# Patient Record
Sex: MALE | Race: WHITE | NOT HISPANIC OR LATINO | Employment: OTHER | URBAN - METROPOLITAN AREA
[De-identification: names, ages, dates, MRNs, and addresses within clinical notes are randomized per-mention and may not be internally consistent; named-entity substitution may affect disease eponyms.]

---

## 2017-01-09 ENCOUNTER — HOSPITAL ENCOUNTER (OUTPATIENT)
Dept: PREADMISSION TESTING | Facility: HOSPITAL | Age: 82
Discharge: HOME | End: 2017-01-09
Attending: ANESTHESIOLOGY
Payer: MEDICARE

## 2017-01-09 ENCOUNTER — ANESTHESIA EVENT (OUTPATIENT)
Dept: SURGERY | Facility: HOSPITAL | Age: 82
End: 2017-01-09
Payer: MEDICARE

## 2017-01-09 VITALS
DIASTOLIC BLOOD PRESSURE: 58 MMHG | OXYGEN SATURATION: 92 % | SYSTOLIC BLOOD PRESSURE: 128 MMHG | HEART RATE: 60 BPM | TEMPERATURE: 98 F | HEIGHT: 70 IN | WEIGHT: 149.25 LBS | RESPIRATION RATE: 18 BRPM | BODY MASS INDEX: 21.37 KG/M2

## 2017-01-09 NOTE — IP AVS SNAPSHOT
[facility]  [address]  [facility] [address]  Phone: [phone]           Patient Discharge Instructions    Our goal is to set you up for success. This packet includes information on your condition, medications, and your home care. It will help you to care for yourself so you don't get sicker.     Please ask your nurse if you have any questions.        There are many details to remember when preparing for your surgery. Here is what you will need to do, please ask your nurse if there are more specific instructions and if you have any questions:    1. 24 hours before procedure Do not smoke or drink alcoholic beverages 24 hours prior to your procedure    2. Eating before procedure Do not eat or drink anything 8 hours before your procedure - this includes gum, mints, and candy.     3. Day of procedure Please remove all jewelry for the procedure. If you wear contact lenses, dentures, hearing aids or glasses, bring a container to put them in during your surgery and give to a family member for safekeeping.  If your doctor has scheduled you for an overnight stay, bring a small overnight bag with any personal items that you need.    4. After procedure Make arrangements in advance for transportation home by a responsible adult. It is not safe to drive a vehicle during the 24 hours following surgery.     PLEASE NOTE: You may be contacted the day before your surgery to confirm your surgery date and arrival time. The Surgery schedule has many variables which may affect the time of your surgery case. Family members should be available if your surgery time changes.                [facility]  Unless otherwise directed by your provider, please contact [facility], our nurse care line that is available for 24/7 assistance.     [phone] (toll-free)    Registered nurses in the [facility] provide clinical advisement, health education, appointment booking, and other  advisory services.                    ** Verify the list of medication(s) below is accurate and up to date. Carry this with you in case of emergency. If your medications have changed, please notify your healthcare provider.             Medication List      TAKE these medications        Additional Info                      amoxicillin-clavulanate 500-125mg 500-125 mg Tab   Commonly known as:  AUGMENTIN   Refills:  0   Dose:  1 tablet   Indications:  Pneumonia    Instructions:  Take 1 tablet (500 mg total) by mouth 2 (two) times daily. FOR 7 MORE DAYS     Begin Date    AM    Noon    PM    Bedtime       aspirin 81 MG EC tablet   Commonly known as:  ECOTRIN   Refills:  0   Dose:  81 mg    Instructions:  Take 81 mg by mouth once daily.     Begin Date    AM    Noon    PM    Bedtime       budesonide-formoterol 160-4.5 mcg 160-4.5 mcg/actuation Hfaa   Commonly known as:  SYMBICORT   Refills:  0   Dose:  2 puff    Instructions:  Inhale 2 puffs into the lungs every 12 (twelve) hours.     Begin Date    AM    Noon    PM    Bedtime       donepezil 10 MG tablet   Commonly known as:  ARICEPT   Refills:  0   Dose:  10 mg    Instructions:  Take 10 mg by mouth every evening.     Begin Date    AM    Noon    PM    Bedtime       escitalopram oxalate 10 MG tablet   Commonly known as:  LEXAPRO   Refills:  0   Dose:  10 mg    Instructions:  Take 10 mg by mouth once daily.     Begin Date    AM    Noon    PM    Bedtime       esomeprazole 40 MG capsule   Commonly known as:  NEXIUM   Refills:  0   Dose:  40 mg    Instructions:  Take 40 mg by mouth before breakfast.     Begin Date    AM    Noon    PM    Bedtime       fish oil-omega-3 fatty acids 300-1,000 mg capsule   Refills:  0   Dose:  2 g    Instructions:  Take 2 g by mouth once daily.     Begin Date    AM    Noon    PM    Bedtime       gabapentin 100 MG capsule   Commonly known as:  NEURONTIN   Refills:  0   Dose:  100 mg    Instructions:  Take 100 mg by mouth 3 (three) times daily.      Begin Date    AM    Noon    PM    Bedtime       hydrocodone-acetaminophen 5-325mg 5-325 mg per tablet   Commonly known as:  NORCO   Refills:  0   Dose:  1 tablet    Instructions:  Take 1 tablet by mouth every 6 (six) hours as needed for Pain.     Begin Date    AM    Noon    PM    Bedtime       latanoprost 0.005 % ophthalmic solution   Refills:  0   Dose:  1 drop    Instructions:  1 drop every evening.     Begin Date    AM    Noon    PM    Bedtime       lisinopril 5 MG tablet   Commonly known as:  PRINIVIL,ZESTRIL   Refills:  0   Dose:  5 mg    Instructions:  Take 5 mg by mouth once daily.     Begin Date    AM    Noon    PM    Bedtime       lorazepam 0.5 MG tablet   Commonly known as:  ATIVAN   Refills:  0   Dose:  0.5 mg    Instructions:  Take 0.5 mg by mouth 2 (two) times daily as needed for Anxiety.     Begin Date    AM    Noon    PM    Bedtime       memantine 14 mg Cspx   Refills:  0    Instructions:  Take by mouth.     Begin Date    AM    Noon    PM    Bedtime       metoprolol tartrate 25 MG tablet   Commonly known as:  LOPRESSOR   Refills:  0   Dose:  25 mg    Instructions:  Take 1 tablet (25 mg total) by mouth 2 (two) times daily.     Begin Date    AM    Noon    PM    Bedtime       MILK OF MAGNESIA 400 mg/5 mL Susp   Refills:  0   Dose:  30 mL   Generic drug:  magnesium hydroxide 400 mg/5 ml    Instructions:  Take 30 mLs by mouth daily as needed.     Begin Date    AM    Noon    PM    Bedtime       mirtazapine 15 MG disintegrating tablet   Commonly known as:  REMERON SOL-TAB   Refills:  0   Dose:  15 mg    Instructions:  Take 15 mg by mouth every evening.     Begin Date    AM    Noon    PM    Bedtime       ondansetron 4 MG tablet   Commonly known as:  ZOFRAN   Refills:  0      Begin Date    AM    Noon    PM    Bedtime       ONE DAILY MULTIVITAMIN per tablet   Refills:  0   Dose:  1 tablet   Generic drug:  multivitamin    Instructions:  Take 1 tablet by mouth once daily.     Begin Date    AM    Noon    PM     Bedtime       pravastatin 40 MG tablet   Commonly known as:  PRAVACHOL   Refills:  0   Dose:  40 mg    Instructions:  Take 40 mg by mouth once daily.     Begin Date    AM    Noon    PM    Bedtime       VITAMIN B-1 100 MG tablet   Refills:  0   Dose:  100 mg   Generic drug:  thiamine    Instructions:  Take 100 mg by mouth once daily.     Begin Date    AM    Noon    PM    Bedtime       VITAMIN B-12 1000 MCG tablet   Refills:  0   Dose:  100 mcg   Generic drug:  cyanocobalamin    Instructions:  Take 100 mcg by mouth once daily.     Begin Date    AM    Noon    PM    Bedtime                  Please bring to all follow up appointments:    1. A copy of your discharge instructions.  2. All medicines you are currently taking in their original bottles.  3. Identification and insurance card.    Please arrive 15 minutes ahead of scheduled appointment time.    Please call 24 hours in advance if you must reschedule your appointment and/or time.        Your Scheduled Appointments     Jan 09, 2017  2:00 PM CST   Non-Fasting Lab with LAB, APPOINTMENT [place] [facility]    [address] [id number] [phone]            Jan 16, 2017  7:30 AM CST   Lymph Node Imaging with [facility] NM4 MG2 400LB LIMIT   [facility]    [address] [id number] [phone]              Your Future Surgeries/Procedures     Jan 16, 2017   Surgery with Abraham Villafuerte MD   [facility]    [address] [id number] [phone]                  Discharge Instructions       Your surgery has been scheduled for:__________________________________________    You should report to:  ____[facility], located on the [place] side of the first floor of the           [facility] ([phone])  ____The [facility] Center, located on  the [facility] side of the            Second floor of the [facility] ([phone])  ____3rd Floor SSCU located on the [facility] side of the [facility] [phone]  Please Note   - Tell your doctor if you take Aspirin, products containing Aspirin, herbal medications  or blood thinners, such as Coumadin, Ticlid, or Plavix.  (Consult your provider regarding holding or stopping before surgery).  - Arrange for someone to drive you home following surgery.  You will not be allowed to leave the surgical facility alone or drive yourself home following sedation and anesthesia.  Before Surgery  - Stop taking all herbal medications 14days prior to surgery  - No Motrin/Advil (Ibuprofen) 7 days before surgery  - No Aleve (Naproxen) 7 days before surgery  - Stop Taking Asprin, products containing Asprin _____days before surgery  - Stop taking blood thinners_______days before surgery  - Refrain from drinking alcoholic beverages for 24hours before and after surgery  - Stop or limit smoking _________days before surgery  Night before Surgery  - DO NOT EAT OR DRINK ANYTHING AFTER MIDNIGHT, INCLUDING GUM, HARD CANDY, MINTS, OR CHEWING TOBACCO.  - Take a shower or bath (shower is recommended).  Bathe with Hibiclens soap or an antibacterial soap from the neck down.  If not supplied by your surgeon, hibiclens soap will need to be purchased over the counter in pharmacy.  Rinse soap off thoroughly.  - Shampoo your hair with your regular shampoo  The Day of Surgery  - Take another bath or shower with hibiclens or any antibacterial soap, to reduce the chance of infection.  - Take heart and blood pressure medications with a small sip of water, as advised by the perioperative team.  - Do not take fluid pills  - You may brush your teeth and rinse your mouth, but do not swall any additional water.   - Do not apply perfumes, powder, body lotions or deodorant on the day of surgery.  - Nail polish  should be removed.  - Do not wear makeup or moisturizer  - Wear comfortable clothes, such as a button front shirt and loose fitting pants.  - Leave all jewelry, including body piercings, and valuables at home.    - Bring any devices you will neeed after surgery such as crutches or canes.  - If you have sleep apnea, please bring your CPAP machine  In the event that your physical condition changes including the onset of a cold or respiratory illness, or if you have to delay or cancel your surgery, please notify your surgeon.  Anesthesia: Monitored Anesthesia Care (MAC)  Youre due to have surgery. During surgery, youll be given medication called anesthesia. This will keep you comfortable and pain-free. Your surgeon will use monitored anesthesia care (MAC). This sheet tells you more about this type of anesthesia.    What Is Monitored Anesthesia Care?  MAC keeps you very drowsy during surgery. You may be awake, but you will likely not remember much. And you wont feel pain. With MAC, medications are given through an IV line into a vein in your arm or hand. A local anesthetic may also be injected into the skin and muscle around the surgical site to numb it. The anesthesia provider monitors you during the procedure. He or she checks your heart rate and rhythm, blood pressure, and blood oxygen level.  Anesthesia Tools and Medications That May be Near You During Your Operation  You will likely have:  · A pulse oximeter on the end of your finger. This measures your blood oxygen level.  · Electrocardiography leads (electrodes) on your chest. These record your heart rate and rhythm.  · Medications given through an IV. These relax you and prevent pain. You may be awake or sleep lightly. If you have local anesthetic, it is injected directly into your skin.  · A facemask to give you oxygen, if needed.  Risks and Possible Complications  MAC has some risks. These include:  · Breathing problems  · Nausea and vomiting  · Allergic  "reaction to the anesthetic    Anesthesia Safety  · Follow all instructions you are given for how long not to eat or drink before your procedure.  · Be sure your doctor knows what medications you take. This includes over-the-counter medications, herbs, and supplements.  · Have an adult family member or friend drive you home after the procedure.  · For the first 24 hours after your surgery:  ¨ Do not drive or use heavy equipment.  ¨ Do not make important decisions or sign documents.  ¨ Avoid alcohol.  ¨ Have someone stay with you, if possible. They can watch for problems and help keep you safe.  © 3940-2496 [facility], [address] [place] [address]. All rights reserved. This information is not intended as a substitute for professional medical care. Always follow your healthcare professional's instructions.          Admission Information     Date & Time Provider Department CSN    1/9/2017  1:00 PM Uday Herrmann MD [facility] [id number]      Care Providers     Provider Role Specialty Primary office phone    Uday Herrmann MD Attending Provider Anesthesiology [phone]      Your Vitals Were     BP Pulse Temp Resp Height Weight    128/58 60 98.2 °F (36.8 °C) 18 5' 9.5" (1.765 m) 67.7 kg (149 lb 4 oz)    SpO2 BMI             92% 21.72 kg/m2         Recent Lab Values     No lab values to display.      Allergies as of 1/9/2017     No Known Allergies      Advance Directives     An advance directive is a document which, in the event you are no longer able to make decisions for yourself, tells your healthcare team what kind of treatment you do or do not want to receive, or who you would like to make those decisions for you.  If you do not currently have an advance directive, [facility] encourages you to create one.  For more information call:  [phone]), [phone] ([phone]),  or log on to [email or web address].        Language Assistance Services     " ATTENTION: Language assistance services are available, free of charge. Please call [phone].      ATENCIÓN: Si aliya noguera, tiene a mcdowell disposición servicios gratuitos de asistencia lingüística. Llame al [phone].     CHÚ Ý: N?u b?n nói Ti?ng Vi?t, có các d?ch v? h? tr? ngôn ng? mi?n phí dành cho b?n. G?i s? [phone].        Pneumonmia Discharge Instructions                [facility] Sign-Up     Activating your [facility] account is as easy as 1-2-3!     1) Visit [email or web address], select Sign Up Now, enter this activation code and your date of birth, then select Next.  [email or web address]  Expires: 2/13/2017 11:05 AM      2) Create a username and password to use when you visit [facility] in the future and select a security question in case you lose your password and select Next.    3) Enter your e-mail address and click Sign Up!    Additional Information  If you have questions, please e-mail [email or web address] or call [phone] to talk to our [facility] staff. Remember, [facility] is NOT to be used for urgent needs. For medical emergencies, dial 911.          [facility] complies with applicable Federal civil rights laws and does not discriminate on the basis of race, color, national origin, age, disability, or sex.

## 2017-01-09 NOTE — ANESTHESIA PREPROCEDURE EVALUATION
01/09/2017  Fidel Allan is a 89 y.o., male.    [facility] Anesthesia Evaluation      I have reviewed the Medications.     Review of Systems  Anesthesia Hx:  No problems with previous Anesthesia  History of prior surgery of interest to airway management or planning: Previous anesthesia: General insertion of pacemaker at [facility] with general anesthesia.  Procedure performed at an [facility].  Denies Personal Hx of Anesthesia complications.   Social:  Former Smoker, No Alcohol Use Smoked x 25 yrs 1ppd; quit 25 yrs ago   Hematology/Oncology:        Current/Recent Cancer. (melanoma left posterior neck)   EENT/Dental:   glaucoma   Cardiovascular:   Exercise tolerance: poor Hypertension CAD asymptomatic CABG/stent   Denies Angina. hyperlipidemia ECG has been reviewed. Pt reports CABG over 20yrs ago.   [employer] pacemaker inserted 11/23/16  Aortic stenosis;  LHC at [facility] (in Dr Fofana's note states pt had stent placed & then the next day he implanted the pacemaker on 11/23/16); pt denies stent & is only taking low dose aspirin Functional Capacity 2 METS, walks on own around nursing home, sometimes uses w/c; denies CP, SOB    Pulmonary:   Pneumonia (12/27/16 had sepsis 2/2 pneumonia; completed antibiotics ) COPD Denies Shortness of breath.  Denies Sleep Apnea.    Renal/:  Renal/ Normal     Hepatic/GI:   GERD, well controlled    Musculoskeletal:   Denies Arthritis.     Neurological:  Neurology Normal    Endocrine:   Denies Diabetes.    Psych:   Psychiatric History depression dementia         Physical Exam   Airway/Jaw/Neck:  Airway Findings: Mouth Opening: Normal Tongue: Normal  General Airway Assessment: Adult  Jaw/Neck Findings:     Neck ROM: Normal ROM      Dental:  Dental Findings: Edentulous   Chest/Lungs:  Chest/Lungs Findings: Clear to auscultation, Normal Respiratory Rate      Heart/Vascular:  Heart Findings: Rate: Normal  Rhythm: Regular Rhythm  Heart Murmur  Systolic  Systolic Heart Murmur Description: L Upper Sternal Border  Systolic Heart Murmur Grade: Grade II           Pt was seen in POC 1/9/17 accompanied by staff from nursing home/Angella Portillo RN      Anesthesia Plan  Type of Anesthesia, risks & benefits discussed:  Anesthesia Type:  general  Patient's Preference:   Intra-op Monitoring Plan: arterial line  Intra-op Monitoring Plan Comments:   Post Op Pain Control Plan:   Post Op Pain Control Plan Comments:   Induction:   IV  Beta Blocker:  Patient is not currently on a Beta-Blocker (No further documentation required).       Informed Consent: Patient understands risks and agrees with Anesthesia plan.  Questions answered. Anesthesia consent signed with patient.  ASA Score: 4     Day of Surgery Review of History & Physical:    H&P update referred to the surgeon.         Ready For Surgery From Anesthesia Perspective.

## 2017-01-09 NOTE — DISCHARGE INSTRUCTIONS
Your surgery has been scheduled for:__________________________________________    You should report to:  ____[facility], located on the [place] side of the first floor of the           [facility] ([phone])  ____[facility] Center, located on the [facility] side of the            Second floor of the [facility] ([phone])  ____3rd Floor SSCU located on the [facility] side of the [facility] [phone]  Please Note   - Tell your doctor if you take Aspirin, products containing Aspirin, herbal medications  or blood thinners, such as Coumadin, Ticlid, or Plavix.  (Consult your provider regarding holding or stopping before surgery).  - Arrange for someone to drive you home following surgery.  You will not be allowed to leave the surgical facility alone or drive yourself home following sedation and anesthesia.  Before Surgery  - Stop taking all herbal medications 14days prior to surgery  - No Motrin/Advil (Ibuprofen) 7 days before surgery  - No Aleve (Naproxen) 7 days before surgery  - Stop Taking Asprin, products containing Asprin _____days before surgery  - Stop taking blood thinners_______days before surgery  - Refrain from drinking alcoholic beverages for 24hours before and after surgery  - Stop or limit smoking _________days before surgery  Night before Surgery  - DO NOT EAT OR DRINK ANYTHING AFTER MIDNIGHT, INCLUDING GUM, HARD CANDY, MINTS, OR CHEWING TOBACCO.  - Take a shower or bath (shower is recommended).  Bathe with Hibiclens soap or an antibacterial soap from the neck down.  If not supplied by your surgeon, hibiclens soap will need to be purchased over the counter in pharmacy.  Rinse soap off thoroughly.  - Shampoo your hair with your regular shampoo  The Day of Surgery  - Take another bath or shower with hibiclens or any antibacterial soap, to reduce the chance of infection.  - Take heart and blood  pressure medications with a small sip of water, as advised by the perioperative team.  - Do not take fluid pills  - You may brush your teeth and rinse your mouth, but do not swall any additional water.   - Do not apply perfumes, powder, body lotions or deodorant on the day of surgery.  - Nail polish should be removed.  - Do not wear makeup or moisturizer  - Wear comfortable clothes, such as a button front shirt and loose fitting pants.  - Leave all jewelry, including body piercings, and valuables at home.    - Bring any devices you will neeed after surgery such as crutches or canes.  - If you have sleep apnea, please bring your CPAP machine  In the event that your physical condition changes including the onset of a cold or respiratory illness, or if you have to delay or cancel your surgery, please notify your surgeon.  Anesthesia: Monitored Anesthesia Care (MAC)  Youre due to have surgery. During surgery, youll be given medication called anesthesia. This will keep you comfortable and pain-free. Your surgeon will use monitored anesthesia care (MAC). This sheet tells you more about this type of anesthesia.    What Is Monitored Anesthesia Care?  MAC keeps you very drowsy during surgery. You may be awake, but you will likely not remember much. And you wont feel pain. With MAC, medications are given through an IV line into a vein in your arm or hand. A local anesthetic may also be injected into the skin and muscle around the surgical site to numb it. The anesthesia provider monitors you during the procedure. He or she checks your heart rate and rhythm, blood pressure, and blood oxygen level.  Anesthesia Tools and Medications That May be Near You During Your Operation  You will likely have:  · A pulse oximeter on the end of your finger. This measures your blood oxygen level.  · Electrocardiography leads (electrodes) on your chest. These record your heart rate and rhythm.  · Medications given through an IV. These relax  you and prevent pain. You may be awake or sleep lightly. If you have local anesthetic, it is injected directly into your skin.  · A facemask to give you oxygen, if needed.  Risks and Possible Complications  MAC has some risks. These include:  · Breathing problems  · Nausea and vomiting  · Allergic reaction to the anesthetic    Anesthesia Safety  · Follow all instructions you are given for how long not to eat or drink before your procedure.  · Be sure your doctor knows what medications you take. This includes over-the-counter medications, herbs, and supplements.  · Have an adult family member or friend drive you home after the procedure.  · For the first 24 hours after your surgery:  ¨ Do not drive or use heavy equipment.  ¨ Do not make important decisions or sign documents.  ¨ Avoid alcohol.  ¨ Have someone stay with you, if possible. They can watch for problems and help keep you safe.  © 9948-3397 [employer], [address] [place] [address], [place] [address]. All rights reserved. This information is not intended as a substitute for professional medical care. Always follow your healthcare professional's instructions.

## 2017-01-13 ENCOUNTER — TELEPHONE (OUTPATIENT)
Dept: SURGERY | Facility: CLINIC | Age: 82
End: 2017-01-13

## 2017-01-13 NOTE — TELEPHONE ENCOUNTER
[facility] notified for him to arrive at the 2nd Floor Radiology Department for 7:30am for Nuclear Medicine Lymph Node Mapping and then to go to the 2nd Floor Surgery Center for surgery with Dr. Villafuerte on Monday 1/16/17.  Representative at [facility] Verbalized understanding.

## 2017-01-16 ENCOUNTER — ANESTHESIA (OUTPATIENT)
Dept: SURGERY | Facility: HOSPITAL | Age: 82
End: 2017-01-16
Payer: MEDICARE

## 2017-01-16 ENCOUNTER — HOSPITAL ENCOUNTER (OUTPATIENT)
Dept: RADIOLOGY | Facility: HOSPITAL | Age: 82
Discharge: HOME | End: 2017-01-16
Attending: SURGERY
Payer: MEDICARE

## 2017-01-16 DIAGNOSIS — C43.9 MALIGNANT MELANOMA, UNSPECIFIED SITE: ICD-10-CM

## 2017-01-16 PROCEDURE — D9220A PRA ANESTHESIA: Mod: CRNA,,, | Performed by: REGISTERED NURSE

## 2017-01-16 PROCEDURE — 25000003 PHARM REV CODE 250: Performed by: SURGERY

## 2017-01-16 PROCEDURE — 25000003 PHARM REV CODE 250: Performed by: REGISTERED NURSE

## 2017-01-16 PROCEDURE — 63600175 PHARM REV CODE 636 W HCPCS: Performed by: REGISTERED NURSE

## 2017-01-16 PROCEDURE — 78195 LYMPH SYSTEM IMAGING: CPT | Mod: 26,GC,, | Performed by: RADIOLOGY

## 2017-01-16 PROCEDURE — D9220A PRA ANESTHESIA: Mod: ANES,,, | Performed by: ANESTHESIOLOGY

## 2017-01-16 PROCEDURE — A9541 TC99M SULFUR COLLOID: HCPCS

## 2017-01-16 RX ORDER — CEFAZOLIN SODIUM 1 G/3ML
INJECTION, POWDER, FOR SOLUTION INTRAMUSCULAR; INTRAVENOUS
Status: DISCONTINUED | OUTPATIENT
Start: 2017-01-16 | End: 2017-01-16

## 2017-01-16 RX ORDER — LIDOCAINE HCL/PF 100 MG/5ML
SYRINGE (ML) INTRAVENOUS
Status: DISCONTINUED | OUTPATIENT
Start: 2017-01-16 | End: 2017-01-16

## 2017-01-16 RX ORDER — ONDANSETRON 2 MG/ML
INJECTION INTRAMUSCULAR; INTRAVENOUS
Status: DISCONTINUED | OUTPATIENT
Start: 2017-01-16 | End: 2017-01-16

## 2017-01-16 RX ORDER — FENTANYL CITRATE 50 UG/ML
INJECTION, SOLUTION INTRAMUSCULAR; INTRAVENOUS
Status: DISCONTINUED | OUTPATIENT
Start: 2017-01-16 | End: 2017-01-16

## 2017-01-16 RX ORDER — PROPOFOL 10 MG/ML
VIAL (ML) INTRAVENOUS
Status: DISCONTINUED | OUTPATIENT
Start: 2017-01-16 | End: 2017-01-16

## 2017-01-16 RX ORDER — PHENYLEPHRINE HYDROCHLORIDE 10 MG/ML
INJECTION INTRAVENOUS
Status: DISCONTINUED | OUTPATIENT
Start: 2017-01-16 | End: 2017-01-16

## 2017-01-16 RX ADMIN — PHENYLEPHRINE HYDROCHLORIDE 200 MCG: 10 INJECTION INTRAVENOUS at 02:01

## 2017-01-16 RX ADMIN — PHENYLEPHRINE HYDROCHLORIDE 200 MCG: 10 INJECTION INTRAVENOUS at 01:01

## 2017-01-16 RX ADMIN — SODIUM CHLORIDE: 0.9 INJECTION, SOLUTION INTRAVENOUS at 01:01

## 2017-01-16 RX ADMIN — FENTANYL CITRATE 25 MCG: 50 INJECTION, SOLUTION INTRAMUSCULAR; INTRAVENOUS at 02:01

## 2017-01-16 RX ADMIN — PROPOFOL 120 MG: 10 INJECTION, EMULSION INTRAVENOUS at 01:01

## 2017-01-16 RX ADMIN — LIDOCAINE HYDROCHLORIDE 40 MG: 20 INJECTION, SOLUTION INTRAVENOUS at 01:01

## 2017-01-16 RX ADMIN — FENTANYL CITRATE 50 MCG: 50 INJECTION, SOLUTION INTRAMUSCULAR; INTRAVENOUS at 01:01

## 2017-01-16 RX ADMIN — PHENYLEPHRINE HYDROCHLORIDE 0.3 MCG/KG/MIN: 10 INJECTION INTRAVENOUS at 02:01

## 2017-01-16 RX ADMIN — PROPOFOL 30 MG: 10 INJECTION, EMULSION INTRAVENOUS at 02:01

## 2017-01-16 RX ADMIN — CEFAZOLIN 2 G: 1 INJECTION, POWDER, FOR SOLUTION INTRAVENOUS at 02:01

## 2017-01-16 RX ADMIN — ONDANSETRON 4 MG: 2 INJECTION INTRAMUSCULAR; INTRAVENOUS at 02:01

## 2017-01-16 RX ADMIN — PROPOFOL 50 MG: 10 INJECTION, EMULSION INTRAVENOUS at 02:01

## 2017-01-16 RX ADMIN — PHENYLEPHRINE HYDROCHLORIDE 300 MCG: 10 INJECTION INTRAVENOUS at 01:01

## 2017-01-16 NOTE — ANESTHESIA POSTPROCEDURE EVALUATION
"Anesthesia Post Evaluation    Patient: Fidel Allan    Procedure(s) Performed: Procedure(s) (LRB):  EXCISION-WIDE LOCAL 2 cm (Left)  BIOPSY-SENTINEL NODE (N/A)    Final Anesthesia Type: general  Patient location during evaluation: PACU  Patient participation: Yes- Able to Participate  Level of consciousness: awake and alert  Post-procedure vital signs: reviewed and stable  Pain management: adequate  Airway patency: patent  PONV status at discharge: No PONV  Anesthetic complications: no      Cardiovascular status: hemodynamically stable  Respiratory status: unassisted, spontaneous ventilation and room air  Hydration status: euvolemic  Follow-up not needed.        Visit Vitals    BP (!) 155/68    Pulse 60    Temp 36.7 °C (98.1 °F) (Oral)    Resp (!) 21    Ht 5' 9" (1.753 m)    Wt 68 kg (150 lb)    SpO2 (!) 93%    BMI 22.15 kg/m2       Pain/Fior Score: Pain Assessment Performed: Yes (1/16/2017  5:00 PM)  Presence of Pain: denies (1/16/2017  5:00 PM)  Pain Rating Prior to Med Admin: 3 (1/16/2017  4:27 PM)  Fior Score: 10 (1/16/2017  5:00 PM)      "

## 2017-01-16 NOTE — ANESTHESIA RELEASE NOTE
"Anesthesia Release from PACU Note    Patient: Fidel Allan    Procedure(s) Performed: Procedure(s) (LRB):  EXCISION-WIDE LOCAL 2 cm (Left)  BIOPSY-SENTINEL NODE (N/A)    Anesthesia type: general    Post pain: Adequate analgesia    Post assessment: no apparent anesthetic complications, tolerated procedure well and no evidence of recall    Last Vitals:   Visit Vitals    BP (!) 155/68    Pulse 60    Temp 36.7 °C (98.1 °F) (Oral)    Resp (!) 21    Ht 5' 9" (1.753 m)    Wt 68 kg (150 lb)    SpO2 (!) 93%    BMI 22.15 kg/m2       Post vital signs: stable    Level of consciousness: awake, alert  and oriented    Nausea/Vomiting: no nausea/no vomiting    Complications: none    Airway Patency: patent    Respiratory: unassisted    Cardiovascular: stable and blood pressure at baseline    Hydration: euvolemic  "

## 2017-01-16 NOTE — TRANSFER OF CARE
"Anesthesia Transfer of Care Note    Patient: Fidel Allan    Procedure(s) Performed: Procedure(s) (LRB):  EXCISION-WIDE LOCAL 2 cm (Left)  BIOPSY-SENTINEL NODE (N/A)    Patient location: PACU    Anesthesia Type: general    Transport from OR: Transported from OR on 6-10 L/min O2 by face mask with adequate spontaneous ventilation    Post pain: adequate analgesia    Post assessment: no apparent anesthetic complications and tolerated procedure well    Post vital signs: stable    Level of consciousness: awake, alert and oriented    Nausea/Vomiting: no nausea/vomiting    Complications: none          Last vitals:   Visit Vitals    BP (!) 167/78 (BP Location: Left arm, Patient Position: Lying, BP Method: Automatic)    Pulse (!) 58    Temp 36.4 °C (97.6 °F) (Oral)    Resp 18    Ht 5' 9" (1.753 m)    Wt 68 kg (150 lb)    SpO2 99%    BMI 22.15 kg/m2     "

## 2017-01-17 ENCOUNTER — TELEPHONE (OUTPATIENT)
Dept: SURGERY | Facility: CLINIC | Age: 82
End: 2017-01-17

## 2017-01-17 NOTE — TELEPHONE ENCOUNTER
----- Message from Lynn Rose sent at 1/17/2017 10:51 AM CST -----  Contact: Astrid/ [phone]/ assisted living  Astrid called and wanted to schedule a 1 week wound re check. Please call and advise Astrid @ [phone]. Thanks :)

## 2017-01-17 NOTE — TELEPHONE ENCOUNTER
Spoke to safia at nursing [place], she stated that the JEANETH drain is leaking air, per DR Villafuerte, suggested that she place a xeroform on wound  and around   JEANETH insertion site to help with the leaking, she will call if in continues to give her trouble

## 2017-01-17 NOTE — TELEPHONE ENCOUNTER
----- Message from Burton Neal sent at 1/17/2017 11:51 AM CST -----  Contact: Monique anne [facility]  [place],    Caller needs to speak back with you regarding pt. Please call her at [phone]

## 2017-01-26 ENCOUNTER — OFFICE VISIT (OUTPATIENT)
Dept: SURGERY | Facility: CLINIC | Age: 82
End: 2017-01-26
Payer: MEDICARE

## 2017-01-26 VITALS — HEIGHT: 69 IN | TEMPERATURE: 98 F | WEIGHT: 149 LBS | BODY MASS INDEX: 22.07 KG/M2

## 2017-01-26 DIAGNOSIS — C43.4 MALIGNANT MELANOMA OF NECK: Primary | ICD-10-CM

## 2017-01-26 PROCEDURE — 99212 OFFICE O/P EST SF 10 MIN: CPT | Mod: PBBFAC,PO | Performed by: SURGERY

## 2017-01-26 PROCEDURE — 99024 POSTOP FOLLOW-UP VISIT: CPT | Mod: ,,, | Performed by: SURGERY

## 2017-01-26 PROCEDURE — 99999 PR PBB SHADOW E&M-EST. PATIENT-LVL II: CPT | Mod: PBBFAC,,, | Performed by: SURGERY

## 2017-01-26 RX ORDER — NITROGLYCERIN 0.3 MG/1
0.3 TABLET SUBLINGUAL EVERY 5 MIN PRN
COMMUNITY
Start: 2017-01-05

## 2017-01-26 RX ORDER — NYSTATIN 100000 [USP'U]/ML
SUSPENSION ORAL
COMMUNITY
Start: 2017-01-02 | End: 2017-01-29 | Stop reason: CLARIF

## 2017-01-26 RX ORDER — MEMANTINE HYDROCHLORIDE 14 MG/1
14 CAPSULE, EXTENDED RELEASE ORAL DAILY
COMMUNITY
Start: 2017-01-17

## 2017-01-26 RX ORDER — MIRTAZAPINE 15 MG/1
30 TABLET, FILM COATED ORAL NIGHTLY
COMMUNITY
Start: 2017-01-09

## 2017-01-26 RX ORDER — TRAMADOL HYDROCHLORIDE 50 MG/1
TABLET ORAL
COMMUNITY
Start: 2017-01-09 | End: 2017-01-29 | Stop reason: CLARIF

## 2017-01-26 NOTE — LETTER
[facility]  [address]  [facility] [address]  Phone: [phone] January 26, 2017      Jeffrey Watson MD  [address] [place] [address]  [address]  [facility]  [place] [address]    Patient: Fidel Allan   MR Number: [id number]   YOB: 1927   Date of Visit: 1/26/2017     Dear Dr. Watson:    I saw your patient Fidel Allan in my surgery clinic. Below are the relevant portions of my assessment and plan of care.    Mr. Allan is a 89-year-old male who is status post melanoma excision with SLNB, negative margins and negative node.    Patient will f/u with Dr. Watson, his Dermatologist, Q 3 - 6 months for whole body skin screening and surveillance.  No role for any further adjuvant therapy for his melanoma with negative margins of resection and negative SLNB at age 89 for stage II melanoma with thick T4 primary lesion.  F/U with me prn at this point.    Incision site well healed without signs of infection.  Drain and sutures removed. A copy of the pathology report discussed with patient and caretaker and a copy provided to them as well.    If you have questions, please do not hesitate to call me. I look forward to following Fidel along with you.    Sincerely,        Abraham Villafuerte MD  Medical Director, [facility] [place]  Section of General and Oncologic Surgery  [facility]    RLC/hcr     [facility]

## 2017-01-26 NOTE — PROGRESS NOTES
"Fidel Allan is a 89 y.o. male patient.   No diagnosis found.  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  Scheduled Meds:  Continuous Infusions:  PRN Meds:    Review of patient's allergies indicates:  No Known Allergies  There are no hospital problems to display for this patient.    Temperature 98.1 °F (36.7 °C), temperature source Oral, height 5' 9" (1.753 m), weight 67.6 kg (149 lb).    Subjective   Patient doing well since surgery.  No incisional breaking or drainage from incision.  Care provider did not bring drain logs, however the drain has never filled completely on any given day and was never drained more than once per day.  Patient denies pain.    Objective   Vitals above    NAD, friendly and cooperative  NSR  Lungs clear  Abd benign  Ext 2+ DPs x4, no c/c/e  Incision: clean and dry with running nylon suture in place, drain in place with minimal serous output in bulb and tubing    FINAL PATHOLOGIC DIAGNOSIS  1. Skin, left neck, excision:  - RESIDUAL ATYPICAL INTRAEPIDERMAL MELANOCYTIC PROLIFERATION. NO RESIDUAL INVASIVE  MELANOMA IS IDENTIFIED.  - MARGINS ARE NEGATIVE.  - PREVIOUS BIOPSY SITE CHANGES.  - INCIDENTAL MELANOCYTIC NEVUS, INTRADERMAL TYPE, PRESENT WITHIN BLOCKS S AND T.  MARGINS ARE NEGATIVE.  MICROSCOPIC DESCRIPTION: Sections show a proliferation of enlarged, variably atypical junctional melanocytes  exhibiting focal lentiginous and near-confluent growth along the dermoepidermal " junction. The tumor is located in  close proximity to an area with changes consistent with a previous biopsy site. All margins are negative. Within  blocks S and T, there are nests and cords of melanocytes within the dermis showing architectural symmetry,  maturation, and no cytological atypia.  2. Lymph node, left neck level II sentinel lymph node, excision:  - ONE LYMPH NODE NEGATIVE FOR METASTATIC MELANOMA (0/1).  MICROSCOPIC DESCRIPTION: Sections show lymphoid tissue. Atypical melanocytes are not noted. S-100, Mart-1  and HMB-45 immunohistochemical stains are performed and fail to identify atypical melanocytes. Appropriately  reactive controls are reviewed. Multiple levels are examined.     Assessment & Plan     The patient Fidel Allan is a 89 y.o. male s/p melanoma excision with SLNB, negative margins and negative node    Sutures and drain removed in clinic without issues  Follow-up as needed    Jonathan Schoen, MD  1/26/2017   I have personally taken the history and examined this patient and agree with the resident's note as stated above.  Pt will f/u with Dr. Watson his dermatologist Q 3-6 months for whole body skin screening and surveillance.  No role for any further adjuvant therapy for his melanoma with negative margins of resection and negative SLNB at age 89 for stage II melanoma with thick T4 primary lesion.  F/U with me prn at this point.  Incision site well healed with out signs of infection.  Drain and sutures removed.  A copy of the pathology report discussed with patient and caretaker and a copy provided to them as well.

## 2017-01-29 ENCOUNTER — HOSPITAL ENCOUNTER (INPATIENT)
Facility: HOSPITAL | Age: 82
LOS: 5 days | Discharge: HOME | DRG: 445 | End: 2017-02-03
Attending: EMERGENCY MEDICINE | Admitting: HOSPITALIST
Payer: MEDICARE

## 2017-01-29 DIAGNOSIS — R78.81 STREPTOCOCCAL BACTEREMIA: ICD-10-CM

## 2017-01-29 DIAGNOSIS — A49.1 INFECTION DUE TO STREPTOCOCCUS GALLOLYTICUS: ICD-10-CM

## 2017-01-29 DIAGNOSIS — K80.31 CHOLANGITIS DUE TO BILE DUCT CALCULUS WITH OBSTRUCTION: Primary | ICD-10-CM

## 2017-01-29 DIAGNOSIS — B95.5 STREPTOCOCCAL BACTEREMIA: ICD-10-CM

## 2017-01-29 DIAGNOSIS — I10 ESSENTIAL HYPERTENSION: Chronic | ICD-10-CM

## 2017-01-29 DIAGNOSIS — K83.09 CHOLANGITIS: ICD-10-CM

## 2017-01-29 DIAGNOSIS — I38 ENDOCARDITIS: ICD-10-CM

## 2017-01-29 DIAGNOSIS — N18.30 STAGE 3 CHRONIC KIDNEY DISEASE: Chronic | ICD-10-CM

## 2017-01-29 PROBLEM — D69.6 THROMBOCYTOPENIA: Status: ACTIVE | Noted: 2017-01-29

## 2017-01-29 PROBLEM — E80.6 HYPERBILIRUBINEMIA: Status: ACTIVE | Noted: 2017-01-29

## 2017-01-29 PROBLEM — E87.1 HYPONATREMIA: Status: ACTIVE | Noted: 2017-01-29

## 2017-01-29 PROBLEM — R74.01 TRANSAMINITIS: Status: ACTIVE | Noted: 2017-01-29

## 2017-01-29 PROBLEM — K80.50 CHOLEDOCHOLITHIASIS: Status: ACTIVE | Noted: 2017-01-29

## 2017-01-29 LAB
ALBUMIN SERPL BCP-MCNC: 3.2 G/DL
ALP SERPL-CCNC: 227 U/L
ALT SERPL W/O P-5'-P-CCNC: 184 U/L
ANION GAP SERPL CALC-SCNC: 9 MMOL/L
AST SERPL-CCNC: 327 U/L
BASOPHILS # BLD AUTO: 0.01 K/UL
BASOPHILS NFR BLD: 0.1 %
BILIRUB SERPL-MCNC: 2.1 MG/DL
BILIRUB UR QL STRIP: ABNORMAL
BNP SERPL-MCNC: 169 PG/ML
BUN SERPL-MCNC: 20 MG/DL
CALCIUM SERPL-MCNC: 8.4 MG/DL
CHLORIDE SERPL-SCNC: 99 MMOL/L
CK SERPL-CCNC: 71 U/L
CLARITY UR: CLEAR
CO2 SERPL-SCNC: 27 MMOL/L
COLOR UR: ABNORMAL
CREAT SERPL-MCNC: 1.1 MG/DL
DIFFERENTIAL METHOD: ABNORMAL
EOSINOPHIL # BLD AUTO: 0 K/UL
EOSINOPHIL NFR BLD: 0.1 %
ERYTHROCYTE [DISTWIDTH] IN BLOOD BY AUTOMATED COUNT: 14.1 %
EST. GFR  (AFRICAN AMERICAN): >60 ML/MIN/1.73 M^2
EST. GFR  (NON AFRICAN AMERICAN): 59 ML/MIN/1.73 M^2
FLUAV AG SPEC QL IA: NEGATIVE
FLUBV AG SPEC QL IA: NEGATIVE
GLUCOSE SERPL-MCNC: 104 MG/DL
GLUCOSE UR QL STRIP: NEGATIVE
HCT VFR BLD AUTO: 31.2 %
HGB BLD-MCNC: 9.7 G/DL
HGB UR QL STRIP: ABNORMAL
INR PPP: 1.1
KETONES UR QL STRIP: NEGATIVE
LACTATE SERPL-SCNC: 1.5 MMOL/L
LEUKOCYTE ESTERASE UR QL STRIP: NEGATIVE
LYMPHOCYTES # BLD AUTO: 0.9 K/UL
LYMPHOCYTES NFR BLD: 11.1 %
MAGNESIUM SERPL-MCNC: 1.8 MG/DL
MCH RBC QN AUTO: 27.7 PG
MCHC RBC AUTO-ENTMCNC: 31.1 %
MCV RBC AUTO: 89 FL
MONOCYTES # BLD AUTO: 0.7 K/UL
MONOCYTES NFR BLD: 8.7 %
NEUTROPHILS # BLD AUTO: 6.4 K/UL
NEUTROPHILS NFR BLD: 80 %
NITRITE UR QL STRIP: NEGATIVE
PH UR STRIP: 6 [PH] (ref 5–8)
PLATELET # BLD AUTO: 112 K/UL
PMV BLD AUTO: 10.3 FL
POTASSIUM SERPL-SCNC: 4.1 MMOL/L
PROT SERPL-MCNC: 6.1 G/DL
PROT UR QL STRIP: ABNORMAL
PROTHROMBIN TIME: 11.4 SEC
RBC # BLD AUTO: 3.5 M/UL
SODIUM SERPL-SCNC: 135 MMOL/L
SP GR UR STRIP: 1.02 (ref 1–1.03)
SPECIMEN SOURCE: NORMAL
TROPONIN I SERPL DL<=0.01 NG/ML-MCNC: 0.02 NG/ML
URN SPEC COLLECT METH UR: ABNORMAL
UROBILINOGEN UR STRIP-ACNC: 1 EU/DL
WBC # BLD AUTO: 8.03 K/UL

## 2017-01-29 PROCEDURE — 25000003 PHARM REV CODE 250: Performed by: EMERGENCY MEDICINE

## 2017-01-29 PROCEDURE — 80053 COMPREHEN METABOLIC PANEL: CPT

## 2017-01-29 PROCEDURE — 25500020 PHARM REV CODE 255: Performed by: EMERGENCY MEDICINE

## 2017-01-29 PROCEDURE — 96361 HYDRATE IV INFUSION ADD-ON: CPT

## 2017-01-29 PROCEDURE — 85610 PROTHROMBIN TIME: CPT

## 2017-01-29 PROCEDURE — 99285 EMERGENCY DEPT VISIT HI MDM: CPT | Mod: 25

## 2017-01-29 PROCEDURE — 93005 ELECTROCARDIOGRAM TRACING: CPT

## 2017-01-29 PROCEDURE — 96375 TX/PRO/DX INJ NEW DRUG ADDON: CPT

## 2017-01-29 PROCEDURE — 87186 SC STD MICRODIL/AGAR DIL: CPT

## 2017-01-29 PROCEDURE — 96365 THER/PROPH/DIAG IV INF INIT: CPT

## 2017-01-29 PROCEDURE — 83605 ASSAY OF LACTIC ACID: CPT

## 2017-01-29 PROCEDURE — 84484 ASSAY OF TROPONIN QUANT: CPT

## 2017-01-29 PROCEDURE — 83880 ASSAY OF NATRIURETIC PEPTIDE: CPT

## 2017-01-29 PROCEDURE — 81003 URINALYSIS AUTO W/O SCOPE: CPT

## 2017-01-29 PROCEDURE — 85025 COMPLETE CBC W/AUTO DIFF WBC: CPT

## 2017-01-29 PROCEDURE — 83735 ASSAY OF MAGNESIUM: CPT

## 2017-01-29 PROCEDURE — 87040 BLOOD CULTURE FOR BACTERIA: CPT | Mod: 59

## 2017-01-29 PROCEDURE — 87400 INFLUENZA A/B EACH AG IA: CPT

## 2017-01-29 PROCEDURE — 12000002 HC ACUTE/MED SURGE SEMI-PRIVATE ROOM

## 2017-01-29 PROCEDURE — 11000001 HC ACUTE MED/SURG PRIVATE ROOM

## 2017-01-29 PROCEDURE — 63600175 PHARM REV CODE 636 W HCPCS: Performed by: EMERGENCY MEDICINE

## 2017-01-29 PROCEDURE — 82550 ASSAY OF CK (CPK): CPT

## 2017-01-29 RX ORDER — DONEPEZIL HYDROCHLORIDE 5 MG/1
10 TABLET, FILM COATED ORAL NIGHTLY
Status: CANCELLED | OUTPATIENT
Start: 2017-01-29

## 2017-01-29 RX ORDER — MORPHINE SULFATE 2 MG/ML
2 INJECTION, SOLUTION INTRAMUSCULAR; INTRAVENOUS
Status: COMPLETED | OUTPATIENT
Start: 2017-01-29 | End: 2017-01-29

## 2017-01-29 RX ORDER — IBUPROFEN 200 MG
24 TABLET ORAL
Status: CANCELLED | OUTPATIENT
Start: 2017-01-29

## 2017-01-29 RX ORDER — ACETAMINOPHEN 325 MG/1
650 TABLET ORAL
Status: COMPLETED | OUTPATIENT
Start: 2017-01-29 | End: 2017-01-29

## 2017-01-29 RX ORDER — ESOMEPRAZOLE MAGNESIUM 40 MG/1
40 CAPSULE, DELAYED RELEASE ORAL DAILY
Status: ON HOLD | COMMUNITY
End: 2017-06-11

## 2017-01-29 RX ORDER — PRAVASTATIN SODIUM 40 MG/1
40 TABLET ORAL NIGHTLY
Status: CANCELLED | OUTPATIENT
Start: 2017-01-29

## 2017-01-29 RX ORDER — MIRTAZAPINE 15 MG/1
15 TABLET, ORALLY DISINTEGRATING ORAL NIGHTLY
Status: CANCELLED | OUTPATIENT
Start: 2017-01-29

## 2017-01-29 RX ORDER — INSULIN ASPART 100 [IU]/ML
1-10 INJECTION, SOLUTION INTRAVENOUS; SUBCUTANEOUS
Status: CANCELLED | OUTPATIENT
Start: 2017-01-29

## 2017-01-29 RX ORDER — GLUCAGON 1 MG
1 KIT INJECTION
Status: CANCELLED | OUTPATIENT
Start: 2017-01-29

## 2017-01-29 RX ORDER — ONDANSETRON 2 MG/ML
4 INJECTION INTRAMUSCULAR; INTRAVENOUS EVERY 6 HOURS PRN
Status: CANCELLED | OUTPATIENT
Start: 2017-01-29

## 2017-01-29 RX ORDER — FAMOTIDINE 20 MG/1
20 TABLET, FILM COATED ORAL 2 TIMES DAILY
Status: CANCELLED | OUTPATIENT
Start: 2017-01-29

## 2017-01-29 RX ORDER — IBUPROFEN 200 MG
16 TABLET ORAL
Status: CANCELLED | OUTPATIENT
Start: 2017-01-29

## 2017-01-29 RX ORDER — ESCITALOPRAM OXALATE 10 MG/1
10 TABLET ORAL EVERY MORNING
Status: CANCELLED | OUTPATIENT
Start: 2017-01-30

## 2017-01-29 RX ORDER — SODIUM CHLORIDE 9 MG/ML
1000 INJECTION, SOLUTION INTRAVENOUS
Status: COMPLETED | OUTPATIENT
Start: 2017-01-29 | End: 2017-01-29

## 2017-01-29 RX ORDER — GABAPENTIN 100 MG/1
100 CAPSULE ORAL 3 TIMES DAILY
Status: CANCELLED | OUTPATIENT
Start: 2017-01-29

## 2017-01-29 RX ORDER — ACETAMINOPHEN 325 MG/1
650 TABLET ORAL EVERY 6 HOURS PRN
COMMUNITY
End: 2018-06-20 | Stop reason: HOSPADM

## 2017-01-29 RX ORDER — ENOXAPARIN SODIUM 100 MG/ML
40 INJECTION SUBCUTANEOUS EVERY 24 HOURS
Status: CANCELLED | OUTPATIENT
Start: 2017-01-30

## 2017-01-29 RX ORDER — FLUTICASONE FUROATE AND VILANTEROL 100; 25 UG/1; UG/1
1 POWDER RESPIRATORY (INHALATION) DAILY
Status: CANCELLED | OUTPATIENT
Start: 2017-01-30

## 2017-01-29 RX ADMIN — PIPERACILLIN SODIUM AND TAZOBACTAM SODIUM 4.5 G: 4; .5 INJECTION, POWDER, FOR SOLUTION INTRAVENOUS at 08:01

## 2017-01-29 RX ADMIN — MORPHINE SULFATE 2 MG: 2 INJECTION, SOLUTION INTRAMUSCULAR; INTRAVENOUS at 10:01

## 2017-01-29 RX ADMIN — ACETAMINOPHEN 650 MG: 325 TABLET ORAL at 05:01

## 2017-01-29 RX ADMIN — SODIUM CHLORIDE 1000 ML: 0.9 INJECTION, SOLUTION INTRAVENOUS at 09:01

## 2017-01-29 RX ADMIN — SODIUM CHLORIDE 1000 ML: 0.9 INJECTION, SOLUTION INTRAVENOUS at 06:01

## 2017-01-29 RX ADMIN — IOHEXOL 75 ML: 350 INJECTION, SOLUTION INTRAVENOUS at 07:01

## 2017-01-29 NOTE — ED PROVIDER NOTES
Encounter Date: 1/29/2017       History     Chief Complaint   Patient presents with    Fever     fever and chills since this afternoon     Review of patient's allergies indicates:  No Known Allergies  HPI Comments: Patient is an 89-year-old male brought in by EMS from his nursing home where he was noted with fever today.  Temperature at the nursing home was reportedly 101.9.  Patient has no physical complaints.  He denies headache, chest pain or abdominal pain.  No shortness of breath or cough.  No vomiting or diarrhea.    The history is provided by the patient, the nursing home and the EMS personnel.     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  Past Surgical History   Procedure Laterality Date    Coronary artery bypass graft      Colectomy      Cardiac pacemaker placement  2016    Coronary angioplasty with stent placement       History reviewed. No pertinent family history.  Social History   Substance Use Topics    Smoking status: Never Smoker    Smokeless tobacco: None    Alcohol use No     Review of Systems   Constitutional: Positive for fever.   Respiratory: Negative for cough.    Cardiovascular: Negative for chest pain.   Gastrointestinal: Negative for abdominal pain, diarrhea and vomiting.   All other systems reviewed and are negative.      Physical Exam   Initial Vitals   BP Pulse Resp Temp SpO2   01/29/17 1635 01/29/17 1635 01/29/17 1635 01/29/17 1635 01/29/17 1635   140/47 74 18 102.4 °F (39.1  °C) 93 %     Physical Exam    Nursing note and vitals reviewed.  Constitutional: No distress.   HENT:   Head: Normocephalic and atraumatic.   Eyes: EOM are normal.   Neck: Normal range of motion. Neck supple.   Cardiovascular:   GIA.   Pulmonary/Chest:   Diminished breath sounds bilaterally.  (Poor inspiratory effort.)   Abdominal: Soft. There is no tenderness.   Musculoskeletal: Normal range of motion.   Neurological: He is alert.   Skin: Skin is warm and dry.   Psychiatric: His behavior is normal.         ED Course   Procedures  Labs Reviewed   CBC W/ AUTO DIFFERENTIAL - Abnormal; Notable for the following:        Result Value    RBC 3.50 (*)     Hemoglobin 9.7 (*)     Hematocrit 31.2 (*)     MCHC 31.1 (*)     Platelets 112 (*)     Lymph # 0.9 (*)     Gran% 80.0 (*)     Lymph% 11.1 (*)     All other components within normal limits   COMPREHENSIVE METABOLIC PANEL - Abnormal; Notable for the following:     Sodium 135 (*)     Calcium 8.4 (*)     Albumin 3.2 (*)     Total Bilirubin 2.1 (*)     Alkaline Phosphatase 227 (*)      (*)      (*)     eGFR if non  59 (*)     All other components within normal limits   URINALYSIS - Abnormal; Notable for the following:     Color, UA Orange (*)     Protein, UA Trace (*)     Bilirubin (UA) 1+ (*)     Occult Blood UA Trace (*)     All other components within normal limits   B-TYPE NATRIURETIC PEPTIDE - Abnormal; Notable for the following:      (*)     All other components within normal limits   CULTURE, BLOOD   CULTURE, BLOOD   CK   TROPONIN I   INFLUENZA A AND B ANTIGEN   PROTIME-INR   MAGNESIUM   LACTIC ACID, PLASMA     Imaging Results         X-Ray Chest 1 View (Final result) Result time:  01/29/17 17:30:10    Final result by Brooks Espinal MD (01/29/17 17:30:10)    Impression:        No acute cardiothoracic disease evident.      Electronically signed by: Dr. Brooks Espinal MD  Date:     01/29/17  Time:    17:30     Narrative:     PORTABLE AP CHEST:      Comparison: 12/28/16    Findings:     Sternal wires.  Bipolar pacer.  Surgical clips.  The lungs are clear. The cardiac silhouette is normal in size. The pulmonary vasculature is unremarkable. There is no pleural effusion or pneumothorax. The hilar and mediastinal contours are unremarkable. There are no acute bony abnormalities.                                   Attending Attestation:             Attending ED Notes:   Patient signed out to me in the follow-up on CAT scan results.  Patient is an 89-year-old nursing home resident was brought to emergency department for fevers.  Labwork showed elevated T bili, transaminases, and alkaline phosphatase.  CT abdomen and pelvis showed possible common bile duct obstruction.  Patient given antibiotics. Pt will need to be admitted to internal medicine for abx and probable ercp GI consulted.Dr. Boyle who agrees.           ED Course     Clinical Impression:   The encounter diagnosis was Cholangitis due to bile duct calculus with obstruction.          John Dhaliwal MD  01/29/17 [id number]

## 2017-01-29 NOTE — IP AVS SNAPSHOT
\[facility]\""  [address]  [place] [address]  Phone: [phone]           Patient Discharge Instructions     Our goal is to set you up for success. This packet includes information on your condition, medications, and your home care. It will help you to care for yourself so you don't get sicker and need to go back to the hospital.     Please ask your nurse if you have any questions.        There are many details to remember when preparing to leave the hospital. Here is what you will need to do:    1. Take your medicine. If you are prescribed medications, review your Medication List in the following pages. You may have new medications to  at the pharmacy and others that you'll need to stop taking. Review the instructions for how and when to take your medications. Talk with your doctor or nurses if you are unsure of what to do.     2. Go to your follow-up appointments. Specific follow-up information is listed in the following pages. Your may be contacted by a transition nurse or clinical provider about future appointments. Be sure we have all of the phone numbers to reach you, if needed. Please contact your provider's office if you are unable to make an appointment.     3. Watch for warning signs. Your doctor or nurse will give you detailed warning signs to watch for and when to call for assistance. These instructions may also include educational information about your condition. If you experience any of warning signs to your health, call your doctor.               ** Verify the list of medication(s) below is accurate and up to date. Carry this with you in case of emergency. If your medications have changed, please notify your healthcare provider.             Medication List      START taking these medications        Additional Info                      dextrose 5 % SolP 50 mL with cefTRIAXone 2 gram SolR 2 g   Refills:  0   Dose:  2 g   Indications:  Bacteremia, strep gallolyticus    Last  time this was given:  2,000 mg on 2/3/2017  2:26 PM   Instructions:  Inject 2 g into the vein once daily.     Begin Date    AM    Noon    PM    Bedtime         CONTINUE taking these medications        Additional Info                      acetaminophen 325 MG tablet   Commonly known as:  TYLENOL   Refills:  0   Dose:  650 mg    Last time this was given:  650 mg on 1/29/2017  5:01 PM   Instructions:  Take 650 mg by mouth every 6 (six) hours as needed for Pain.     Begin Date    AM    Noon    PM    Bedtime       aspirin 81 MG EC tablet   Commonly known as:  ECOTRIN   Refills:  0   Dose:  81 mg    Last time this was given:  81 mg on 2/3/2017  8:02 AM   Instructions:  Take 81 mg by mouth once daily.     Begin Date    AM    Noon    PM    Bedtime       budesonide-formoterol 160-4.5 mcg 160-4.5 mcg/actuation Hfaa   Commonly known as:  SYMBICORT   Refills:  0   Dose:  2 puff    Instructions:  Inhale 2 puffs into the lungs 2 (two) times daily.     Begin Date    AM    Noon    PM    Bedtime       donepezil 10 MG tablet   Commonly known as:  ARICEPT   Refills:  0   Dose:  10 mg    Last time this was given:  10 mg on 2/2/2017  8:29 PM   Instructions:  Take 10 mg by mouth every evening.     Begin Date    AM    Noon    PM    Bedtime       escitalopram oxalate 10 MG tablet   Commonly known as:  LEXAPRO   Refills:  0   Dose:  10 mg    Last time this was given:  10 mg on 2/3/2017  6:04 AM   Instructions:  Take 10 mg by mouth every morning.     Begin Date    AM    Noon    PM    Bedtime       esomeprazole 40 MG capsule   Commonly known as:  NEXIUM   Refills:  0   Dose:  40 mg    Instructions:  Take 40 mg by mouth once daily.     Begin Date    AM    Noon    PM    Bedtime       fish oil-omega-3 fatty acids 300-1,000 mg capsule   Refills:  0   Dose:  1 g    Instructions:  Take 1 g by mouth every morning.     Begin Date    AM    Noon    PM    Bedtime       gabapentin 100 MG capsule   Commonly known as:  NEURONTIN   Refills:  0   Dose:  100  mg    Last time this was given:  100 mg on 2/3/2017  2:25 PM   Instructions:  Take 100 mg by mouth 3 (three) times daily.     Begin Date    AM    Noon    PM    Bedtime       hydrocodone-acetaminophen 5-325mg 5-325 mg per tablet   Commonly known as:  NORCO   Refills:  0   Dose:  1 tablet    Instructions:  Take 1 tablet by mouth every 6 (six) hours as needed for Pain.     Begin Date    AM    Noon    PM    Bedtime       latanoprost 0.005 % ophthalmic solution   Refills:  0   Dose:  1 drop    Last time this was given:  1 drop on 2/2/2017  8:41 PM   Instructions:  Place 1 drop into both eyes every evening.     Begin Date    AM    Noon    PM    Bedtime       lisinopril 5 MG tablet   Commonly known as:  PRINIVIL,ZESTRIL   Refills:  0   Dose:  5 mg    Last time this was given:  5 mg on 2/3/2017  6:03 AM   Instructions:  Take 5 mg by mouth every morning. Hold for SBP < 120     Begin Date    AM    Noon    PM    Bedtime       lorazepam 0.5 MG tablet   Commonly known as:  ATIVAN   Refills:  0   Dose:  0.5 mg    Instructions:  Take 0.5 mg by mouth 2 (two) times daily as needed for Anxiety.     Begin Date    AM    Noon    PM    Bedtime       MILK OF MAGNESIA 400 mg/5 mL Susp   Refills:  0   Dose:  30 mL   Generic drug:  magnesium hydroxide 400 mg/5 ml    Instructions:  Take 30 mLs by mouth every evening.     Begin Date    AM    Noon    PM    Bedtime       mirtazapine 15 MG tablet   Commonly known as:  REMERON   Refills:  0   Dose:  15 mg    Last time this was given:  15 mg on 2/2/2017  8:29 PM   Instructions:  Take 15 mg by mouth every evening.     Begin Date    AM    Noon    PM    Bedtime       NAMENDA XR 14 mg Cspx   Refills:  0   Dose:  14 mg   Generic drug:  memantine    Instructions:  Take 14 mg by mouth once daily.     Begin Date    AM    Noon    PM    Bedtime       NITROSTAT 0.3 MG SL tablet   Refills:  0   Dose:  0.3 mg   Generic drug:  nitroGLYCERIN    Instructions:  Place 0.3 mg under the tongue every 5 (five) minutes  as needed for Chest pain.     Begin Date    AM    Noon    PM    Bedtime       ONE DAILY MULTIVITAMIN per tablet   Refills:  0   Dose:  1 tablet   Generic drug:  multivitamin    Instructions:  Take 1 tablet by mouth once daily.     Begin Date    AM    Noon    PM    Bedtime       pravastatin 40 MG tablet   Commonly known as:  PRAVACHOL   Refills:  0   Dose:  40 mg    Instructions:  Take 40 mg by mouth every evening.     Begin Date    AM    Noon    PM    Bedtime       VITAMIN B-1 100 MG tablet   Refills:  0   Dose:  100 mg   Generic drug:  thiamine    Last time this was given:  100 mg on 2/3/2017  6:03 AM   Instructions:  Take 100 mg by mouth every morning.     Begin Date    AM    Noon    PM    Bedtime       VITAMIN B-12 1000 MCG tablet   Refills:  0   Dose:  1000 mcg   Generic drug:  cyanocobalamin    Last time this was given:  1,000 mcg on 2/3/2017  8:02 AM   Instructions:  Take 1,000 mcg by mouth once daily.     Begin Date    AM    Noon    PM    Bedtime            Where to Get Your Medications      Information about where to get these medications is not yet available     ! Ask your nurse or doctor about these medications     dextrose 5 % SolP 50 mL with cefTRIAXone 2 gram SolR 2 g                  Please bring to all follow up appointments:    1. A copy of your discharge instructions.  2. All medicines you are currently taking in their original bottles.  3. Identification and insurance card.    Please arrive 15 minutes ahead of scheduled appointment time.    Please call 24 hours in advance if you must reschedule your appointment and/or time.        Follow-up Information     Follow up with Juan Elmore MD.    Specialty:  Internal Medicine    Why:  As needed    Contact information:    [facility] [address]  [place] [address]  [phone]          Follow up with [facility] On 2/3/2017.    Specialties:  Nursing Home Agency, SNF Agency    Why:  Nursing Home    Contact information:    [address]  [facility] [address]  [phone]        Referrals     Future Orders    Ambulatory referral to Outpatient Case Management     Questions:    Does the patient have a chronic or uncontrolled disease process?:  Yes    Does the patient have a new diagnosis of a catastrophic or life altering illness/treatment?:  No    Does the patient have any psycho-social issues that may affect their ability to adhere to treatment plan?:  Yes    Does patient have any behaviors or circumstances that may impede ability to adhere to treatment plan?:  Yes    Is patient at risk for admission/readmission?:  Yes        Discharge Instructions     Future Orders    Activity as tolerated     Aspiration precautions     Diet Adult Regular     Questions:    Total calories:      Fat restriction, if any:      Protein restriction, if any:      Na restriction, if any:      Fluid restriction:      Additional restrictions:  Cardiac (Low Na/Chol)    Dental Soft    Full code     Skin assessment every shift      Up in chair/ wheel chair     Vital signs per facility protocol         Primary Diagnosis     Your primary diagnosis was:  Cholangitis Due To Bile Duct Calculus With Obstruction      Admission Information     Date & Time Provider Department CSN    1/29/2017  4:22 PM Cristhian Reyes MD [facility] [id number]      Care Providers     Provider Role Specialty Primary office phone    Cristhian Reyes MD Attending Provider Hospitalist [phone]    Kerry Boyle MD Consulting Physician  Gastroenterology [phone]    Elzbieta Truong MD Consulting Physician  Gastroenterology  [phone]    Guillaume Landrum MD Surgeon  Gastroenterology [phone]    Yenny Elias MD Consulting Physician  Infectious Diseases [phone]    Yovany Guzman MD Surgeon  Cardiology [phone]      Important Medicare Message          Most Recent Value    Important Message from Medicare Regarding Discharge Appeal Rights  Other  "(comments), Signed/date by patient/caregiver [Patient not in room.] yes 01/31/2017 1134      Your Vitals Were     BP Pulse Temp Resp Height Weight    169/74 (BP Location: Right arm, Patient Position: Lying, BP Method: Automatic) 60 99.4 °F (37.4 °C) (Oral) 16 5' 9" (1.753 m) 68.3 kg (150 lb 9.6 oz)    SpO2 BMI             97% 22.24 kg/m2         Recent Lab Values     No lab values to display.      Pending Labs     Order Current Status    Blood culture Preliminary result    Blood culture Preliminary result      Allergies as of 2/3/2017     No Known Allergies      [facility]     [facility] Nurse Care Line - 24/7 Assistance  Unless otherwise directed by your provider, please contact [facility], our nurse care line that is available for 24/7 assistance.     Registered nurses in the [facility] provide clinical advisement, health education, appointment booking, and other advisory services.  Call for this free service at [phone].        Advance Directives     An advance directive is a document which, in the event you are no longer able to make decisions for yourself, tells your healthcare team what kind of treatment you do or do not want to receive, or who you would like to make those decisions for you.  If you do not currently have an advance directive, [facility] encourages you to create one.  For more information call:  [phone]), [phone] ([phone]),  or log on to [email or web address].        Language Assistance Services     ATTENTION: Language assistance services are available, free of charge. Please call [phone].      ATENCIÓN: Si habla español, tiene a mcdowell disposición servicios gratuitos de asistencia lingüística. Llame al [phone].     CHÚ Ý: N?u b?n nói Ti?ng Vi?t, có các d?ch v? h? tr? ngôn ng? mi?n phí dành cho b?n. G?i s? [phone].        Pneumonmia Discharge Instructions                Chronic Kindey Disease Education           "   [facility] Sign-Up     Activating your [facility] account is as easy as 1-2-3!     1) Visit [email or web address], select Sign Up Now, enter this activation code and your date of birth, then select Next.  [email or web address]  Expires: 2/13/2017 11:05 AM      2) Create a username and password to use when you visit [facility] in the future and select a security question in case you lose your password and select Next.    3) Enter your e-mail address and click Sign Up!    Additional Information  If you have questions, please e-mail [email or web address] or call [phone] to talk to our [facility] staff. Remember, [facility] is NOT to be used for urgent needs. For medical emergencies, dial 911.          [facility] complies with applicable Federal civil rights laws and does not discriminate on the basis of race, color, national origin, age, disability, or sex.

## 2017-01-30 ENCOUNTER — ANESTHESIA (OUTPATIENT)
Dept: ENDOSCOPY | Facility: HOSPITAL | Age: 82
DRG: 445 | End: 2017-01-30
Payer: MEDICARE

## 2017-01-30 ENCOUNTER — TELEPHONE (OUTPATIENT)
Dept: GASTROENTEROLOGY | Facility: CLINIC | Age: 82
End: 2017-01-30

## 2017-01-30 ENCOUNTER — ANESTHESIA EVENT (OUTPATIENT)
Dept: ENDOSCOPY | Facility: HOSPITAL | Age: 82
DRG: 445 | End: 2017-01-30
Payer: MEDICARE

## 2017-01-30 PROBLEM — C43.9 MELANOMA: Status: RESOLVED | Noted: 2017-01-16 | Resolved: 2017-01-30

## 2017-01-30 PROBLEM — I25.810 CORONARY ARTERY DISEASE INVOLVING CORONARY BYPASS GRAFT OF NATIVE HEART WITHOUT ANGINA PECTORIS: Chronic | Status: ACTIVE | Noted: 2017-01-30

## 2017-01-30 PROBLEM — I10 ESSENTIAL HYPERTENSION: Chronic | Status: ACTIVE | Noted: 2017-01-30

## 2017-01-30 PROBLEM — Z95.0 S/P PLACEMENT OF CARDIAC PACEMAKER: Status: ACTIVE | Noted: 2017-01-30

## 2017-01-30 LAB
ALBUMIN SERPL BCP-MCNC: 2.7 G/DL
ALP SERPL-CCNC: 213 U/L
ALT SERPL W/O P-5'-P-CCNC: 154 U/L
ANION GAP SERPL CALC-SCNC: 6 MMOL/L
AST SERPL-CCNC: 192 U/L
BASOPHILS # BLD AUTO: 0.03 K/UL
BASOPHILS NFR BLD: 0.2 %
BILIRUB DIRECT SERPL-MCNC: 1.1 MG/DL
BILIRUB SERPL-MCNC: 1.6 MG/DL
BUN SERPL-MCNC: 16 MG/DL
CALCIUM SERPL-MCNC: 8.2 MG/DL
CHLORIDE SERPL-SCNC: 105 MMOL/L
CO2 SERPL-SCNC: 24 MMOL/L
CREAT SERPL-MCNC: 1 MG/DL
DIFFERENTIAL METHOD: ABNORMAL
EOSINOPHIL # BLD AUTO: 0 K/UL
EOSINOPHIL NFR BLD: 0.1 %
ERYTHROCYTE [DISTWIDTH] IN BLOOD BY AUTOMATED COUNT: 14.5 %
EST. GFR  (AFRICAN AMERICAN): >60 ML/MIN/1.73 M^2
EST. GFR  (NON AFRICAN AMERICAN): >60 ML/MIN/1.73 M^2
GLUCOSE SERPL-MCNC: 100 MG/DL
HCT VFR BLD AUTO: 29.5 %
HGB BLD-MCNC: 9.1 G/DL
LYMPHOCYTES # BLD AUTO: 1.5 K/UL
LYMPHOCYTES NFR BLD: 12.5 %
MCH RBC QN AUTO: 27.7 PG
MCHC RBC AUTO-ENTMCNC: 30.8 %
MCV RBC AUTO: 90 FL
MONOCYTES # BLD AUTO: 1.3 K/UL
MONOCYTES NFR BLD: 10.7 %
NEUTROPHILS # BLD AUTO: 9.3 K/UL
NEUTROPHILS NFR BLD: 76.2 %
PLATELET # BLD AUTO: 101 K/UL
PMV BLD AUTO: 10.5 FL
POTASSIUM SERPL-SCNC: 4.2 MMOL/L
PROT SERPL-MCNC: 5.7 G/DL
RBC # BLD AUTO: 3.28 M/UL
SODIUM SERPL-SCNC: 135 MMOL/L
WBC # BLD AUTO: 12.23 K/UL

## 2017-01-30 PROCEDURE — 94640 AIRWAY INHALATION TREATMENT: CPT

## 2017-01-30 PROCEDURE — 27200949 HC CANNULATOME: Performed by: INTERNAL MEDICINE

## 2017-01-30 PROCEDURE — 37000008 HC ANESTHESIA 1ST 15 MINUTES: Performed by: INTERNAL MEDICINE

## 2017-01-30 PROCEDURE — 43264 ERCP REMOVE DUCT CALCULI: CPT | Mod: ,,, | Performed by: INTERNAL MEDICINE

## 2017-01-30 PROCEDURE — 37000009 HC ANESTHESIA EA ADD 15 MINS: Performed by: INTERNAL MEDICINE

## 2017-01-30 PROCEDURE — 25000003 PHARM REV CODE 250: Performed by: NURSE ANESTHETIST, CERTIFIED REGISTERED

## 2017-01-30 PROCEDURE — 25000003 PHARM REV CODE 250: Performed by: NURSE PRACTITIONER

## 2017-01-30 PROCEDURE — 27000221 HC OXYGEN, UP TO 24 HOURS

## 2017-01-30 PROCEDURE — 43264 ERCP REMOVE DUCT CALCULI: CPT | Performed by: INTERNAL MEDICINE

## 2017-01-30 PROCEDURE — 63600175 PHARM REV CODE 636 W HCPCS: Performed by: NURSE ANESTHETIST, CERTIFIED REGISTERED

## 2017-01-30 PROCEDURE — C1726 CATH, BAL DIL, NON-VASCULAR: HCPCS | Performed by: INTERNAL MEDICINE

## 2017-01-30 PROCEDURE — 36415 COLL VENOUS BLD VENIPUNCTURE: CPT

## 2017-01-30 PROCEDURE — 85025 COMPLETE CBC W/AUTO DIFF WBC: CPT

## 2017-01-30 PROCEDURE — 74328 X-RAY BILE DUCT ENDOSCOPY: CPT | Mod: 26,,, | Performed by: INTERNAL MEDICINE

## 2017-01-30 PROCEDURE — 80048 BASIC METABOLIC PNL TOTAL CA: CPT

## 2017-01-30 PROCEDURE — 94761 N-INVAS EAR/PLS OXIMETRY MLT: CPT

## 2017-01-30 PROCEDURE — 63600175 PHARM REV CODE 636 W HCPCS: Performed by: NURSE PRACTITIONER

## 2017-01-30 PROCEDURE — 0F798ZZ DILATION OF COMMON BILE DUCT, VIA NATURAL OR ARTIFICIAL OPENING ENDOSCOPIC: ICD-10-PCS | Performed by: INTERNAL MEDICINE

## 2017-01-30 PROCEDURE — 80076 HEPATIC FUNCTION PANEL: CPT

## 2017-01-30 PROCEDURE — C1769 GUIDE WIRE: HCPCS | Performed by: INTERNAL MEDICINE

## 2017-01-30 PROCEDURE — 99223 1ST HOSP IP/OBS HIGH 75: CPT | Mod: ,,, | Performed by: INTERNAL MEDICINE

## 2017-01-30 PROCEDURE — 11000001 HC ACUTE MED/SURG PRIVATE ROOM

## 2017-01-30 RX ORDER — LATANOPROST 50 UG/ML
1 SOLUTION/ DROPS OPHTHALMIC NIGHTLY
Status: DISCONTINUED | OUTPATIENT
Start: 2017-01-30 | End: 2017-02-03 | Stop reason: HOSPADM

## 2017-01-30 RX ORDER — FLUTICASONE FUROATE AND VILANTEROL 100; 25 UG/1; UG/1
1 POWDER RESPIRATORY (INHALATION) DAILY
Status: DISCONTINUED | OUTPATIENT
Start: 2017-01-30 | End: 2017-02-03 | Stop reason: HOSPADM

## 2017-01-30 RX ORDER — SODIUM CHLORIDE 0.9 % (FLUSH) 0.9 %
3 SYRINGE (ML) INJECTION
Status: CANCELLED | OUTPATIENT
Start: 2017-01-30

## 2017-01-30 RX ORDER — MORPHINE SULFATE 2 MG/ML
2 INJECTION, SOLUTION INTRAMUSCULAR; INTRAVENOUS EVERY 6 HOURS PRN
Status: DISCONTINUED | OUTPATIENT
Start: 2017-01-30 | End: 2017-02-03 | Stop reason: HOSPADM

## 2017-01-30 RX ORDER — HYDROMORPHONE HYDROCHLORIDE 2 MG/ML
0.2 INJECTION, SOLUTION INTRAMUSCULAR; INTRAVENOUS; SUBCUTANEOUS EVERY 5 MIN PRN
Status: CANCELLED | OUTPATIENT
Start: 2017-01-30

## 2017-01-30 RX ORDER — SODIUM CHLORIDE 9 MG/ML
INJECTION, SOLUTION INTRAVENOUS CONTINUOUS
Status: DISCONTINUED | OUTPATIENT
Start: 2017-01-30 | End: 2017-02-02

## 2017-01-30 RX ORDER — ONDANSETRON 2 MG/ML
8 INJECTION INTRAMUSCULAR; INTRAVENOUS EVERY 8 HOURS PRN
Status: DISCONTINUED | OUTPATIENT
Start: 2017-01-30 | End: 2017-02-03 | Stop reason: HOSPADM

## 2017-01-30 RX ORDER — ROCURONIUM BROMIDE 10 MG/ML
INJECTION, SOLUTION INTRAVENOUS
Status: DISCONTINUED | OUTPATIENT
Start: 2017-01-30 | End: 2017-01-30

## 2017-01-30 RX ORDER — PANTOPRAZOLE SODIUM 40 MG/1
40 TABLET, DELAYED RELEASE ORAL DAILY
Status: DISCONTINUED | OUTPATIENT
Start: 2017-01-30 | End: 2017-02-03 | Stop reason: HOSPADM

## 2017-01-30 RX ORDER — ACETAMINOPHEN 500 MG
500 TABLET ORAL EVERY 6 HOURS PRN
Status: DISCONTINUED | OUTPATIENT
Start: 2017-01-30 | End: 2017-02-03 | Stop reason: HOSPADM

## 2017-01-30 RX ORDER — GABAPENTIN 100 MG/1
100 CAPSULE ORAL 3 TIMES DAILY
Status: DISCONTINUED | OUTPATIENT
Start: 2017-01-30 | End: 2017-02-03 | Stop reason: HOSPADM

## 2017-01-30 RX ORDER — LORAZEPAM 0.5 MG/1
0.5 TABLET ORAL 2 TIMES DAILY PRN
Status: DISCONTINUED | OUTPATIENT
Start: 2017-01-30 | End: 2017-02-03 | Stop reason: HOSPADM

## 2017-01-30 RX ORDER — MEMANTINE HYDROCHLORIDE 5 MG/1
10 TABLET ORAL 2 TIMES DAILY
Status: DISCONTINUED | OUTPATIENT
Start: 2017-01-30 | End: 2017-02-03 | Stop reason: HOSPADM

## 2017-01-30 RX ORDER — SUCCINYLCHOLINE CHLORIDE 20 MG/ML
INJECTION INTRAMUSCULAR; INTRAVENOUS
Status: DISCONTINUED | OUTPATIENT
Start: 2017-01-30 | End: 2017-01-30

## 2017-01-30 RX ORDER — DONEPEZIL HYDROCHLORIDE 5 MG/1
10 TABLET, FILM COATED ORAL NIGHTLY
Status: DISCONTINUED | OUTPATIENT
Start: 2017-01-30 | End: 2017-02-03 | Stop reason: HOSPADM

## 2017-01-30 RX ORDER — ONDANSETRON 2 MG/ML
INJECTION INTRAMUSCULAR; INTRAVENOUS
Status: DISCONTINUED | OUTPATIENT
Start: 2017-01-30 | End: 2017-01-30

## 2017-01-30 RX ORDER — ESCITALOPRAM OXALATE 10 MG/1
10 TABLET ORAL EVERY MORNING
Status: DISCONTINUED | OUTPATIENT
Start: 2017-01-30 | End: 2017-02-03 | Stop reason: HOSPADM

## 2017-01-30 RX ORDER — RAMELTEON 8 MG/1
8 TABLET ORAL NIGHTLY PRN
Status: DISCONTINUED | OUTPATIENT
Start: 2017-01-30 | End: 2017-02-03 | Stop reason: HOSPADM

## 2017-01-30 RX ORDER — ASPIRIN 81 MG/1
81 TABLET ORAL DAILY
Status: DISCONTINUED | OUTPATIENT
Start: 2017-01-30 | End: 2017-02-03 | Stop reason: HOSPADM

## 2017-01-30 RX ORDER — MIRTAZAPINE 15 MG/1
15 TABLET, FILM COATED ORAL NIGHTLY
Status: DISCONTINUED | OUTPATIENT
Start: 2017-01-30 | End: 2017-02-03 | Stop reason: HOSPADM

## 2017-01-30 RX ORDER — LISINOPRIL 5 MG/1
5 TABLET ORAL EVERY MORNING
Status: DISCONTINUED | OUTPATIENT
Start: 2017-01-30 | End: 2017-02-03 | Stop reason: HOSPADM

## 2017-01-30 RX ORDER — THIAMINE HCL 100 MG
100 TABLET ORAL EVERY MORNING
Status: DISCONTINUED | OUTPATIENT
Start: 2017-01-30 | End: 2017-02-03 | Stop reason: HOSPADM

## 2017-01-30 RX ORDER — ETOMIDATE 2 MG/ML
INJECTION INTRAVENOUS
Status: DISCONTINUED | OUTPATIENT
Start: 2017-01-30 | End: 2017-01-30

## 2017-01-30 RX ORDER — LIDOCAINE HCL/PF 100 MG/5ML
SYRINGE (ML) INTRAVENOUS
Status: DISCONTINUED | OUTPATIENT
Start: 2017-01-30 | End: 2017-01-30

## 2017-01-30 RX ORDER — FENTANYL CITRATE 50 UG/ML
INJECTION, SOLUTION INTRAMUSCULAR; INTRAVENOUS
Status: DISCONTINUED | OUTPATIENT
Start: 2017-01-30 | End: 2017-01-30

## 2017-01-30 RX ORDER — ENOXAPARIN SODIUM 100 MG/ML
40 INJECTION SUBCUTANEOUS EVERY 24 HOURS
Status: DISCONTINUED | OUTPATIENT
Start: 2017-01-30 | End: 2017-02-03 | Stop reason: HOSPADM

## 2017-01-30 RX ORDER — SODIUM CHLORIDE, SODIUM LACTATE, POTASSIUM CHLORIDE, CALCIUM CHLORIDE 600; 310; 30; 20 MG/100ML; MG/100ML; MG/100ML; MG/100ML
125 INJECTION, SOLUTION INTRAVENOUS CONTINUOUS
Status: CANCELLED | OUTPATIENT
Start: 2017-01-30 | End: 2017-01-30

## 2017-01-30 RX ORDER — LANOLIN ALCOHOL/MO/W.PET/CERES
1000 CREAM (GRAM) TOPICAL DAILY
Status: DISCONTINUED | OUTPATIENT
Start: 2017-01-30 | End: 2017-02-03 | Stop reason: HOSPADM

## 2017-01-30 RX ORDER — ONDANSETRON 2 MG/ML
4 INJECTION INTRAMUSCULAR; INTRAVENOUS DAILY PRN
Status: CANCELLED | OUTPATIENT
Start: 2017-01-30

## 2017-01-30 RX ADMIN — LISINOPRIL 5 MG: 5 TABLET ORAL at 06:01

## 2017-01-30 RX ADMIN — PANTOPRAZOLE SODIUM 40 MG: 40 TABLET, DELAYED RELEASE ORAL at 08:01

## 2017-01-30 RX ADMIN — SODIUM CHLORIDE 500 ML: 0.9 INJECTION, SOLUTION INTRAVENOUS at 10:01

## 2017-01-30 RX ADMIN — Medication 100 MG: at 06:01

## 2017-01-30 RX ADMIN — PIPERACILLIN SODIUM AND TAZOBACTAM SODIUM 4.5 G: 4; .5 INJECTION, POWDER, FOR SOLUTION INTRAVENOUS at 03:01

## 2017-01-30 RX ADMIN — ASPIRIN 81 MG: 81 TABLET, COATED ORAL at 08:01

## 2017-01-30 RX ADMIN — LIDOCAINE HYDROCHLORIDE 50 MG: 20 INJECTION, SOLUTION INTRAVENOUS at 12:01

## 2017-01-30 RX ADMIN — Medication 1 TABLET: at 08:01

## 2017-01-30 RX ADMIN — GABAPENTIN 100 MG: 400 CAPSULE ORAL at 03:01

## 2017-01-30 RX ADMIN — ETOMIDATE 12 MG: 2 INJECTION, SOLUTION INTRAVENOUS at 12:01

## 2017-01-30 RX ADMIN — ESCITALOPRAM 10 MG: 10 TABLET, FILM COATED ORAL at 06:01

## 2017-01-30 RX ADMIN — SODIUM CHLORIDE: 0.9 INJECTION, SOLUTION INTRAVENOUS at 11:01

## 2017-01-30 RX ADMIN — MIRTAZAPINE 15 MG: 15 TABLET, FILM COATED ORAL at 08:01

## 2017-01-30 RX ADMIN — SODIUM CHLORIDE: 0.9 INJECTION, SOLUTION INTRAVENOUS at 12:01

## 2017-01-30 RX ADMIN — MEMANTINE HYDROCHLORIDE 10 MG: 5 TABLET ORAL at 08:01

## 2017-01-30 RX ADMIN — Medication 1 CAPSULE: at 08:01

## 2017-01-30 RX ADMIN — ENOXAPARIN SODIUM 40 MG: 100 INJECTION SUBCUTANEOUS at 03:01

## 2017-01-30 RX ADMIN — DONEPEZIL HYDROCHLORIDE 10 MG: 5 TABLET ORAL at 08:01

## 2017-01-30 RX ADMIN — FENTANYL CITRATE 50 MCG: 50 INJECTION, SOLUTION INTRAMUSCULAR; INTRAVENOUS at 12:01

## 2017-01-30 RX ADMIN — PIPERACILLIN SODIUM AND TAZOBACTAM SODIUM 4.5 G: 4; .5 INJECTION, POWDER, FOR SOLUTION INTRAVENOUS at 05:01

## 2017-01-30 RX ADMIN — FLUTICASONE FUROATE AND VILANTEROL TRIFENATATE 1 PUFF: 100; 25 POWDER RESPIRATORY (INHALATION) at 07:01

## 2017-01-30 RX ADMIN — SODIUM CHLORIDE: 0.9 INJECTION, SOLUTION INTRAVENOUS at 03:01

## 2017-01-30 RX ADMIN — PIPERACILLIN SODIUM AND TAZOBACTAM SODIUM 4.5 G: 4; .5 INJECTION, POWDER, FOR SOLUTION INTRAVENOUS at 08:01

## 2017-01-30 RX ADMIN — SUCCINYLCHOLINE CHLORIDE 100 MG: 20 INJECTION, SOLUTION INTRAMUSCULAR; INTRAVENOUS at 12:01

## 2017-01-30 RX ADMIN — CYANOCOBALAMIN TAB 1000 MCG 1000 MCG: 1000 TAB at 08:01

## 2017-01-30 RX ADMIN — GABAPENTIN 100 MG: 400 CAPSULE ORAL at 10:01

## 2017-01-30 RX ADMIN — ONDANSETRON 4 MG: 2 INJECTION, SOLUTION INTRAMUSCULAR; INTRAVENOUS at 12:01

## 2017-01-30 RX ADMIN — GABAPENTIN 100 MG: 400 CAPSULE ORAL at 05:01

## 2017-01-30 RX ADMIN — ROCURONIUM BROMIDE 5 MG: 10 INJECTION, SOLUTION INTRAVENOUS at 12:01

## 2017-01-30 RX ADMIN — LATANOPROST 1 DROP: 50 SOLUTION OPHTHALMIC at 08:01

## 2017-01-30 NOTE — CONSULTS
Gastroenterology  CC: Fever    HPI 89 y.o. male with the below listed as medical history presented to the hospital with fever.  Patients workup revealed a CT scan which I reviewed noting choledocholithiasis with some biliary ductal dilation.  Patient does endorse some nausea which is intermittent and came postprandial period is mild to moderate in intensity can last for hours without particular dietary relation.  No other exacerbating or relieving factors or other associated symptoms.  Denies any dark urine or jaundice.  He is alert and oriented ×3 and answers questions.  Been afebrile this morning.  He does endorse some bloating and dyspepsia.  No current chest pain or shortness of breath.  He denies dysuria, cough, sputum production.    The following portions of the patient's history were reviewed and updated as appropriate: allergies, current medications, past family history, past medical history, past social history, past surgical history and problem list.    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     Blindness of right eye     Bone disorder     Bradycardia     CAD (coronary artery disease)     Constipation     COPD (chronic obstructive pulmonary disease)     Dyspnea     Falls frequently     GERD (gastroesophageal reflux disease)     Glaucoma     Hearing loss     HLD (hyperlipidemia)     Hypertension     Insomnia     Major depressive disorder     Melanoma     Vascular dementia          Past Surgical History   Procedure Laterality Date    Coronary artery bypass graft      Colectomy      Cardiac pacemaker placement  2016    Coronary angioplasty with stent placement         Social History  Social History   Substance Use Topics    Smoking status: Never Smoker     "Smokeless tobacco: None    Alcohol use No         History reviewed. No pertinent family history.    Review of Systems  General ROS: negative for chills or weight loss; +fever  Psychological ROS: negative for hallucination, depression or suicidal ideation  Ophthalmic ROS: negative for blurry vision, photophobia or eye pain  ENT ROS: negative for epistaxis, sore throat or rhinorrhea  Respiratory ROS: no cough, shortness of breath, or wheezing  Cardiovascular ROS: no chest pain or dyspnea on exertion  Gastrointestinal ROS: no abdominal pain, change in bowel habits, or black/ bloody stools  Genito-Urinary ROS: no dysuria, trouble voiding, or hematuria  Musculoskeletal ROS: negative for gait disturbance or muscular weakness  Neurological ROS: no syncope or seizures; no ataxia  Dermatological ROS: negative for pruritis, rash and jaundice    Physical Examination  Visit Vitals    BP (!) 114/56    Pulse 60    Temp 99.4 °F (37.4 °C) (Oral)    Resp 18    Ht 5' 9" (1.753 m)    Wt 67.6 kg (149 lb)    SpO2 98%    BMI 22 kg/m2     General appearance: alert, cooperative, no distress  HENT: Normocephalic, atraumatic, neck symmetrical, no nasal discharge   Eyes: conjunctivae/corneas clear, PERRL, EOM's intact  Lungs: clear to auscultation bilaterally, no dullness to percussion bilaterally  Heart: regular rate and rhythm without rub; no displacement of the PMI   Abdomen: soft, non-tender; bowel sounds normoactive; no organomegaly  Extremities: extremities symmetric; no clubbing, cyanosis, or edema  Integument: Skin color, texture, turgor normal; no rashes; hair distrubution normal  Neurologic: Alert and oriented X 3, normal strength, normal coordination and gait  Psychiatric: no pressured speech; normal affect; no evidence of impaired cognition     Labs: Tb >1    Imaging: CT images/report reviewed, choledocholithiasis    Assessment/Plan: 88yo male with fever with:    1. Elevated LFTs/Choledocholithiasis   - plan for " ERCP     - numerous risk factors, copd, cad   - monitor liver function   - continue abx    - risks/benefits of procedure discussed in detail with the pt, he understands and is amenable to proceeding    - he is awake, conversive; alert oriented x 3   - I also discussed the procedure with his wife, she confirms he consents for himself   - monitor cultures

## 2017-01-30 NOTE — TRANSFER OF CARE
"Anesthesia Transfer of Care Note    Patient: Fidel Allan    Procedure(s) Performed: Procedure(s) (LRB):  ERCP (N/A)    Patient location: PACU    Anesthesia Type: general    Transport from OR: Transported from OR on 6-10 L/min O2 by face mask with adequate spontaneous ventilation    Post pain: adequate analgesia    Post assessment: no apparent anesthetic complications and tolerated procedure well    Post vital signs: stable    Level of consciousness: awake, alert and oriented    Nausea/Vomiting: no nausea/vomiting    Complications: none          Last vitals:   Visit Vitals    BP (!) 114/56    Pulse 60    Temp 37.4 °C (99.4 °F) (Oral)    Resp 18    Ht 5' 9" (1.753 m)    Wt 67.6 kg (149 lb)    SpO2 98%    BMI 22 kg/m2     "

## 2017-01-30 NOTE — PLAN OF CARE
Problem: Patient Care Overview  Goal: Plan of Care Review  Outcome: Ongoing (interventions implemented as appropriate)  Pt transferred to unit with belongings AA&O x4.  No verbalized complaints of pain. Respirations even and unlabored. On 2 liters NC with oxygen saturation of 95%. Pt blood pressure upon admission 100/49. NP dileep notified. IV fluids initiated. Pt notified not to eat or drink anything except meds. Safety maintained. Bed in lowest position, side rails up x 3, call light and personal items within reach, room close to nurses station, and bed alarm on. Pt notified to call for assistance if needed. Pt verbalizes understanding. Will continue to monitor.

## 2017-01-30 NOTE — ASSESSMENT & PLAN NOTE
"Transaminitis  Hyperbilirubinemia  , , , tbili 2.1.  Abdominal CT showed "Debris/sludge/possible choledocholithiasis identified level of the mid/distal common bile duct with mild proximal biliary ductal dilatation and intrahepatic biliary ductal dilatation.  MRCP and/or ultrasound recommended for further evaluation in this regard."  Given zosyn 4.5 gm and morphine 2 mg IV in ED.  Will obtain abdominal U/S. Continue zosyn 4.5 gm q8h. NPO p MN.  IVFs.  Daily BMP and LFTs.  Holding statin due to elevated LFTs.  GI consulted for evaluation; appreciate assistance.    "

## 2017-01-30 NOTE — ANESTHESIA RELEASE NOTE
"Anesthesia Release from PACU Note    Patient: Fidel Allan    Procedure(s) Performed: Procedure(s) (LRB):  ERCP (N/A)    Anesthesia type: general    Post pain: Adequate analgesia    Post assessment: no apparent anesthetic complications    Last Vitals:   Visit Vitals    BP (!) 119/57    Pulse 60    Temp 36.9 °C (98.5 °F) (Oral)    Resp 12    Ht 5' 9" (1.753 m)    Wt 67.6 kg (149 lb)    SpO2 95%    BMI 22 kg/m2       Post vital signs: stable    Level of consciousness: awake and oriented    Nausea/Vomiting: no nausea/no vomiting    Complications: none    Airway Patency: patent    Respiratory: unassisted, spontaneous ventilation, nasal cannula    Cardiovascular: stable    Hydration: euvolemic  "

## 2017-01-30 NOTE — ANESTHESIA PREPROCEDURE EVALUATION
01/30/2017  Fidel Allan is a 89 y.o., male w cholangitis from bile duct stone for ERCP.    2017-01-16 Wide_Excision GA  [fent 50, prop 120 => sbp no drop]        [iso, fent 25, phenyleph injection & infusioni; generally hypertensive]       [LMA-4 atraumatic]  2016-11-03 Pacemaker MAC mid 1, fent 25X3, prop 20-> 40...20 mcg/kg/min VSS NAAC    ANES-RELATED MED/SURG  HTN  CAD   - CABG  COPD  Sinus node dysfunction (bradycardia)   - pacemaker 2016  Moderate Aortic Stenosis  GERD  Vascular dementia  Thrombocytopenia  Malignant melanoma  Stage 3 chronic kidney disease  Blind R eye      Past Surgical History   Procedure Laterality Date    Coronary artery bypass graft      Colectomy      Cardiac pacemaker placement  2016    Coronary angioplasty with stent placement       ALLERGIES 2017-01-30  No Known Allergies    ANES-RELATED MAR 2017-01-30  asa  fluticasone-vilanterol memantine pantoprazole pip-tazo  enoxaparin    donepezil  lisinopril  [facility] Anesthesia Evaluation      I have reviewed the Medications.     Review of Systems  Anesthesia Hx:  No problems with previous Anesthesia  History of prior surgery of interest to airway management or planning: Previous anesthesia: General insertion of pacemaker at [facility] with general anesthesia.  Procedure performed at an [facility].  Denies Personal Hx of Anesthesia complications.   Social:  Former Smoker, No Alcohol Use Smoked x 25 yrs 1ppd; quit 25 yrs ago   Hematology/Oncology:         -- Anemia: Current/Recent Cancer. (melanoma left posterior neck)   EENT/Dental:   Glaucoma  Edentulous  Blind R eye   Cardiovascular:   Exercise tolerance: poor Hypertension CAD asymptomatic CABG/stent   Denies Angina. hyperlipidemia ECG has been reviewed. Pt reports CABG over 20yrs ago.   [employer] pacemaker inserted 11/23/16  Aortic stenosis;  LHC at [facility]  [facility] (in Dr Fofana's note states pt had stent placed & then the next day he implanted the pacemaker on 11/23/16); pt denies stent & is only taking low dose aspirin Functional Capacity 2 METS, walks on own around nursing home, sometimes uses w/c; denies CP, SOB    Pulmonary:   Pneumonia (12/27/16 had sepsis 2/2 pneumonia; completed antibiotics ) COPD Denies Shortness of breath.  Denies Sleep Apnea.    Renal/:  Renal/ Normal     Hepatic/GI:   GERD, well controlled 2017-01-30 Acute calculous cholecystitis (bile duct stone)   Musculoskeletal:   Denies Arthritis.     Neurological:  Neurology Normal    Endocrine:   Denies Diabetes.    Psych:   Psychiatric History depression 2017-01-30 Dementia, but seems able to consent       Wt Readings from Last 3 Encounters:   01/29/17 67.6 kg (149 lb)   01/26/17 67.6 kg (149 lb)   01/16/17 68 kg (150 lb)     Temp Readings from Last 3 Encounters:   01/30/17 37.4 °C (99.4 °F) (Oral)   01/26/17 36.7 °C (98.1 °F) (Oral)   01/16/17 36.8 °C (98.3 °F) (Oral)     BP Readings from Last 3 Encounters:   01/30/17 (!) 114/56   01/16/17 (!) 140/58   01/09/17 (!) 128/58     Pulse Readings from Last 3 Encounters:   01/30/17 60   01/16/17 60   01/09/17 60       Physical Exam   Airway/Jaw/Neck:  AIRWAY FINDINGS: Normal       Dental:  Dental Findings: Edentulous   Chest/Lungs:  Chest/Lungs Clear    Heart/Vascular:  Heart Findings: (pacemaker R infraclavicular fossa) Rate: Normal  Rhythm: Regular Rhythm  Heart Murmur  Systolic  Systolic Heart Murmur Description: L Upper Sternal Border  Systolic Heart Murmur Grade: Grade II           Lab Results   Component Value Date    WBC 12.23 01/30/2017    HGB 9.1 (L) 01/30/2017    HCT 29.5 (L) 01/30/2017    MCV 90 01/30/2017     (L) 01/30/2017       Chemistry        Component Value Date/Time     (L) 01/30/2017 0553    K 4.2 01/30/2017 0553     01/30/2017 0553    CO2 24 01/30/2017 0553    BUN 16 01/30/2017 0553    CREATININE 1.0 01/30/2017  0553     01/30/2017 0553        Component Value Date/Time    CALCIUM 8.2 (L) 01/30/2017 0553    ALKPHOS 213 (H) 01/30/2017 0553     (H) 01/30/2017 0553     (H) 01/30/2017 0553    BILITOT 1.6 (H) 01/30/2017 0553        Lab Results   Component Value Date    ALBUMIN 2.7 (L) 01/30/2017     CXR 2017-01-29  No acute cardiothoracic disease evident.    EKG 20106-12-23  ST, LAD, wide QRS, poss septal MI      Anesthesia Plan  Type of Anesthesia, risks & benefits discussed:  Anesthesia Type:  general  Patient's Preference:   Intra-op Monitoring Plan: arterial line  Intra-op Monitoring Plan Comments:   Post Op Pain Control Plan:   Post Op Pain Control Plan Comments:   Induction:   IV  Beta Blocker:  Patient is not currently on a Beta-Blocker (No further documentation required).       Informed Consent: Patient understands risks and agrees with Anesthesia plan.  Questions answered. Anesthesia consent signed with patient.  ASA Score: 4     Day of Surgery Review of History & Physical:    H&P update referred to the surgeon.     Anesthesia Plan Notes: 2017-01-30   - consent from pt & daughter in law (latter has already communicated w his wife who also agrees)   - Pt is NPO   - has pacemaker R infraclavicular fossa        Ready For Surgery From Anesthesia Perspective.      nnn

## 2017-01-30 NOTE — ANESTHESIA POSTPROCEDURE EVALUATION
"Anesthesia Post Evaluation    Patient: Fidel Allan    Procedure(s) Performed: Procedure(s) (LRB):  ERCP (N/A)    Final Anesthesia Type: general  Patient location during evaluation: PACU  Patient participation: Yes- Able to Participate  Level of consciousness: awake and alert  Post-procedure vital signs: reviewed and stable  Pain management: adequate  Airway patency: patent  PONV status at discharge: No PONV  Anesthetic complications: no      Cardiovascular status: hemodynamically stable  Respiratory status: unassisted  Hydration status: euvolemic  Follow-up not needed.        Visit Vitals    BP (!) 119/57    Pulse 60    Temp 36.9 °C (98.5 °F) (Oral)    Resp 12    Ht 5' 9" (1.753 m)    Wt 67.6 kg (149 lb)    SpO2 95%    BMI 22 kg/m2       Pain/Fior Score: Pain Assessment Performed: Yes (1/30/2017  1:46 PM)  Presence of Pain: denies (1/30/2017  1:46 PM)  Pain Rating Prior to Med Admin: 9 (1/29/2017 10:32 PM)  Fior Score: 9 (1/30/2017  1:46 PM)  Modified Fior Score: 19 (1/30/2017  1:46 PM)      "

## 2017-01-30 NOTE — PLAN OF CARE
Problem: Patient Care Overview  Goal: Plan of Care Review  Outcome: Ongoing (interventions implemented as appropriate)  No issues in recovery,vss, report called, patient signed out per dr evangelista, no issues

## 2017-01-30 NOTE — SUBJECTIVE & OBJECTIVE
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     Blindness of right eye     Bone disorder     Bradycardia     CAD (coronary artery disease)     Constipation     COPD (chronic obstructive pulmonary disease)     Dyspnea     Falls frequently     GERD (gastroesophageal reflux disease)     Glaucoma     Hearing loss     HLD (hyperlipidemia)     Hypertension     Insomnia     Major depressive disorder     Melanoma     Vascular dementia        Past Surgical History   Procedure Laterality Date    Coronary artery bypass graft      Colectomy      Cardiac pacemaker placement  2016    Coronary angioplasty with stent placement         Review of patient's allergies indicates:  No Known Allergies    No current facility-administered medications on file prior to encounter.      Current Outpatient Prescriptions on File Prior to Encounter   Medication Sig    aspirin (ECOTRIN) 81 MG EC tablet Take 81 mg by mouth once daily.    budesonide-formoterol 160-4.5 mcg (SYMBICORT) 160-4.5 mcg/actuation HFAA Inhale 2 puffs into the lungs every 12 (twelve) hours.    cyanocobalamin (VITAMIN B-12) 1000 MCG tablet Take 1,000 mcg by mouth once daily.     donepezil (ARICEPT) 10 MG tablet Take 10 mg by mouth every evening.    escitalopram oxalate (LEXAPRO) 10 MG tablet Take 10 mg by mouth every morning.     fish oil-omega-3 fatty acids 300-1,000 mg capsule Take 1 g by mouth every morning.     gabapentin (NEURONTIN) 100 MG capsule Take 100 mg by mouth 3 (three) times daily.    hydrocodone-acetaminophen 5-325mg (NORCO) 5-325 mg per tablet Take 1 tablet by mouth every 6 (six) hours as needed for Pain.    latanoprost 0.005 % ophthalmic solution Place 1 drop into both eyes every evening.     lisinopril (PRINIVIL,ZESTRIL) 5 MG tablet Take 5 mg by mouth every morning. Hold for SBP < 120    lorazepam (ATIVAN) 0.5 MG tablet Take 0.5 mg by mouth 2 (two) times daily as needed  for Anxiety.    magnesium hydroxide 400 mg/5 ml (MILK OF MAGNESIA) 400 mg/5 mL Susp Take 30 mLs by mouth every evening.     mirtazapine (REMERON) 15 MG tablet Take 15 mg by mouth every evening.     multivitamin (ONE DAILY MULTIVITAMIN) per tablet Take 1 tablet by mouth once daily.    NAMENDA XR 14 mg CSpX Take 14 mg by mouth once daily.     NITROSTAT 0.3 mg SL tablet Place 0.3 mg under the tongue every 5 (five) minutes as needed for Chest pain.     pravastatin (PRAVACHOL) 40 MG tablet Take 40 mg by mouth every evening.     thiamine (VITAMIN B-1) 100 MG tablet Take 100 mg by mouth every morning.      Family History     None        Social History Main Topics    Smoking status: Never Smoker    Smokeless tobacco: Not on file    Alcohol use No    Drug use: No    Sexual activity: Not on file     Review of Systems   Constitutional: Positive for fever. Negative for chills.   HENT: Negative for congestion and sore throat.    Eyes: Negative for photophobia and visual disturbance.   Respiratory: Negative for cough and shortness of breath.    Cardiovascular: Negative for chest pain and leg swelling.   Gastrointestinal: Positive for abdominal pain. Negative for constipation, diarrhea, nausea and vomiting.   Endocrine: Negative for cold intolerance and heat intolerance.   Genitourinary: Negative for dysuria, frequency and urgency.   Musculoskeletal: Negative for arthralgias and myalgias.   Skin: Negative for color change and pallor.   Neurological: Positive for weakness. Negative for dizziness, numbness and headaches.   Hematological: Does not bruise/bleed easily.   Psychiatric/Behavioral: Negative for agitation. The patient is not nervous/anxious.      Objective:     Vital Signs (Most Recent):  Temp: 99 °F (37.2 °C) (01/29/17 2236)  Pulse: 73 (01/29/17 2340)  Resp: (!) 23 (01/29/17 2340)  BP: (!) 96/40 (01/29/17 2340)  SpO2: 95 % (01/29/17 2340) Vital Signs (24h Range):  Temp:  [99 °F (37.2 °C)-102.4 °F (39.1 °C)] 99  °F (37.2 °C)  Pulse:  [63-82] 73  Resp:  [18-30] 23  SpO2:  [93 %-100 %] 95 %  BP: ()/(33-67) 96/40     Weight: 67.6 kg (149 lb)  Body mass index is 22 kg/(m^2).    Physical Exam   Constitutional: He is oriented to person, place, and time. He appears well-developed and well-nourished. He is cooperative. No distress. Nasal cannula in place.   2L NC   HENT:   Head: Normocephalic and atraumatic.   Right Ear: Decreased hearing is noted.   Left Ear: Decreased hearing is noted.   Mouth/Throat: Oropharynx is clear and moist.   edentulous   Eyes: Pupils are equal, round, and reactive to light. Scleral icterus is present.   Neck: Normal range of motion. Neck supple.   Cardiovascular: Normal rate, regular rhythm and intact distal pulses.    Murmur heard.   Systolic murmur is present with a grade of 3/6   Right chest wall pacemaker   Pulmonary/Chest: Effort normal and breath sounds normal. No respiratory distress. He has no wheezes.   Abdominal: Soft. Bowel sounds are increased. There is no tenderness. There is no rigidity, no rebound and no guarding.   Musculoskeletal: He exhibits no edema or tenderness.   Neurological: He is alert and oriented to person, place, and time. GCS eye subscore is 4. GCS verbal subscore is 5. GCS motor subscore is 6.   Skin: Skin is warm and dry.   Psychiatric: He has a normal mood and affect. His behavior is normal.   Nursing note and vitals reviewed.       Significant Labs:   Bilirubin:   Recent Labs  Lab 01/09/17  1455 01/29/17  1800   BILITOT 0.2 2.1*     CBC:   Recent Labs  Lab 01/29/17  1800   WBC 8.03   HGB 9.7*   HCT 31.2*   *     CMP:   Recent Labs  Lab 01/29/17  1800   *   K 4.1   CL 99   CO2 27      BUN 20   CREATININE 1.1   CALCIUM 8.4*   PROT 6.1   ALBUMIN 3.2*   BILITOT 2.1*   ALKPHOS 227*   *   *   ANIONGAP 9   EGFRNONAA 59*     Coagulation:   Recent Labs  Lab 01/29/17  1800   INR 1.1     Lactic Acid:   Recent Labs  Lab 01/29/17  1800   LACTATE  1.5     Troponin:   Recent Labs  Lab 01/29/17  1800   TROPONINI 0.022     Urine Studies:   Recent Labs  Lab 01/29/17  1902   COLORU [place]*   APPEARANCEUA Clear   PHUR 6.0   SPECGRAV 1.025   PROTEINUA Trace*   GLUCUA Negative   KETONESU Negative   BILIRUBINUA 1+*   OCCULTUA Trace*   NITRITE Negative   UROBILINOGEN 1.0   LEUKOCYTESUR Negative       Influenza A&B antigen: negative    All pertinent labs within the past 24 hours have been reviewed.    Significant Imaging: I have reviewed all pertinent imaging results/findings within the past 24 hours.     X-Ray Chest 1 View: No acute cardiothoracic disease evident.    CT Abdomen Pelvis With Contrast: Debris/sludge/possible choledocholithiasis identified level of the mid/distal common bile duct with mild proximal biliary ductal dilatation and intrahepatic biliary ductal dilatation.  MRCP and/or ultrasound recommended for further evaluation in this regard.

## 2017-01-30 NOTE — PLAN OF CARE
Problem: Patient Care Overview  Goal: Plan of Care Review  Outcome: Ongoing (interventions implemented as appropriate)  The proper method of use, as well as anticipated side effects, of this metered-dose inhaler are discussed and demonstrated to the patient.

## 2017-01-30 NOTE — H&P
"[facility]  [facility]    Patient Name: Fidel Allan  MRN: [id number]  Admission Date: 1/29/2017  Attending Physician: Dontrell Haskins MD   Primary Care Provider: Juan Elmore MD         Patient information was obtained from patient, nursing home, past medical records and ER records.     Subjective:     Principal Problem:Choledocholithiasis    Chief Complaint:  fever     HPI: Fidel Allan is a 89 y.o.  male with HTN, HLD, CAD s/p CABG x 3 and cardiac stents; COPD, glaucoma, depression and anxiety, melanoma s/p excision (1/16/17), and s/p pacemaker (11/22/16).  He is a resident of [facility].  His PCP at the NH is Dr. Juan Elmore.  He is followed by dermatologist Dr. Huang for melanoma.    Sent to ED from NH for reports of fever 101.1 F axillary, "not eating and increased weakness".  Patient complains of constant, non-radiating, epigastric pain beginning this morning after eating breakfast.  Reports pain as "small... Not too bad", accompanied by feeling of fullness and decreased appetite.  Denies shortness of breath, chest pain, nausea, vomiting, diarrhea, and constipation.    Upon arrival to ED patient with temperature of 102.4 F, WBC count 8K, Hgb/Hct 9.7/31.2, platelet 112K, lactate 1.5, , , , tbili 2.1.  CXR with no acute abnormalities.  Abdominal CT showed "Debris/sludge/possible choledocholithiasis identified level of the mid/distal common bile duct with mild proximal biliary ductal dilatation and intrahepatic biliary ductal dilatation.  MRCP and/or ultrasound recommended for further evaluation in this regard."  Given 1L NS, zosyn 4.5 gm, tylenol 650 mg, and morphine 2 mg IV in ED.  ED physician spoke to gastroenterologist Dr. Boyle who agreed with admission to Hospital Medicine service for ERCP.  [facility].      Past Medical History   Diagnosis Date    Anxiety     " 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     Blindness of right eye     Bone disorder     Bradycardia     CAD (coronary artery disease)     Constipation     COPD (chronic obstructive pulmonary disease)     Dyspnea     Falls frequently     GERD (gastroesophageal reflux disease)     Glaucoma     Hearing loss     HLD (hyperlipidemia)     Hypertension     Insomnia     Major depressive disorder     Melanoma     Vascular dementia        Past Surgical History   Procedure Laterality Date    Coronary artery bypass graft      Colectomy      Cardiac pacemaker placement  2016    Coronary angioplasty with stent placement         Review of patient's allergies indicates:  No Known Allergies    No current facility-administered medications on file prior to encounter.      Current Outpatient Prescriptions on File Prior to Encounter   Medication Sig    aspirin (ECOTRIN) 81 MG EC tablet Take 81 mg by mouth once daily.    budesonide-formoterol 160-4.5 mcg (SYMBICORT) 160-4.5 mcg/actuation HFAA Inhale 2 puffs into the lungs every 12 (twelve) hours.    cyanocobalamin (VITAMIN B-12) 1000 MCG tablet Take 1,000 mcg by mouth once daily.     donepezil (ARICEPT) 10 MG tablet Take 10 mg by mouth every evening.    escitalopram oxalate (LEXAPRO) 10 MG tablet Take 10 mg by mouth every morning.     fish oil-omega-3 fatty acids 300-1,000 mg capsule Take 1 g by mouth every morning.     gabapentin (NEURONTIN) 100 MG capsule Take 100 mg by mouth 3 (three) times daily.    hydrocodone-acetaminophen 5-325mg (NORCO) 5-325 mg per tablet Take 1 tablet by mouth every 6 (six) hours as needed for Pain.    latanoprost 0.005 % ophthalmic solution Place 1 drop into both eyes every evening.     lisinopril (PRINIVIL,ZESTRIL) 5 MG tablet Take 5 mg by mouth every morning. Hold for SBP < 120    lorazepam (ATIVAN) 0.5 MG tablet Take 0.5 mg by mouth 2 (two) times daily as needed for Anxiety.    magnesium hydroxide 400 mg/5 ml (MILK OF  MAGNESIA) 400 mg/5 mL Susp Take 30 mLs by mouth every evening.     mirtazapine (REMERON) 15 MG tablet Take 15 mg by mouth every evening.     multivitamin (ONE DAILY MULTIVITAMIN) per tablet Take 1 tablet by mouth once daily.    NAMENDA XR 14 mg CSpX Take 14 mg by mouth once daily.     NITROSTAT 0.3 mg SL tablet Place 0.3 mg under the tongue every 5 (five) minutes as needed for Chest pain.     pravastatin (PRAVACHOL) 40 MG tablet Take 40 mg by mouth every evening.     thiamine (VITAMIN B-1) 100 MG tablet Take 100 mg by mouth every morning.      Family History     None        Social History Main Topics    Smoking status: Never Smoker    Smokeless tobacco: Not on file    Alcohol use No    Drug use: No    Sexual activity: Not on file     Review of Systems   Constitutional: Positive for fever. Negative for chills.   HENT: Negative for congestion and sore throat.    Eyes: Negative for photophobia and visual disturbance.   Respiratory: Negative for cough and shortness of breath.    Cardiovascular: Negative for chest pain and leg swelling.   Gastrointestinal: Positive for abdominal pain. Negative for constipation, diarrhea, nausea and vomiting.   Endocrine: Negative for cold intolerance and heat intolerance.   Genitourinary: Negative for dysuria, frequency and urgency.   Musculoskeletal: Negative for arthralgias and myalgias.   Skin: Negative for color change and pallor.   Neurological: Positive for weakness. Negative for dizziness, numbness and headaches.   Hematological: Does not bruise/bleed easily.   Psychiatric/Behavioral: Negative for agitation. The patient is not nervous/anxious.      Objective:     Vital Signs (Most Recent):  Temp: 99 °F (37.2 °C) (01/29/17 2236)  Pulse: 73 (01/29/17 2340)  Resp: (!) 23 (01/29/17 2340)  BP: (!) 96/40 (01/29/17 2340)  SpO2: 95 % (01/29/17 2340) Vital Signs (24h Range):  Temp:  [99 °F (37.2 °C)-102.4 °F (39.1 °C)] 99 °F (37.2 °C)  Pulse:  [63-82] 73  Resp:  [18-30]  23  SpO2:  [93 %-100 %] 95 %  BP: ()/(33-67) 96/40     Weight: 67.6 kg (149 lb)  Body mass index is 22 kg/(m^2).    Physical Exam   Constitutional: He is oriented to person, place, and time. He appears well-developed and well-nourished. He is cooperative. No distress. Nasal cannula in place.   2L NC   HENT:   Head: Normocephalic and atraumatic.   Right Ear: Decreased hearing is noted.   Left Ear: Decreased hearing is noted.   Mouth/Throat: Oropharynx is clear and moist.   edentulous   Eyes: Pupils are equal, round, and reactive to light. Scleral icterus is present.   Neck: Normal range of motion. Neck supple.   Cardiovascular: Normal rate, regular rhythm and intact distal pulses.    Murmur heard.   Systolic murmur is present with a grade of 3/6   Right chest wall pacemaker   Pulmonary/Chest: Effort normal and breath sounds normal. No respiratory distress. He has no wheezes.   Abdominal: Soft. Bowel sounds are increased. There is no tenderness. There is no rigidity, no rebound and no guarding.   Musculoskeletal: He exhibits no edema or tenderness.   Neurological: He is alert and oriented to person, place, and time. GCS eye subscore is 4. GCS verbal subscore is 5. GCS motor subscore is 6.   Skin: Skin is warm and dry.   Psychiatric: He has a normal mood and affect. His behavior is normal.   Nursing note and vitals reviewed.       Significant Labs:   Bilirubin:   Recent Labs  Lab 01/09/17  1455 01/29/17  1800   BILITOT 0.2 2.1*     CBC:   Recent Labs  Lab 01/29/17  1800   WBC 8.03   HGB 9.7*   HCT 31.2*   *     CMP:   Recent Labs  Lab 01/29/17  1800   *   K 4.1   CL 99   CO2 27      BUN 20   CREATININE 1.1   CALCIUM 8.4*   PROT 6.1   ALBUMIN 3.2*   BILITOT 2.1*   ALKPHOS 227*   *   *   ANIONGAP 9   EGFRNONAA 59*     Coagulation:   Recent Labs  Lab 01/29/17  1800   INR 1.1     Lactic Acid:   Recent Labs  Lab 01/29/17  1800   LACTATE 1.5     Troponin:   Recent Labs  Lab  "01/29/17  1800   TROPONINI 0.022     Urine Studies:   Recent Labs  Lab 01/29/17  1902   COLORU [place]*   APPEARANCEUA Clear   PHUR 6.0   SPECGRAV 1.025   PROTEINUA Trace*   GLUCUA Negative   KETONESU Negative   BILIRUBINUA 1+*   OCCULTUA Trace*   NITRITE Negative   UROBILINOGEN 1.0   LEUKOCYTESUR Negative       Influenza A&B antigen: negative    All pertinent labs within the past 24 hours have been reviewed.    Significant Imaging: I have reviewed all pertinent imaging results/findings within the past 24 hours.     X-Ray Chest 1 View: No acute cardiothoracic disease evident.    CT Abdomen Pelvis With Contrast: Debris/sludge/possible choledocholithiasis identified level of the mid/distal common bile duct with mild proximal biliary ductal dilatation and intrahepatic biliary ductal dilatation.  MRCP and/or ultrasound recommended for further evaluation in this regard.    Assessment/Plan:     * Choledocholithiasis  Transaminitis  Hyperbilirubinemia  , , , tbili 2.1.  Abdominal CT showed "Debris/sludge/possible choledocholithiasis identified level of the mid/distal common bile duct with mild proximal biliary ductal dilatation and intrahepatic biliary ductal dilatation.  MRCP and/or ultrasound recommended for further evaluation in this regard."  Given zosyn 4.5 gm and morphine 2 mg IV in ED.  Will obtain abdominal U/S. Continue zosyn 4.5 gm q8h. NPO p MN.  IVFs.  Daily BMP and LFTs.  Holding statin due to elevated LFTs.  GI consulted for evaluation; appreciate assistance.      GERD (gastroesophageal reflux disease)  PPI      Vascular dementia  Resume namenda and aricept.      COPD (chronic obstructive pulmonary disease)  Resume Symbicort.  PRN A/A nebs.      Glaucoma  Resume latanoprost gtts.      Major depressive disorder  Resume lexapro and ativan.      HLD (hyperlipidemia)  Holding statin due to elevated LFTs.  Continue to monitor.      Melanoma  S/p removal of melanoma on 1/16/17.  Followed " outpatient by Dermatologist Dr. Huang.      Hyponatremia  Na 135.  Given 1L NS bolus.  , monitor for fluid overload.  Continue to monitor.      Thrombocytopenia  Platelet count 112K, down from 266 on 1/9/17.  Continue to monitor.      Stage 3 chronic kidney disease  Limit/avoid nephrotoxins.  Continue to monitor.      Coronary artery disease involving coronary bypass graft of native heart without angina pectoris  Resume ASA therapy.  Holding statin due to elevated LFTs.  Continue to monitor.      S/P placement of cardiac pacemaker  S/p placement of pacemaker on 11/22/16      Essential hypertension  SBP range 99 to 140.  Resume home dose lisinopril with hold parameters.  Continue to monitor.      VTE Risk Mitigation         Ordered     enoxaparin injection 40 mg  Daily     Route:  Subcutaneous        01/30/17 0022     Medium Risk of VTE  Once      01/30/17 0022        Kat Haas NP  Department of Hospital Medicine   [facility]

## 2017-01-30 NOTE — ED TRIAGE NOTES
88 Y/O M with CC of Fever. Pt was brought in by EMS from nursing home. Reported pt had a temp of 101.9F. No meds given PTA by Nursing facility. Pt currently 102.4F. MD notified. Pt denies any pain. No complaints verbalized. Pt provided with gown and assisted into it. Patient on cardiac monitor, automatic blood pressure cuff and pulse oximeter. Will continue to monitor.

## 2017-01-30 NOTE — PROGRESS NOTES
[facility] Medicine  Progress Note    Patient Name: Fidel Allan  MRN: [id number]  Patient Class: IP- Inpatient   Admission Date: 1/29/2017  Length of Stay: 1 days  Attending Physician: Dontrell Haskins MD  Primary Care Provider: Juan Elmore MD        Subjective:     Principal Problem:Cholangitis due to bile duct calculus with obstruction    HPI:  Fidel Allan is a 89 y.o.  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 He resides at [facility]. His primary care physician is Dr. Juan Elmore.     He was sent to [facility] ED on 1/29/17 for fever (101.1 F axillary), decreased appetite, and increased weakness. He reported constant, non-radiating, epigastric pain that began in the morning after breakfast. He was found to have a temperature of 102.4 F, decreased platelets (112,000), alkaline phosphatase 227, , , and total bilirubin 2.1. Abdominal CT showed debris, sludge, or stone in the common bile duct with mild proximal dilatation. He was given 1 liters of saline, piperacillin-tazobactam, acetaminophen, and IV morphine. [facility] was contacted and planned ERCP. He was admitted to [facility].       Hospital Course:      Interval History: Going for ERCP today.    Review of Systems   Respiratory: Negative for cough and shortness of breath.    Gastrointestinal: Positive for abdominal pain. Negative for vomiting.     Objective:     Vital Signs (Most Recent):  Temp: 99.4 °F (37.4 °C) (01/30/17 0742)  Pulse: 60 (01/30/17 0752)  Resp: 18 (01/30/17 0752)  BP: (!) 114/56 (01/30/17 0742)  SpO2: 98 % (01/30/17 0752) Vital Signs (24h Range):  Temp:   [98.5 °F (36.9 °C)-102.4 °F (39.1 °C)] 99.4 °F (37.4 °C)  Pulse:  [60-82] 60  Resp:  [16-30] 18  SpO2:  [93 %-100 %] 98 %  BP: ()/(33-67) 114/56     Weight: 67.6 kg (149 lb)  Body mass index is 22 kg/(m^2).    Intake/Output Summary (Last 24 hours) at 01/30/17 1239  Last data filed at 01/30/17 0454   Gross per 24 hour   Intake                0 ml   Output              250 ml   Net             -250 ml      Physical Exam   Constitutional: He is oriented to person, place, and time. He appears well-developed and well-nourished. He is cooperative. No distress. Nasal cannula in place.   HENT:   Right Ear: Decreased hearing is noted.   Left Ear: Decreased hearing is noted.   edentulous   Cardiovascular: Normal rate and regular rhythm.    Murmur heard.   Systolic murmur is present with a grade of 3/6   Right chest wall pacemaker   Pulmonary/Chest: Effort normal and breath sounds normal.   Abdominal: Soft. There is no tenderness.   Neurological: He is alert and oriented to person, place, and time.   Psychiatric: He has a normal mood and affect. His behavior is normal.   Nursing note and vitals reviewed.      Significant Labs:   CMP:   Recent Labs  Lab 01/29/17  1800 01/30/17  0553   * 135*   K 4.1 4.2   CL 99 105   CO2 27 24    100   BUN 20 16   CREATININE 1.1 1.0   CALCIUM 8.4* 8.2*   PROT 6.1 5.7*   ALBUMIN 3.2* 2.7*   BILITOT 2.1* 1.6*   ALKPHOS 227* 213*   * 192*   * 154*   ANIONGAP 9 6*   EGFRNONAA 59* >60       Significant Imaging: I have reviewed all pertinent imaging results/findings within the past 24 hours.   CT Abdomen Pelvis With Contrast 1/29/17:   ABDOMEN:  - Lung bases: Minimal bibasilar consolidative change, likely dependent atelectasis.   - Liver: No focal mass.    - Gallbladder: No calcified gallstones.  - Bile Ducts: Mild prominence of intra-and extrahepatic biliary ductal system with apparent debris/sludge/possible choledocholithiasis.  Ultrasound and/or MRCP may be  helpful for further evaluation.  - Spleen: Negative.  - Kidneys: No mass or hydronephrosis.  LEFT renal cysts.- Adrenals: Unremarkable.  - Pancreas: No mass or peripancreatic fat stranding.   - Retroperitoneum:  No significant adenopathy.   - Vascular: 3 cm abdominal aortic aneurysm mid abdominal aorta.  - Abdominal wall:  Unremarkable.   PELVIS:  No pelvic mass, adenopathy, or free fluid.  Prominent prostate.  BOWEL/MESENTERY:   No evidence of bowel obstruction or inflammation.    BONES:  No acute osseous abnormality and no suspicious lytic or blastic lesion.  US Abdomen Complete 1/30/17: The visualized portion of the pancreas, aorta, and IVC are unremarkable.  The gallbladder is absent.  The common duct is dilated measuring 1 cm.  The liver measures 14.3 cm and is unremarkable.  The right kidney measures 9.9 cm and is unremarkable.  The spleen measures 10.9 cm and is unremarkable.  The left kidney measures 10.3 cm and is unremarkable.    Assessment/Plan:      * Cholangitis due to bile duct calculus with obstruction  Continue antibiotics. ERCP today.      GERD (gastroesophageal reflux disease)  Takes esomeprazole 40 mg daily.    Vascular dementia  Continue donepezil, memantine.      COPD (chronic obstructive pulmonary disease)  Takes budesonide-formoterol 160-4.5 mcg BID. Continue. Albuterol-ipratropium nebulizer PRN.      Glaucoma  Continue latanoprost 0.005% eye drops.    Major depressive disorder  Continue escitalopram 10 mg daily, mirtazapine 14 mg HS, lorazepam 0.5 mg twice daily PRN.      HLD (hyperlipidemia)  Hold pravastatin 40 mg HS due to transaminitis.      Coronary artery disease involving coronary bypass graft of native heart without angina pectoris  Continue aspirin 81 mg daily. Holding pravastatin.      Essential hypertension  Continue lisinopril 4 mg daily with hold parameters.      VTE Risk Mitigation         Ordered     enoxaparin injection 40 mg  Daily     Route:  Subcutaneous        01/30/17  0022     Medium Risk of VTE  Once      01/30/17 0022        Disposition: Treat cholangitis then return to [facility].    Dontrell Haskins MD  Department of Hospital Medicine   [facility]

## 2017-01-30 NOTE — SUBJECTIVE & OBJECTIVE
Interval History: Going for ERCP today.    Review of Systems   Respiratory: Negative for cough and shortness of breath.    Gastrointestinal: Positive for abdominal pain. Negative for vomiting.     Objective:     Vital Signs (Most Recent):  Temp: 99.4 °F (37.4 °C) (01/30/17 0742)  Pulse: 60 (01/30/17 0752)  Resp: 18 (01/30/17 0752)  BP: (!) 114/56 (01/30/17 0742)  SpO2: 98 % (01/30/17 0752) Vital Signs (24h Range):  Temp:  [98.5 °F (36.9 °C)-102.4 °F (39.1 °C)] 99.4 °F (37.4 °C)  Pulse:  [60-82] 60  Resp:  [16-30] 18  SpO2:  [93 %-100 %] 98 %  BP: ()/(33-67) 114/56     Weight: 67.6 kg (149 lb)  Body mass index is 22 kg/(m^2).    Intake/Output Summary (Last 24 hours) at 01/30/17 1239  Last data filed at 01/30/17 0454   Gross per 24 hour   Intake                0 ml   Output              250 ml   Net             -250 ml      Physical Exam   Constitutional: He is oriented to person, place, and time. He appears well-developed and well-nourished. He is cooperative. No distress. Nasal cannula in place.   HENT:   Right Ear: Decreased hearing is noted.   Left Ear: Decreased hearing is noted.   edentulous   Cardiovascular: Normal rate and regular rhythm.    Murmur heard.   Systolic murmur is present with a grade of 3/6   Right chest wall pacemaker   Pulmonary/Chest: Effort normal and breath sounds normal.   Abdominal: Soft. There is no tenderness.   Neurological: He is alert and oriented to person, place, and time.   Psychiatric: He has a normal mood and affect. His behavior is normal.   Nursing note and vitals reviewed.      Significant Labs:   CMP:   Recent Labs  Lab 01/29/17  1800 01/30/17  0553   * 135*   K 4.1 4.2   CL 99 105   CO2 27 24    100   BUN 20 16   CREATININE 1.1 1.0   CALCIUM 8.4* 8.2*   PROT 6.1 5.7*   ALBUMIN 3.2* 2.7*   BILITOT 2.1* 1.6*   ALKPHOS 227* 213*   * 192*   * 154*   ANIONGAP 9 6*   EGFRNONAA 59* >60       Significant Imaging: I have reviewed all pertinent imaging  results/findings within the past 24 hours.   CT Abdomen Pelvis With Contrast 1/29/17:   ABDOMEN:  - Lung bases: Minimal bibasilar consolidative change, likely dependent atelectasis.   - Liver: No focal mass.    - Gallbladder: No calcified gallstones.  - Bile Ducts: Mild prominence of intra-and extrahepatic biliary ductal system with apparent debris/sludge/possible choledocholithiasis.  Ultrasound and/or MRCP may be helpful for further evaluation.  - Spleen: Negative.  - Kidneys: No mass or hydronephrosis.  LEFT renal cysts.- Adrenals: Unremarkable.  - Pancreas: No mass or peripancreatic fat stranding.   - Retroperitoneum:  No significant adenopathy.   - Vascular: 3 cm abdominal aortic aneurysm mid abdominal aorta.  - Abdominal wall:  Unremarkable.   PELVIS:  No pelvic mass, adenopathy, or free fluid.  Prominent prostate.  BOWEL/MESENTERY:   No evidence of bowel obstruction or inflammation.    BONES:  No acute osseous abnormality and no suspicious lytic or blastic lesion.  US Abdomen Complete 1/30/17: The visualized portion of the pancreas, aorta, and IVC are unremarkable.  The gallbladder is absent.  The common duct is dilated measuring 1 cm.  The liver measures 14.3 cm and is unremarkable.  The right kidney measures 9.9 cm and is unremarkable.  The spleen measures 10.9 cm and is unremarkable.  The left kidney measures 10.3 cm and is unremarkable.

## 2017-01-30 NOTE — PLAN OF CARE
Positive blood cultures collected on 1/29 from anaerobic bottles reveals gram positive coccyx in chains resembling strep. Hospital medicine paged to notify MD. Waiting for page back. Notified oncoming nurse Polly BRANDT Of results.

## 2017-01-30 NOTE — TELEPHONE ENCOUNTER
----- Message from Cielo Jacobo LPN sent at 1/30/2017  9:25 AM CST -----  Good morning,  Did you need me to do something with this pt?  Thanks  ----- Message -----     From: Daina De La Torre LPN     Sent: 1/30/2017   9:19 AM       To: [facility] Staff        ----- Message -----     From: Nicky Borjas     Sent: 1/30/2017   8:58 AM       To: [place] GARCIA Staff    CONSULTS  Patient:  Fidel Allan  Facility:  [facility]  Room /bed number:  Room 524  Referring MD:  Dr. Haskins                                                                                             Diagnosis:  Possible ERCP  Person calling & phone number:  Klarissa   No. [phone]

## 2017-01-31 PROBLEM — B95.5 STREPTOCOCCAL BACTEREMIA: Status: ACTIVE | Noted: 2017-01-31

## 2017-01-31 PROBLEM — A49.1 INFECTION DUE TO STREPTOCOCCUS GALLOLYTICUS: Status: ACTIVE | Noted: 2017-01-31

## 2017-01-31 PROBLEM — R78.81 STREPTOCOCCAL BACTEREMIA: Status: ACTIVE | Noted: 2017-01-31

## 2017-01-31 PROBLEM — K83.09 CHOLANGITIS: Status: ACTIVE | Noted: 2017-01-31

## 2017-01-31 LAB
ALBUMIN SERPL BCP-MCNC: 2.3 G/DL
ALP SERPL-CCNC: 198 U/L
ALT SERPL W/O P-5'-P-CCNC: 108 U/L
ANION GAP SERPL CALC-SCNC: 8 MMOL/L
AST SERPL-CCNC: 106 U/L
BACTERIA #/AREA URNS HPF: ABNORMAL /HPF
BASOPHILS # BLD AUTO: 0.02 K/UL
BASOPHILS NFR BLD: 0.3 %
BILIRUB DIRECT SERPL-MCNC: 1.3 MG/DL
BILIRUB SERPL-MCNC: 1.7 MG/DL
BILIRUB UR QL STRIP: ABNORMAL
BUN SERPL-MCNC: 23 MG/DL
CALCIUM SERPL-MCNC: 8.3 MG/DL
CHLORIDE SERPL-SCNC: 107 MMOL/L
CLARITY UR: CLEAR
CO2 SERPL-SCNC: 22 MMOL/L
COLOR UR: YELLOW
CREAT SERPL-MCNC: 1.4 MG/DL
DIASTOLIC DYSFUNCTION: NO
DIFFERENTIAL METHOD: ABNORMAL
EOSINOPHIL # BLD AUTO: 0.1 K/UL
EOSINOPHIL NFR BLD: 1.4 %
ERYTHROCYTE [DISTWIDTH] IN BLOOD BY AUTOMATED COUNT: 14.9 %
EST. GFR  (AFRICAN AMERICAN): 51 ML/MIN/1.73 M^2
EST. GFR  (NON AFRICAN AMERICAN): 44 ML/MIN/1.73 M^2
ESTIMATED PA SYSTOLIC PRESSURE: 17.14
GLUCOSE SERPL-MCNC: 87 MG/DL
GLUCOSE UR QL STRIP: NEGATIVE
HCT VFR BLD AUTO: 28.1 %
HGB BLD-MCNC: 8.8 G/DL
HGB UR QL STRIP: ABNORMAL
HYALINE CASTS #/AREA URNS LPF: 0 /LPF
KETONES UR QL STRIP: ABNORMAL
LEUKOCYTE ESTERASE UR QL STRIP: ABNORMAL
LYMPHOCYTES # BLD AUTO: 1.3 K/UL
LYMPHOCYTES NFR BLD: 20.5 %
MCH RBC QN AUTO: 28.5 PG
MCHC RBC AUTO-ENTMCNC: 31.3 %
MCV RBC AUTO: 91 FL
MICROSCOPIC COMMENT: ABNORMAL
MITRAL VALVE MOBILITY: NORMAL
MONOCYTES # BLD AUTO: 1 K/UL
MONOCYTES NFR BLD: 15.3 %
NEUTROPHILS # BLD AUTO: 4 K/UL
NEUTROPHILS NFR BLD: 62.3 %
NITRITE UR QL STRIP: NEGATIVE
PH UR STRIP: 6 [PH] (ref 5–8)
PLATELET # BLD AUTO: 85 K/UL
PMV BLD AUTO: 10.7 FL
POTASSIUM SERPL-SCNC: 4.2 MMOL/L
PROT SERPL-MCNC: 5.4 G/DL
PROT UR QL STRIP: ABNORMAL
RBC # BLD AUTO: 3.09 M/UL
RBC #/AREA URNS HPF: 15 /HPF (ref 0–4)
RETIRED EF AND QEF - SEE NOTES: 55 (ref 55–65)
SODIUM SERPL-SCNC: 137 MMOL/L
SP GR UR STRIP: 1.02 (ref 1–1.03)
SQUAMOUS #/AREA URNS HPF: ABNORMAL /HPF
TRICUSPID VALVE REGURGITATION: NORMAL
URN SPEC COLLECT METH UR: ABNORMAL
UROBILINOGEN UR STRIP-ACNC: ABNORMAL EU/DL
WBC # BLD AUTO: 6.39 K/UL
WBC #/AREA URNS HPF: 3 /HPF (ref 0–5)

## 2017-01-31 PROCEDURE — 93306 TTE W/DOPPLER COMPLETE: CPT | Mod: 26,,, | Performed by: INTERNAL MEDICINE

## 2017-01-31 PROCEDURE — 11000001 HC ACUTE MED/SURG PRIVATE ROOM

## 2017-01-31 PROCEDURE — 94664 DEMO&/EVAL PT USE INHALER: CPT

## 2017-01-31 PROCEDURE — 87040 BLOOD CULTURE FOR BACTERIA: CPT

## 2017-01-31 PROCEDURE — 80048 BASIC METABOLIC PNL TOTAL CA: CPT

## 2017-01-31 PROCEDURE — 85025 COMPLETE CBC W/AUTO DIFF WBC: CPT

## 2017-01-31 PROCEDURE — 94761 N-INVAS EAR/PLS OXIMETRY MLT: CPT

## 2017-01-31 PROCEDURE — 87086 URINE CULTURE/COLONY COUNT: CPT

## 2017-01-31 PROCEDURE — 36415 COLL VENOUS BLD VENIPUNCTURE: CPT

## 2017-01-31 PROCEDURE — 94640 AIRWAY INHALATION TREATMENT: CPT

## 2017-01-31 PROCEDURE — 25000003 PHARM REV CODE 250: Performed by: NURSE PRACTITIONER

## 2017-01-31 PROCEDURE — 93306 TTE W/DOPPLER COMPLETE: CPT

## 2017-01-31 PROCEDURE — 63600175 PHARM REV CODE 636 W HCPCS: Performed by: NURSE PRACTITIONER

## 2017-01-31 PROCEDURE — 27000221 HC OXYGEN, UP TO 24 HOURS

## 2017-01-31 PROCEDURE — 80076 HEPATIC FUNCTION PANEL: CPT

## 2017-01-31 PROCEDURE — 81000 URINALYSIS NONAUTO W/SCOPE: CPT

## 2017-01-31 RX ORDER — NYSTATIN 100000 [USP'U]/ML
SUSPENSION ORAL 4 TIMES DAILY
Status: ON HOLD | COMMUNITY
End: 2017-01-31

## 2017-01-31 RX ADMIN — PANTOPRAZOLE SODIUM 40 MG: 40 TABLET, DELAYED RELEASE ORAL at 10:01

## 2017-01-31 RX ADMIN — ENOXAPARIN SODIUM 40 MG: 100 INJECTION SUBCUTANEOUS at 01:01

## 2017-01-31 RX ADMIN — LATANOPROST 1 DROP: 50 SOLUTION OPHTHALMIC at 08:01

## 2017-01-31 RX ADMIN — GABAPENTIN 100 MG: 400 CAPSULE ORAL at 10:01

## 2017-01-31 RX ADMIN — ESCITALOPRAM 10 MG: 10 TABLET, FILM COATED ORAL at 06:01

## 2017-01-31 RX ADMIN — MEMANTINE HYDROCHLORIDE 10 MG: 5 TABLET ORAL at 08:01

## 2017-01-31 RX ADMIN — CYANOCOBALAMIN TAB 1000 MCG 1000 MCG: 1000 TAB at 10:01

## 2017-01-31 RX ADMIN — DONEPEZIL HYDROCHLORIDE 10 MG: 5 TABLET ORAL at 08:01

## 2017-01-31 RX ADMIN — Medication 1 TABLET: at 10:01

## 2017-01-31 RX ADMIN — FLUTICASONE FUROATE AND VILANTEROL TRIFENATATE 1 PUFF: 100; 25 POWDER RESPIRATORY (INHALATION) at 09:01

## 2017-01-31 RX ADMIN — MEMANTINE HYDROCHLORIDE 10 MG: 5 TABLET ORAL at 10:01

## 2017-01-31 RX ADMIN — MIRTAZAPINE 15 MG: 15 TABLET, FILM COATED ORAL at 08:01

## 2017-01-31 RX ADMIN — PIPERACILLIN SODIUM AND TAZOBACTAM SODIUM 4.5 G: 4; .5 INJECTION, POWDER, FOR SOLUTION INTRAVENOUS at 05:01

## 2017-01-31 RX ADMIN — GABAPENTIN 100 MG: 400 CAPSULE ORAL at 02:01

## 2017-01-31 RX ADMIN — PIPERACILLIN SODIUM AND TAZOBACTAM SODIUM 4.5 G: 4; .5 INJECTION, POWDER, FOR SOLUTION INTRAVENOUS at 08:01

## 2017-01-31 RX ADMIN — ASPIRIN 81 MG: 81 TABLET, COATED ORAL at 10:01

## 2017-01-31 RX ADMIN — Medication 100 MG: at 06:01

## 2017-01-31 RX ADMIN — GABAPENTIN 100 MG: 400 CAPSULE ORAL at 05:01

## 2017-01-31 RX ADMIN — Medication 1 CAPSULE: at 10:01

## 2017-01-31 RX ADMIN — PIPERACILLIN SODIUM AND TAZOBACTAM SODIUM 4.5 G: 4; .5 INJECTION, POWDER, FOR SOLUTION INTRAVENOUS at 01:01

## 2017-01-31 NOTE — ASSESSMENT & PLAN NOTE
S/P ERCP - had sludge removed.  Continue with zosyn for now.  Possible source for the bacteremia.

## 2017-01-31 NOTE — PLAN OF CARE
Problem: Patient Care Overview  Goal: Plan of Care Review  Outcome: Ongoing (interventions implemented as appropriate)  Pt received on nasal cannula at 2.5 lpm.  SPO2  98%.  Pt in no apparent respiratory distress. Will continue to monitor.

## 2017-01-31 NOTE — ASSESSMENT & PLAN NOTE
Strep gallolyticus bacteremia - possibly due to cholangitis. TTE negative for endocarditis.   Strep Gallolyticus is Formerly known as strep bovis - generally would recommend colonoscopy to rule out colon cancer - will leave this up to primary team decision  Patient has had this bacteremia in November - worrisome for intravascular focus of infection - has pacemaker in place.   Will add vanco for now to the zosyn while awaiting sensitivities

## 2017-01-31 NOTE — PLAN OF CARE
Pt blood pressure 85/46, HR:62. NP Waldo notified. 500cc bolus of NS ordered. Will administer and reassess blood pressure.

## 2017-01-31 NOTE — CONSULTS
[facility]  Infectious Disease  Consult Note    Patient Name: Fidel Allan  MRN: [id number]  Admission Date: 1/29/2017  Hospital Length of Stay: 2 days  Attending Physician: Cristhian Villar MD  Primary Care Provider: Juan Elmore MD     Isolation Status: No active isolations    Patient information was obtained from patient, the chart,  and ER records.      Inpatient consult to Infectious Diseases  Consult performed by: VALERIO TOM  Consult ordered by: CRISTHIAN VILLAR  Reason for consult: streptococcus gallolyticus bacteremia        Assessment/Plan:     Infection due to Streptococcus gallolyticus  Strep gallolyticus bacteremia - possibly due to cholangitis. TTE negative for endocarditis.   Strep Gallolyticus is Formerly known as strep bovis - generally would recommend colonoscopy to rule out colon cancer - will leave this up to primary team decision  Patient has had this bacteremia in November - worrisome for intravascular focus of infection - has pacemaker in place.   Will add vanco for now to the zosyn while awaiting sensitivities      Streptococcal bacteremia  Strep gallolyticus (formerly bovis)    Cholangitis  S/P ERCP - had sludge removed.  Continue with zosyn for now.  Possible source for the bacteremia.       Thank you for your consult. I will follow-up with patient. Please contact us if you have any additional questions.    Subjective:     Principal Problem: Streptococcus gallolyticus bacteremia, cholangitis    HPI: 88 y/o male with HTN, HLD, CAD, s/p CABGX3 Cardiac stents, glaucoma, depression, anxiety, melanoma s/p excision (1/16/17), s/p pacemaker 11/22/16. Resident of [facility]. Patient sent to [facility] 1/29 for fevers to 101 axillary, not eating and waekness. Complained of epigastric pain after breakfast on admit day. Patient febrile to 102 in ED. WBC 8 and elevated LFTs. CT abdomen positive for bile duct dilatation, possible choledocholithiasis and needs ERCP.  BC on admit 2/2 sets positive for strep gallolyticus. Of note patient had BC positive for strep gallolyticus 11/5/2016 (S to alfredito and vanco). ID consult called 1/31 for help with antibiotics.     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     Blindness of right eye     Bone disorder     Bradycardia     CAD (coronary artery disease)     Constipation     COPD (chronic obstructive pulmonary disease)     Dyspnea     Falls frequently     GERD (gastroesophageal reflux disease)     Glaucoma     Hearing loss     HLD (hyperlipidemia)     Hypertension     Insomnia     Major depressive disorder     Melanoma     Vascular dementia        Past Surgical History   Procedure Laterality Date    Coronary artery bypass graft      Colectomy      Cardiac pacemaker placement  2016    Coronary angioplasty with stent placement      Malignant skin lesion excision Left 01/16/2017     neck melanoma    Choledochoduodenostomy         Review of patient's allergies indicates:  No Known Allergies    Medications:  Prescriptions Prior to Admission   Medication Sig    acetaminophen (TYLENOL) 325 MG tablet Take 650 mg by mouth every 6 (six) hours as needed for Pain.    aspirin (ECOTRIN) 81 MG EC tablet Take 81 mg by mouth once daily.    budesonide-formoterol 160-4.5 mcg (SYMBICORT) 160-4.5 mcg/actuation HFAA Inhale 2 puffs into the lungs every 12 (twelve) hours.    cyanocobalamin (VITAMIN B-12) 1000 MCG tablet Take 1,000 mcg by mouth once daily.     donepezil (ARICEPT) 10 MG tablet Take 10 mg by mouth every evening.    escitalopram oxalate (LEXAPRO) 10 MG tablet Take 10 mg by mouth every morning.     esomeprazole (NEXIUM) 40 MG capsule Take 40 mg by mouth once daily.    fish oil-omega-3 fatty acids 300-1,000 mg capsule Take 1 g by mouth every morning.     gabapentin (NEURONTIN) 100 MG capsule Take 100 mg by mouth 3 (three) times daily.    hydrocodone-acetaminophen  5-325mg (NORCO) 5-325 mg per tablet Take 1 tablet by mouth every 6 (six) hours as needed for Pain.    latanoprost 0.005 % ophthalmic solution Place 1 drop into both eyes every evening.     lisinopril (PRINIVIL,ZESTRIL) 5 MG tablet Take 5 mg by mouth every morning. Hold for SBP < 120    lorazepam (ATIVAN) 0.5 MG tablet Take 0.5 mg by mouth 2 (two) times daily as needed for Anxiety.    magnesium hydroxide 400 mg/5 ml (MILK OF MAGNESIA) 400 mg/5 mL Susp Take 30 mLs by mouth every evening.     mirtazapine (REMERON) 15 MG tablet Take 15 mg by mouth every evening.     multivitamin (ONE DAILY MULTIVITAMIN) per tablet Take 1 tablet by mouth once daily.    NAMENDA XR 14 mg CSpX Take 14 mg by mouth once daily.     NITROSTAT 0.3 mg SL tablet Place 0.3 mg under the tongue every 5 (five) minutes as needed for Chest pain.     pravastatin (PRAVACHOL) 40 MG tablet Take 40 mg by mouth every evening.     thiamine (VITAMIN B-1) 100 MG tablet Take 100 mg by mouth every morning.      Antibiotics     Start     Stop Route Frequency Ordered    01/30/17 0400  piperacillin-tazobactam 4.5 g in dextrose 5 % 100 mL IVPB (ready to mix system)      -- IV Every 8 hours (non-standard times) 01/30/17 0022             There is no immunization history on file for this patient.    Family History     None        Social History     Social History    Marital status:      Spouse name: N/A    Number of children: N/A    Years of education: N/A     Social History Main Topics    Smoking status: Never Smoker    Smokeless tobacco: None    Alcohol use No    Drug use: No    Sexual activity: Not Asked     Other Topics Concern    None     Social History Narrative     Review of Systems   Respiratory: Negative for shortness of breath.    Gastrointestinal: Negative for diarrhea, nausea and vomiting.     Objective:     Vital Signs (Most Recent):  Temp: 98.9 °F (37.2 °C) (01/31/17 1200)  Pulse: 60 (01/31/17 1200)  Resp: 14 (01/31/17 1200)  BP:  (!) 142/63 (01/31/17 1200)  SpO2: 99 % (01/31/17 1222) Vital Signs (24h Range):  Temp:  [96.9 °F (36.1 °C)-100 °F (37.8 °C)] 98.9 °F (37.2 °C)  Pulse:  [60-74] 60  Resp:  [14-18] 14  SpO2:  [96 %-99 %] 99 %  BP: ()/(46-66) 142/63     Weight: 67.6 kg (149 lb)  Body mass index is 22 kg/(m^2).    Estimated Creatinine Clearance: 34.2 mL/min (based on Cr of 1.4).    Physical Exam   Cardiovascular: Regular rhythm.    2/6 GIA LSB  Midline sternal incision scar well healed   Pulmonary/Chest: Breath sounds normal. No respiratory distress. He has no wheezes.   Abdominal: Bowel sounds are normal. He exhibits no distension. There is no tenderness.   Genitourinary:   Genitourinary Comments: Yoon in place   Musculoskeletal: He exhibits no edema.       Significant Labs:   Bilirubin:   Recent Labs  Lab 01/09/17  1455 01/29/17  1800 01/30/17  0553 01/31/17  0627   BILIDIR  --   --  1.1* 1.3*   BILITOT 0.2 2.1* 1.6* 1.7*     Blood Culture:   Recent Labs  Lab 11/05/16  1123 11/05/16  1130 12/27/16  0832 01/29/17  1800 01/29/17  1805   LABBLOO Gram stain hanny bottle: Gram positive cocci in chains resembling Strep   Results called to and read back by: Abby Sandy RN  11/06/2016  02:58  Gram stain aer bottle: Gram positive cocci in chains resembling Strep  Positive results previously called 11/06/2016  05:46  STREPTOCOCCUS GALLOLYTICUS Gram stain aer bottle: Gram positive cocci in chains resembling Strep   Positive results previously called 11/06/2016  06:21  STREPTOCOCCUS GALLOLYTICUSFor susceptibility see order #[id number] No growth after 5 days.  No growth after 5 days. Gram stain hanny bottle: Gram positive cocci in chains resembling Strep   Results called to and read back by: Martha Martinez RN  01/30/2017  06:43  Gram stain aer bottle: Gram positive cocci in chains resembling Strep   Positive results previously called 01/30/2017  08:44  STREPTOCOCCUS GALLOLYTICUSSusceptibility pending Gram stain hanny bottle: Gram  positive cocci in chains resembling Strep   Results called to and read back by: Martha Martinez RN  2017  06:43  Gram stain aer bottle: Gram positive cocci in chains resembling Strep   Positive results previously called 2017  08:55  STREPTOCOCCUS GALLOLYTICUSFor susceptibility see order #[id number]     BMP:   Recent Labs  Lab 17  1800  17     < > 87   *  < > 137   K 4.1  < > 4.2   CL 99  < > 107   CO2 27  < > 22*   BUN 20  < > 23   CREATININE 1.1  < > 1.4   CALCIUM 8.4*  < > 8.3*   MG 1.8  --   --    < > = values in this interval not displayed.  CBC:   Recent Labs  Lab 17  1800 17  0553 17   WBC 8.03 12.23 6.39   HGB 9.7* 9.1* 8.8*   HCT 31.2* 29.5* 28.1*   * 101* 85*     CMP:   Recent Labs  Lab 17  1800 17  0553 17   * 135* 137   K 4.1 4.2 4.2   CL 99 105 107   CO2 27 24 22*    100 87   BUN 20 16 23   CREATININE 1.1 1.0 1.4   CALCIUM 8.4* 8.2* 8.3*   PROT 6.1 5.7* 5.4*   ALBUMIN 3.2* 2.7* 2.3*   BILITOT 2.1* 1.6* 1.7*   ALKPHOS 227* 213* 198*   * 192* 106*   * 154* 108*   ANIONGAP 9 6* 8   EGFRNONAA 59* >60 44*     Urine Studies:   Recent Labs  Lab 17  190   COLORU [place]*   APPEARANCEUA Clear   PHUR 6.0   SPECGRAV 1.025   PROTEINUA Trace*   GLUCUA Negative   KETONESU Negative   BILIRUBINUA 1+*   OCCULTUA Trace*   NITRITE Negative   UROBILINOGEN 1.0   LEUKOCYTESUR Negative       Significant Imagin/30 ERCP - Impression: History of cholangitis with sludge successfully swept from the CBD.                        Cholangiogram appearance consistent with choledochoduodenostomy                        Patent Billroth II gastrojejunostomy   US abdomen -  Mild common duct dilatation.  If there is concern for common duct stone recommended nuclear medicine HIDA study.   CT abdomen pelvis - Debris/sludge/possible choledocholithiasis identified level of the mid/distal common bile  duct with mild proximal biliary ductal dilatation and intrahepatic biliary ductal dilatation.  MRCP and/or ultrasound recommended for further evaluation in this regard.        Yenny Elias MD  Infectious Disease  [facility]

## 2017-01-31 NOTE — SUBJECTIVE & OBJECTIVE
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     Blindness of right eye     Bone disorder     Bradycardia     CAD (coronary artery disease)     Constipation     COPD (chronic obstructive pulmonary disease)     Dyspnea     Falls frequently     GERD (gastroesophageal reflux disease)     Glaucoma     Hearing loss     HLD (hyperlipidemia)     Hypertension     Insomnia     Major depressive disorder     Melanoma     Vascular dementia        Past Surgical History   Procedure Laterality Date    Coronary artery bypass graft      Colectomy      Cardiac pacemaker placement  2016    Coronary angioplasty with stent placement      Malignant skin lesion excision Left 01/16/2017     neck melanoma    Choledochoduodenostomy         Review of patient's allergies indicates:  No Known Allergies    Medications:  Prescriptions Prior to Admission   Medication Sig    acetaminophen (TYLENOL) 325 MG tablet Take 650 mg by mouth every 6 (six) hours as needed for Pain.    aspirin (ECOTRIN) 81 MG EC tablet Take 81 mg by mouth once daily.    budesonide-formoterol 160-4.5 mcg (SYMBICORT) 160-4.5 mcg/actuation HFAA Inhale 2 puffs into the lungs every 12 (twelve) hours.    cyanocobalamin (VITAMIN B-12) 1000 MCG tablet Take 1,000 mcg by mouth once daily.     donepezil (ARICEPT) 10 MG tablet Take 10 mg by mouth every evening.    escitalopram oxalate (LEXAPRO) 10 MG tablet Take 10 mg by mouth every morning.     esomeprazole (NEXIUM) 40 MG capsule Take 40 mg by mouth once daily.    fish oil-omega-3 fatty acids 300-1,000 mg capsule Take 1 g by mouth every morning.     gabapentin (NEURONTIN) 100 MG capsule Take 100 mg by mouth 3 (three) times daily.    hydrocodone-acetaminophen 5-325mg (NORCO) 5-325 mg per tablet Take 1 tablet by mouth every 6 (six) hours as needed for Pain.    latanoprost 0.005 % ophthalmic solution Place 1 drop into both eyes every evening.      lisinopril (PRINIVIL,ZESTRIL) 5 MG tablet Take 5 mg by mouth every morning. Hold for SBP < 120    lorazepam (ATIVAN) 0.5 MG tablet Take 0.5 mg by mouth 2 (two) times daily as needed for Anxiety.    magnesium hydroxide 400 mg/5 ml (MILK OF MAGNESIA) 400 mg/5 mL Susp Take 30 mLs by mouth every evening.     mirtazapine (REMERON) 15 MG tablet Take 15 mg by mouth every evening.     multivitamin (ONE DAILY MULTIVITAMIN) per tablet Take 1 tablet by mouth once daily.    NAMENDA XR 14 mg CSpX Take 14 mg by mouth once daily.     NITROSTAT 0.3 mg SL tablet Place 0.3 mg under the tongue every 5 (five) minutes as needed for Chest pain.     pravastatin (PRAVACHOL) 40 MG tablet Take 40 mg by mouth every evening.     thiamine (VITAMIN B-1) 100 MG tablet Take 100 mg by mouth every morning.      Antibiotics     Start     Stop Route Frequency Ordered    01/30/17 0400  piperacillin-tazobactam 4.5 g in dextrose 5 % 100 mL IVPB (ready to mix system)      -- IV Every 8 hours (non-standard times) 01/30/17 0022             There is no immunization history on file for this patient.    Family History     None        Social History     Social History    Marital status:      Spouse name: N/A    Number of children: N/A    Years of education: N/A     Social History Main Topics    Smoking status: Never Smoker    Smokeless tobacco: None    Alcohol use No    Drug use: No    Sexual activity: Not Asked     Other Topics Concern    None     Social History Narrative     Review of Systems   Respiratory: Negative for shortness of breath.    Gastrointestinal: Negative for diarrhea, nausea and vomiting.     Objective:     Vital Signs (Most Recent):  Temp: 98.9 °F (37.2 °C) (01/31/17 1200)  Pulse: 60 (01/31/17 1200)  Resp: 14 (01/31/17 1200)  BP: (!) 142/63 (01/31/17 1200)  SpO2: 99 % (01/31/17 1222) Vital Signs (24h Range):  Temp:  [96.9 °F (36.1 °C)-100 °F (37.8 °C)] 98.9 °F (37.2 °C)  Pulse:  [60-74] 60  Resp:  [14-18] 14  SpO2:   [96 %-99 %] 99 %  BP: ()/(46-66) 142/63     Weight: 67.6 kg (149 lb)  Body mass index is 22 kg/(m^2).    Estimated Creatinine Clearance: 34.2 mL/min (based on Cr of 1.4).    Physical Exam   Cardiovascular: Regular rhythm.    2/6 GIA LSB  Midline sternal incision scar well healed   Pulmonary/Chest: Breath sounds normal. No respiratory distress. He has no wheezes.   Abdominal: Bowel sounds are normal. He exhibits no distension. There is no tenderness.   Genitourinary:   Genitourinary Comments: Yoon in place   Musculoskeletal: He exhibits no edema.       Significant Labs:   Bilirubin:   Recent Labs  Lab 01/09/17  1455 01/29/17  1800 01/30/17  0553 01/31/17  0627   BILIDIR  --   --  1.1* 1.3*   BILITOT 0.2 2.1* 1.6* 1.7*     Blood Culture:   Recent Labs  Lab 11/05/16  1123 11/05/16  1130 12/27/16  0832 01/29/17  1800 01/29/17  1805   LABBLOO Gram stain hanny bottle: Gram positive cocci in chains resembling Strep   Results called to and read back by: Abby Sandy RN  11/06/2016  02:58  Gram stain aer bottle: Gram positive cocci in chains resembling Strep  Positive results previously called 11/06/2016  05:46  STREPTOCOCCUS GALLOLYTICUS Gram stain aer bottle: Gram positive cocci in chains resembling Strep   Positive results previously called 11/06/2016  06:21  STREPTOCOCCUS GALLOLYTICUSFor susceptibility see order #[id number] No growth after 5 days.  No growth after 5 days. Gram stain hanny bottle: Gram positive cocci in chains resembling Strep   Results called to and read back by: Martha Martinez RN  01/30/2017  06:43  Gram stain aer bottle: Gram positive cocci in chains resembling Strep   Positive results previously called 01/30/2017  08:44  STREPTOCOCCUS GALLOLYTICUSSusceptibility pending Gram stain hanny bottle: Gram positive cocci in chains resembling Strep   Results called to and read back by: Martha Martinez RN  01/30/2017  06:43  Gram stain aer bottle: Gram positive cocci in chains resembling Strep    Positive results previously called 2017  08:55  STREPTOCOCCUS GALLOLYTICUSFor susceptibility see order #[id number]     BMP:   Recent Labs  Lab 17  1800  17  06     < > 87   *  < > 137   K 4.1  < > 4.2   CL 99  < > 107   CO2 27  < > 22*   BUN 20  < > 23   CREATININE 1.1  < > 1.4   CALCIUM 8.4*  < > 8.3*   MG 1.8  --   --    < > = values in this interval not displayed.  CBC:   Recent Labs  Lab 17  1800 17  0553 17   WBC 8.03 12.23 6.39   HGB 9.7* 9.1* 8.8*   HCT 31.2* 29.5* 28.1*   * 101* 85*     CMP:   Recent Labs  Lab 17  1800 17  0553 17  06   * 135* 137   K 4.1 4.2 4.2   CL 99 105 107   CO2 27 24 22*    100 87   BUN 20 16 23   CREATININE 1.1 1.0 1.4   CALCIUM 8.4* 8.2* 8.3*   PROT 6.1 5.7* 5.4*   ALBUMIN 3.2* 2.7* 2.3*   BILITOT 2.1* 1.6* 1.7*   ALKPHOS 227* 213* 198*   * 192* 106*   * 154* 108*   ANIONGAP 9 6* 8   EGFRNONAA 59* >60 44*     Urine Studies:   Recent Labs  Lab 17  190   COLORU [place]*   APPEARANCEUA Clear   PHUR 6.0   SPECGRAV 1.025   PROTEINUA Trace*   GLUCUA Negative   KETONESU Negative   BILIRUBINUA 1+*   OCCULTUA Trace*   NITRITE Negative   UROBILINOGEN 1.0   LEUKOCYTESUR Negative       Significant Imagin/30 ERCP - Impression: History of cholangitis with sludge successfully swept from the CBD.                        Cholangiogram appearance consistent with choledochoduodenostomy                        Patent Billroth II gastrojejunostomy   US abdomen -  Mild common duct dilatation.  If there is concern for common duct stone recommended nuclear medicine HIDA study.   CT abdomen pelvis - Debris/sludge/possible choledocholithiasis identified level of the mid/distal common bile duct with mild proximal biliary ductal dilatation and intrahepatic biliary ductal dilatation.  MRCP and/or ultrasound recommended for further evaluation in this regard.

## 2017-01-31 NOTE — PLAN OF CARE
Pt has not voided yet this shift. Bladder scan reveals greater than 700 cc of urine. NP Torey notified. Urethral Yoon catheter ordered.

## 2017-01-31 NOTE — PHARMACY MED REC
[facility] Medication Reconciliation  Template    Patient was admitted on 1/29/2017 for Cholangitis due to bile duct calculus with obstruction.      Patient's prior to admission medication regimen was as follows:  Prescriptions Prior to Admission   Medication Sig Dispense Refill Last Dose    acetaminophen (TYLENOL) 325 MG tablet Take 650 mg by mouth every 6 (six) hours as needed for Pain.       aspirin (ECOTRIN) 81 MG EC tablet Take 81 mg by mouth once daily.   1/29/2017    budesonide-formoterol 160-4.5 mcg (SYMBICORT) 160-4.5 mcg/actuation HFAA Inhale 2 puffs into the lungs 2 (two) times daily.    1/29/2017    cyanocobalamin (VITAMIN B-12) 1000 MCG tablet Take 1,000 mcg by mouth once daily.    1/29/2017    donepezil (ARICEPT) 10 MG tablet Take 10 mg by mouth every evening.   1/29/2017    escitalopram oxalate (LEXAPRO) 10 MG tablet Take 10 mg by mouth every morning.    1/29/2017    esomeprazole (NEXIUM) 40 MG capsule Take 40 mg by mouth once daily.       fish oil-omega-3 fatty acids 300-1,000 mg capsule Take 1 g by mouth every morning.    1/29/2017    gabapentin (NEURONTIN) 100 MG capsule Take 100 mg by mouth 3 (three) times daily.   1/29/2017    hydrocodone-acetaminophen 5-325mg (NORCO) 5-325 mg per tablet Take 1 tablet by mouth every 6 (six) hours as needed for Pain.   1/29/2017    latanoprost 0.005 % ophthalmic solution Place 1 drop into both eyes every evening.    1/29/2017    lisinopril (PRINIVIL,ZESTRIL) 5 MG tablet Take 5 mg by mouth every morning. Hold for SBP < 120   1/29/2017    lorazepam (ATIVAN) 0.5 MG tablet Take 0.5 mg by mouth 2 (two) times daily as needed for Anxiety.   1/29/2017    magnesium hydroxide 400 mg/5 ml (MILK OF MAGNESIA) 400 mg/5 mL Susp Take 30 mLs by mouth every evening.    1/29/2017    mirtazapine (REMERON) 15 MG tablet Take 15 mg by mouth every evening.    1/29/2017    multivitamin (ONE DAILY MULTIVITAMIN) per tablet Take 1 tablet by mouth once daily.   1/29/2017  "   NAMENDA XR 14 mg CSpX Take 14 mg by mouth once daily.    1/29/2017    NITROSTAT 0.3 mg SL tablet Place 0.3 mg under the tongue every 5 (five) minutes as needed for Chest pain.    1/29/2017    pravastatin (PRAVACHOL) 40 MG tablet Take 40 mg by mouth every evening.    1/29/2017    thiamine (VITAMIN B-1) 100 MG tablet Take 100 mg by mouth every morning.    1/29/2017         Please add appropriate    SmartPhrase below:      Admission Medication Reconciliation - Pharmacy Consult Note    The home medication history was taken by Norma Mclaughlin CPHT.  Based on information gathered and subsequent review by the clinical pharmacist, the items below may need attention.    You may go to "Admission" then "Reconcile Home Medications" tabs to review and/or act upon these items.        No issues noted with the medication reconciliation.        Potential issues to be addressed PRIOR TO DISCHARGE  o None    Please address this information as you see fit.  Feel free to contact us if you have any questions or require assistance.    Jin Londono  [phone]      "

## 2017-02-01 LAB
ALBUMIN SERPL BCP-MCNC: 2.3 G/DL
ALP SERPL-CCNC: 195 U/L
ALT SERPL W/O P-5'-P-CCNC: 77 U/L
ANION GAP SERPL CALC-SCNC: 8 MMOL/L
AST SERPL-CCNC: 62 U/L
BACTERIA #/AREA URNS HPF: NORMAL /HPF
BACTERIA BLD CULT: NORMAL
BACTERIA UR CULT: NO GROWTH
BASOPHILS # BLD AUTO: 0.02 K/UL
BASOPHILS NFR BLD: 0.5 %
BILIRUB DIRECT SERPL-MCNC: 0.5 MG/DL
BILIRUB SERPL-MCNC: 0.8 MG/DL
BILIRUB UR QL STRIP: NEGATIVE
BUN SERPL-MCNC: 18 MG/DL
CALCIUM SERPL-MCNC: 8.5 MG/DL
CHLORIDE SERPL-SCNC: 108 MMOL/L
CLARITY UR: CLEAR
CO2 SERPL-SCNC: 22 MMOL/L
COLOR UR: YELLOW
CREAT SERPL-MCNC: 1.1 MG/DL
DIFFERENTIAL METHOD: ABNORMAL
EOSINOPHIL # BLD AUTO: 0 K/UL
EOSINOPHIL NFR BLD: 0.8 %
ERYTHROCYTE [DISTWIDTH] IN BLOOD BY AUTOMATED COUNT: 14.7 %
EST. GFR  (AFRICAN AMERICAN): >60 ML/MIN/1.73 M^2
EST. GFR  (NON AFRICAN AMERICAN): 59 ML/MIN/1.73 M^2
GLUCOSE SERPL-MCNC: 101 MG/DL
GLUCOSE UR QL STRIP: NEGATIVE
HCT VFR BLD AUTO: 27.6 %
HGB BLD-MCNC: 8.5 G/DL
HGB UR QL STRIP: ABNORMAL
KETONES UR QL STRIP: NEGATIVE
LEUKOCYTE ESTERASE UR QL STRIP: NEGATIVE
LYMPHOCYTES # BLD AUTO: 1.2 K/UL
LYMPHOCYTES NFR BLD: 32.4 %
MCH RBC QN AUTO: 27.5 PG
MCHC RBC AUTO-ENTMCNC: 30.8 %
MCV RBC AUTO: 89 FL
MICROSCOPIC COMMENT: NORMAL
MONOCYTES # BLD AUTO: 0.7 K/UL
MONOCYTES NFR BLD: 17.8 %
NEUTROPHILS # BLD AUTO: 1.8 K/UL
NEUTROPHILS NFR BLD: 48.2 %
NITRITE UR QL STRIP: NEGATIVE
PH UR STRIP: 6 [PH] (ref 5–8)
PLATELET # BLD AUTO: 96 K/UL
PMV BLD AUTO: 10.3 FL
POTASSIUM SERPL-SCNC: 3.7 MMOL/L
PROT SERPL-MCNC: 5.4 G/DL
PROT UR QL STRIP: NEGATIVE
RBC # BLD AUTO: 3.09 M/UL
RBC #/AREA URNS HPF: 3 /HPF (ref 0–4)
SODIUM SERPL-SCNC: 138 MMOL/L
SP GR UR STRIP: 1.01 (ref 1–1.03)
URN SPEC COLLECT METH UR: ABNORMAL
UROBILINOGEN UR STRIP-ACNC: 1 EU/DL
WBC # BLD AUTO: 3.76 K/UL
WBC #/AREA URNS HPF: 3 /HPF (ref 0–5)

## 2017-02-01 PROCEDURE — 11000001 HC ACUTE MED/SURG PRIVATE ROOM

## 2017-02-01 PROCEDURE — 63600175 PHARM REV CODE 636 W HCPCS: Performed by: NURSE PRACTITIONER

## 2017-02-01 PROCEDURE — 94761 N-INVAS EAR/PLS OXIMETRY MLT: CPT

## 2017-02-01 PROCEDURE — 94664 DEMO&/EVAL PT USE INHALER: CPT

## 2017-02-01 PROCEDURE — 81000 URINALYSIS NONAUTO W/SCOPE: CPT

## 2017-02-01 PROCEDURE — 80076 HEPATIC FUNCTION PANEL: CPT

## 2017-02-01 PROCEDURE — 36415 COLL VENOUS BLD VENIPUNCTURE: CPT

## 2017-02-01 PROCEDURE — 85025 COMPLETE CBC W/AUTO DIFF WBC: CPT

## 2017-02-01 PROCEDURE — 25000003 PHARM REV CODE 250: Performed by: NURSE PRACTITIONER

## 2017-02-01 PROCEDURE — 63600175 PHARM REV CODE 636 W HCPCS: Performed by: INTERNAL MEDICINE

## 2017-02-01 PROCEDURE — 94640 AIRWAY INHALATION TREATMENT: CPT

## 2017-02-01 PROCEDURE — 80048 BASIC METABOLIC PNL TOTAL CA: CPT

## 2017-02-01 PROCEDURE — 27000221 HC OXYGEN, UP TO 24 HOURS

## 2017-02-01 PROCEDURE — 25000003 PHARM REV CODE 250: Performed by: INTERNAL MEDICINE

## 2017-02-01 PROCEDURE — 99232 SBSQ HOSP IP/OBS MODERATE 35: CPT | Mod: ,,, | Performed by: INTERNAL MEDICINE

## 2017-02-01 RX ADMIN — Medication 100 MG: at 06:02

## 2017-02-01 RX ADMIN — LISINOPRIL 5 MG: 5 TABLET ORAL at 07:02

## 2017-02-01 RX ADMIN — ESCITALOPRAM 10 MG: 10 TABLET, FILM COATED ORAL at 06:02

## 2017-02-01 RX ADMIN — DONEPEZIL HYDROCHLORIDE 10 MG: 5 TABLET ORAL at 08:02

## 2017-02-01 RX ADMIN — Medication 1 CAPSULE: at 09:02

## 2017-02-01 RX ADMIN — FLUTICASONE FUROATE AND VILANTEROL TRIFENATATE 1 PUFF: 100; 25 POWDER RESPIRATORY (INHALATION) at 09:02

## 2017-02-01 RX ADMIN — VANCOMYCIN HYDROCHLORIDE 1500 MG: 1 INJECTION, POWDER, LYOPHILIZED, FOR SOLUTION INTRAVENOUS at 11:02

## 2017-02-01 RX ADMIN — DEXTROSE 2 G: 50 INJECTION, SOLUTION INTRAVENOUS at 06:02

## 2017-02-01 RX ADMIN — ENOXAPARIN SODIUM 40 MG: 100 INJECTION SUBCUTANEOUS at 02:02

## 2017-02-01 RX ADMIN — MEMANTINE HYDROCHLORIDE 10 MG: 5 TABLET ORAL at 09:02

## 2017-02-01 RX ADMIN — GABAPENTIN 100 MG: 400 CAPSULE ORAL at 06:02

## 2017-02-01 RX ADMIN — PIPERACILLIN SODIUM AND TAZOBACTAM SODIUM 4.5 G: 4; .5 INJECTION, POWDER, FOR SOLUTION INTRAVENOUS at 01:02

## 2017-02-01 RX ADMIN — GABAPENTIN 100 MG: 400 CAPSULE ORAL at 09:02

## 2017-02-01 RX ADMIN — ASPIRIN 81 MG: 81 TABLET, COATED ORAL at 09:02

## 2017-02-01 RX ADMIN — GABAPENTIN 100 MG: 400 CAPSULE ORAL at 02:02

## 2017-02-01 RX ADMIN — MIRTAZAPINE 15 MG: 15 TABLET, FILM COATED ORAL at 08:02

## 2017-02-01 RX ADMIN — Medication 1 TABLET: at 09:02

## 2017-02-01 RX ADMIN — PIPERACILLIN SODIUM AND TAZOBACTAM SODIUM 4.5 G: 4; .5 INJECTION, POWDER, FOR SOLUTION INTRAVENOUS at 04:02

## 2017-02-01 RX ADMIN — MEMANTINE HYDROCHLORIDE 10 MG: 5 TABLET ORAL at 08:02

## 2017-02-01 RX ADMIN — CYANOCOBALAMIN TAB 1000 MCG 1000 MCG: 1000 TAB at 09:02

## 2017-02-01 RX ADMIN — LATANOPROST 1 DROP: 50 SOLUTION OPHTHALMIC at 08:02

## 2017-02-01 RX ADMIN — PANTOPRAZOLE SODIUM 40 MG: 40 TABLET, DELAYED RELEASE ORAL at 09:02

## 2017-02-01 NOTE — PROGRESS NOTES
Pharmacokinetics Pharmacy Protocol  WBC 3.76, SCr 1.1, Crcl 44,BUN 18  Ht 69, Wt 68.3, T-max 100°,   Indication: Bacteremia  Current antibiotics: Zosyn 4.5Gm Q8h, Vancomycin 15mg/kg q 24h.  Based on his pharmacokinetics/protocol;  load vancomycin 1500mg today, then continue current course of 15mg/kg  (1gm) q 24 hours, to obtain a trough after 3rd dose on 2/3/17 @10:30 am.  Pharmacy will continue to follow.

## 2017-02-01 NOTE — PLAN OF CARE
Problem: Patient Care Overview  Goal: Plan of Care Review  Outcome: Ongoing (interventions implemented as appropriate)  Safety maintained   Afebrile   Yoon removed.   Due to void   Repositions self often  Tolerated diet  No episodes abd pain or gi distress   Afebrile  Blood cultures and urine cultures done   Dressing changed to left neck remain dry and intact  Iv fluids cont at ordered rate   Spoke to wife   Oriented x 4

## 2017-02-01 NOTE — PROGRESS NOTES
[facility] Medicine  Progress Note    Patient Name: Fidel Allan  MRN: [id number]  Patient Class: IP- Inpatient   Admission Date: 1/29/2017  Length of Stay: 3 days  Attending Physician: Cristhian Reyes MD  Primary Care Provider: Juan Elmore MD        Subjective:     Principal Problem:Cholangitis due to bile duct calculus with obstruction    HPI:  Fidel Allan is a 89 y.o.  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 He resides at [facility]. His primary care physician is Dr. Juan Elmore.     He was sent to [facility] ED on 1/29/17 for fever (101.1 F axillary), decreased appetite, and increased weakness. He reported constant, non-radiating, epigastric pain that began in the morning after breakfast. He was found to have a temperature of 102.4 F, decreased platelets (112,000), alkaline phosphatase 227, , , and total bilirubin 2.1. Abdominal CT showed debris, sludge, or stone in the common bile duct with mild proximal dilatation. He was given 1 liters of saline, piperacillin-tazobactam, acetaminophen, and IV morphine. [facility] was contacted and planned ERCP. He was admitted to [facility].       Hospital Course:  ERCP was done, showing evidence of prior choledochoduodenostomy. Sludge was swept. His initial blood cultures were positive for Streptococcus gallolyticus.     Interval History: Says that he is feeling much better. No abdominal pain. Reports being hungry. No fever or chills.     Review of Systems   Constitutional: Negative for chills and fever.   Respiratory: Negative for cough and shortness of breath.    Cardiovascular:  Negative for chest pain.   Gastrointestinal: Negative for nausea and vomiting.     Objective:     Vital Signs (Most Recent):  Temp: 98.4 °F (36.9 °C) (01/31/17 2023)  Pulse: 70 (01/31/17 2023)  Resp: 18 (01/31/17 2023)  BP: (!) 94/52 (01/31/17 2023)  SpO2: 97 % (02/01/17 0016) Vital Signs (24h Range):  Temp:  [96.9 °F (36.1 °C)-99.4 °F (37.4 °C)] 98.4 °F (36.9 °C)  Pulse:  [60-91] 70  Resp:  [14-18] 18  SpO2:  [96 %-99 %] 97 %  BP: ()/(52-64) 94/52     Weight: 67.6 kg (149 lb)  Body mass index is 22 kg/(m^2).    Intake/Output Summary (Last 24 hours) at 02/01/17 0035  Last data filed at 01/31/17 2335   Gross per 24 hour   Intake             2300 ml   Output             2050 ml   Net              250 ml      Physical Exam   Constitutional: He is oriented to person, place, and time. He appears well-developed and well-nourished. No distress.   HENT:   Head: Normocephalic and atraumatic.   Cardiovascular: Normal rate and regular rhythm.    Murmur heard.  Pulmonary/Chest: Effort normal and breath sounds normal. No respiratory distress.   Abdominal: Soft. Bowel sounds are normal. He exhibits no distension. There is no tenderness.   Musculoskeletal: He exhibits no edema.   Neurological: He is alert and oriented to person, place, and time.   Skin: Skin is warm and dry.   Psychiatric: He has a normal mood and affect. His behavior is normal.   Nursing note and vitals reviewed.      Significant Labs:   Blood Culture:   Recent Labs  Lab 01/31/17  1001   LABBLOO No Growth to date  No Growth to date     CBC:   Recent Labs  Lab 01/30/17  0553 01/31/17 0627   WBC 12.23 6.39   HGB 9.1* 8.8*   HCT 29.5* 28.1*   * 85*     CMP:   Recent Labs  Lab 01/30/17 0553 01/31/17 0627   * 137   K 4.2 4.2    107   CO2 24 22*    87   BUN 16 23   CREATININE 1.0 1.4   CALCIUM 8.2* 8.3*   PROT 5.7* 5.4*   ALBUMIN 2.7* 2.3*   BILITOT 1.6* 1.7*   ALKPHOS 213* 198*   * 106*   * 108*   ANIONGAP 6* 8    EGFRNONAA >60 44*       Significant Imaging: I have reviewed all pertinent imaging results/findings within the past 24 hours.    Assessment/Plan:      * Cholangitis due to bile duct calculus with obstruction  Streptococcus gallolyticus septecemia    Continue zosyn. Consulted ID for evaluation. Had same bacteria in the blood on 11/5/16. He received a couple days of IV abx in the hospital and then was discharged home with amoxicillin for 5 more days. He did not have any repeat cultures done at that time. Repeat cultures were done in 12/2016 when admitted for pneumonia. He had PPM placed on 11/21/16. ERCP showed sludge, but no stones. LFTs are improving. Resume diet.     Vascular dementia  Continue donepezil, memantine.      Stage 3 chronic kidney disease  Limit/avoid nephrotoxins.  Continue to monitor.      Essential hypertension  Continue lisinopril 5 mg daily with hold parameters. SBP ranged 85 to 136.       VTE Risk Mitigation         Ordered     enoxaparin injection 40 mg  Daily     Route:  Subcutaneous        01/30/17 0022     Medium Risk of VTE  Once      01/30/17 0022          Cristhian Reyes MD  Department of Hospital Medicine   [facility]

## 2017-02-01 NOTE — ASSESSMENT & PLAN NOTE
Streptococcus gallolyticus septecemia    Continue zosyn. Consulted ID for evaluation. Had same bacteria in the blood on 11/5/16. He received a couple days of IV abx in the hospital and then was discharged home with amoxicillin for 5 more days. He did not have any repeat cultures done at that time. Repeat cultures were done in 12/2016 when admitted for pneumonia. He had PPM placed on 11/21/16. ERCP showed sludge, but no stones. LFTs are improving. Resume diet.

## 2017-02-01 NOTE — ASSESSMENT & PLAN NOTE
Strep gallolyticus bacteremia - possibly due to cholangitis. TTE negative for endocarditis.     Strep Gallolyticus is Formerly known as strep bovis - generally would recommend colonoscopy to rule out colon cancer - will leave this up to primary team decision    Patient has had this bacteremia in November - worrisome for intravascular focus of infection - has pacemaker in place.     Have streamlined to ceftriaxone 2/1/17    Need to watch LFTs carefully - 3% chance of elevating transaminases and <1% chance of gallbladder sludge with ceftriaxone.    If he does not tolerate ceftriaxone - will need alternative agent    Plan at least 2 weeks of therapy if no endocarditis    Suggest ask Cardiology to perform BASSEM to rule out endocarditis since patient had this same bacteremia last November (but had interval BC negative in December)

## 2017-02-01 NOTE — PROGRESS NOTES
"Gastroenterology  CC: Weakness    HPI 89 y.o. male - doing well, rehana regular diet. Denies abd pain. Afebrile.       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     Blindness of right eye     Bone disorder     Bradycardia     CAD (coronary artery disease)     Constipation     COPD (chronic obstructive pulmonary disease)     Dyspnea     Falls frequently     GERD (gastroesophageal reflux disease)     Glaucoma     Hearing loss     HLD (hyperlipidemia)     Hypertension     Insomnia     Major depressive disorder     Melanoma     Vascular dementia          Review of Systems  General ROS: negative for chills, fever or weight loss  Cardiovascular ROS: no chest pain or dyspnea on exertion  Gastrointestinal ROS: no abdominal pain, change in bowel habits, or black/ bloody stools    Physical Examination  Visit Vitals    BP (!) 141/64 (BP Location: Right arm, Patient Position: Lying, BP Method: Automatic)    Pulse 60    Temp 98 °F (36.7 °C) (Oral)    Resp 17    Ht 5' 9" (1.753 m)    Wt 68.3 kg (150 lb 9.6 oz)    SpO2 98%    BMI 22.24 kg/m2     General appearance: alert, cooperative, no distress  HENT: Normocephalic, atraumatic, neck symmetrical, no nasal discharge   Lungs: clear to auscultation bilaterally, no dullness to percussion bilaterally  Heart: regular rate and rhythm without rub; no displacement of the PMI   Abdomen: soft, non-tender; bowel sounds normoactive; no organomegaly  Extremities: extremities symmetric; no clubbing, cyanosis, or edema  Neurologic: Alert and oriented X 3, normal strength, normal coordination and gait    Labs: TB 0.8     Imaging: Cholangiogram reviewed report/images    Assessment/Plan: 88yo male with:    1. Choledocholithiasis/Elevated LFTs   - doing better   - rehana reg diet   - LFTs trending down, monitor   - no s/s of compications      "

## 2017-02-01 NOTE — PLAN OF CARE
Pt from [facility] as correction resident     Future Appointments  Date Time Provider Department Center   2/2/2017 2:45 PM Abraham Villafuerte MD [facility] GENYANY Starkey [facility]           01/30/17 1109   Discharge Assessment   Assessment Type Discharge Planning Assessment   Assessment information obtained from? Medical Record;Patient   Prior to hospitilization cognitive status: Unable to Assess   Prior to hospitalization functional status: Assistive Equipment;Needs Assistance   Current cognitive status: Not Oriented to Person;Not Oriented to Place;Alert/Oriented   Current Functional Status: Assistive Equipment;Needs Assistance   Arrived From nursing home   Lives With facility resident   Able to Return to Prior Arrangements yes   Is patient able to care for self after discharge? No   How many people do you have in your home that can help with your care after discharge? other (see comments)  (nh)   Patient's perception of discharge disposition nursing home   Readmission Within The Last 30 Days no previous admission in last 30 days   Patient currently being followed by outpatient case management? No   Patient currently receives home health services? No   Does the patient currently use HME? Yes   Equipment Currently Used at Home (per NH)   Do you have any problems affording any of your prescribed medications? No   Is the patient taking medications as prescribed? yes   Do you have any financial concerns preventing you from receiving the healthcare you need? No   Does the patient have transportation to healthcare appointments? Yes   Transportation Available van, wheelchair accessible;agency transportation   On Dialysis? No   Are there any open cases? No   Discharge Plan A Return to nursing home   Patient/Family In Agreement With Plan yes

## 2017-02-01 NOTE — PROGRESS NOTES
[facility]  Infectious Disease  Progress Note    Patient Name: Fidel Allan  MRN: [id number]  Admission Date: 1/29/2017  Length of Stay: 3 days  Attending Physician: Cristhian Reyes MD  Primary Care Provider: Juan Elmore MD    Isolation Status: No active isolations  Assessment/Plan:      Infection due to Streptococcus gallolyticus  Strep gallolyticus bacteremia - possibly due to cholangitis. TTE negative for endocarditis.     Strep Gallolyticus is Formerly known as strep bovis - generally would recommend colonoscopy to rule out colon cancer - will leave this up to primary team decision    Patient has had this bacteremia in November - worrisome for intravascular focus of infection - has pacemaker in place.     Have streamlined to ceftriaxone 2/1/17    Need to watch LFTs carefully - 3% chance of elevating transaminases and <1% chance of gallbladder sludge with ceftriaxone.    If he does not tolerate ceftriaxone - will need alternative agent    Plan at least 2 weeks of therapy if no endocarditis    Suggest ask Cardiology to perform BASSEM to rule out endocarditis since patient had this same bacteremia last November (but had interval BC negative in December)            Streptococcal bacteremia  Strep gallolyticus (formerly bovis) - see above    Cholangitis  S/P ERCP - had sludge removed.  Continue with zosyn for now.  Possible source for the bacteremia.       Anticipated Disposition: Unclear at this point. Needs PICC line if not already placed. Needs at least 2 weeks of therapy IV if no endocarditis. Recommend BASSEM. Needs to get CMP at least 3 times a week on ceftriaxone due to recent gallbladder sludge issues and ERCP. Need to get CBC at least once a week on ceftriaxone.     Thank you for your consult. I will follow-up with patient. Please contact us if you have any additional questions.    Subjective:     Principal Problem:Cholangitis due to bile duct calculus with obstruction    Interval  History: Patient with no new complaints. Was on zosyn and vanco was added today. Have streamlined to ceftriaxone today.     HPI:  89 y/o male with HTN, HLD, CAD, s/p CABGX3 Cardiac stents, glaucoma, depression, anxiety, melanoma s/p excision (1/16/17), s/p pacemaker 11/22/16. Resident of [facility]. Patient sent to [facility] 1/29 for fevers to 101 axillary, not eating and weakness. Complained of epigastric pain after breakfast on admit day. Patient febrile to 102 in ED. WBC 8 and elevated LFTs. CT abdomen positive for bile duct dilatation, possible choledocholithiasis and needs ERCP. BC on admit 2/2 sets positive for strep gallolyticus. Of note patient had BC positive for strep gallolyticus 11/5/2016 (S to pcn and vanco). At that time he was treated with IV antibiotic for a few days as inpatient then transitioned to PO amoxicillin for 5 days after discharge. ID consult called 1/31 for help with antibiotics. Zosyn continued 1/29 until 2/1. Vanco added 2/1 then stopped after 1 dose. Ceftriaxone started 2/1.         Review of Systems   Respiratory: Negative for shortness of breath.    Gastrointestinal: Negative for diarrhea, nausea and vomiting.     Antibiotics     Start     Stop Route Frequency Ordered    02/01/17 1730  cefTRIAXone (ROCEPHIN) 2 g in dextrose 5 % 50 mL IVPB      -- IV Every 24 hours (non-standard times) 02/01/17 1618        Objective:     Vital Signs (Most Recent):  Temp: 98.1 °F (36.7 °C) (02/01/17 1200)  Pulse: 70 (02/01/17 1200)  Resp: 18 (02/01/17 1200)  BP: (!) 131/59 (02/01/17 1200)  SpO2: 97 % (02/01/17 1547) Vital Signs (24h Range):  Temp:  [98 °F (36.7 °C)-98.4 °F (36.9 °C)] 98.1 °F (36.7 °C)  Pulse:  [60-71] 70  Resp:  [17-18] 18  SpO2:  [96 %-98 %] 97 %  BP: ()/(52-91) 131/59     Weight: 68.3 kg (150 lb 9.6 oz)  Body mass index is 22.24 kg/(m^2).    Estimated Creatinine Clearance: 44 mL/min (based on Cr of 1.1).    Physical Exam   Cardiovascular: Normal heart sounds.    No  murmur heard.  Pulmonary/Chest: Breath sounds normal. No respiratory distress.   Abdominal: Bowel sounds are normal. He exhibits no distension.   Musculoskeletal: He exhibits no edema.   No sacral decubiti       Significant Labs:   Blood Culture:   Recent Labs  Lab 11/05/16  1130 12/27/16  0832 01/29/17  1800 01/29/17  1805 01/31/17  1001   LABBLOO Gram stain aer bottle: Gram positive cocci in chains resembling Strep   Positive results previously called 11/06/2016  06:21  STREPTOCOCCUS GALLOLYTICUSFor susceptibility see order #[id number] No growth after 5 days.  No growth after 5 days. Gram stain hanny bottle: Gram positive cocci in chains resembling Strep   Results called to and read back by: Martha Martinez RN  01/30/2017  06:43  Gram stain aer bottle: Gram positive cocci in chains resembling Strep   Positive results previously called 01/30/2017  08:44  STREPTOCOCCUS GALLOLYTICUS Gram stain hanny bottle: Gram positive cocci in chains resembling Strep   Results called to and read back by: Martha Martinez RN  01/30/2017  06:43  Gram stain aer bottle: Gram positive cocci in chains resembling Strep   Positive results previously called 01/30/2017  08:55  STREPTOCOCCUS GALLOLYTICUSFor susceptibility see order #[id number] No Growth to date  No Growth to date  No Growth to date  No Growth to date       CBC:   Recent Labs  Lab 01/31/17  0627 02/01/17  0709   WBC 6.39 3.76*   HGB 8.8* 8.5*   HCT 28.1* 27.6*   PLT 85* 96*     CMP:   Recent Labs  Lab 01/31/17  0627 02/01/17  0709    138   K 4.2 3.7    108   CO2 22* 22*   GLU 87 101   BUN 23 18   CREATININE 1.4 1.1   CALCIUM 8.3* 8.5*   PROT 5.4* 5.4*   ALBUMIN 2.3* 2.3*   BILITOT 1.7* 0.8   ALKPHOS 198* 195*   * 62*   * 77*   ANIONGAP 8 8   EGFRNONAA 44* 59*     Urine Culture:   Recent Labs  Lab 12/28/16  0610   LABURIN No growth     Urine Studies:   Recent Labs  Lab 01/31/17  1502 02/01/17  0913   COLORU Yellow Yellow   APPEARANCEUA Clear  Clear   PHUR 6.0 6.0   SPECGRAV 1.020 1.015   PROTEINUA 1+* Negative   GLUCUA Negative Negative   KETONESU 1+* Negative   BILIRUBINUA 1+* Negative   OCCULTUA 3+* 2+*   NITRITE Negative Negative   UROBILINOGEN 2.0-3.0* 1.0   LEUKOCYTESUR Trace* Negative   RBCUA 15* 3   WBCUA 3 3   BACTERIA Occasional None   SQUAMEPITHEL Occ  --    HYALINECASTS 0  --        Significant Imagin/30 ERCP - History of cholangitis with sludge successfully swept from the CBD. Cholangiogram appearance consistent with  choledochoduodenostomy Patent Billroth II gastrojejunostomy        Yenny Elias MD  Infectious Disease  [facility]

## 2017-02-01 NOTE — SUBJECTIVE & OBJECTIVE
Interval History: Says that he is feeling much better. No abdominal pain. Reports being hungry. No fever or chills.     Review of Systems   Constitutional: Negative for chills and fever.   Respiratory: Negative for cough and shortness of breath.    Cardiovascular: Negative for chest pain.   Gastrointestinal: Negative for nausea and vomiting.     Objective:     Vital Signs (Most Recent):  Temp: 98.4 °F (36.9 °C) (01/31/17 2023)  Pulse: 70 (01/31/17 2023)  Resp: 18 (01/31/17 2023)  BP: (!) 94/52 (01/31/17 2023)  SpO2: 97 % (02/01/17 0016) Vital Signs (24h Range):  Temp:  [96.9 °F (36.1 °C)-99.4 °F (37.4 °C)] 98.4 °F (36.9 °C)  Pulse:  [60-91] 70  Resp:  [14-18] 18  SpO2:  [96 %-99 %] 97 %  BP: ()/(52-64) 94/52     Weight: 67.6 kg (149 lb)  Body mass index is 22 kg/(m^2).    Intake/Output Summary (Last 24 hours) at 02/01/17 0035  Last data filed at 01/31/17 2335   Gross per 24 hour   Intake             2300 ml   Output             2050 ml   Net              250 ml      Physical Exam   Constitutional: He is oriented to person, place, and time. He appears well-developed and well-nourished. No distress.   HENT:   Head: Normocephalic and atraumatic.   Cardiovascular: Normal rate and regular rhythm.    Murmur heard.  Pulmonary/Chest: Effort normal and breath sounds normal. No respiratory distress.   Abdominal: Soft. Bowel sounds are normal. He exhibits no distension. There is no tenderness.   Musculoskeletal: He exhibits no edema.   Neurological: He is alert and oriented to person, place, and time.   Skin: Skin is warm and dry.   Psychiatric: He has a normal mood and affect. His behavior is normal.   Nursing note and vitals reviewed.      Significant Labs:   Blood Culture:   Recent Labs  Lab 01/31/17  1001   LABBLOO No Growth to date  No Growth to date     CBC:   Recent Labs  Lab 01/30/17  0553 01/31/17  0627   WBC 12.23 6.39   HGB 9.1* 8.8*   HCT 29.5* 28.1*   * 85*     CMP:   Recent Labs  Lab 01/30/17  0553  01/31/17  0627   * 137   K 4.2 4.2    107   CO2 24 22*    87   BUN 16 23   CREATININE 1.0 1.4   CALCIUM 8.2* 8.3*   PROT 5.7* 5.4*   ALBUMIN 2.7* 2.3*   BILITOT 1.6* 1.7*   ALKPHOS 213* 198*   * 106*   * 108*   ANIONGAP 6* 8   EGFRNONAA >60 44*       Significant Imaging: I have reviewed all pertinent imaging results/findings within the past 24 hours.

## 2017-02-01 NOTE — PROGRESS NOTES
Patient unable to void 6 hours after jones catheter removed.  Bladder scan done, showing ~350cc of urine.  Notified DEBORAH Rushing RA.  Orders received to perform in & out cath now and as needed in 4-6 hours.  NAD noted at this time. Will continue to monitor.

## 2017-02-01 NOTE — SUBJECTIVE & OBJECTIVE
Interval History: Patient with no new complaints. Was on zosyn and vanco was added today. Have streamlined to ceftriaxone today.     HPI:  89 y/o male with HTN, HLD, CAD, s/p CABGX3 Cardiac stents, glaucoma, depression, anxiety, melanoma s/p excision (1/16/17), s/p pacemaker 11/22/16. Resident of [facility]. Patient sent to [facility] 1/29 for fevers to 101 axillary, not eating and weakness. Complained of epigastric pain after breakfast on admit day. Patient febrile to 102 in ED. WBC 8 and elevated LFTs. CT abdomen positive for bile duct dilatation, possible choledocholithiasis and needs ERCP. BC on admit 2/2 sets positive for strep gallolyticus. Of note patient had BC positive for strep gallolyticus 11/5/2016 (S to pcn and vanco). At that time he was treated with IV antibiotic for a few days as inpatient then transitioned to PO amoxicillin for 5 days after discharge. ID consult called 1/31 for help with antibiotics. Zosyn continued 1/29 until 2/1. Vanco added 2/1 then stopped after 1 dose. Ceftriaxone started 2/1.         Review of Systems   Respiratory: Negative for shortness of breath.    Gastrointestinal: Negative for diarrhea, nausea and vomiting.     Antibiotics     Start     Stop Route Frequency Ordered    02/01/17 1730  cefTRIAXone (ROCEPHIN) 2 g in dextrose 5 % 50 mL IVPB      -- IV Every 24 hours (non-standard times) 02/01/17 1618        Objective:     Vital Signs (Most Recent):  Temp: 98.1 °F (36.7 °C) (02/01/17 1200)  Pulse: 70 (02/01/17 1200)  Resp: 18 (02/01/17 1200)  BP: (!) 131/59 (02/01/17 1200)  SpO2: 97 % (02/01/17 1547) Vital Signs (24h Range):  Temp:  [98 °F (36.7 °C)-98.4 °F (36.9 °C)] 98.1 °F (36.7 °C)  Pulse:  [60-71] 70  Resp:  [17-18] 18  SpO2:  [96 %-98 %] 97 %  BP: ()/(52-91) 131/59     Weight: 68.3 kg (150 lb 9.6 oz)  Body mass index is 22.24 kg/(m^2).    Estimated Creatinine Clearance: 44 mL/min (based on Cr of 1.1).    Physical Exam   Cardiovascular: Normal heart sounds.     No murmur heard.  Pulmonary/Chest: Breath sounds normal. No respiratory distress.   Abdominal: Bowel sounds are normal. He exhibits no distension.   Musculoskeletal: He exhibits no edema.   No sacral decubiti       Significant Labs:   Blood Culture:   Recent Labs  Lab 11/05/16  1130 12/27/16  0832 01/29/17  1800 01/29/17  1805 01/31/17  1001   LABBLOO Gram stain aer bottle: Gram positive cocci in chains resembling Strep   Positive results previously called 11/06/2016  06:21  STREPTOCOCCUS GALLOLYTICUSFor susceptibility see order #[id number] No growth after 5 days.  No growth after 5 days. Gram stain hanny bottle: Gram positive cocci in chains resembling Strep   Results called to and read back by: Martha Martinez RN  01/30/2017  06:43  Gram stain aer bottle: Gram positive cocci in chains resembling Strep   Positive results previously called 01/30/2017  08:44  STREPTOCOCCUS GALLOLYTICUS Gram stain hanny bottle: Gram positive cocci in chains resembling Strep   Results called to and read back by: Martha Martinez RN  01/30/2017  06:43  Gram stain aer bottle: Gram positive cocci in chains resembling Strep   Positive results previously called 01/30/2017  08:55  STREPTOCOCCUS GALLOLYTICUSFor susceptibility see order #[id number] No Growth to date  No Growth to date  No Growth to date  No Growth to date       CBC:   Recent Labs  Lab 01/31/17  0627 02/01/17  0709   WBC 6.39 3.76*   HGB 8.8* 8.5*   HCT 28.1* 27.6*   PLT 85* 96*     CMP:   Recent Labs  Lab 01/31/17  0627 02/01/17  0709    138   K 4.2 3.7    108   CO2 22* 22*   GLU 87 101   BUN 23 18   CREATININE 1.4 1.1   CALCIUM 8.3* 8.5*   PROT 5.4* 5.4*   ALBUMIN 2.3* 2.3*   BILITOT 1.7* 0.8   ALKPHOS 198* 195*   * 62*   * 77*   ANIONGAP 8 8   EGFRNONAA 44* 59*     Urine Culture:   Recent Labs  Lab 12/28/16  0610   LABURIN No growth     Urine Studies:   Recent Labs  Lab 01/31/17  1502 02/01/17  0913   COLORU Yellow Yellow   APPEARANCEUA Clear  Clear   PHUR 6.0 6.0   SPECGRAV 1.020 1.015   PROTEINUA 1+* Negative   GLUCUA Negative Negative   KETONESU 1+* Negative   BILIRUBINUA 1+* Negative   OCCULTUA 3+* 2+*   NITRITE Negative Negative   UROBILINOGEN 2.0-3.0* 1.0   LEUKOCYTESUR Trace* Negative   RBCUA 15* 3   WBCUA 3 3   BACTERIA Occasional None   SQUAMEPITHEL Occ  --    HYALINECASTS 0  --        Significant Imagin/30 ERCP - History of cholangitis with sludge successfully swept from the CBD. Cholangiogram appearance consistent with  choledochoduodenostomy Patent Billroth II gastrojejunostomy

## 2017-02-01 NOTE — PROGRESS NOTES
Bladder scan rechecked before in & out catheter; bladder scan showing ~620cc of urine.  Catheter drained 700cc of clear oleg, non-odorous urine with few bloody clots at initiation of urine flow.  Patient tolerated well. Will continue to monitor.

## 2017-02-01 NOTE — PLAN OF CARE
Problem: Patient Care Overview  Goal: Plan of Care Review  Outcome: Ongoing (interventions implemented as appropriate)  Pt on oxygen in no apparent distress. MDI tx. Given with ok pt. Effort.  Will cont. To monitor.

## 2017-02-02 ENCOUNTER — ANESTHESIA EVENT (OUTPATIENT)
Dept: SURGERY | Facility: HOSPITAL | Age: 82
DRG: 445 | End: 2017-02-02
Payer: MEDICARE

## 2017-02-02 PROBLEM — E87.6 HYPOKALEMIA: Status: ACTIVE | Noted: 2017-02-02

## 2017-02-02 LAB
ALBUMIN SERPL BCP-MCNC: 2.3 G/DL
ALP SERPL-CCNC: 175 U/L
ALT SERPL W/O P-5'-P-CCNC: 57 U/L
ANION GAP SERPL CALC-SCNC: 8 MMOL/L
AST SERPL-CCNC: 37 U/L
BASOPHILS # BLD AUTO: 0.03 K/UL
BASOPHILS NFR BLD: 0.8 %
BILIRUB DIRECT SERPL-MCNC: 0.3 MG/DL
BILIRUB SERPL-MCNC: 0.4 MG/DL
BUN SERPL-MCNC: 9 MG/DL
CALCIUM SERPL-MCNC: 8.4 MG/DL
CHLORIDE SERPL-SCNC: 110 MMOL/L
CO2 SERPL-SCNC: 26 MMOL/L
CREAT SERPL-MCNC: 0.8 MG/DL
CRP SERPL-MCNC: 51.5 MG/L
DIFFERENTIAL METHOD: ABNORMAL
EOSINOPHIL # BLD AUTO: 0.1 K/UL
EOSINOPHIL NFR BLD: 2.2 %
ERYTHROCYTE [DISTWIDTH] IN BLOOD BY AUTOMATED COUNT: 14.7 %
ERYTHROCYTE [SEDIMENTATION RATE] IN BLOOD BY WESTERGREN METHOD: 67 MM/HR
EST. GFR  (AFRICAN AMERICAN): >60 ML/MIN/1.73 M^2
EST. GFR  (NON AFRICAN AMERICAN): >60 ML/MIN/1.73 M^2
GLUCOSE SERPL-MCNC: 85 MG/DL
HCT VFR BLD AUTO: 28 %
HGB BLD-MCNC: 8.6 G/DL
LYMPHOCYTES # BLD AUTO: 1.4 K/UL
LYMPHOCYTES NFR BLD: 37.4 %
MCH RBC QN AUTO: 27.3 PG
MCHC RBC AUTO-ENTMCNC: 30.7 %
MCV RBC AUTO: 89 FL
MONOCYTES # BLD AUTO: 0.4 K/UL
MONOCYTES NFR BLD: 10.7 %
NEUTROPHILS # BLD AUTO: 1.8 K/UL
NEUTROPHILS NFR BLD: 48.6 %
PLATELET # BLD AUTO: 123 K/UL
PMV BLD AUTO: 10.4 FL
POTASSIUM SERPL-SCNC: 3.1 MMOL/L
PROT SERPL-MCNC: 5.4 G/DL
RBC # BLD AUTO: 3.15 M/UL
SODIUM SERPL-SCNC: 144 MMOL/L
WBC # BLD AUTO: 3.64 K/UL

## 2017-02-02 PROCEDURE — 80048 BASIC METABOLIC PNL TOTAL CA: CPT

## 2017-02-02 PROCEDURE — 94664 DEMO&/EVAL PT USE INHALER: CPT

## 2017-02-02 PROCEDURE — 63600175 PHARM REV CODE 636 W HCPCS: Performed by: INTERNAL MEDICINE

## 2017-02-02 PROCEDURE — 85025 COMPLETE CBC W/AUTO DIFF WBC: CPT

## 2017-02-02 PROCEDURE — 94640 AIRWAY INHALATION TREATMENT: CPT

## 2017-02-02 PROCEDURE — 86140 C-REACTIVE PROTEIN: CPT

## 2017-02-02 PROCEDURE — 25000003 PHARM REV CODE 250: Performed by: INTERNAL MEDICINE

## 2017-02-02 PROCEDURE — 99231 SBSQ HOSP IP/OBS SF/LOW 25: CPT | Mod: ,,, | Performed by: INTERNAL MEDICINE

## 2017-02-02 PROCEDURE — 36415 COLL VENOUS BLD VENIPUNCTURE: CPT

## 2017-02-02 PROCEDURE — 85652 RBC SED RATE AUTOMATED: CPT

## 2017-02-02 PROCEDURE — 94761 N-INVAS EAR/PLS OXIMETRY MLT: CPT

## 2017-02-02 PROCEDURE — 27000221 HC OXYGEN, UP TO 24 HOURS

## 2017-02-02 PROCEDURE — 02HV33Z INSERTION OF INFUSION DEVICE INTO SUPERIOR VENA CAVA, PERCUTANEOUS APPROACH: ICD-10-PCS | Performed by: HOSPITALIST

## 2017-02-02 PROCEDURE — 80076 HEPATIC FUNCTION PANEL: CPT

## 2017-02-02 PROCEDURE — 11000001 HC ACUTE MED/SURG PRIVATE ROOM

## 2017-02-02 PROCEDURE — 25000003 PHARM REV CODE 250: Performed by: NURSE PRACTITIONER

## 2017-02-02 RX ORDER — POTASSIUM CHLORIDE 20 MEQ/1
40 TABLET, EXTENDED RELEASE ORAL ONCE
Status: COMPLETED | OUTPATIENT
Start: 2017-02-03 | End: 2017-02-03

## 2017-02-02 RX ADMIN — LATANOPROST 1 DROP: 50 SOLUTION OPHTHALMIC at 08:02

## 2017-02-02 RX ADMIN — Medication 100 MG: at 06:02

## 2017-02-02 RX ADMIN — GABAPENTIN 100 MG: 400 CAPSULE ORAL at 02:02

## 2017-02-02 RX ADMIN — MEMANTINE HYDROCHLORIDE 10 MG: 5 TABLET ORAL at 08:02

## 2017-02-02 RX ADMIN — PANTOPRAZOLE SODIUM 40 MG: 40 TABLET, DELAYED RELEASE ORAL at 08:02

## 2017-02-02 RX ADMIN — Medication 1 CAPSULE: at 08:02

## 2017-02-02 RX ADMIN — DONEPEZIL HYDROCHLORIDE 10 MG: 5 TABLET ORAL at 08:02

## 2017-02-02 RX ADMIN — DEXTROSE 2 G: 50 INJECTION, SOLUTION INTRAVENOUS at 07:02

## 2017-02-02 RX ADMIN — GABAPENTIN 100 MG: 400 CAPSULE ORAL at 05:02

## 2017-02-02 RX ADMIN — GABAPENTIN 100 MG: 400 CAPSULE ORAL at 10:02

## 2017-02-02 RX ADMIN — LISINOPRIL 5 MG: 5 TABLET ORAL at 06:02

## 2017-02-02 RX ADMIN — CYANOCOBALAMIN TAB 1000 MCG 1000 MCG: 1000 TAB at 08:02

## 2017-02-02 RX ADMIN — FLUTICASONE FUROATE AND VILANTEROL TRIFENATATE 1 PUFF: 100; 25 POWDER RESPIRATORY (INHALATION) at 08:02

## 2017-02-02 RX ADMIN — Medication 1 TABLET: at 08:02

## 2017-02-02 RX ADMIN — ASPIRIN 81 MG: 81 TABLET, COATED ORAL at 08:02

## 2017-02-02 RX ADMIN — MIRTAZAPINE 15 MG: 15 TABLET, FILM COATED ORAL at 08:02

## 2017-02-02 RX ADMIN — ESCITALOPRAM 10 MG: 10 TABLET, FILM COATED ORAL at 06:02

## 2017-02-02 NOTE — SUBJECTIVE & OBJECTIVE
Interval History: Patient with no complaints today. LFTs trending down. Tolerated first dose of ceftriaxone yesterday. BASSEM ordered. Possibly to be done tomorrow.     HPI:  88 y/o male with HTN, HLD, CAD, s/p CABGX3 Cardiac stents, glaucoma, depression, anxiety, melanoma s/p excision (1/16/17), s/p pacemaker 11/22/16. Resident of [facility]. Patient sent to [facility] [place] for fevers to 101 axillary, not eating and weakness. Complained of epigastric pain after breakfast on admit day. Patient febrile to 102 in ED. WBC 8 and elevated LFTs. CT abdomen positive for bile duct dilatation, possible choledocholithiasis and needs ERCP. BC on admit 2/2 sets positive for strep gallolyticus. Of note patient had BC positive for strep gallolyticus 11/5/2016 (S to pcn and vanco). At that time he was treated with IV antibiotic for a few days as inpatient then transitioned to PO amoxicillin for 5 days after discharge. ID consult called 1/31 for help with antibiotics. Zosyn continued 1/29 until 2/1. Vanco added 2/1 then stopped after 1 dose. Ceftriaxone started 2/1.         Review of Systems   Respiratory: Negative for shortness of breath.    Gastrointestinal: Negative for diarrhea, nausea and vomiting.     Antibiotics     Start     Stop Route Frequency Ordered    02/01/17 1730  cefTRIAXone (ROCEPHIN) 2 g in dextrose 5 % 50 mL IVPB      -- IV Every 24 hours (non-standard times) 02/01/17 1618        Objective:     Vital Signs (Most Recent):  Temp: 98.2 °F (36.8 °C) (02/02/17 1625)  Pulse: 60 (02/02/17 1625)  Resp: 16 (02/02/17 1625)  BP: (!) 152/67 (02/02/17 1625)  SpO2: 96 % (02/02/17 1550) Vital Signs (24h Range):  Temp:  [97.9 °F (36.6 °C)-98.3 °F (36.8 °C)] 98.2 °F (36.8 °C)  Pulse:  [60-71] 60  Resp:  [16-18] 16  SpO2:  [95 %-96 %] 96 %  BP: (137-166)/(65-75) 152/67     Weight: 68.3 kg (150 lb 9.6 oz)  Body mass index is 22.24 kg/(m^2).    Estimated Creatinine Clearance: 60.5 mL/min (based on Cr of 0.8).    Physical Exam    HENT:   Left neck incision with slight opening at posterior aspect of incision and slight clear drainage   Cardiovascular: Normal heart sounds.    No murmur heard.  Pulmonary/Chest: Breath sounds normal. No respiratory distress.   Abdominal: Bowel sounds are normal. He exhibits no distension. There is no tenderness.   Musculoskeletal: He exhibits no edema.       Significant Labs:     Blood Culture:   Recent Labs  Lab 11/05/16  1130 12/27/16  0832 01/29/17  1800 01/29/17  1805 01/31/17  1001   LABBLOO Gram stain aer bottle: Gram positive cocci in chains resembling Strep   Positive results previously called 11/06/2016  06:21  STREPTOCOCCUS GALLOLYTICUSFor susceptibility see order #[id number] No growth after 5 days.  No growth after 5 days. Gram stain hanny bottle: Gram positive cocci in chains resembling Strep   Results called to and read back by: Martha Martinez RN  01/30/2017  06:43  Gram stain aer bottle: Gram positive cocci in chains resembling Strep   Positive results previously called 01/30/2017  08:44  STREPTOCOCCUS GALLOLYTICUS Gram stain hanny bottle: Gram positive cocci in chains resembling Strep   Results called to and read back by: Martha Martinez RN  01/30/2017  06:43  Gram stain aer bottle: Gram positive cocci in chains resembling Strep   Positive results previously called 01/30/2017  08:55  STREPTOCOCCUS GALLOLYTICUSFor susceptibility see order #[id number] No Growth to date  No Growth to date  No Growth to date  No Growth to date  No Growth to date  No Growth to date     CBC:   Recent Labs  Lab 02/01/17  0709 02/02/17  0622   WBC 3.76* 3.64*   HGB 8.5* 8.6*   HCT 27.6* 28.0*   PLT 96* 123*     CMP:   Recent Labs  Lab 02/01/17  0709 02/02/17  0622 02/02/17  0623    144  --    K 3.7 3.1*  --     110  --    CO2 22* 26  --     85  --    BUN 18 9  --    CREATININE 1.1 0.8  --    CALCIUM 8.5* 8.4*  --    PROT 5.4*  --  5.4*   ALBUMIN 2.3*  --  2.3*   BILITOT 0.8  --  0.4   ALKPHOS  195*  --  175*   AST 62*  --  37   ALT 77*  --  57*   ANIONGAP 8 8  --    EGFRNONAA 59* >60  --      Urine Culture:   Recent Labs  Lab 16  0610 17  1502   LABURIN No growth No growth     Urine Studies:   Recent Labs  Lab 17  1502 17  0913   COLORU Yellow Yellow   APPEARANCEUA Clear Clear   PHUR 6.0 6.0   SPECGRAV 1.020 1.015   PROTEINUA 1+* Negative   GLUCUA Negative Negative   KETONESU 1+* Negative   BILIRUBINUA 1+* Negative   OCCULTUA 3+* 2+*   NITRITE Negative Negative   UROBILINOGEN 2.0-3.0* 1.0   LEUKOCYTESUR Trace* Negative   RBCUA 15* 3   WBCUA 3 3   BACTERIA Occasional None   SQUAMEPITHEL Occ  --    HYALINECASTS 0  --        Significant Imagin/2 CXR - Single view chest, comparison 2017.  Insertion left PICC line, tip mid SVC.  Right subclavian pacemaker, postop sternotomy cardiac surgery stable.  Mild prominence of interstitial markings with chronic appearance.  Linear atelectasis left lateral CP angle and retrocardiac patchy infiltrate left lower lobe basilar segment. Impression Pneumonitis atelectasis left lung base.

## 2017-02-02 NOTE — PLAN OF CARE
.      02/02/17 1611   Discharge Reassessment   Assessment Type Discharge Planning Reassessment   Can the patient answer the patient profile reliably? Yes, cognitively intact   How does the patient rate their overall health at the present time? Fair   Describe the patient's ability to walk at the present time. Major restrictions/daily assistance from another person   How often would a person be available to care for the patient? Whenever needed   Number of comorbid conditions (as recorded on the chart) Three   During the past month, has the patient often been bothered by feeling down, depressed or hopeless? No   During the past month, has the patient often been bothered by little interest or pleasure in doing things? No   Discharge plan remains the same: Yes   Provided patient/caregiver education on the expected discharge date and the discharge plan Yes   Discharge Plan A Return to nursing home   Discharge Plan B Skilled Nursing Facility   Involved the patient/caregiver in establishing a new discharge plan: No

## 2017-02-02 NOTE — SUBJECTIVE & OBJECTIVE
Interval History: Feeling good this afternoon. Wife at bedside today. No abdominal pain or nausea with the diet.     Review of Systems   Constitutional: Negative for fever.   Respiratory: Negative for shortness of breath.    Cardiovascular: Negative for chest pain and palpitations.   Gastrointestinal: Negative for nausea and vomiting.     Objective:     Vital Signs (Most Recent):  Temp: 98.2 °F (36.8 °C) (02/01/17 1600)  Pulse: 60 (02/01/17 1600)  Resp: 18 (02/01/17 1600)  BP: (!) 143/65 (02/01/17 1600)  SpO2: 97 % (02/01/17 1547) Vital Signs (24h Range):  Temp:  [98 °F (36.7 °C)-98.4 °F (36.9 °C)] 98.2 °F (36.8 °C)  Pulse:  [60-71] 60  Resp:  [17-18] 18  SpO2:  [96 %-98 %] 97 %  BP: ()/(52-91) 143/65     Weight: 68.3 kg (150 lb 9.6 oz)  Body mass index is 22.24 kg/(m^2).    Intake/Output Summary (Last 24 hours) at 02/01/17 1849  Last data filed at 02/01/17 1000   Gross per 24 hour   Intake              480 ml   Output             1400 ml   Net             -920 ml      Physical Exam   Constitutional: He is oriented to person, place, and time. He appears well-developed and well-nourished. No distress.   HENT:   Head: Normocephalic and atraumatic.   Cardiovascular: Normal rate and regular rhythm.    Murmur heard.  Pulmonary/Chest: Effort normal and breath sounds normal. No respiratory distress.   Abdominal: Soft. Bowel sounds are normal. He exhibits no distension. There is no tenderness.   Musculoskeletal: He exhibits no edema.   Neurological: He is alert and oriented to person, place, and time.   Skin: Skin is warm and dry.   Psychiatric: He has a normal mood and affect. His behavior is normal.   Nursing note and vitals reviewed.      Significant Labs:   Blood Culture:   Recent Labs  Lab 01/31/17  1001   LABBLOO No Growth to date  No Growth to date  No Growth to date  No Growth to date     CBC:   Recent Labs  Lab 01/31/17  0627 02/01/17  0709   WBC 6.39 3.76*   HGB 8.8* 8.5*   HCT 28.1* 27.6*   PLT 85* 96*      CMP:   Recent Labs  Lab 01/31/17  0627 02/01/17  0709    138   K 4.2 3.7    108   CO2 22* 22*   GLU 87 101   BUN 23 18   CREATININE 1.4 1.1   CALCIUM 8.3* 8.5*   PROT 5.4* 5.4*   ALBUMIN 2.3* 2.3*   BILITOT 1.7* 0.8   ALKPHOS 198* 195*   * 62*   * 77*   ANIONGAP 8 8   EGFRNONAA 44* 59*       Significant Imaging: Echo: I have reviewed all pertinent results/findings within the past 24 hours and my personal findings are:  No vegetation on the echo.  I have reviewed all pertinent imaging results/findings within the past 24 hours.

## 2017-02-02 NOTE — PROGRESS NOTES
[facility]\"" Medicine  Progress Note    Patient Name: Fidel Allan  MRN: [id number]  Patient Class: IP- Inpatient   Admission Date: 1/29/2017  Length of Stay: 3 days  Attending Physician: Cristhian Reyes MD  Primary Care Provider: Juan Elmore MD      Subjective:     Principal Problem:Cholangitis due to bile duct calculus with obstruction    HPI:  Fidel Allan is a 89 y.o.  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 He resides at [facility]. His primary care physician is Dr. Juan Elmore.     He was sent to [facility] ED on 1/29/17 for fever (101.1 F axillary), decreased appetite, and increased weakness. He reported constant, non-radiating, epigastric pain that began in the morning after breakfast. He was found to have a temperature of 102.4 F, decreased platelets (112,000), alkaline phosphatase 227, , , and total bilirubin 2.1. Abdominal CT showed debris, sludge, or stone in the common bile duct with mild proximal dilatation. He was given 1 liters of saline, piperacillin-tazobactam, acetaminophen, and IV morphine. [facility] was contacted and planned ERCP. He was admitted to [facility].       Hospital Course:  ERCP was done, showing evidence of prior choledochoduodenostomy. Sludge was swept. His initial blood cultures were positive for Streptococcus gallolyticus and repeat blood cultures have been negative. His abdominal pain has resolved and he is tolerating a regular diet. His LFTs have improved. Given the recurrent Strep bacteremia, Infectious disease was consulted. Transthoracic echo was negative for vegetations. He was  transitioned to ceftriaxone for the Strep. Transesophageal echo was requested to rule out endocarditis.     Interval History: Feeling good this afternoon. Wife at bedside today. No abdominal pain or nausea with the diet.     Review of Systems   Constitutional: Negative for fever.   Respiratory: Negative for shortness of breath.    Cardiovascular: Negative for chest pain and palpitations.   Gastrointestinal: Negative for nausea and vomiting.     Objective:     Vital Signs (Most Recent):  Temp: 98.2 °F (36.8 °C) (02/01/17 1600)  Pulse: 60 (02/01/17 1600)  Resp: 18 (02/01/17 1600)  BP: (!) 143/65 (02/01/17 1600)  SpO2: 97 % (02/01/17 1547) Vital Signs (24h Range):  Temp:  [98 °F (36.7 °C)-98.4 °F (36.9 °C)] 98.2 °F (36.8 °C)  Pulse:  [60-71] 60  Resp:  [17-18] 18  SpO2:  [96 %-98 %] 97 %  BP: ()/(52-91) 143/65     Weight: 68.3 kg (150 lb 9.6 oz)  Body mass index is 22.24 kg/(m^2).    Intake/Output Summary (Last 24 hours) at 02/01/17 1849  Last data filed at 02/01/17 1000   Gross per 24 hour   Intake              480 ml   Output             1400 ml   Net             -920 ml      Physical Exam   Constitutional: He is oriented to person, place, and time. He appears well-developed and well-nourished. No distress.   HENT:   Head: Normocephalic and atraumatic.   Cardiovascular: Normal rate and regular rhythm.    Murmur heard.  Pulmonary/Chest: Effort normal and breath sounds normal. No respiratory distress.   Abdominal: Soft. Bowel sounds are normal. He exhibits no distension. There is no tenderness.   Musculoskeletal: He exhibits no edema.   Neurological: He is alert and oriented to person, place, and time.   Skin: Skin is warm and dry.   Psychiatric: He has a normal mood and affect. His behavior is normal.   Nursing note and vitals reviewed.      Significant Labs:   Blood Culture:   Recent Labs  Lab 01/31/17  1001   LABBLOO No Growth to date  No Growth to date  No Growth to date  No Growth to date     CBC:    Recent Labs  Lab 01/31/17  0627 02/01/17  0709   WBC 6.39 3.76*   HGB 8.8* 8.5*   HCT 28.1* 27.6*   PLT 85* 96*     CMP:   Recent Labs  Lab 01/31/17  0627 02/01/17  0709    138   K 4.2 3.7    108   CO2 22* 22*   GLU 87 101   BUN 23 18   CREATININE 1.4 1.1   CALCIUM 8.3* 8.5*   PROT 5.4* 5.4*   ALBUMIN 2.3* 2.3*   BILITOT 1.7* 0.8   ALKPHOS 198* 195*   * 62*   * 77*   ANIONGAP 8 8   EGFRNONAA 44* 59*       Significant Imaging: Echo: I have reviewed all pertinent results/findings within the past 24 hours and my personal findings are:  No vegetation on the echo.  I have reviewed all pertinent imaging results/findings within the past 24 hours.    Assessment/Plan:      * Cholangitis due to bile duct calculus with obstruction  Streptococcus gallolyticus septecemia    Appreciate ID assistance. Had same bacteria in the blood on 11/5/16. Transthroacic echo was negative for vegetation. Will request BASSEM to evaluate the valves and leads for evidence of endocardidtis.  ERCP showed sludge, but no stones. LFTs are improving. Resume diet. Transitioning to ceftriaxone today.     Vascular dementia  Continue donepezil, memantine.      Essential hypertension  Continue lisinopril 5 mg daily with hold parameters. SBP ranged 94 to 150.       VTE Risk Mitigation         Ordered     enoxaparin injection 40 mg  Daily     Route:  Subcutaneous        01/30/17 0022     Medium Risk of VTE  Once      01/30/17 0022          Cristhian Reyes MD  Department of Hospital Medicine   [facility]

## 2017-02-02 NOTE — PROCEDURES
"Fidel Allan is a 89 y.o. male patient.    Temp: 97.9 °F (36.6 °C) (02/02/17 0730)  Pulse: 60 (02/02/17 0818)  Resp: 16 (02/02/17 0818)  BP: (!) 155/75 (02/02/17 0730)  SpO2: 96 % (02/02/17 0818)  Weight: 68.3 kg (150 lb 9.6 oz) (02/01/17 0600)  Height: 5' 9" (175.3 cm) (01/29/17 1635)    PICC  Date/Time: 2/2/2017 10:11 AM  Consent Done: Yes  Time out: Immediately prior to procedure a time out was called to verify the correct patient, procedure, equipment, support staff and site/side marked as required  Indications: med administration and vascular access  Anesthesia: local infiltration  Local anesthetic: lidocaine 1% without epinephrine  Anesthetic Total (mL): 3  Preparation: skin prepped with ChloraPrep  Skin prep agent dried: skin prep agent completely dried prior to procedure  Sterile barriers: all five maximum sterile barriers used - cap, mask, sterile gown, sterile gloves, and large sterile sheet  Hand hygiene: hand hygiene performed prior to central venous catheter insertion  Location details: left brachial  Catheter type: double lumen  Catheter size: 5 Fr  Catheter Length: 40cm    Ultrasound guidance: yes  Vessel Caliber: medium and patent, compressibility normal  Needle advanced into vessel with real time Ultrasound guidance.  Guidewire confirmed in vessel.  Sterile sheath used.  Number of attempts: 1  Post-procedure: blood return through all ports and sterile dressing applied  Estimated blood loss (mL): 4          Son Briceño  2/2/2017  "

## 2017-02-02 NOTE — PROGRESS NOTES
"Gastroenterology  CC: Fatigue    HPI 89 y.o. male  - doing ok, rehana full breakfast today. No abd pain. 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     Blindness of right eye     Bone disorder     Bradycardia     CAD (coronary artery disease)     Constipation     COPD (chronic obstructive pulmonary disease)     Dyspnea     Falls frequently     GERD (gastroesophageal reflux disease)     Glaucoma     Hearing loss     HLD (hyperlipidemia)     Hypertension     Insomnia     Major depressive disorder     Melanoma     Vascular dementia          Review of Systems  General ROS: negative for chills, fever or weight loss  Cardiovascular ROS: no chest pain or dyspnea on exertion  Gastrointestinal ROS: no abdominal pain, change in bowel habits, or black/ bloody stools    Physical Examination  Visit Vitals    BP (!) 155/75 (Patient Position: Lying, BP Method: Automatic)    Pulse 60    Temp 97.9 °F (36.6 °C) (Oral)    Resp 16    Ht 5' 9" (1.753 m)    Wt 68.3 kg (150 lb 9.6 oz)    SpO2 96%    BMI 22.24 kg/m2     General appearance: alert, cooperative, no distress  HENT: Normocephalic, atraumatic, neck symmetrical, no nasal discharge   Lungs: clear to auscultation bilaterally, no dullness to percussion bilaterally  Heart: regular rate and rhythm without rub; no displacement of the PMI   Abdomen: soft, non-tender; bowel sounds normoactive; no organomegaly  Extremities: extremities symmetric; no clubbing, cyanosis, or edema  Neurologic: Alert and oriented X 3, normal strength     Labs: TB 0.4       Assessment/Plan: 88yo male with:     1. Choledocholithiasis/Elevated LFTs  - doing better  - rehana reg diet  - LFTs trending down  - ok to d/c from my standpoint  "

## 2017-02-02 NOTE — PROGRESS NOTES
spoke with [facility] director of nursing , Milady and confirmed they can meet patient's need at readmission. Milady informed patient will have a picc and iv Ceftriaxone.   told Milady she will update her tomorrow regarding possible discharge date.

## 2017-02-02 NOTE — PROGRESS NOTES
faxed clinicals to [facility] admit coordinator, Marycarmen to prepare for readmission when patient is ready.  Then  attempted to make phone contact with Marycarmen to highlight that patient will return with a picc and have iv antibiotic (Ceftriaxone).  Marycarmen was not available  left voice message.

## 2017-02-02 NOTE — ASSESSMENT & PLAN NOTE
Streptococcus gallolyticus septecemia    Appreciate ID assistance. Had same bacteria in the blood on 11/5/16. Transthroacic echo was negative for vegetation. Will request BASSEM to evaluate the valves and leads for evidence of endocardidtis.  ERCP showed sludge, but no stones. LFTs are improving. Resume diet. Transitioning to ceftriaxone today.

## 2017-02-03 ENCOUNTER — OUTPATIENT CASE MANAGEMENT (OUTPATIENT)
Dept: ADMINISTRATIVE | Facility: OTHER | Age: 82
End: 2017-02-03

## 2017-02-03 ENCOUNTER — ANESTHESIA (OUTPATIENT)
Dept: SURGERY | Facility: HOSPITAL | Age: 82
DRG: 445 | End: 2017-02-03
Payer: MEDICARE

## 2017-02-03 VITALS
HEIGHT: 69 IN | OXYGEN SATURATION: 93 % | WEIGHT: 150.63 LBS | DIASTOLIC BLOOD PRESSURE: 74 MMHG | HEART RATE: 60 BPM | RESPIRATION RATE: 18 BRPM | TEMPERATURE: 98 F | BODY MASS INDEX: 22.31 KG/M2 | SYSTOLIC BLOOD PRESSURE: 175 MMHG

## 2017-02-03 PROBLEM — E87.1 HYPONATREMIA: Status: RESOLVED | Noted: 2017-01-29 | Resolved: 2017-02-03

## 2017-02-03 PROBLEM — E87.6 HYPOKALEMIA: Status: RESOLVED | Noted: 2017-02-02 | Resolved: 2017-02-03

## 2017-02-03 PROBLEM — R78.81 STREPTOCOCCAL BACTEREMIA: Status: RESOLVED | Noted: 2017-01-31 | Resolved: 2017-02-03

## 2017-02-03 PROBLEM — B95.5 STREPTOCOCCAL BACTEREMIA: Status: RESOLVED | Noted: 2017-01-31 | Resolved: 2017-02-03

## 2017-02-03 PROBLEM — K83.09 CHOLANGITIS: Status: RESOLVED | Noted: 2017-01-31 | Resolved: 2017-02-03

## 2017-02-03 LAB
ALBUMIN SERPL BCP-MCNC: 2.4 G/DL
ALP SERPL-CCNC: 166 U/L
ALT SERPL W/O P-5'-P-CCNC: 51 U/L
ANION GAP SERPL CALC-SCNC: 8 MMOL/L
AORTIC ATHEROMA: YES
AST SERPL-CCNC: 40 U/L
BASOPHILS # BLD AUTO: 0.05 K/UL
BASOPHILS NFR BLD: 1.2 %
BILIRUB DIRECT SERPL-MCNC: 0.3 MG/DL
BILIRUB SERPL-MCNC: 0.4 MG/DL
BUN SERPL-MCNC: 11 MG/DL
CALCIUM SERPL-MCNC: 8.6 MG/DL
CHLORIDE SERPL-SCNC: 111 MMOL/L
CO2 SERPL-SCNC: 26 MMOL/L
CREAT SERPL-MCNC: 0.8 MG/DL
DIFFERENTIAL METHOD: ABNORMAL
EOSINOPHIL # BLD AUTO: 0.1 K/UL
EOSINOPHIL NFR BLD: 1.7 %
ERYTHROCYTE [DISTWIDTH] IN BLOOD BY AUTOMATED COUNT: 14.7 %
EST. GFR  (AFRICAN AMERICAN): >60 ML/MIN/1.73 M^2
EST. GFR  (NON AFRICAN AMERICAN): >60 ML/MIN/1.73 M^2
GLUCOSE SERPL-MCNC: 93 MG/DL
HCT VFR BLD AUTO: 27.4 %
HGB BLD-MCNC: 8.4 G/DL
LYMPHOCYTES # BLD AUTO: 1.5 K/UL
LYMPHOCYTES NFR BLD: 37.2 %
MCH RBC QN AUTO: 27.4 PG
MCHC RBC AUTO-ENTMCNC: 30.7 %
MCV RBC AUTO: 89 FL
MITRAL VALVE REGURGITATION: ABNORMAL
MONOCYTES # BLD AUTO: 0.6 K/UL
MONOCYTES NFR BLD: 13.7 %
NEUTROPHILS # BLD AUTO: 1.9 K/UL
NEUTROPHILS NFR BLD: 46 %
PLATELET # BLD AUTO: 134 K/UL
PMV BLD AUTO: 10.2 FL
POTASSIUM SERPL-SCNC: 3.8 MMOL/L
PROT SERPL-MCNC: 5.6 G/DL
RBC # BLD AUTO: 3.07 M/UL
RETIRED EF AND QEF - SEE NOTES: 65 (ref 55–65)
SODIUM SERPL-SCNC: 145 MMOL/L
TRICUSPID VALVE REGURGITATION: ABNORMAL
WBC # BLD AUTO: 4.09 K/UL

## 2017-02-03 PROCEDURE — 71000033 HC RECOVERY, INTIAL HOUR: Performed by: INTERNAL MEDICINE

## 2017-02-03 PROCEDURE — 63600175 PHARM REV CODE 636 W HCPCS: Performed by: HOSPITALIST

## 2017-02-03 PROCEDURE — 94761 N-INVAS EAR/PLS OXIMETRY MLT: CPT

## 2017-02-03 PROCEDURE — 99231 SBSQ HOSP IP/OBS SF/LOW 25: CPT | Mod: ,,, | Performed by: INTERNAL MEDICINE

## 2017-02-03 PROCEDURE — 93312 ECHO TRANSESOPHAGEAL: CPT | Mod: 26,,, | Performed by: INTERNAL MEDICINE

## 2017-02-03 PROCEDURE — 80076 HEPATIC FUNCTION PANEL: CPT

## 2017-02-03 PROCEDURE — 37000008 HC ANESTHESIA 1ST 15 MINUTES: Performed by: INTERNAL MEDICINE

## 2017-02-03 PROCEDURE — 93325 DOPPLER ECHO COLOR FLOW MAPG: CPT | Mod: 26,,, | Performed by: INTERNAL MEDICINE

## 2017-02-03 PROCEDURE — 63600175 PHARM REV CODE 636 W HCPCS: Performed by: NURSE PRACTITIONER

## 2017-02-03 PROCEDURE — 25000003 PHARM REV CODE 250: Performed by: NURSE PRACTITIONER

## 2017-02-03 PROCEDURE — 25000003 PHARM REV CODE 250: Performed by: NURSE ANESTHETIST, CERTIFIED REGISTERED

## 2017-02-03 PROCEDURE — 94640 AIRWAY INHALATION TREATMENT: CPT

## 2017-02-03 PROCEDURE — 80048 BASIC METABOLIC PNL TOTAL CA: CPT

## 2017-02-03 PROCEDURE — 71000039 HC RECOVERY, EACH ADD'L HOUR: Performed by: INTERNAL MEDICINE

## 2017-02-03 PROCEDURE — 93320 DOPPLER ECHO COMPLETE: CPT | Mod: 26,,, | Performed by: INTERNAL MEDICINE

## 2017-02-03 PROCEDURE — 63600175 PHARM REV CODE 636 W HCPCS: Performed by: NURSE ANESTHETIST, CERTIFIED REGISTERED

## 2017-02-03 PROCEDURE — 85025 COMPLETE CBC W/AUTO DIFF WBC: CPT

## 2017-02-03 PROCEDURE — 37000009 HC ANESTHESIA EA ADD 15 MINS: Performed by: INTERNAL MEDICINE

## 2017-02-03 PROCEDURE — 93325 DOPPLER ECHO COLOR FLOW MAPG: CPT

## 2017-02-03 PROCEDURE — 25000003 PHARM REV CODE 250: Performed by: HOSPITALIST

## 2017-02-03 PROCEDURE — 25000003 PHARM REV CODE 250: Performed by: ANESTHESIOLOGY

## 2017-02-03 RX ORDER — OXYCODONE HYDROCHLORIDE 5 MG/1
5 TABLET ORAL
Status: DISCONTINUED | OUTPATIENT
Start: 2017-02-03 | End: 2017-02-03 | Stop reason: HOSPADM

## 2017-02-03 RX ORDER — HYDRALAZINE HYDROCHLORIDE 20 MG/ML
5 INJECTION INTRAMUSCULAR; INTRAVENOUS ONCE
Status: COMPLETED | OUTPATIENT
Start: 2017-02-03 | End: 2017-02-03

## 2017-02-03 RX ORDER — HYDROMORPHONE HYDROCHLORIDE 2 MG/ML
0.2 INJECTION, SOLUTION INTRAMUSCULAR; INTRAVENOUS; SUBCUTANEOUS EVERY 5 MIN PRN
Status: DISCONTINUED | OUTPATIENT
Start: 2017-02-03 | End: 2017-02-03 | Stop reason: HOSPADM

## 2017-02-03 RX ORDER — ETOMIDATE 2 MG/ML
INJECTION INTRAVENOUS
Status: DISCONTINUED | OUTPATIENT
Start: 2017-02-03 | End: 2017-02-03

## 2017-02-03 RX ORDER — PROPOFOL 10 MG/ML
VIAL (ML) INTRAVENOUS
Status: DISCONTINUED | OUTPATIENT
Start: 2017-02-03 | End: 2017-02-03

## 2017-02-03 RX ORDER — LIDOCAINE HCL/PF 100 MG/5ML
SYRINGE (ML) INTRAVENOUS
Status: DISCONTINUED | OUTPATIENT
Start: 2017-02-03 | End: 2017-02-03

## 2017-02-03 RX ORDER — SODIUM CHLORIDE 0.9 % (FLUSH) 0.9 %
3 SYRINGE (ML) INJECTION
Status: DISCONTINUED | OUTPATIENT
Start: 2017-02-03 | End: 2017-02-03 | Stop reason: HOSPADM

## 2017-02-03 RX ORDER — SODIUM CHLORIDE, SODIUM LACTATE, POTASSIUM CHLORIDE, CALCIUM CHLORIDE 600; 310; 30; 20 MG/100ML; MG/100ML; MG/100ML; MG/100ML
INJECTION, SOLUTION INTRAVENOUS CONTINUOUS PRN
Status: DISCONTINUED | OUTPATIENT
Start: 2017-02-03 | End: 2017-02-03

## 2017-02-03 RX ORDER — SODIUM CHLORIDE 0.9 % (FLUSH) 0.9 %
3 SYRINGE (ML) INJECTION EVERY 8 HOURS
Status: DISCONTINUED | OUTPATIENT
Start: 2017-02-03 | End: 2017-02-03 | Stop reason: HOSPADM

## 2017-02-03 RX ADMIN — PROPOFOL 10 MG: 10 INJECTION, EMULSION INTRAVENOUS at 12:02

## 2017-02-03 RX ADMIN — ETOMIDATE 2 MG: 2 INJECTION, SOLUTION INTRAVENOUS at 12:02

## 2017-02-03 RX ADMIN — PANTOPRAZOLE SODIUM 40 MG: 40 TABLET, DELAYED RELEASE ORAL at 08:02

## 2017-02-03 RX ADMIN — LIDOCAINE HYDROCHLORIDE 50 MG: 20 INJECTION, SOLUTION INTRAVENOUS at 12:02

## 2017-02-03 RX ADMIN — CYANOCOBALAMIN TAB 1000 MCG 1000 MCG: 1000 TAB at 08:02

## 2017-02-03 RX ADMIN — MEMANTINE HYDROCHLORIDE 10 MG: 5 TABLET ORAL at 08:02

## 2017-02-03 RX ADMIN — FLUTICASONE FUROATE AND VILANTEROL TRIFENATATE 1 PUFF: 100; 25 POWDER RESPIRATORY (INHALATION) at 07:02

## 2017-02-03 RX ADMIN — GABAPENTIN 100 MG: 400 CAPSULE ORAL at 02:02

## 2017-02-03 RX ADMIN — GABAPENTIN 100 MG: 400 CAPSULE ORAL at 05:02

## 2017-02-03 RX ADMIN — ASPIRIN 81 MG: 81 TABLET, COATED ORAL at 08:02

## 2017-02-03 RX ADMIN — ESCITALOPRAM 10 MG: 10 TABLET, FILM COATED ORAL at 06:02

## 2017-02-03 RX ADMIN — SODIUM CHLORIDE, SODIUM LACTATE, POTASSIUM CHLORIDE, AND CALCIUM CHLORIDE: .6; .31; .03; .02 INJECTION, SOLUTION INTRAVENOUS at 12:02

## 2017-02-03 RX ADMIN — Medication 1 TABLET: at 08:02

## 2017-02-03 RX ADMIN — Medication 100 MG: at 06:02

## 2017-02-03 RX ADMIN — LISINOPRIL 5 MG: 5 TABLET ORAL at 06:02

## 2017-02-03 RX ADMIN — POTASSIUM CHLORIDE 40 MEQ: 20 TABLET, EXTENDED RELEASE ORAL at 12:02

## 2017-02-03 RX ADMIN — ENOXAPARIN SODIUM 40 MG: 100 INJECTION SUBCUTANEOUS at 11:02

## 2017-02-03 RX ADMIN — SODIUM CHLORIDE, PRESERVATIVE FREE 3 ML: 5 INJECTION INTRAVENOUS at 02:02

## 2017-02-03 RX ADMIN — Medication 1 CAPSULE: at 08:02

## 2017-02-03 RX ADMIN — DEXTROSE 2 G: 50 INJECTION, SOLUTION INTRAVENOUS at 02:02

## 2017-02-03 RX ADMIN — HYDRALAZINE HYDROCHLORIDE 5 MG: 20 INJECTION INTRAMUSCULAR; INTRAVENOUS at 04:02

## 2017-02-03 NOTE — PLAN OF CARE
Dr Mathews at bedside for consent and to start procedure, GEOFF WHITEHEAD to monitor and sedated for the case will assume care afterwards.

## 2017-02-03 NOTE — PROGRESS NOTES
faxed transfer facility orders to [facility] admit coordinator, Marycarmen.  Then made phone contact with Marycarmen at [facility] to confirm orders received.  Marycarmen reported orders received, she will follow up with  regarding readmission after orders have been reviewed.

## 2017-02-03 NOTE — PLAN OF CARE
Problem: Patient Care Overview  Goal: Plan of Care Review  Outcome: Ongoing (interventions implemented as appropriate)  VSS, no issues in recovery, will send back to room, dr called for sign out

## 2017-02-03 NOTE — ASSESSMENT & PLAN NOTE
Streptococcus gallolyticus septecemia    Appreciate ID assistance. Had same bacteria in the blood on 11/5/16. Transthroacic echo was negative for vegetation. BASSEM tomorrow evaluate the valves and leads for evidence of endocardidtis.  ERCP showed sludge, but no stones. LFTs are improving. Resume diet. Continue ceftriaxone. First negative blood culture was 1/31/17 and will need 2 vs 6 weeks depending on BASSEM result.

## 2017-02-03 NOTE — PROGRESS NOTES
informed patient will be ready for readmission to [facility] today.   faxed updated clinical to [facility] admit coordinator to review for readmission today.   attempted to make phone contact with admit coordinator, she was not available.   left message stating patient is ready for readmission today and requested that admit coordinator return phone call.

## 2017-02-03 NOTE — PROGRESS NOTES
[facility]  Progress Note    Patient Name: Fidel Allan  MRN: [id number]  Patient Class: IP- Inpatient   Admission Date: 1/29/2017  Length of Stay: 5 days  Attending Physician: Cristhian Reyes MD  Primary Care Provider: Juan Elmore MD        Subjective:     Principal Problem:Cholangitis due to bile duct calculus with obstruction    HPI:  Fidel Allan is a 89 y.o.  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 He resides at [facility]. His primary care physician is Dr. Juan Elmore.     He was sent to [facility] ED on 1/29/17 for fever (101.1 F axillary), decreased appetite, and increased weakness. He reported constant, non-radiating, epigastric pain that began in the morning after breakfast. He was found to have a temperature of 102.4 F, decreased platelets (112,000), alkaline phosphatase 227, , , and total bilirubin 2.1. Abdominal CT showed debris, sludge, or stone in the common bile duct with mild proximal dilatation. He was given 1 liters of saline, piperacillin-tazobactam, acetaminophen, and IV morphine. [facility] was contacted and planned ERCP. He was admitted to [facility].       Hospital Course:  ERCP was done, showing evidence of prior choledochoduodenostomy. Sludge was swept. His initial blood cultures were positive for Streptococcus gallolyticus and repeat blood cultures have been negative. His abdominal pain has resolved and he is tolerating a regular diet. His LFTs have improved. Given the recurrent Strep bacteremia, Infectious disease was consulted. Transthoracic echo was negative for vegetations. He was  transitioned to ceftriaxone for the Strep. Transesophageal echo was requested to rule out endocarditis and was negative for any vegetation on the valves. He will complete a 14 day course of ceftriaxone on 2/14/17. Consideration for outpatient colonoscopy is deferred to his PCP at the NH. He has been tolerating a regular diet without any abdominal pain or nausea/vomiting.     Interval History: Had BASSEM early afternoon. Feeling good after it. Ready to go home. Eating well. Complained of some dry eyes.     Review of Systems   Constitutional: Negative for fever.   Respiratory: Negative for shortness of breath.    Cardiovascular: Negative for chest pain.   Gastrointestinal: Negative for abdominal pain, nausea and vomiting.     Objective:     Vital Signs (Most Recent):  Temp: 98 °F (36.7 °C) (02/03/17 1600)  Pulse: 60 (02/03/17 1600)  Resp: 18 (02/03/17 1600)  BP: (!) 175/74 (02/03/17 1600)  SpO2: (!) 93 % (02/03/17 1609) Vital Signs (24h Range):  Temp:  [97.9 °F (36.6 °C)-99.4 °F (37.4 °C)] 98 °F (36.7 °C)  Pulse:  [54-80] 60  Resp:  [13-22] 18  SpO2:  [93 %-99 %] 93 %  BP: (112-187)/(54-74) 175/74     Weight: 68.3 kg (150 lb 9.6 oz)  Body mass index is 22.24 kg/(m^2).    Intake/Output Summary (Last 24 hours) at 02/03/17 1639  Last data filed at 02/03/17 1400   Gross per 24 hour   Intake              783 ml   Output              725 ml   Net               58 ml      Physical Exam   Constitutional: He is oriented to person, place, and time. He appears well-developed and well-nourished. No distress.   Neurological: He is alert and oriented to person, place, and time.   Psychiatric: He has a normal mood and affect. His behavior is normal.   Nursing note and vitals reviewed.      Significant Labs:   CBC:   Recent Labs  Lab 02/02/17  0622 02/03/17  0443   WBC 3.64* 4.09   HGB 8.6* 8.4*   HCT 28.0* 27.4*   * 134*     CMP:   Recent Labs  Lab 02/02/17  0622 02/02/17  0623 02/03/17  0443     --  145   K 3.1*  --  3.8      --  111*   CO2 26  --  26   GLU 85  --  93   BUN 9  --  11   CREATININE 0.8  --  0.8   CALCIUM 8.4*  --  8.6*   PROT  --  5.4* 5.6*   ALBUMIN  --  2.3* 2.4*   BILITOT  --  0.4 0.4   ALKPHOS  --  175* 166*   AST  --  37 40   ALT  --  57* 51*   ANIONGAP 8  --  8   EGFRNONAA >60  --  >60       Significant Imaging: I have reviewed all pertinent imaging results/findings within the past 24 hours.    Assessment/Plan:      * Cholangitis due to bile duct calculus with obstruction  Streptococcus gallolyticus septecemia    Appreciate ID assistance. Had same bacteria in the blood on 11/5/16. Transthroacic echo was negative for vegetation. BASSEM was also negative for vegetation. ERCP showed sludge, but no stones. LFTs are improving. Resume diet. Continue ceftriaxone with end date of 2/14/17.  First negative blood culture was 1/31/17.    Essential hypertension  Continue lisinopril 5 mg daily with hold parameters. SBP ranged 112 to 155.       VTE Risk Mitigation         Ordered     enoxaparin injection 40 mg  Daily     Route:  Subcutaneous        01/30/17 0022     Medium Risk of VTE  Once      01/30/17 0022        Time Spent:  I spent 40 minutes on this discharge, which includes examination, reviewing hospital course with patient/family, reviewing discharge medications and arranging follow-up care.      Cristhian Reyes MD  Department of Hospital Medicine   [facility]

## 2017-02-03 NOTE — PROGRESS NOTES
"Gastroenterology  CC: Elevated lfts    HPI 89 y.o. male - doing ok, ate breakfast. No abd pain.       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     Blindness of right eye     Bone disorder     Bradycardia     CAD (coronary artery disease)     Constipation     COPD (chronic obstructive pulmonary disease)     Dyspnea     Falls frequently     GERD (gastroesophageal reflux disease)     Glaucoma     Hearing loss     HLD (hyperlipidemia)     Hypertension     Insomnia     Major depressive disorder     Melanoma     Vascular dementia          Review of Systems  General ROS: negative for chills, fever or weight loss  Cardiovascular ROS: no chest pain or dyspnea on exertion  Gastrointestinal ROS: no abdominal pain, change in bowel habits, or black/ bloody stools    Physical Examination  Visit Vitals    BP (!) 178/73    Pulse 60    Temp 98.3 °F (36.8 °C) (Oral)    Resp 17    Ht 5' 9" (1.753 m)    Wt 68.3 kg (150 lb 9.6 oz)    SpO2 97%    BMI 22.24 kg/m2   General appearance: alert, cooperative, no distress  HENT: Normocephalic, atraumatic, neck symmetrical, no nasal discharge   Lungs: clear to auscultation bilaterally, no dullness to percussion bilaterally  Heart: regular rate and rhythm without rub; no displacement of the PMI   Abdomen: soft, non-tender; bowel sounds normoactive; no organomegaly  Extremities: extremities symmetric; no clubbing, cyanosis, or edema  Neurologic: Alert and oriented X 3, normal strength      Labs: TB 0.4         Assessment/Plan: 89yo male with:      1. Choledocholithiasis/Elevated LFTs  - doing better  - rehana reg diet  - LFTs trending down, essentially normalized  - ok to d/c from my standpoint  - please call if we can be of further assistance  "

## 2017-02-03 NOTE — PLAN OF CARE
[facility]  Cristhian Reyes MD, [facility]     MD Torey Ac FNP Lauren Holmes, PA-C Lauren Johns, PA-C Renee Melancon, PA-C  [address] [place] [address]  Office: [phone]  Fax: [phone]      NURSING HOME ORDERS    02/03/2017    Admit to Nursing Home:  Regular Bed - [facility]     Diagnoses:  Active Hospital Problems    Diagnosis  POA    *Cholangitis due to bile duct calculus with obstruction [K80.31]  Yes    Infection due to Streptococcus gallolyticus [B95.4]  Yes     bacteremia      Coronary artery disease involving coronary bypass graft of native heart without angina pectoris [I25.810]  Yes     Chronic    Essential hypertension [I10]  Yes     Chronic    Nursing home resident [Z59.3]  Not Applicable     [facility] ([facility] [place] [address])      Thrombocytopenia [D69.6]  Yes    Stage 3 chronic kidney disease [N18.3]  Yes     Chronic    Vascular dementia [F01.50]  Yes     Chronic    COPD (chronic obstructive pulmonary disease) [J44.9]  Yes     Chronic    Major depressive disorder [F32.9]  Yes     Chronic    HLD (hyperlipidemia) [E78.5]  Yes     Chronic    Glaucoma [H40.9]  Yes     Chronic    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]  Yes     Chronic    GERD (gastroesophageal reflux disease) [K21.9]  Yes     Chronic    Moderate aortic stenosis [I35.0]  Yes     Chronic      Resolved Hospital Problems    Diagnosis Date Resolved POA    Hypokalemia [E87.6] 02/03/2017 No    Streptococcal bacteremia [R78.81] 02/03/2017 Yes     Streptococcus gallolyticus      Cholangitis [K83.0] 02/03/2017 Yes    Hyponatremia [E87.1] 02/03/2017 Yes       Allergies:Review of patient's allergies indicates:  No Known Allergies      Discharge Procedure Orders  Ambulatory referral to Outpatient Case Management   Referral Priority: Routine Referral Type: Consultation   Referral  Reason: Specialty Services Required    Number of Visits Requested: 1      Diet Adult Regular   Order Specific Question Answer Comments   Additional restrictions: Cardiac (Low Na/Chol)    Additional restrictions: Dental Soft      Vital signs per facility protocol     Skin assessment every shift      Activity as tolerated     Up in chair/ wheel chair     Full code     Aspiration precautions         LABS:  Per facility protocol    Nursing Precautions:     - Aspiration precautions:        - Fall precautions per nursing home protocol   - Decubitus precautions:        -  for positioning   - Pressure reducing foam mattress   - Turn patient every two hours. Use wedge pillows to anchor patient      MISCELLANEOUS CARE:     Routine Skin for Bedridden Patients:  Apply moisture barrier cream to all    skin folds and wet areas in perineal area daily and after baths and                           all bowel movements.     Supplemental oxygen via nasal cannula at 2 LPM continuously    Infusion Therapy:     SN to perform Infusion Therapy/Central Line Care.    Administer (drug and dose): Ceftriaxone 2 grams IV daily    Last dose given: 2/3/2017  1400                        Home dose due: 2/4/2017     Scrub the Hub: Prior to accessing the line, always perform a 30 second alcohol scrub  Each lumen of the central line is to be flushed at least daily with 10 mL Normal Saline and 3 mL Heparin flush (100 units/mL)  Skilled Nurse (SN) may draw blood from IV access  Blood Draw Procedure:   - Aspirate at least 5 mL of blood   - Discard   - Obtain specimen   - Change posiflow cap   - Flush with 20 mL Normal Saline followed by a                 3-5 mL Heparin flush (100 units/mL)  Central :   - Sterile dressing changes are done weekly and as needed.   - Use chlor-hexadine scrub to cleanse site, apply Biopatch to insertion site,       apply securement device dressing   - Posi-flow caps are changed weekly and after EVERY lab  draw.   - If sterile gauze is under dressing to control oozing,                 dressing change must be performed every 24 hours until gauze is not needed.    Remove the PICC line on 2/14/2017 after completion of the ceftriaxone therapy.      Medications: Discontinue all previous medication orders, if any. See new list below.     Fidel Allan   Home Medication Instructions JAMSHID:[id number]    Printed on:02/03/17 8474   Medication Information                      acetaminophen (TYLENOL) 325 MG tablet  Take 650 mg by mouth every 6 (six) hours as needed for Pain.             aspirin (ECOTRIN) 81 MG EC tablet  Take 81 mg by mouth once daily.             budesonide-formoterol 160-4.5 mcg (SYMBICORT) 160-4.5 mcg/actuation HFAA  Inhale 2 puffs into the lungs 2 (two) times daily.              cyanocobalamin (VITAMIN B-12) 1000 MCG tablet  Take 1,000 mcg by mouth once daily.              dextrose 5 % SolP 50 mL with cefTRIAXone 2 gram SolR 2 g  Inject 2 g into the vein once daily. Start 2/4/17. Stop 2/14/17.             donepezil (ARICEPT) 10 MG tablet  Take 10 mg by mouth every evening.             escitalopram oxalate (LEXAPRO) 10 MG tablet  Take 10 mg by mouth every morning.              esomeprazole (NEXIUM) 40 MG capsule  Take 40 mg by mouth once daily.             fish oil-omega-3 fatty acids 300-1,000 mg capsule  Take 1 g by mouth every morning.              gabapentin (NEURONTIN) 100 MG capsule  Take 100 mg by mouth 3 (three) times daily.             hydrocodone-acetaminophen 5-325mg (NORCO) 5-325 mg per tablet  Take 1 tablet by mouth every 6 (six) hours as needed for Pain.             latanoprost 0.005 % ophthalmic solution  Place 1 drop into both eyes every evening.              lisinopril (PRINIVIL,ZESTRIL) 5 MG tablet  Take 5 mg by mouth every morning. Hold for SBP < 120             lorazepam (ATIVAN) 0.5 MG tablet  Take 0.5 mg by mouth 2 (two) times daily as needed for Anxiety.             magnesium hydroxide 400  mg/5 ml (MILK OF MAGNESIA) 400 mg/5 mL Susp  Take 30 mLs by mouth every evening.              mirtazapine (REMERON) 15 MG tablet  Take 15 mg by mouth every evening.              multivitamin (ONE DAILY MULTIVITAMIN) per tablet  Take 1 tablet by mouth once daily.             NAMENDA XR 14 mg CSpX  Take 14 mg by mouth once daily.              NITROSTAT 0.3 mg SL tablet  Place 0.3 mg under the tongue every 5 (five) minutes as needed for Chest pain.              pravastatin (PRAVACHOL) 40 MG tablet  Take 40 mg by mouth every evening.              thiamine (VITAMIN B-1) 100 MG tablet  Take 100 mg by mouth every morning.                  Cristhian Reyes MD  02/03/2017

## 2017-02-03 NOTE — SUBJECTIVE & OBJECTIVE
Interval History: Says that he is doing well this AM. He was given a breakfast tray this AM despite having NPO order in place.     Review of Systems   Constitutional: Negative for fever.   Respiratory: Negative for cough and shortness of breath.    Gastrointestinal: Negative for nausea and vomiting.     Objective:     Vital Signs (Most Recent):  Temp: 99 °F (37.2 °C) (02/02/17 2013)  Pulse: 60 (02/02/17 2013)  Resp: 20 (02/02/17 2013)  BP: (!) 118/56 (02/02/17 2013)  SpO2: 95 % (02/02/17 2054) Vital Signs (24h Range):  Temp:  [97.9 °F (36.6 °C)-99 °F (37.2 °C)] 99 °F (37.2 °C)  Pulse:  [60-71] 60  Resp:  [16-20] 20  SpO2:  [95 %-96 %] 95 %  BP: (118-166)/(56-75) 118/56     Weight: 68.3 kg (150 lb 9.6 oz)  Body mass index is 22.24 kg/(m^2).    Intake/Output Summary (Last 24 hours) at 02/02/17 2246  Last data filed at 02/02/17 2000   Gross per 24 hour   Intake              480 ml   Output             2625 ml   Net            -2145 ml      Physical Exam   Constitutional: He is oriented to person, place, and time. He appears well-developed and well-nourished. No distress.   Cardiovascular: Normal rate and regular rhythm.    Abdominal: Soft. Bowel sounds are normal. He exhibits no distension. There is no tenderness.   Musculoskeletal: He exhibits no edema.   Neurological: He is alert and oriented to person, place, and time.   Nursing note and vitals reviewed.      Significant Labs:   CBC:   Recent Labs  Lab 02/01/17  0709 02/02/17  0622   WBC 3.76* 3.64*   HGB 8.5* 8.6*   HCT 27.6* 28.0*   PLT 96* 123*     CMP:   Recent Labs  Lab 02/01/17  0709 02/02/17  0622 02/02/17  0623    144  --    K 3.7 3.1*  --     110  --    CO2 22* 26  --     85  --    BUN 18 9  --    CREATININE 1.1 0.8  --    CALCIUM 8.5* 8.4*  --    PROT 5.4*  --  5.4*   ALBUMIN 2.3*  --  2.3*   BILITOT 0.8  --  0.4   ALKPHOS 195*  --  175*   AST 62*  --  37   ALT 77*  --  57*   ANIONGAP 8 8  --    EGFRNONAA 59* >60  --        Significant  Imaging: I have reviewed all pertinent imaging results/findings within the past 24 hours.

## 2017-02-03 NOTE — PROGRESS NOTES
[facility]  Infectious Disease  Progress Note    Patient Name: Fidel Allan  MRN: [id number]  Admission Date: 1/29/2017  Length of Stay: 4 days  Attending Physician: Cristhian Reyes MD  Primary Care Provider: Juan Elmore MD    Isolation Status: No active isolations  Assessment/Plan:      Infection due to Streptococcus gallolyticus  Strep gallolyticus bacteremia - possibly due to cholangitis. TTE negative for endocarditis.     Strep Gallolyticus is Formerly known as strep bovis - generally would recommend colonoscopy to rule out colon cancer - will leave this up to primary team decision    Patient has had this bacteremia in November - worrisome for intravascular focus of infection - has pacemaker in place.     Have streamlined to ceftriaxone 2/1/17    Need to watch LFTs carefully - 3% chance of elevating transaminases and <1% chance of gallbladder sludge with ceftriaxone.    If he does not tolerate ceftriaxone - will need alternative agent    Plan at least 2 weeks of therapy if no endocarditis    Suggest ask Cardiology to perform BASSEM to rule out endocarditis since patient had this same bacteremia last November (but had interval BC negative in December) - BASSEM planned for tomorrow per chart    Dr. Carbone to  service tomorrow - please contact him for final ID recs.             Streptococcal bacteremia  Strep gallolyticus (formerly bovis) - see above    Cholangitis  S/P ERCP on 1/30 - had sludge removed.  Possible source for the bacteremia.   Have changed from zosyn to ceftriaxone on 2/1      Anticipated Disposition: Needs at least 2 weeks of therapy IV if no endocarditis - longer if endocarditis. Recommend BASSEM. Needs to get CMP at least 3 times a week on ceftriaxone due to recent gallbladder sludge issues and s/p ERCP. Need to get CBC at least once a week on ceftriaxone. Need to get sed rate and CRP weekly while on antibiotics. BASSEM planned for 2/3. Need to have someone follow labs as  outpatient. Can fax to [facility] ID office if patient does not have anyone to check labs.     Thank you for your consult. Dr. Carbone to  [facility] ID Service 2/3. Please discuss final ID recs with Dr. Carbone on 2/3.Call [phone] if needed.     Yenny Elias MD  Infectious Disease  [facility]    Subjective:     Principal Problem:Cholangitis due to bile duct calculus with obstruction    Interval History: Patient with no complaints today. LFTs trending down. Tolerated first dose of ceftriaxone yesterday. BASSEM ordered. Possibly to be done tomorrow.     HPI:  89 y/o male with HTN, HLD, CAD, s/p CABGX3 Cardiac stents, glaucoma, depression, anxiety, melanoma s/p excision (1/16/17), s/p pacemaker 11/22/16. Resident of [facility]. Patient sent to [facility] 1/29 for fevers to 101 axillary, not eating and weakness. Complained of epigastric pain after breakfast on admit day. Patient febrile to 102 in ED. WBC 8 and elevated LFTs. CT abdomen positive for bile duct dilatation, possible choledocholithiasis and needs ERCP. BC on admit 2/2 sets positive for strep gallolyticus. Of note patient had BC positive for strep gallolyticus 11/5/2016 (S to pcn and vanco). At that time he was treated with IV antibiotic for a few days as inpatient then transitioned to PO amoxicillin for 5 days after discharge. ID consult called 1/31 for help with antibiotics. Zosyn continued 1/29 until 2/1. Vanco added 2/1 then stopped after 1 dose. Ceftriaxone started 2/1.         Review of Systems   Respiratory: Negative for shortness of breath.    Gastrointestinal: Negative for diarrhea, nausea and vomiting.     Antibiotics     Start     Stop Route Frequency Ordered    02/01/17 1730  cefTRIAXone (ROCEPHIN) 2 g in dextrose 5 % 50 mL IVPB      -- IV Every 24 hours (non-standard times) 02/01/17 1618        Objective:     Vital Signs (Most Recent):  Temp: 98.2 °F (36.8 °C) (02/02/17 1625)  Pulse: 60 (02/02/17 1625)  Resp: 16 (02/02/17  1625)  BP: (!) 152/67 (02/02/17 1625)  SpO2: 96 % (02/02/17 1550) Vital Signs (24h Range):  Temp:  [97.9 °F (36.6 °C)-98.3 °F (36.8 °C)] 98.2 °F (36.8 °C)  Pulse:  [60-71] 60  Resp:  [16-18] 16  SpO2:  [95 %-96 %] 96 %  BP: (137-166)/(65-75) 152/67     Weight: 68.3 kg (150 lb 9.6 oz)  Body mass index is 22.24 kg/(m^2).    Estimated Creatinine Clearance: 60.5 mL/min (based on Cr of 0.8).    Physical Exam   HENT:   Left neck incision with slight opening at posterior aspect of incision and slight clear drainage   Cardiovascular: Normal heart sounds.    No murmur heard.  Pulmonary/Chest: Breath sounds normal. No respiratory distress.   Abdominal: Bowel sounds are normal. He exhibits no distension. There is no tenderness.   Musculoskeletal: He exhibits no edema.       Significant Labs:     Blood Culture:   Recent Labs  Lab 11/05/16  1130 12/27/16  0832 01/29/17  1800 01/29/17  1805 01/31/17  1001   LABBLOO Gram stain aer bottle: Gram positive cocci in chains resembling Strep   Positive results previously called 11/06/2016  06:21  STREPTOCOCCUS GALLOLYTICUSFor susceptibility see order #[id number] No growth after 5 days.  No growth after 5 days. Gram stain hanny bottle: Gram positive cocci in chains resembling Strep   Results called to and read back by: Martha Martinez RN  01/30/2017  06:43  Gram stain aer bottle: Gram positive cocci in chains resembling Strep   Positive results previously called 01/30/2017  08:44  STREPTOCOCCUS GALLOLYTICUS Gram stain hanny bottle: Gram positive cocci in chains resembling Strep   Results called to and read back by: Martha Martinez RN  01/30/2017  06:43  Gram stain aer bottle: Gram positive cocci in chains resembling Strep   Positive results previously called 01/30/2017  08:55  STREPTOCOCCUS GALLOLYTICUSFor susceptibility see order #[id number] No Growth to date  No Growth to date  No Growth to date  No Growth to date  No Growth to date  No Growth to date     CBC:   Recent Labs  Lab  17  0709 17  0622   WBC 3.76* 3.64*   HGB 8.5* 8.6*   HCT 27.6* 28.0*   PLT 96* 123*     CMP:   Recent Labs  Lab 17  0709 17  0622 17  0623    144  --    K 3.7 3.1*  --     110  --    CO2 22* 26  --     85  --    BUN 18 9  --    CREATININE 1.1 0.8  --    CALCIUM 8.5* 8.4*  --    PROT 5.4*  --  5.4*   ALBUMIN 2.3*  --  2.3*   BILITOT 0.8  --  0.4   ALKPHOS 195*  --  175*   AST 62*  --  37   ALT 77*  --  57*   ANIONGAP 8 8  --    EGFRNONAA 59* >60  --      Urine Culture:   Recent Labs  Lab 16  0610 17  1502   LABURIN No growth No growth     Urine Studies:   Recent Labs  Lab 17  1502 17  0913   COLORU Yellow Yellow   APPEARANCEUA Clear Clear   PHUR 6.0 6.0   SPECGRAV 1.020 1.015   PROTEINUA 1+* Negative   GLUCUA Negative Negative   KETONESU 1+* Negative   BILIRUBINUA 1+* Negative   OCCULTUA 3+* 2+*   NITRITE Negative Negative   UROBILINOGEN 2.0-3.0* 1.0   LEUKOCYTESUR Trace* Negative   RBCUA 15* 3   WBCUA 3 3   BACTERIA Occasional None   SQUAMEPITHEL Occ  --    HYALINECASTS 0  --        Significant Imagin/2 CXR - Single view chest, comparison 2017.  Insertion left PICC line, tip mid SVC.  Right subclavian pacemaker, postop sternotomy cardiac surgery stable.  Mild prominence of interstitial markings with chronic appearance.  Linear atelectasis left lateral CP angle and retrocardiac patchy infiltrate left lower lobe basilar segment. Impression Pneumonitis atelectasis left lung base.          Lab Results   Component Value Date    SEDRATE 67 (H) 2017     Lab Results   Component Value Date    CRP 51.5 (H) 2017

## 2017-02-03 NOTE — ASSESSMENT & PLAN NOTE
S/P ERCP on 1/30 - had sludge removed.  Possible source for the bacteremia.   Have changed from zosyn to ceftriaxone on 2/1

## 2017-02-03 NOTE — ANESTHESIA PREPROCEDURE EVALUATION
02/02/2017  Fidel Allan is a 89 y.o., male for BASSEM    Review of patient's allergies indicates:  No Known Allergies    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     Blindness of right eye     Bone disorder     Bradycardia     CAD (coronary artery disease)     Constipation     COPD (chronic obstructive pulmonary disease)     Dyspnea     Falls frequently     GERD (gastroesophageal reflux disease)     Glaucoma     Hearing loss     HLD (hyperlipidemia)     Hypertension     Insomnia     Major depressive disorder     Melanoma     Vascular dementia      Past Surgical History   Procedure Laterality Date    Coronary artery bypass graft      Colectomy      Cardiac pacemaker placement  2016    Coronary angioplasty with stent placement      Malignant skin lesion excision Left 01/16/2017     neck melanoma    Choledochoduodenostomy       Wt Readings from Last 3 Encounters:   02/01/17 68.3 kg (150 lb 9.6 oz)   01/26/17 67.6 kg (149 lb)   01/16/17 68 kg (150 lb)     Temp Readings from Last 3 Encounters:   02/02/17 37.2 °C (99 °F)   01/26/17 36.7 °C (98.1 °F) (Oral)   01/16/17 36.8 °C (98.3 °F) (Oral)     BP Readings from Last 3 Encounters:   02/02/17 (!) 118/56   01/16/17 (!) 140/58   01/09/17 (!) 128/58     Pulse Readings from Last 3 Encounters:   02/02/17 60   01/16/17 60   01/09/17 60         [facility] Anesthesia Evaluation         Review of Systems    Patient Active Problem List   Diagnosis    GERD (gastroesophageal reflux disease)    Moderate aortic stenosis    Sinus node dysfunction    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    Malignant melanoma    Vascular dementia    COPD (chronic obstructive pulmonary disease)    Glaucoma    Major depressive disorder    HLD (hyperlipidemia)    Cholangitis due to bile duct calculus with  obstruction    Hyponatremia    Thrombocytopenia    Stage 3 chronic kidney disease    Coronary artery disease involving coronary bypass graft of native heart without angina pectoris    Essential hypertension    Nursing home resident    Infection due to Streptococcus gallolyticus    Streptococcal bacteremia    Cholangitis     Lab Results   Component Value Date    WBC 3.64 (L) 02/02/2017    HGB 8.6 (L) 02/02/2017    HCT 28.0 (L) 02/02/2017    MCV 89 02/02/2017     (L) 02/02/2017     CMP  Sodium   Date Value Ref Range Status   02/02/2017 144 136 - 145 mmol/L Final     Potassium   Date Value Ref Range Status   02/02/2017 3.1 (L) 3.5 - 5.1 mmol/L Final     Chloride   Date Value Ref Range Status   02/02/2017 110 95 - 110 mmol/L Final     CO2   Date Value Ref Range Status   02/02/2017 26 23 - 29 mmol/L Final     Glucose   Date Value Ref Range Status   02/02/2017 85 70 - 110 mg/dL Final     BUN, Bld   Date Value Ref Range Status   02/02/2017 9 8 - 23 mg/dL Final     Creatinine   Date Value Ref Range Status   02/02/2017 0.8 0.5 - 1.4 mg/dL Final     Calcium   Date Value Ref Range Status   02/02/2017 8.4 (L) 8.7 - 10.5 mg/dL Final     Total Protein   Date Value Ref Range Status   02/02/2017 5.4 (L) 6.0 - 8.4 g/dL Final     Albumin   Date Value Ref Range Status   02/02/2017 2.3 (L) 3.5 - 5.2 g/dL Final     Total Bilirubin   Date Value Ref Range Status   02/02/2017 0.4 0.1 - 1.0 mg/dL Final     Comment:     For infants and newborns, interpretation of results should be based  on gestational age, weight and in agreement with clinical  observations.  Premature Infant recommended reference ranges:  Up to 24 hours.............<8.0 mg/dL  Up to 48 hours............<12.0 mg/dL  3-5 days..................<15.0 mg/dL  6-29 days.................<15.0 mg/dL       Alkaline Phosphatase   Date Value Ref Range Status   02/02/2017 175 (H) 55 - 135 U/L Final     AST   Date Value Ref Range Status   02/02/2017 37 10 - 40 U/L Final      ALT   Date Value Ref Range Status   02/02/2017 57 (H) 10 - 44 U/L Final     Anion Gap   Date Value Ref Range Status   02/02/2017 8 8 - 16 mmol/L Final     eGFR if    Date Value Ref Range Status   02/02/2017 >60 >60 mL/min/1.73 m^2 Final     eGFR if non    Date Value Ref Range Status   02/02/2017 >60 >60 mL/min/1.73 m^2 Final     Comment:     Calculation used to obtain the estimated glomerular filtration  rate (eGFR) is the CKD-EPI equation. Since race is unknown   in our information system, the eGFR values for   -American and Non--American patients are given   for each creatinine result.       EKG:  Vent. Rate : 070 BPM     Atrial Rate : 070 BPM     P-R Int : 210 ms          QRS Dur : 090 ms      QT Int : 410 ms       P-R-T Axes : 024 -34 046 degrees     QTc Int : 442 ms    Sinus rhythm with 1st degree A-V block  Left axis deviation  Abnormal ECG  No previous ECGs available  Confirmed by Kristyn Alberts MD ([id number]) on 1/30/2017 1:21:31 PM    Referred By: SELF REFERRAL           Confirmed By:Kristyn Alberts MD    Physical Exam  General:  Well nourished    Airway/Jaw/Neck:  Airway Findings: Mouth Opening: Normal General Airway Assessment: Adult  Mallampati: II  Improves to II with phonation.            Mental Status:  Mental Status Findings:  Alert and Oriented, Cooperative        2D Echo:  CONCLUSIONS     1 - Normal left ventricular systolic function (EF 55-60%).     2 - Normal left ventricular diastolic function.     3 - Normal right ventricular systolic function .     4 - The mitral valve is moderately sclerotic with mildly restricted leaflet mobility.     5 - The estimated PA systolic pressure is greater than 14 mmHg.     Anesthesia Plan  Type of Anesthesia, risks & benefits discussed:  Anesthesia Type:  MAC  Patient's Preference:   Intra-op Monitoring Plan:   Intra-op Monitoring Plan Comments:   Post Op Pain Control Plan:   Post Op Pain Control Plan  Comments:   Induction:   IV  Beta Blocker:  Patient is not currently on a Beta-Blocker (No further documentation required).       Informed Consent: Patient understands risks and agrees with Anesthesia plan.  Questions answered. Anesthesia consent signed with patient.  ASA Score: 4     Day of Surgery Review of History & Physical: I have interviewed and examined the patient. I have reviewed the patient's H&P dated:    H&P update referred to the surgeon.     Anesthesia Plan Notes: PIC on the Left arm. Pacemaker in place. Weak left upper extremity.        Ready For Surgery From Anesthesia Perspective.

## 2017-02-03 NOTE — SUBJECTIVE & OBJECTIVE
Interval History: Had BASSEM early afternoon. Feeling good after it. Ready to go home. Eating well. Complained of some dry eyes.     Review of Systems   Constitutional: Negative for fever.   Respiratory: Negative for shortness of breath.    Cardiovascular: Negative for chest pain.   Gastrointestinal: Negative for abdominal pain, nausea and vomiting.     Objective:     Vital Signs (Most Recent):  Temp: 98 °F (36.7 °C) (02/03/17 1600)  Pulse: 60 (02/03/17 1600)  Resp: 18 (02/03/17 1600)  BP: (!) 175/74 (02/03/17 1600)  SpO2: (!) 93 % (02/03/17 1609) Vital Signs (24h Range):  Temp:  [97.9 °F (36.6 °C)-99.4 °F (37.4 °C)] 98 °F (36.7 °C)  Pulse:  [54-80] 60  Resp:  [13-22] 18  SpO2:  [93 %-99 %] 93 %  BP: (112-187)/(54-74) 175/74     Weight: 68.3 kg (150 lb 9.6 oz)  Body mass index is 22.24 kg/(m^2).    Intake/Output Summary (Last 24 hours) at 02/03/17 1639  Last data filed at 02/03/17 1400   Gross per 24 hour   Intake              783 ml   Output              725 ml   Net               58 ml      Physical Exam   Constitutional: He is oriented to person, place, and time. He appears well-developed and well-nourished. No distress.   Neurological: He is alert and oriented to person, place, and time.   Psychiatric: He has a normal mood and affect. His behavior is normal.   Nursing note and vitals reviewed.      Significant Labs:   CBC:   Recent Labs  Lab 02/02/17 0622 02/03/17  0443   WBC 3.64* 4.09   HGB 8.6* 8.4*   HCT 28.0* 27.4*   * 134*     CMP:   Recent Labs  Lab 02/02/17 0622 02/02/17 0623 02/03/17  0443     --  145   K 3.1*  --  3.8     --  111*   CO2 26  --  26   GLU 85  --  93   BUN 9  --  11   CREATININE 0.8  --  0.8   CALCIUM 8.4*  --  8.6*   PROT  --  5.4* 5.6*   ALBUMIN  --  2.3* 2.4*   BILITOT  --  0.4 0.4   ALKPHOS  --  175* 166*   AST  --  37 40   ALT  --  57* 51*   ANIONGAP 8  --  8   EGFRNONAA >60  --  >60       Significant Imaging: I have reviewed all pertinent imaging results/findings  within the past 24 hours.

## 2017-02-03 NOTE — PROGRESS NOTES
Pt returned to floor from PACU following BASSEM procedure.  Report received from Venkat.  Patient AAOX4.  SpO2 97% on 3 L nasal cannula.  /74. Pulse 60.  Patient denies nausea, pain, and trouble breathing.  C/O dry eyes. MD notified, will order artificial tears.  Patient resting comfortably in bed. Will continue to monitor.

## 2017-02-03 NOTE — ANESTHESIA POSTPROCEDURE EVALUATION
"Anesthesia Post Evaluation    Patient: Fidel Allan    Procedure(s) Performed: Procedure(s) (LRB):  TRANSESOPHAGEAL ECHOCARDIOGRAM (BASSEM) (N/A)    Final Anesthesia Type: MAC  Patient location during evaluation: PACU  Patient participation: Yes- Able to Participate  Level of consciousness: awake and alert  Post-procedure vital signs: reviewed and stable  Pain management: adequate  Airway patency: patent  PONV status at discharge: No PONV  Anesthetic complications: no      Cardiovascular status: blood pressure returned to baseline and hemodynamically stable  Respiratory status: unassisted, spontaneous ventilation and room air  Hydration status: euvolemic  Follow-up not needed.        Visit Vitals    BP (!) 143/66    Pulse 64    Temp 36.6 °C (97.9 °F) (Oral)    Resp 15    Ht 5' 9" (1.753 m)    Wt 68.3 kg (150 lb 9.6 oz)    SpO2 97%    BMI 22.24 kg/m2       Pain/Fior Score: Pain Assessment Performed: Yes (2/3/2017  1:00 PM)  Presence of Pain: denies (2/3/2017  1:00 PM)  Fior Score: 8 (2/3/2017  1:00 PM)      "

## 2017-02-03 NOTE — PROGRESS NOTES
received phone call from [facility] admit coordinator, Marycarmen giving permission for patient to readmit today.  Marycarmen said nurse to call report to [facility] nurse at [phone] prior to discharge.  Also, she will arrange wheelchair van transport with [employer] for  within the hour via [facility] contract.       informed bedside nurse, Becky patient is ready for readmission to [facility]. She can call report to number located on front of ambulance packet.  Also, wheelchair van is scheduled for transport within the hour.

## 2017-02-03 NOTE — ANESTHESIA RELEASE NOTE
"Anesthesia Release from PACU Note    Patient: Fidel Allan    Procedure(s) Performed: Procedure(s) (LRB):  TRANSESOPHAGEAL ECHOCARDIOGRAM (BASSEM) (N/A)    Anesthesia type: MAC    Post pain: Adequate analgesia    Post assessment: no apparent anesthetic complications    Last Vitals:   Visit Vitals    BP (!) 143/66    Pulse 64    Temp 36.6 °C (97.9 °F) (Oral)    Resp 15    Ht 5' 9" (1.753 m)    Wt 68.3 kg (150 lb 9.6 oz)    SpO2 97%    BMI 22.24 kg/m2       Post vital signs: stable    Level of consciousness: awake and alert     Nausea/Vomiting: no nausea/no vomiting    Complications: none    Airway Patency: patent    Respiratory: unassisted, spontaneous ventilation, room air    Cardiovascular: stable and blood pressure at baseline    Hydration: euvolemic  "

## 2017-02-03 NOTE — ASSESSMENT & PLAN NOTE
Streptococcus gallolyticus septecemia    Appreciate ID assistance. Had same bacteria in the blood on 11/5/16. Transthroacic echo was negative for vegetation. BASSEM was also negative for vegetation. ERCP showed sludge, but no stones. LFTs are improving. Resume diet. Continue ceftriaxone with end date of 2/14/17.  First negative blood culture was 1/31/17.

## 2017-02-03 NOTE — DISCHARGE SUMMARY
[facility]  Discharge Summary      Patient Name: Fidel Allan  MRN: [id number]  Admission Date: 1/29/2017  Hospital Length of Stay: 5 days  Discharge Date and Time: 2/3/2017 4:43 PM  Attending Physician: Cristhian Reyes MD   Discharging Provider: Cristhian Reyes MD  Primary Care Provider: Juan Elmore MD      HPI:   Fidel Allan is a 89 y.o.  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 He resides at [facility]. His primary care physician is Dr. Juan Elmore.     He was sent to [facility] ED on 1/29/17 for fever (101.1 F axillary), decreased appetite, and increased weakness. He reported constant, non-radiating, epigastric pain that began in the morning after breakfast. He was found to have a temperature of 102.4 F, decreased platelets (112,000), alkaline phosphatase 227, , , and total bilirubin 2.1. Abdominal CT showed debris, sludge, or stone in the common bile duct with mild proximal dilatation. He was given 1 liters of saline, piperacillin-tazobactam, acetaminophen, and IV morphine. [facility] was contacted and planned ERCP. He was admitted to [facility].       Procedure(s) (LRB):  TRANSESOPHAGEAL ECHOCARDIOGRAM (BASSEM) (N/A)      Indwelling Lines/Drains at time of discharge:   Lines/Drains/Airways     Peripherally Inserted Central Catheter Line                 PICC Double Lumen 02/02/17 1000 left brachial 1 day          Airway                 Airway - Non-Surgical 02/03/17 1230 Nasal Cannula less than 1 day              Hospital Course:   ERCP was done, showing evidence of prior choledochoduodenostomy. Sludge was  swept. His initial blood cultures were positive for Streptococcus gallolyticus and repeat blood cultures have been negative. His abdominal pain has resolved and he is tolerating a regular diet. His LFTs have improved. Given the recurrent Strep bacteremia, Infectious disease was consulted. Transthoracic echo was negative for vegetations. He was transitioned to ceftriaxone for the Strep. Transesophageal echo was requested to rule out endocarditis and was negative for any vegetation on the valves. He will complete a 14 day course of ceftriaxone on 2/14/17. Consideration for outpatient colonoscopy is deferred to his PCP at the NH. He has been tolerating a regular diet without any abdominal pain or nausea/vomiting.      Consults:   Consults         Status Ordering Provider     Gastroenterology  Once     Provider:  Kerry Boyle MD    Completed SHAMIKA REID     Inpatient consult to Anesthesiology  Once     Provider:  (Not yet assigned)    FRANCISCO Mcleod     Inpatient consult to Infectious Diseases  Once     Provider:  (Not yet assigned)    Completed LORRAINE VILLAR     Inpatient consult to PICC team ([facility])  Once     Provider:  (Not yet assigned)    Completed LORRAINE VILLAR          Significant Diagnostic Studies: Labs:   CMP   Recent Labs  Lab 02/02/17  0622 02/02/17  0623 02/03/17  0443     --  145   K 3.1*  --  3.8     --  111*   CO2 26  --  26   GLU 85  --  93   BUN 9  --  11   CREATININE 0.8  --  0.8   CALCIUM 8.4*  --  8.6*   PROT  --  5.4* 5.6*   ALBUMIN  --  2.3* 2.4*   BILITOT  --  0.4 0.4   ALKPHOS  --  175* 166*   AST  --  37 40   ALT  --  57* 51*   ANIONGAP 8  --  8   ESTGFRAFRICA >60  --  >60   EGFRNONAA >60  --  >60    and CBC   Recent Labs  Lab 02/02/17  0622 02/03/17  0443   WBC 3.64* 4.09   HGB 8.6* 8.4*   HCT 28.0* 27.4*   * 134*     Microbiology:   Blood Culture   Lab Results   Component Value Date    LABBLOO No Growth to date 01/31/2017    LABBLOO  No Growth to date 01/31/2017    LABBLOO No Growth to date 01/31/2017    LABBLOO No Growth to date 01/31/2017    LABBLOO No Growth to date 01/31/2017    LABBLOO No Growth to date 01/31/2017    LABBLOO No Growth to date 01/31/2017    LABBLOO No Growth to date 01/31/2017     Cardiac Graphics: Echocardiogram:   2D echo with color flow doppler:   Results for orders placed or performed during the hospital encounter of 01/29/17   2D echo with color flow doppler   Result Value Ref Range    EF 55 55 - 65    Diastolic Dysfunction No     Est. PA Systolic Pressure 17.14     Mitral Valve Mobility MILDLY RESTRICTED     Tricuspid Valve Regurgitation TRIVIAL TO MILD        Pending Diagnostic Studies:     None        Final Active Diagnoses:    Diagnosis Date Noted POA    PRINCIPAL PROBLEM:  Cholangitis due to bile duct calculus with obstruction [K80.31] 01/29/2017 Yes    Infection due to Streptococcus gallolyticus [B95.4] 01/31/2017 Yes    Coronary artery disease involving coronary bypass graft of native heart without angina pectoris [I25.810] 01/30/2017 Yes     Chronic    Essential hypertension [I10] 01/30/2017 Yes     Chronic    Nursing home resident [Z59.3] 01/30/2017 Not Applicable     Chronic    Thrombocytopenia [D69.6] 01/29/2017 Yes    Stage 3 chronic kidney disease [N18.3] 01/29/2017 Yes     Chronic    Vascular dementia [F01.50]  Yes     Chronic    COPD (chronic obstructive pulmonary disease) [J44.9]  Yes     Chronic    Major depressive disorder [F32.9]  Yes     Chronic    HLD (hyperlipidemia) [E78.5]  Yes     Chronic    Glaucoma [H40.9]  Yes     Chronic    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] 11/22/2016 Yes     Chronic    GERD (gastroesophageal reflux disease) [K21.9] 11/06/2016 Yes     Chronic    Moderate aortic stenosis [I35.0] 11/06/2016 Yes     Chronic      Problems Resolved During this Admission:    Diagnosis Date Noted Date Resolved POA    Hypokalemia [E87.6] 02/02/2017 02/03/2017 No     Streptococcal bacteremia [R78.81] 01/31/2017 02/03/2017 Yes    Cholangitis [K83.0] 01/31/2017 02/03/2017 Yes    Hyponatremia [E87.1] 01/29/2017 02/03/2017 Yes      * Cholangitis due to bile duct calculus with obstruction  Streptococcus gallolyticus septecemia    Appreciate ID assistance. Had same bacteria in the blood on 11/5/16. Transthroacic echo was negative for vegetation. BASSEM was also negative for vegetation. ERCP showed sludge, but no stones. LFTs are improving. Resume diet. Continue ceftriaxone with end date of 2/14/17.  First negative blood culture was 1/31/17.    Vascular dementia  Continue donepezil, memantine.      Essential hypertension  Continue lisinopril 5 mg daily with hold parameters.       Discharged Condition: fair    Disposition: half-way Nursing Home    Follow Up:  Follow-up Information     Follow up with Juan Elmore MD.    Specialty:  Internal Medicine    Why:  As needed    Contact information:    [address] [place] [address] [id number] [phone]          Follow up with [facility] On 2/3/2017.    Specialties:  Nursing Home Agency, SNF Agency    Why:  Nursing Home    Contact information:    [address]  [facility] [address]  [phone]          Patient Instructions:     Ambulatory referral to Outpatient Case Management   Referral Priority: Routine Referral Type: Consultation   Referral Reason: Specialty Services Required    Number of Visits Requested: 1      Diet Adult Regular   Order Specific Question Answer Comments   Additional restrictions: Cardiac (Low Na/Chol)    Additional restrictions: Dental Soft      Vital signs per facility protocol     Skin assessment every shift      Activity as tolerated     Up in chair/ wheel chair     Full code     Aspiration precautions       Medications:  Reconciled Home Medications:   Current Discharge Medication List      START taking these medications    Details   dextrose 5 % SolP 50 mL with cefTRIAXone 2 gram SolR 2 g  Inject 2 g into the vein once daily.         CONTINUE these medications which have NOT CHANGED    Details   acetaminophen (TYLENOL) 325 MG tablet Take 650 mg by mouth every 6 (six) hours as needed for Pain.      aspirin (ECOTRIN) 81 MG EC tablet Take 81 mg by mouth once daily.      budesonide-formoterol 160-4.5 mcg (SYMBICORT) 160-4.5 mcg/actuation HFAA Inhale 2 puffs into the lungs 2 (two) times daily.       cyanocobalamin (VITAMIN B-12) 1000 MCG tablet Take 1,000 mcg by mouth once daily.       donepezil (ARICEPT) 10 MG tablet Take 10 mg by mouth every evening.      escitalopram oxalate (LEXAPRO) 10 MG tablet Take 10 mg by mouth every morning.       esomeprazole (NEXIUM) 40 MG capsule Take 40 mg by mouth once daily.      fish oil-omega-3 fatty acids 300-1,000 mg capsule Take 1 g by mouth every morning.       gabapentin (NEURONTIN) 100 MG capsule Take 100 mg by mouth 3 (three) times daily.      hydrocodone-acetaminophen 5-325mg (NORCO) 5-325 mg per tablet Take 1 tablet by mouth every 6 (six) hours as needed for Pain.      latanoprost 0.005 % ophthalmic solution Place 1 drop into both eyes every evening.       lisinopril (PRINIVIL,ZESTRIL) 5 MG tablet Take 5 mg by mouth every morning. Hold for SBP < 120      lorazepam (ATIVAN) 0.5 MG tablet Take 0.5 mg by mouth 2 (two) times daily as needed for Anxiety.      magnesium hydroxide 400 mg/5 ml (MILK OF MAGNESIA) 400 mg/5 mL Susp Take 30 mLs by mouth every evening.       mirtazapine (REMERON) 15 MG tablet Take 15 mg by mouth every evening.       multivitamin (ONE DAILY MULTIVITAMIN) per tablet Take 1 tablet by mouth once daily.      NAMENDA XR 14 mg CSpX Take 14 mg by mouth once daily.       NITROSTAT 0.3 mg SL tablet Place 0.3 mg under the tongue every 5 (five) minutes as needed for Chest pain.       pravastatin (PRAVACHOL) 40 MG tablet Take 40 mg by mouth every evening.       thiamine (VITAMIN B-1) 100 MG tablet Take 100 mg by mouth every morning.            Time spent  on the discharge of patient: 40 minutes    Cristhian Reyes MD  Department of Hospital Medicine  [facility]

## 2017-02-03 NOTE — ASSESSMENT & PLAN NOTE
Strep gallolyticus bacteremia - possibly due to cholangitis. TTE negative for endocarditis.     Strep Gallolyticus is Formerly known as strep bovis - generally would recommend colonoscopy to rule out colon cancer - will leave this up to primary team decision    Patient has had this bacteremia in November - worrisome for intravascular focus of infection - has pacemaker in place.     Have streamlined to ceftriaxone 2/1/17    Need to watch LFTs carefully - 3% chance of elevating transaminases and <1% chance of gallbladder sludge with ceftriaxone.    If he does not tolerate ceftriaxone - will need alternative agent    Plan at least 2 weeks of therapy if no endocarditis    Suggest ask Cardiology to perform BASSEM to rule out endocarditis since patient had this same bacteremia last November (but had interval BC negative in December) - BASSEM planned for tomorrow per chart    Dr. Carbone to  service tomorrow - please contact him for final ID recs.

## 2017-02-03 NOTE — TRANSFER OF CARE
"Anesthesia Transfer of Care Note    Patient: Fidel Allan    Procedure(s) Performed: Procedure(s) (LRB):  TRANSESOPHAGEAL ECHOCARDIOGRAM (BASSEM) (N/A)    Patient location: PACU    Anesthesia Type: MAC    Transport from OR: Transported from OR on room air with adequate spontaneous ventilation    Post pain: adequate analgesia    Post assessment: no apparent anesthetic complications    Post vital signs: stable    Level of consciousness: awake and alert    Nausea/Vomiting: no nausea/vomiting    Complications: none          Last vitals:   Visit Vitals    BP (!) 178/73    Pulse 60    Temp 36.8 °C (98.3 °F) (Oral)    Resp 17    Ht 5' 9" (1.753 m)    Wt 68.3 kg (150 lb 9.6 oz)    SpO2 97%    BMI 22.24 kg/m2     "

## 2017-02-03 NOTE — PLAN OF CARE
Problem: Patient Care Overview  Goal: Plan of Care Review  Outcome: Ongoing (interventions implemented as appropriate)  Pt AA&O x 4. No complaints of pain. Respirations even and unlabored. No evidence of distress noted. Notified not to eat or drink anything past midnight to have BASSEM in am. Pt verbalizes understanding. Safety maintained. Bed in lowest position, side rails up x 3, call light and personal items within reach. Pt notified to call for assistance if needed. Pt verbalizes understanding. Will continue to monitor.

## 2017-02-03 NOTE — PROGRESS NOTES
[facility] Hospital Medicine  Progress Note    Patient Name: Fidel Allan  MRN: [id number]  Patient Class: IP- Inpatient   Admission Date: 1/29/2017  Length of Stay: 4 days  Attending Physician: Cristhian Reyes MD  Primary Care Provider: Juan Elmore MD        Subjective:     Principal Problem:Cholangitis due to bile duct calculus with obstruction    HPI:  Fidel Allan is a 89 y.o.  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 He resides at [facility]. His primary care physician is Dr. Juan Elmore.     He was sent to [facility] ED on 1/29/17 for fever (101.1 F axillary), decreased appetite, and increased weakness. He reported constant, non-radiating, epigastric pain that began in the morning after breakfast. He was found to have a temperature of 102.4 F, decreased platelets (112,000), alkaline phosphatase 227, , , and total bilirubin 2.1. Abdominal CT showed debris, sludge, or stone in the common bile duct with mild proximal dilatation. He was given 1 liters of saline, piperacillin-tazobactam, acetaminophen, and IV morphine. [facility] was contacted and planned ERCP. He was admitted to [facility].       Hospital Course:  ERCP was done, showing evidence of prior choledochoduodenostomy. Sludge was swept. His initial blood cultures were positive for Streptococcus gallolyticus and repeat blood cultures have been negative. His abdominal pain has resolved and he is tolerating a regular diet. His LFTs have improved. Given the recurrent Strep bacteremia, Infectious disease was consulted. Transthoracic echo was negative for vegetations. He was  transitioned to ceftriaxone for the Strep. Transesophageal echo was requested to rule out endocarditis.     Interval History: Says that he is doing well this AM. He was given a breakfast tray this AM despite having NPO order in place.     Review of Systems   Constitutional: Negative for fever.   Respiratory: Negative for cough and shortness of breath.    Gastrointestinal: Negative for nausea and vomiting.     Objective:     Vital Signs (Most Recent):  Temp: 99 °F (37.2 °C) (02/02/17 2013)  Pulse: 60 (02/02/17 2013)  Resp: 20 (02/02/17 2013)  BP: (!) 118/56 (02/02/17 2013)  SpO2: 95 % (02/02/17 2054) Vital Signs (24h Range):  Temp:  [97.9 °F (36.6 °C)-99 °F (37.2 °C)] 99 °F (37.2 °C)  Pulse:  [60-71] 60  Resp:  [16-20] 20  SpO2:  [95 %-96 %] 95 %  BP: (118-166)/(56-75) 118/56     Weight: 68.3 kg (150 lb 9.6 oz)  Body mass index is 22.24 kg/(m^2).    Intake/Output Summary (Last 24 hours) at 02/02/17 2246  Last data filed at 02/02/17 2000   Gross per 24 hour   Intake              480 ml   Output             2625 ml   Net            -2145 ml      Physical Exam   Constitutional: He is oriented to person, place, and time. He appears well-developed and well-nourished. No distress.   Cardiovascular: Normal rate and regular rhythm.    Abdominal: Soft. Bowel sounds are normal. He exhibits no distension. There is no tenderness.   Musculoskeletal: He exhibits no edema.   Neurological: He is alert and oriented to person, place, and time.   Nursing note and vitals reviewed.      Significant Labs:   CBC:   Recent Labs  Lab 02/01/17  0709 02/02/17  0622   WBC 3.76* 3.64*   HGB 8.5* 8.6*   HCT 27.6* 28.0*   PLT 96* 123*     CMP:   Recent Labs  Lab 02/01/17  0709 02/02/17  0622 02/02/17  0623    144  --    K 3.7 3.1*  --     110  --    CO2 22* 26  --     85  --    BUN 18 9  --    CREATININE 1.1 0.8  --    CALCIUM 8.5* 8.4*  --    PROT 5.4*  --  5.4*   ALBUMIN 2.3*  --  2.3*   BILITOT 0.8  --  0.4   ALKPHOS 195*  --   175*   AST 62*  --  37   ALT 77*  --  57*   ANIONGAP 8 8  --    EGFRNONAA 59* >60  --        Significant Imaging: I have reviewed all pertinent imaging results/findings within the past 24 hours.    Assessment/Plan:      * Cholangitis due to bile duct calculus with obstruction  Streptococcus gallolyticus septecemia    Appreciate ID assistance. Had same bacteria in the blood on 11/5/16. Transthroacic echo was negative for vegetation. BASSEM tomorrow evaluate the valves and leads for evidence of endocardidtis.  ERCP showed sludge, but no stones. LFTs are improving. Resume diet. Continue ceftriaxone. First negative blood culture was 1/31/17 and will need 2 vs 6 weeks depending on BASSEM result.      Vascular dementia  Continue donepezil, memantine.      COPD (chronic obstructive pulmonary disease)  Takes budesonide-formoterol 160-4.5 mcg BID. Continue. Albuterol-ipratropium nebulizer PRN.      Essential hypertension  Continue lisinopril 5 mg daily with hold parameters. SBP ranged 131 to 166.       Hypokalemia  Replace    VTE Risk Mitigation         Ordered     enoxaparin injection 40 mg  Daily     Route:  Subcutaneous        01/30/17 0022     Medium Risk of VTE  Once      01/30/17 0022          Cristhian Reyes MD  Department of Hospital Medicine   [facility]

## 2017-02-04 NOTE — PROGRESS NOTES
Pt. Discharged to [facility]. Discharge papers given to patient. Transported via wheelchair with belongings in hand and safety maintained.

## 2017-02-04 NOTE — PROGRESS NOTES
"Pt. waiting on transport.  Discharge orders placed and carried out.  Report called in to [facility]. Patient is awake and alert. Oriented to person and place.  Asked "is it [place]".  Blood pressure remained elevated throughout shift with last taken BP of 175/ 74.  MD notified and 5mg hydralazine ordered and given.  Will continue to monitor.  Safety maintained and patient resting comfortably in bed.    "

## 2017-02-05 LAB
BACTERIA BLD CULT: NORMAL
BACTERIA BLD CULT: NORMAL

## 2017-02-06 ENCOUNTER — OUTPATIENT CASE MANAGEMENT (OUTPATIENT)
Dept: ADMINISTRATIVE | Facility: OTHER | Age: 82
End: 2017-02-06

## 2017-02-06 NOTE — PROGRESS NOTES
Patient referred to OPCM on 2/3/2017.  This RN received referral on 2/6/2017.  RIKKI Robbins, OPCM-RN

## 2017-02-07 ENCOUNTER — OUTPATIENT CASE MANAGEMENT (OUTPATIENT)
Dept: ADMINISTRATIVE | Facility: OTHER | Age: 82
End: 2017-02-07

## 2017-02-07 NOTE — PROGRESS NOTES
First attempt to perform initial assessment for OPCM; unable to leave a voice message at home number because mailbox is fill.  Also attempted to call daughter Debbie and was unable to talk at this time.  Left voice message to return call.  RIKKI Robbins, OPCM-RN

## 2017-02-09 ENCOUNTER — OUTPATIENT CASE MANAGEMENT (OUTPATIENT)
Dept: ADMINISTRATIVE | Facility: OTHER | Age: 82
End: 2017-02-09

## 2017-02-09 NOTE — PROGRESS NOTES
Talked to Marycarmen,  with [facility].  Phone number is [phone].  Marycarmen stated that patient is currently on SNF and then will transfer to a residential patient.  Patient also lives with wife in [facility] and is room number is 103A.  Also attempted to phone daughter Debbie Trevizo at [phone] to perform initial assessment.  Partial assessment performed.  RIKKI Robbins OPCOSCAR-RN

## 2017-02-13 ENCOUNTER — OUTPATIENT CASE MANAGEMENT (OUTPATIENT)
Dept: ADMINISTRATIVE | Facility: OTHER | Age: 82
End: 2017-02-13

## 2017-02-13 NOTE — PROGRESS NOTES
Second attempt to perform initial assessment with daughter Debbie rTevizo.  Left voice mail to return call.  Letter sent.  ASH Valentine-RN

## 2017-02-13 NOTE — LETTER
February 13, 2017    Fidel Allan  [address]  [place] [address]             [facility] [address] [place] [address] Dear Fidel,    I work with [facility] Out[facility] Case Management Department. I have been unsuccessful at reaching you to follow-up to see how you have been doing. If you require any future assistance or if any new concerns or problems arise, please do not hesitate to call.     The Outpatient Case Management Department can be reached at [phone] from 8:00AM to 4:30 PM on Monday thru Friday. [facility] also has a program where a nurse is available 24/7 to answer questions or provide medical advice, their number is [phone].    Thanks,      Shayna Robbins RN  Outpatient Case Management

## 2017-02-15 ENCOUNTER — OUTPATIENT CASE MANAGEMENT (OUTPATIENT)
Dept: ADMINISTRATIVE | Facility: OTHER | Age: 82
End: 2017-02-15

## 2017-02-15 NOTE — LETTER
February 15, 2017    Fidel Allan  [address]  [place] [address]             Outpatient Case Management  [address]  [facility] [address] Dear Fidel Allan:    We understand that receiving many services from different doctors and healthcare providers is overwhelming. There are appointments to make, transportation to arrange, dietary instructions to understand, and new medications to obtain.    This is where [facility] can help.     You are eligible to receive Outpatient Case Management services when you have healthcare needs that require the coordination of many providers, treatments, and services. You also qualify if you need assistance with a new treatment plan.     There is no charge for this support. You may have been referred to this program from your doctor(s), hospital staff member(s), or insurance company but you always have a choice to participate or not participate. To participate, you must give us your permission to be enrolled.     Depending on your needs and wishes, your  may speak with you by phone, visit you at your place of living (for example your home, skilled nursing facility, or rehabilitation facility), or meet you at your doctors office.     Your  will tell you why you have been selected to participate in the program and will complete an assessment of your needs. Then a personalized plan of care will be developed with you and or your caregiver.        Here are examples of the services your [facility]  provides.     Coordinate communication among multiple providers.   Arrange for transportation, doctors visits, durable medical equipment, home care services, and special clinics.    Provide coaching on how to manage your health condition.    Answer questions about your health condition.   Help you understand your doctors treatment plan.    Provide additional instruction about your health condition,  treatments, and medications.    Help you obtain information about your insurance coverage.    Advocate for your individual needs.    Request a Licensed Clinical  (LCSW) to visit you if you need their services. LCSWs help with long term planning (discussing placement options, advanced planning directives), financial planning, and assistance (for example rent, utilities, medication funding).     Your  will coordinate their activities with other outpatient services you are receiving. All services provided by [facility]  are coordinated with and communicated to your primary care physician.    Our goal is to help you manage your health condition(s) safely within your living environment, whether that is your home or a medical facility. We want to help you function at the healthiest and highest level possible.     Sincerely,      Santos Solis MD  Medical Director    Enclosures:    Frequently Asked Questions  Patient Rights and Responsibilities   Reporting a Grievance or Complaint  Consent/Release of Information  Stamped Addressed Envelope                  Frequently Asked Questions about [facility]    What is [facility]?  Outpatient Case management is not Home healthcare services. [facility]  do not provide hands-on care. [facility]  will work with your doctor to arrange for home health services, if needed. Home health services have a limited duration and there are some restrictions as to who can get these services. There is no prescribed limit to the amount of time you receive [facility] services. [facility]  are not agents of your insurance company. However, [facility]  can help you obtain information from your insurance company.     Who are the [facility] ?  [facility]  are  Registered Nurses and Social Workers. It is important to remember that you and your  are a team that works together with your primary care physician to create your individualized plan of care. The ultimate goal of your care plan is to help you implement your doctors treatment plan and to help you function at the highest level of health possible.     What are my rights as a patient?  It is important for you to know and understand your rights and responsibilities while receiving services from the [facility] program. We have enclosed a complete description of your rights and responsibilities. You can help to make your care more effective when you understand your right and responsibilities.     What is needed to be enrolled in the program?  You are only enrolled in the [facility] Program when you give us your consent to participate. You will find enclosed a consent form. You are receiving this letter because you or your caregivers have given us a verbal consent to enroll you in [facility] Program. We ask that you sign and return the enclosed written consent in the stamped self-addressed envelope.                           Patient Rights and Responsibilities    We consider you a partner in your care. When you are well informed, participate in treatment decisions and communicate openly with your doctor and other healthcare professionals, you help make your care more effective.     While you are in the Outpatient Case Management Program, your rights include the following:     You have a right to be provided services in a non-discriminatory manner in accordance with the provisions of Title VI of the Civil Rights Act of 1964, Section 504 of the Rehabilitation Act of 1973, the Age Discrimination Act of 1975, the Americans with Disabilities Act as well as any other applicable Federal and State laws and regulations.     You have the right  to a reasonable, timely response to your request or need for care, as well as the right to considerate and respectful care including an environment that preserves dignity and contributes to a positive self-image. You are responsible for being considerate and respectful of our staff.     You have a right to information regarding patient rights, advocacy services and complaint mechanisms, and the right to prompt resolution of any complaint. You or a designee has the right to participate in the resolution of ethical issues surrounding your care. You have a right to file a complaint if you feel that your rights have been infringed, without fear or penalty from [facility] or the federal government. You may file a complaint with the Director of Outpatient Case Management by calling [phone]. At any time, you may lodge a grievance with the [facility] by calling [phone], or the Joint Commission on Accreditation of Healthcare Organizations at [phone].     You, or someone acting on your behalf, have the right to understandable information on your health status, treatment and progress in order to make decisions. You have the right to know the nature, risks and alternatives to treatment. You have the right to be informed, when appropriate, regarding the outcome of the care that has been provided. You have the right to refuse treatment to the extent permitted by law, and the right to be informed of the alternatives and consequences of refusing treatment.     You, in collaboration with your physician, have the right to make decisions regarding care and the right to participate in the development and implementation of the plan of care and effective pain management. You have the right to know the name and professional status of those responsible for the delivery of your care and treatment.       You have a right, within legal guidelines, to have a guardian, next-of-kin or legal  designee exercises your patient rights when you are unable to do so. You have the right for your wishes regarding end-of-life decisions to be addressed by the healthcare team through advance directives. You have the right to personal privacy and confidentiality and to expect confidentiality of all records and communications pertaining to your care.      You have the right to receive communications about your health information confidentially. You have the right to request restrictions on the uses and disclosures of your health information. You have the right to inspect, copy, request amendments and receive an accounting of to whom we have disclosed your health information.     You have the right to be provided with interpretation services if you do not speak English; to alternative communication techniques if you are hearing or vision impaired; and to have any other resources needed on your behalf to ensure effective communication. These services are provided free of charge.     You have a right to personal safety (free from mental, physical, sexual and verbal abuse, neglect and exploitation). You have the right to access protective and advocacy services.     Advance Directives  A Patient Advocate is available to meet with patients to answer questions regarding advance directives.    Living Will  A document that outlines what medical treatment the patient does or does not want in the event the patient becomes unable to make those decisions at the appropriate time.    Durable Medical Power of   A document by which the patient designates an individual to be responsible for making medical decisions in the event the patient becomes unable to do so.    HIPAA Notice of Privacy Practices  Your medical information is governed by federal privacy laws. HIPAA protects private medical information and how that information is disclosed. If you have a question regarding the HIPAA Notice of Privacy Practices, or if you  believe your privacy rights have been violated, you may call our designated hotline at ([phone].            Quality Improvement  Because we consistently strive to improve the care and service provided to our patients, we welcome your feedback. Your comments are an important part of our quality improvement process, as we like to know what we are doing right and which areas are in need of improvement. Our policy is to listen, be responsive and provide you with an appropriate and timely follow-up to your questions or concerns. Our goal is active patient and family involvement in all aspects of the care process.                                                                                  Reporting a Grievance or Complaint    During your time with the [facility] team you may have a grievance or complaint with our services. Your Patients Bill of Rights gives patients, families, and caregivers the right to express concerns and grievances and the right to expect a reasonable and timely response.     Your presentation of your concerns is not viewed negatively. It is an opportunity for us to improve the quality of our care and services we provide to you.     You may report your concerns directly to your , or you can phone in a complaint to:     Director of Outpatient Case Management  [phone]    You may also send a complaint letter to:    Director of Outpatient Case Management Services  [address] [place] [id number]    Tell us the details of your complaint and provide us with a contact phone number so we can contact you to obtain additional information. We will return a call to you within two business days of our receipt of your complaint, and to request additional information as needed. If you choose to mail a letter, your complaint may take a few days longer to reach us.     All grievances will be addressed as quickly as possible. A grievance or complaint  that involves situations or practices which place patients in immediate danger will be addressed as an urgent matter. We will work to resolve all other complaints within seven days of receipt. By that time, you will receive a phone call with either the resolution of your complaint, or a plan for corrective action. A formal written response will be sent to you within 30 days of receipt of your grievance.     If a resolution cannot be completed within 30 days, a letter will be sent to you or your family member with an estimated time for the final response.    Additionally, all patients have the right to file complaints with external agencies, without exception. Complaints/grievances can be addressed to the following agencies:            Patient Safety or Quality of Care Concerns  Office of Quality Monitoring   [facility] [place] [address]  [phone] Toll Free    HIPPA Privacy/Security Concerns  Office for Civil Rights Region IV  [place] Department of Health & Human Services  [address], [address]  [place] [address] [phone] Phone  [phone] TDD  [phone] Toll Free    Medicare/Medicaid Billing Concerns  [place] for Medicare & Medicaid Services  Region 6  [address], [address]  [place] [address] [phone] Phone  [phone] Toll Free    General Concerns  [facility] and [facility]  [phone] Toll Free Complaint Hotline                                                                              Consent Form/Release of Information    By signing--     (1) I agree I have read the Outpatient Case Management information provided to me;     (2) I agree to voluntarily participate in the Outpatient Case Management program;     (3) I understand I must consent to participation in the Outpatient Case Management program during my first interview with my ;    (4) I consent to the discussion and release  of my personal health information to my healthcare team (including my personal physician, my medical home care team, any specialty physician(s), and my [facility] team);     (5) I agree my consent is valid for the length of time I am receiving Outpatient Case Management;    (6) I agree to referrals to community resources which my Case Management team recommends for me. I agree to the release of my personal information and personal health information as necessary to referral sources.    ___________________________________________________________________  Patients Printed Name     ___________________________________________________________________  Patients Signature       Date    If patient is in being cared for, please complete this section:     ___________________________________________________________________  Printed Name of Person Caring For Patient   Relationship To Patient    ___________________________________________________________________   Signature of Person Caring For Patient     Date    PLEASE SIGN AND RETURN IN THE ENCLOSED PRE-ADDRESSED ENVELOPE.

## 2017-02-16 NOTE — PROGRESS NOTES
An OPCM welcome Packet and consent form was created and mailed to the patient on 2/15/17.        Cristina Levine, [facility]  Outpatient Complex Care Mgmt  Ext [phone]

## 2017-02-20 ENCOUNTER — OUTPATIENT CASE MANAGEMENT (OUTPATIENT)
Dept: ADMINISTRATIVE | Facility: OTHER | Age: 82
End: 2017-02-20

## 2017-02-20 NOTE — PROGRESS NOTES
Third attempt to perform initial assessment with daughter Debbie Trevizo; left voice message to return call.  Unable to reach.  RIKKI Robbins OPCM-RN

## 2017-02-21 ENCOUNTER — TELEPHONE (OUTPATIENT)
Dept: SURGERY | Facility: CLINIC | Age: 82
End: 2017-02-21

## 2017-02-21 NOTE — TELEPHONE ENCOUNTER
----- Message from Ana Maria Rod sent at 2/21/2017  9:11 AM CST -----  Contact: [facility]/safia  [phone]//caller states that she needs to speak with nurse in ref to pt having some sort of string coming out of his incision//please call//thank you

## 2017-02-21 NOTE — TELEPHONE ENCOUNTER
"Spoke with Monique from [facility], pt has a "white string" in his incision, pt schedule to see us on Thursday  "

## 2017-02-23 ENCOUNTER — OFFICE VISIT (OUTPATIENT)
Dept: SURGERY | Facility: CLINIC | Age: 82
End: 2017-02-23
Payer: MEDICARE

## 2017-02-23 VITALS
SYSTOLIC BLOOD PRESSURE: 127 MMHG | BODY MASS INDEX: 22.22 KG/M2 | TEMPERATURE: 98 F | HEART RATE: 62 BPM | HEIGHT: 69 IN | WEIGHT: 150 LBS | DIASTOLIC BLOOD PRESSURE: 62 MMHG

## 2017-02-23 DIAGNOSIS — C43.4 MALIGNANT MELANOMA OF NECK: Primary | ICD-10-CM

## 2017-02-23 PROCEDURE — 99999 PR PBB SHADOW E&M-EST. PATIENT-LVL III: CPT | Mod: PBBFAC,,, | Performed by: SURGERY

## 2017-02-23 PROCEDURE — 99024 POSTOP FOLLOW-UP VISIT: CPT | Mod: ,,, | Performed by: SURGERY

## 2017-02-23 PROCEDURE — 99213 OFFICE O/P EST LOW 20 MIN: CPT | Mod: PBBFAC | Performed by: SURGERY

## 2017-02-23 NOTE — LETTER
February 23, 2017        Juan Elmore MD  [address] [facility]  [address]  [facility] [address]             [facility] - General Surgery  [address]  [place] [address]  Phone: [phone]   Patient: Fidel Allan   MR Number: [id number]   YOB: 1927   Date of Visit: 2/23/2017       Dear Dr. Elmore:    Thank you for referring Fidel Allan to me for evaluation. Attached you will find relevant portions of my assessment and plan of care.    If you have questions, please do not hesitate to call me. I look forward to following Fidel Allan along with you.    Sincerely,      Abraham Villafuerte MD            [facility]  Jeffrey Watson MD  [facility]

## 2017-02-23 NOTE — LETTER
[facility] - General Surgery  [address]  [facility] [address]  Phone: [phone] February 24, 2017      Juan Elmore MD  [facility] [address] [address]    Patient: Fidel Allan   MR Number: [id number]   YOB: 1927   Date of Visit: 2/23/2017     Dear Dr. Elmore:    I saw your patient Fidel Allan in my General Surgery Clinic. Below are the relevant portions of my assessment and plan of care.    His left neck wound is healing appropriately without signs of infection.  Deep dermal / SC vicryl sutures spitting through incison line.  Sutures debrided.  No signs of infection.   No subjective complaints.  F/U prn.    If you have questions, please do not hesitate to call me. I look forward to following Fidel along with you.    Sincerely,      Abraham Villafuerte MD  Medical Director, [facility]  Section of General and Oncologic Surgery  [facility]    RL[facility]/hcr     [facility]  Jeffrey Watson MD  [facility]

## 2017-02-24 NOTE — PROGRESS NOTES
Left neck wound healing appropriately without signs of infection.  Deep dermal / SC vicryl sutures spitting through incison line.  Sutures debrided.  No signs of infection  No subjective complaints.  F/U prn

## 2017-06-11 PROBLEM — J18.9 HEALTHCARE-ASSOCIATED PNEUMONIA: Status: ACTIVE | Noted: 2017-06-11

## 2017-06-13 PROBLEM — J20.9 ACUTE BRONCHITIS: Status: ACTIVE | Noted: 2017-06-11

## 2017-06-13 PROBLEM — M25.512 LEFT SHOULDER PAIN: Status: ACTIVE | Noted: 2017-06-13

## 2017-06-15 PROBLEM — M25.512 LEFT SHOULDER PAIN: Status: ACTIVE | Noted: 2017-06-15

## 2017-06-25 PROBLEM — I95.9 HYPOTENSION: Status: ACTIVE | Noted: 2017-06-25

## 2017-06-25 PROBLEM — R50.9 FEVER: Status: ACTIVE | Noted: 2017-06-25

## 2017-06-25 PROBLEM — A41.9 SEPSIS: Status: ACTIVE | Noted: 2017-06-25

## 2017-06-26 PROBLEM — J20.9 ACUTE BRONCHITIS: Status: RESOLVED | Noted: 2017-06-11 | Resolved: 2017-06-26

## 2017-06-26 PROBLEM — I95.9 HYPOTENSION: Status: RESOLVED | Noted: 2017-06-25 | Resolved: 2017-06-26

## 2017-06-26 PROBLEM — J18.9 PNEUMONIA OF LEFT LOWER LOBE DUE TO INFECTIOUS ORGANISM: Status: ACTIVE | Noted: 2017-06-25

## 2017-06-26 PROBLEM — A49.1 INFECTION DUE TO STREPTOCOCCUS GALLOLYTICUS: Status: RESOLVED | Noted: 2017-01-31 | Resolved: 2017-06-26

## 2017-06-26 PROBLEM — K80.31 CHOLANGITIS DUE TO BILE DUCT CALCULUS WITH OBSTRUCTION: Status: RESOLVED | Noted: 2017-01-29 | Resolved: 2017-06-26

## 2017-06-29 ENCOUNTER — OUTPATIENT CASE MANAGEMENT (OUTPATIENT)
Dept: ADMINISTRATIVE | Facility: OTHER | Age: 82
End: 2017-06-29

## 2017-06-29 NOTE — PROGRESS NOTES
Please note that this patient was not enrolled in Outpatient Case Management at this time due to being transferred to a facility.     Please contact [facility] at Ext. [phone] with questions.    Thank you,  Aileen Marina

## 2017-10-10 ENCOUNTER — HOSPITAL ENCOUNTER (EMERGENCY)
Facility: HOSPITAL | Age: 82
Discharge: HOME | End: 2017-10-10
Payer: MEDICARE

## 2017-10-10 VITALS
HEIGHT: 70 IN | RESPIRATION RATE: 18 BRPM | TEMPERATURE: 98 F | OXYGEN SATURATION: 99 % | BODY MASS INDEX: 23.48 KG/M2 | WEIGHT: 164 LBS | SYSTOLIC BLOOD PRESSURE: 119 MMHG | HEART RATE: 84 BPM | DIASTOLIC BLOOD PRESSURE: 53 MMHG

## 2017-10-10 DIAGNOSIS — R07.9 CHEST PAIN: ICD-10-CM

## 2017-10-10 LAB
ALBUMIN SERPL BCP-MCNC: 3.4 G/DL
ALP SERPL-CCNC: 48 U/L
ALT SERPL W/O P-5'-P-CCNC: 10 U/L
ANION GAP SERPL CALC-SCNC: 5 MMOL/L
AST SERPL-CCNC: 21 U/L
BASOPHILS # BLD AUTO: 0.07 K/UL
BASOPHILS NFR BLD: 1 %
BILIRUB SERPL-MCNC: 0.3 MG/DL
BUN SERPL-MCNC: 15 MG/DL
CALCIUM SERPL-MCNC: 8.6 MG/DL
CHLORIDE SERPL-SCNC: 98 MMOL/L
CO2 SERPL-SCNC: 28 MMOL/L
CREAT SERPL-MCNC: 1.1 MG/DL
DIFFERENTIAL METHOD: ABNORMAL
EOSINOPHIL # BLD AUTO: 0.2 K/UL
EOSINOPHIL NFR BLD: 2.2 %
ERYTHROCYTE [DISTWIDTH] IN BLOOD BY AUTOMATED COUNT: 13 %
EST. GFR  (AFRICAN AMERICAN): >60 ML/MIN/1.73 M^2
EST. GFR  (NON AFRICAN AMERICAN): 59 ML/MIN/1.73 M^2
GLUCOSE SERPL-MCNC: 105 MG/DL
HCT VFR BLD AUTO: 31 %
HGB BLD-MCNC: 10.2 G/DL
LYMPHOCYTES # BLD AUTO: 3 K/UL
LYMPHOCYTES NFR BLD: 44.2 %
MCH RBC QN AUTO: 29.8 PG
MCHC RBC AUTO-ENTMCNC: 32.9 G/DL
MCV RBC AUTO: 91 FL
MONOCYTES # BLD AUTO: 0.6 K/UL
MONOCYTES NFR BLD: 8.4 %
NEUTROPHILS # BLD AUTO: 3 K/UL
NEUTROPHILS NFR BLD: 44.2 %
PLATELET # BLD AUTO: 149 K/UL
PMV BLD AUTO: 9.3 FL
POTASSIUM SERPL-SCNC: 4.3 MMOL/L
PROT SERPL-MCNC: 6.3 G/DL
RBC # BLD AUTO: 3.42 M/UL
SODIUM SERPL-SCNC: 131 MMOL/L
TROPONIN I SERPL DL<=0.01 NG/ML-MCNC: <0.006 NG/ML
WBC # BLD AUTO: 6.77 K/UL

## 2017-10-10 PROCEDURE — 85025 COMPLETE CBC W/AUTO DIFF WBC: CPT

## 2017-10-10 PROCEDURE — 93005 ELECTROCARDIOGRAM TRACING: CPT

## 2017-10-10 PROCEDURE — 99285 EMERGENCY DEPT VISIT HI MDM: CPT

## 2017-10-10 PROCEDURE — 80053 COMPREHEN METABOLIC PANEL: CPT

## 2017-10-10 PROCEDURE — 84484 ASSAY OF TROPONIN QUANT: CPT

## 2017-10-10 RX ORDER — HYDROCODONE BITARTRATE AND ACETAMINOPHEN 10; 325 MG/1; MG/1
1 TABLET ORAL EVERY 6 HOURS PRN
Status: ON HOLD | COMMUNITY
End: 2017-12-20

## 2017-10-11 NOTE — ED NOTES
spd here for patient discharge back to [facility] per whch; pt stable; no chest pain reported; instructions were given to vita in report and paper instructions to spd; pt has clothing; no other valuables observed; no rx needed

## 2017-10-11 NOTE — ED PROVIDER NOTES
Encounter Date: 10/10/2017       History     Chief Complaint   Patient presents with    Chest Pain     pt arrived awake and alert for [facility] per [facility] ems; pt denies any pain; ems reports call came out as midsternal chest pain; pt reports his left eye was throbbing and then his gums but not sure it went to his chest; iv access right hand, cbg 128; nursing home gave ntg spray x 1 pta and ems reports bp was initially 80-90     Chief complaint: Chest pain    History of present illness:Fidel Allan is a 89 y.o. male who presents via ambulance from a local nursing home with reports of retrosternal pain.  He denies any pain at present and has no recollection of same.  EMS reports that he received sublingual nitroglycerin prior to their arrival.  He has had a prior CABG.  He denies any nausea, vomiting, shortness of breath or diaphoresis.  He does have a history of dementia which inhibits his history.          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     Blindness of right eye     Bone disorder     Bradycardia     CAD (coronary artery disease)     Constipation     COPD (chronic obstructive pulmonary disease)     Dyspnea     Falls frequently     GERD (gastroesophageal reflux disease)     Glaucoma     Hearing loss     HLD (hyperlipidemia)     Hypertension     Insomnia     Major depressive disorder     Melanoma     Vascular dementia      Past Surgical History:   Procedure Laterality Date    CARDIAC PACEMAKER PLACEMENT  2016    CHOLEDOCHODUODENOSTOMY      COLECTOMY      CORONARY ANGIOPLASTY WITH STENT PLACEMENT      CORONARY ARTERY BYPASS GRAFT      MALIGNANT SKIN LESION EXCISION Left 01/16/2017    neck melanoma     No family history on file.  Social History   Substance Use Topics    Smoking status: Never Smoker    Smokeless tobacco: Not on file    Alcohol use  No     Review of Systems   Constitutional: Negative for activity change, appetite change, chills, fatigue and fever.   Eyes: Positive for pain (intermittent eye pain which is currently gone). Negative for visual disturbance.   Respiratory: Negative for apnea and shortness of breath.    Cardiovascular: Negative for chest pain and palpitations.   Gastrointestinal: Negative for abdominal distention and abdominal pain.   Genitourinary: Negative for difficulty urinating.   Musculoskeletal: Negative for neck pain.   Skin: Negative for pallor and rash.   Neurological: Negative for headaches.   Hematological: Does not bruise/bleed easily.   Psychiatric/Behavioral: Negative for agitation.       Physical Exam     Initial Vitals [10/10/17 1943]   BP Pulse Resp Temp SpO2   130/62 60 (!) 22 98 °F (36.7 °C) 97 %      MAP       84.67         Physical Exam    Nursing note and vitals reviewed.  Constitutional: He appears well-developed and well-nourished.   HENT:   Head: Normocephalic and atraumatic.   Eyes: Conjunctivae are normal.   Neck: Normal range of motion. Neck supple.   Cardiovascular: Normal rate, regular rhythm and normal heart sounds. Exam reveals no gallop and no friction rub.    No murmur heard.  Pulmonary/Chest: No respiratory distress. He has no wheezes. He has no rhonchi. He has rales.   Abdominal: Soft.   Musculoskeletal: Normal range of motion.   Neurological: He is alert and oriented to person, place, and time.   Skin: Skin is warm and dry.   Psychiatric: He has a normal mood and affect.         ED Course   Procedures  Labs Reviewed   CBC W/ AUTO DIFFERENTIAL - Abnormal; Notable for the following:        Result Value    RBC 3.42 (*)     Hemoglobin 10.2 (*)     Hematocrit 31.0 (*)     Platelets 149 (*)     All other components within normal limits   COMPREHENSIVE METABOLIC PANEL - Abnormal; Notable for the following:     Sodium 131 (*)     Calcium 8.6 (*)     Albumin 3.4 (*)     Alkaline Phosphatase 48 (*)      Anion Gap 5 (*)     eGFR if non  59 (*)     All other components within normal limits   TROPONIN I     EKG Readings: (Independently Interpreted)   Rhythm: Paced Rhythm. Heart Rate: 60. Ectopy: No Ectopy. Conduction: Normal. ST Segments: Normal ST Segments. T Waves: Normal. Axis: Normal. Clinical Impression: Paced Rhythm          Medical Decision Making:   ED Management:  Fidel Allan is a 89 y.o. male who presents with  reports of chest pain which the patient denies.  EKG fails to demonstrate any evidence of ischemia/MI.  He has no tenderness of his hips with no evidence of injury.                   ED Course      Clinical Impression:   Diagnoses of Chest pain and Chest pain were pertinent to this visit.                           Kadeem Gresham III, MD  10/11/17 [id number]

## 2017-11-02 DIAGNOSIS — R13.14 DYSPHAGIA, PHARYNGOESOPHAGEAL: Primary | ICD-10-CM

## 2017-11-15 ENCOUNTER — HOSPITAL ENCOUNTER (OUTPATIENT)
Dept: RADIOLOGY | Facility: HOSPITAL | Age: 82
Discharge: HOME | End: 2017-11-15
Attending: INTERNAL MEDICINE
Payer: MEDICARE

## 2017-11-15 ENCOUNTER — CLINICAL SUPPORT (OUTPATIENT)
Dept: SPEECH THERAPY | Facility: HOSPITAL | Age: 82
End: 2017-11-15
Attending: INTERNAL MEDICINE
Payer: MEDICARE

## 2017-11-15 DIAGNOSIS — R13.14 DYSPHAGIA, PHARYNGOESOPHAGEAL: ICD-10-CM

## 2017-11-15 PROCEDURE — G8998 SWALLOW D/C STATUS: HCPCS | Mod: CJ

## 2017-11-15 PROCEDURE — G8997 SWALLOW GOAL STATUS: HCPCS | Mod: CJ

## 2017-11-15 PROCEDURE — 92611 MOTION FLUOROSCOPY/SWALLOW: CPT | Mod: KX

## 2017-11-15 PROCEDURE — 74230 X-RAY XM SWLNG FUNCJ C+: CPT | Mod: TC

## 2017-11-15 PROCEDURE — G8996 SWALLOW CURRENT STATUS: HCPCS | Mod: CJ

## 2017-11-15 PROCEDURE — 74230 X-RAY XM SWLNG FUNCJ C+: CPT | Mod: 26,,, | Performed by: RADIOLOGY

## 2017-11-15 NOTE — PROGRESS NOTES
TIME RECORD    Date: 11/15/2017    Start Time:  10:55  Stop Time:  11:12    PROCEDURES:    TIMED  Procedure Time Min.    Start:  Stop:     Start:  Stop:     Start:  Stop:     Start:  Stop:          UNTIMED  Procedure Time Min.   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 Pt resides at [facility].   Prior Level of Function:  Pt uses wheelchair, accompanied by RN from facility.   Functional Deficits Leading to Referral:  Dysphagia, MBS to assess change in swallow mechanism.  Social Cultural:  Resides at [facility]  Nutrition:  Pt currently on regular diet and thin liquids  Signs of Abuse:  No  Medication:    Alertness/Attention:  Alert, awake, communicative  Oral Motor:    Dentition: edentulous (uses dentures to eat at [facility]; dentures not present at hospital)  Secretion Management:  (WNL)  Mucosal Quality: good, adequate  Mandibular Strength and Mobility: WFL  Oral Labial Strength and Mobility: WFL  Lingual Strength and Mobility: WFL  Velar Elevation: WNL  Buccal  "Strength and Mobility: WFL  Volitional Cough: adequate  Volitional Swallow: adquate  Voice Prior to PO Intake: clear   Patient Position:  Sitting  View:  Lateral  Consistencies Administered:  5cc, 10cc, 20cc, and self regulated sips of thin liquid barium via cup, straw; 10cc, 20 cc and self regulated sips of nectar thick liquid barium via cup; tsp bites of barium pudding; tsp bites of diced peaches (drained) coated in barium powder; 1/2"  abel cracker with barium paste frosting.   MBSS completed in lateral view.       Oral Preparation / Oral Phase  Mild oral dysphagia c/b reduced oral motor skills, slow a/p transport, prolonged mastication/oral transit time needed for mastication of diced solids and hard/dry solids (missing dentition). Pt with oral stasis along hard palpate,  tongue and tongue base post swallow with reduced sensation of material. Pt required additional swallows to clear on her own.       Pharyngeal Phase  Mild-moderate pharyngeal dysphagia c/b delayed swallow response (approx 5 seconds delayed) resulting in prespillage of liquids (thin and nectar thick) into pharynx, delay noted across all consistencies. Pt demonstrated dcr'd tongue base retraction, impaired hyolaryngeal lift/excursion, impaired epiglottic inversion resulting in vallecular residue after the swallow (ranging from mild with liquids, mild with puree). Pt also with residue (stasis) in both pyriform sinus(es) (mild with thin liquids and  Puree).  Pt did exhibit penetration into the  laryngeal vestibule, it remained above the level of the vocal folds (cup and straw trials of thin liquid). No evidence of aspiration was noted.  Pt demonstrated reduced laryngeal closure (with thin liquids). Pt required verbal cues from SLP to initiate dry and spont dry swallows to clear out vallecular stasis. Pt with reduced sensation of pharyngeal residue. Strategies which aided swallow mechanism include: double swallow which reduced vallecular residue " and liquid wash of nectar thick was needed to reduce pureed residuals. No aspiration of thin and nectar thick liquids was noted. No penetration noted with nectar thick liquids.     Cervical Esophageal Phase  No evidence of backflow from upper cervical esophagus into the pharynx       Impressions  Mild oral dysphagia as well as mild-moderate pharyngeal dysphagia was noted.Pt exhibited penetration into the  laryngeal vestibule, it remained above the level of the vocal folds (cup and straw trials of thin liquid). No evidence of aspiration was noted. No penetration on nectar thick liquids noted.     Prognosis/Plan/Education  Fair at this time given degree of swallow deficits. Educated RN who accompanied pt and educated pt as well. Pt may need ongoing review of results.    Pt and RN educated on results of study, recommendations, and strict aspiration precautions.   Swallow precautions and thickener packets provided to pt. SLP demo'd use of thickener via liquids.   Pt will need ST services to address dysphagia.    Plan:    1. SLP to send report via [employer] system to MD  2. Refer pt for continued SLP services to address dysphagia  3. Educated all parties on use of thickener and strict swallow precautions     Therapist's Name: BLESSING Dumotn, [facility]-SLP  Speech Pathologist 11/15/2017      Therapist's Signature: ___________________________________  Date: 11/15/2017    I CERTIFY THE NEED FOR THESE SERVICES FURNISHED UNDER THIS PLAN OF TREATMENT AND WHILE UNDER MY CARE    Physician's comments: ________________________________________________________________________________________________________________________________________________      Physician's Name: ___________________________________

## 2017-12-17 PROBLEM — R78.81 BACTEREMIA: Status: ACTIVE | Noted: 2017-12-17

## 2017-12-18 PROBLEM — I50.33 ACUTE ON CHRONIC HEART FAILURE WITH NORMAL EJECTION FRACTION: Status: ACTIVE | Noted: 2017-12-18

## 2017-12-18 PROBLEM — D64.9 ANEMIA: Status: ACTIVE | Noted: 2017-12-18

## 2017-12-18 PROBLEM — B95.5 BACTEREMIA DUE TO STREPTOCOCCUS: Status: ACTIVE | Noted: 2017-12-17

## 2017-12-18 PROBLEM — R74.8 ABNORMAL LIVER ENZYMES: Status: ACTIVE | Noted: 2017-12-18

## 2017-12-18 PROBLEM — J18.9 PNEUMONIA OF LEFT LOWER LOBE DUE TO INFECTIOUS ORGANISM: Status: RESOLVED | Noted: 2017-06-25 | Resolved: 2017-12-18

## 2017-12-18 PROBLEM — M25.512 LEFT SHOULDER PAIN: Status: RESOLVED | Noted: 2017-06-15 | Resolved: 2017-12-18

## 2017-12-19 PROBLEM — E87.1 HYPONATREMIA: Status: RESOLVED | Noted: 2017-01-29 | Resolved: 2017-12-19

## 2017-12-21 ENCOUNTER — OUTPATIENT CASE MANAGEMENT (OUTPATIENT)
Dept: ADMINISTRATIVE | Facility: OTHER | Age: 82
End: 2017-12-21

## 2017-12-21 NOTE — PROGRESS NOTES
Thank you for the referral. The following patient has been assigned to Shayna Robbins RN with Outpatient Complex Care Management for high risk screening.    Reason for referral: Bacteremia    Please contact [facility] at [phone] with any questions.    Thank you,  Aileen Marina

## 2017-12-21 NOTE — PROGRESS NOTES
First attempt to perform initial assessment for OCPM; unable to reach and leave a voice message.  RIKKI Robbins OPCM-RN

## 2017-12-22 ENCOUNTER — OUTPATIENT CASE MANAGEMENT (OUTPATIENT)
Dept: ADMINISTRATIVE | Facility: OTHER | Age: 82
End: 2017-12-22

## 2017-12-22 NOTE — PROGRESS NOTES
2 nd attempt to complete initial assessment for OPCM; left message requesting a return call.  As this is my second unsuccessful attempt to complete assessment for OPCM, I will mail a letter with my contact information.  RIKKI Robbins OPCM-RN

## 2017-12-22 NOTE — LETTER
December 22, 2017    Fidel Allan  [address]  [place] [address]             [facility] [address] [place] [address] Dear Fidel,    I work with [facility]'s Outpatient Case Management Department. I have been unsuccessful at reaching you to follow-up to see how you have been doing. If you require any future assistance or if any new concerns or problems arise, please do not hesitate to call.     The Outpatient Case Management Department can be reached at [phone] from 8:00AM to 4:30 PM on Monday thru Friday. [facility] also has a program where a nurse is available 24/7 to answer questions or provide medical advice, their number is [phone].    Thanks,      Shayna Robbins, RN  Outpatient Case Management

## 2017-12-28 ENCOUNTER — OUTPATIENT CASE MANAGEMENT (OUTPATIENT)
Dept: ADMINISTRATIVE | Facility: OTHER | Age: 82
End: 2017-12-28

## 2017-12-28 NOTE — PROGRESS NOTES
Third attempt to perform initial assessment for OPCM; unable to leave voice message because mailbox is full at this time.  Unable to reach x 3; will dis-enroll patient at this time.  Case closed.  RIKKI Robbins OPCM-RN

## 2018-01-02 PROBLEM — J18.9 PNEUMONIA OF LEFT LOWER LOBE DUE TO INFECTIOUS ORGANISM: Status: ACTIVE | Noted: 2018-01-02

## 2018-01-03 PROBLEM — R33.9 URINARY RETENTION: Status: ACTIVE | Noted: 2018-01-03

## 2018-01-03 PROBLEM — I50.32 CHRONIC DIASTOLIC HEART FAILURE: Status: ACTIVE | Noted: 2017-12-18

## 2018-01-07 PROBLEM — I95.9 HYPOTENSION: Status: RESOLVED | Noted: 2017-06-25 | Resolved: 2018-01-07

## 2018-01-07 PROBLEM — E87.1 HYPONATREMIA: Status: RESOLVED | Noted: 2017-01-29 | Resolved: 2018-01-07

## 2018-01-07 PROBLEM — R13.10 DYSPHAGIA: Status: ACTIVE | Noted: 2018-01-07

## 2018-02-16 ENCOUNTER — OFFICE VISIT (OUTPATIENT)
Dept: UROLOGY | Facility: CLINIC | Age: 83
End: 2018-02-16
Payer: MEDICARE

## 2018-02-16 VITALS
BODY MASS INDEX: 24.59 KG/M2 | RESPIRATION RATE: 20 BRPM | HEIGHT: 69 IN | OXYGEN SATURATION: 98 % | HEART RATE: 60 BPM | SYSTOLIC BLOOD PRESSURE: 118 MMHG | WEIGHT: 166 LBS | DIASTOLIC BLOOD PRESSURE: 64 MMHG

## 2018-02-16 DIAGNOSIS — N40.1 BENIGN PROSTATIC HYPERPLASIA WITH URINARY HESITANCY: ICD-10-CM

## 2018-02-16 DIAGNOSIS — R39.11 BENIGN PROSTATIC HYPERPLASIA WITH URINARY HESITANCY: ICD-10-CM

## 2018-02-16 DIAGNOSIS — R35.1 NOCTURIA: ICD-10-CM

## 2018-02-16 DIAGNOSIS — R31.9 HEMATURIA, UNSPECIFIED TYPE: Primary | ICD-10-CM

## 2018-02-16 DIAGNOSIS — R53.82 CHRONIC FATIGUE: ICD-10-CM

## 2018-02-16 DIAGNOSIS — R39.198 SLOW URINARY STREAM: ICD-10-CM

## 2018-02-16 LAB
BACTERIA #/AREA URNS AUTO: ABNORMAL /HPF
BILIRUB UR QL STRIP: NEGATIVE
CLARITY UR REFRACT.AUTO: CLEAR
COLOR UR AUTO: YELLOW
GLUCOSE UR QL STRIP: NEGATIVE
HGB UR QL STRIP: NEGATIVE
HYALINE CASTS UR QL AUTO: 14 /LPF
KETONES UR QL STRIP: NEGATIVE
LEUKOCYTE ESTERASE UR QL STRIP: ABNORMAL
MICROSCOPIC COMMENT: ABNORMAL
NITRITE UR QL STRIP: NEGATIVE
PH UR STRIP: 5 [PH] (ref 5–8)
PROT UR QL STRIP: NEGATIVE
RBC #/AREA URNS AUTO: 0 /HPF (ref 0–4)
SP GR UR STRIP: 1.01 (ref 1–1.03)
SQUAMOUS #/AREA URNS AUTO: 0 /HPF
URN SPEC COLLECT METH UR: ABNORMAL
UROBILINOGEN UR STRIP-ACNC: NEGATIVE EU/DL
WBC #/AREA URNS AUTO: 9 /HPF (ref 0–5)

## 2018-02-16 PROCEDURE — 81001 URINALYSIS AUTO W/SCOPE: CPT

## 2018-02-16 PROCEDURE — 88112 CYTOPATH CELL ENHANCE TECH: CPT | Performed by: PATHOLOGY

## 2018-02-16 PROCEDURE — 1159F MED LIST DOCD IN RCRD: CPT | Mod: ,,, | Performed by: UROLOGY

## 2018-02-16 PROCEDURE — 99999 PR PBB SHADOW E&M-EST. PATIENT-LVL III: CPT | Mod: PBBFAC,,, | Performed by: UROLOGY

## 2018-02-16 PROCEDURE — 99213 OFFICE O/P EST LOW 20 MIN: CPT | Mod: PBBFAC,PO,25 | Performed by: UROLOGY

## 2018-02-16 PROCEDURE — 88112 CYTOPATH CELL ENHANCE TECH: CPT | Mod: 26,,, | Performed by: PATHOLOGY

## 2018-02-16 PROCEDURE — 99205 OFFICE O/P NEW HI 60 MIN: CPT | Mod: S$PBB,,, | Performed by: UROLOGY

## 2018-02-16 PROCEDURE — 1125F AMNT PAIN NOTED PAIN PRSNT: CPT | Mod: ,,, | Performed by: UROLOGY

## 2018-02-16 PROCEDURE — 87086 URINE CULTURE/COLONY COUNT: CPT

## 2018-02-16 RX ORDER — FINASTERIDE 5 MG/1
5 TABLET, FILM COATED ORAL DAILY
Qty: 30 TABLET | Refills: 11 | Status: SHIPPED | OUTPATIENT
Start: 2018-02-16 | End: 2020-08-19 | Stop reason: SDUPTHER

## 2018-02-16 RX ORDER — FLUTICASONE FUROATE AND VILANTEROL TRIFENATATE 100; 25 UG/1; UG/1
1 POWDER RESPIRATORY (INHALATION) DAILY
COMMUNITY
Start: 2018-02-02

## 2018-02-16 RX ORDER — ALBUTEROL SULFATE 0.83 MG/ML
SOLUTION RESPIRATORY (INHALATION)
COMMUNITY
Start: 2017-12-20 | End: 2018-03-14

## 2018-02-16 RX ORDER — CLOBETASOL PROPIONATE 0.5 MG/G
OINTMENT TOPICAL DAILY PRN
COMMUNITY
Start: 2018-02-02 | End: 2019-06-13

## 2018-02-16 NOTE — LETTER
February 16, 2018        Juan Elmore MD  [address] [facility] [address] [facility] [address]  [place] [address]             [place] - Urology  [address] [place] [address] [address]  [facility] [address]  Phone: [phone]  Fax: [phone]   Patient: Fidel Allan   MR Number: [id number]   YOB: 1927   Date of Visit: 2/16/2018       Dear Dr. Elmore:    Thank you for referring Fidel Allan to me for evaluation. Attached you will find relevant portions of my assessment and plan of care.    If you have questions, please do not hesitate to call me. I look forward to following Fidel Allan along with you.    Sincerely,      Memo Nieto MD            CC  No Recipients    Enclosure

## 2018-02-16 NOTE — PATIENT INSTRUCTIONS
Check U/A,  Urine Culture, Cytology and urine FISH Tests  Obtain CT Sone study  F/U 1 month for Office Cystoscopy

## 2018-02-16 NOTE — PROGRESS NOTES
Subjective:       Patient ID: Fidel Allan is a 89 y.o. male.    Chief Complaint: Hematuria and Establish Care    89 yo WM with Hematuria and Frequency, Nocturia and slow urinary stream. On Flomax. Referred for evaluation.      Hematuria   This is a new problem. The current episode started 1 to 4 weeks ago. The problem is unchanged. He describes the hematuria as microscopic hematuria. He reports no clotting in his urine stream. His pain is at a severity of 0/10. He is experiencing no pain. He describes his urine color as yellow. Irritative symptoms include frequency, nocturia and urgency. Associated symptoms include hesitancy. Pertinent negatives include no abdominal pain, chills, dysuria, facial swelling, fever, flank pain, genital pain, inability to urinate, nausea, vomiting or sore throat. He is not sexually active. His past medical history is significant for tobacco use (in past). There is no history of hypertension,  trauma, kidney stones, recent infection, sickle cell disease, STDs or UTI.     Review of Systems   Constitutional: Negative for activity change, appetite change, chills, diaphoresis, fatigue, fever and unexpected weight change.   HENT: Negative for congestion, facial swelling, hearing loss, sinus pressure, sore throat and trouble swallowing.    Eyes: Negative for photophobia, pain, discharge and visual disturbance.   Respiratory: Negative for apnea, cough and shortness of breath.    Cardiovascular: Negative for chest pain, palpitations and leg swelling.   Gastrointestinal: Negative for abdominal distention, abdominal pain, anal bleeding, blood in stool, constipation, diarrhea, nausea, rectal pain and vomiting.   Endocrine: Negative for cold intolerance, heat intolerance, polydipsia, polyphagia and polyuria.   Genitourinary: Positive for frequency, hematuria, hesitancy, nocturia and urgency. Negative for decreased urine volume, difficulty urinating, discharge, dysuria, enuresis, flank pain, genital  sores, penile pain, penile swelling, scrotal swelling and testicular pain.   Musculoskeletal: Negative for arthralgias, back pain and myalgias.   Skin: Negative for color change, pallor, rash and wound.   Allergic/Immunologic: Negative for environmental allergies, food allergies and immunocompromised state.   Neurological: Negative for dizziness, seizures, weakness and headaches.   Hematological: Negative for adenopathy. Does not bruise/bleed easily.   Psychiatric/Behavioral: Negative.        Objective:      Physical Exam   Nursing note and vitals reviewed.  Constitutional: He is oriented to person, place, and time. He appears well-developed and well-nourished.   HENT:   Head: Normocephalic.   Nose: Nose normal.   Mouth/Throat: Oropharynx is clear and moist.   Eyes: Conjunctivae and EOM are normal. Pupils are equal, round, and reactive to light.   Neck: Normal range of motion. Neck supple.   Cardiovascular: Normal rate, regular rhythm, normal heart sounds and intact distal pulses.    Pulmonary/Chest: Effort normal and breath sounds normal.   Abdominal: Soft. Bowel sounds are normal.   Genitourinary: Rectum normal, testes normal and penis normal. Prostate is enlarged. Prostate is not tender.   Musculoskeletal: Normal range of motion.   Neurological: He is alert and oriented to person, place, and time. He has normal reflexes.   Skin: Skin is warm and dry.     Psychiatric: He has a normal mood and affect. His behavior is normal. Judgment and thought content normal.       Assessment:       1. Hematuria, unspecified type    2. Chronic fatigue    3. Nocturia    4. Benign prostatic hyperplasia with urinary hesitancy    5. Slow urinary stream        Plan:       Patient Instructions   Check U/A,  Urine Culture, Cytology and urine FISH Tests  Obtain CT Sone study  F/U 1 month for Office Cystoscopy

## 2018-02-18 LAB — BACTERIA UR CULT: NO GROWTH

## 2018-03-13 ENCOUNTER — PROCEDURE VISIT (OUTPATIENT)
Dept: UROLOGY | Facility: CLINIC | Age: 83
End: 2018-03-13
Payer: MEDICARE

## 2018-03-13 VITALS — HEIGHT: 69 IN | WEIGHT: 166 LBS | BODY MASS INDEX: 24.59 KG/M2

## 2018-03-13 DIAGNOSIS — N13.8 BPH WITH OBSTRUCTION/LOWER URINARY TRACT SYMPTOMS: ICD-10-CM

## 2018-03-13 DIAGNOSIS — R39.198 SLOW URINARY STREAM: ICD-10-CM

## 2018-03-13 DIAGNOSIS — N40.1 BPH WITH OBSTRUCTION/LOWER URINARY TRACT SYMPTOMS: ICD-10-CM

## 2018-03-13 DIAGNOSIS — N40.1 BENIGN PROSTATIC HYPERPLASIA WITH URINARY HESITANCY: ICD-10-CM

## 2018-03-13 DIAGNOSIS — R35.1 NOCTURIA: ICD-10-CM

## 2018-03-13 DIAGNOSIS — R31.9 HEMATURIA, UNSPECIFIED TYPE: Primary | ICD-10-CM

## 2018-03-13 DIAGNOSIS — R39.11 BENIGN PROSTATIC HYPERPLASIA WITH URINARY HESITANCY: ICD-10-CM

## 2018-03-13 DIAGNOSIS — R33.9 URINARY RETENTION: ICD-10-CM

## 2018-03-13 DIAGNOSIS — N18.30 STAGE 3 CHRONIC KIDNEY DISEASE: Chronic | ICD-10-CM

## 2018-03-13 PROCEDURE — 52000 CYSTOURETHROSCOPY: CPT | Mod: PBBFAC,PO | Performed by: UROLOGY

## 2018-03-13 RX ORDER — CIPROFLOXACIN 500 MG/1
500 TABLET ORAL 2 TIMES DAILY
Qty: 10 TABLET | Refills: 0 | Status: ON HOLD | OUTPATIENT
Start: 2018-03-13 | End: 2018-03-19 | Stop reason: HOSPADM

## 2018-03-13 RX ORDER — SODIUM CHLORIDE 9 MG/ML
INJECTION, SOLUTION INTRAVENOUS CONTINUOUS
Status: CANCELLED | OUTPATIENT
Start: 2018-03-13

## 2018-03-13 RX ORDER — CIPROFLOXACIN 2 MG/ML
400 INJECTION, SOLUTION INTRAVENOUS
Status: CANCELLED | OUTPATIENT
Start: 2018-03-13

## 2018-03-13 RX ORDER — CIPROFLOXACIN 500 MG/1
TABLET ORAL
COMMUNITY
Start: 2018-02-16 | End: 2018-03-14 | Stop reason: SDUPTHER

## 2018-03-13 RX ORDER — LIDOCAINE HYDROCHLORIDE 20 MG/ML
JELLY TOPICAL ONCE
Status: CANCELLED | OUTPATIENT
Start: 2018-03-13 | End: 2018-03-13

## 2018-03-13 RX ORDER — HYDROCODONE BITARTRATE AND ACETAMINOPHEN 10; 325 MG/1; MG/1
TABLET ORAL
COMMUNITY
Start: 2018-02-19 | End: 2018-03-13 | Stop reason: HOSPADM

## 2018-03-13 NOTE — PROCEDURES
"Cystoscopy  Date/Time: 3/13/2018 10:39 AM  Performed by: MIKE GOMEZ  Authorized by: MIKE GOMEZ     Consent Done?:  Yes (Written)  Time out: Immediately prior to procedure a "time out" was called to verify the correct patient, procedure, equipment, support staff and site/side marked as required.    Indications: hematuria and BPH    Position:  Supine  Anesthesia:  Intraurethral instillation  Preparation: Patient was prepped and draped in usual sterile fashion      Scope type:  Flexible cystoscope  Stent inserted: No    Stent removed: No    External exam normal: Yes    Digital exam performed: No    Urethra normal: Yes    Prostate normal: No          Hyperplasia (Trilobar)(Length (cm):  10)        Negative Polyp        Positive Varices        no prostate solid tumorBladder neck normal: Bladder neck normal   Bladder normal: Yes      Patient tolerance:  Patient tolerated the procedure well with no immediate complications      "

## 2018-03-13 NOTE — LETTER
March 13, 2018        Andre Benton MD  [address]   [id number]  [facility] [address]             [place] - Urology  [id number] [place] [address] [address]  [facility] [address]  Phone: [phone]  Fax: [phone]   Patient: Fidel Allan   MR Number: [id number]   YOB: 1927   Date of Visit: 3/13/2018       Dear Dr. Benton:    Thank you for referring Fidel Allan to me for evaluation. Below are the relevant portions of my assessment and plan of care.            If you have questions, please do not hesitate to call me. I look forward to following Fidel along with you.    Sincerely,      Memo Nieto MD           CC  No Recipients

## 2018-03-19 PROBLEM — N40.1 BPH WITH OBSTRUCTION/LOWER URINARY TRACT SYMPTOMS: Status: ACTIVE | Noted: 2018-03-19

## 2018-03-19 PROBLEM — N13.8 BPH WITH OBSTRUCTION/LOWER URINARY TRACT SYMPTOMS: Status: ACTIVE | Noted: 2018-03-19

## 2018-03-28 ENCOUNTER — OFFICE VISIT (OUTPATIENT)
Dept: UROLOGY | Facility: CLINIC | Age: 83
End: 2018-03-28
Payer: MEDICARE

## 2018-03-28 VITALS — WEIGHT: 166 LBS | HEIGHT: 69 IN | BODY MASS INDEX: 24.59 KG/M2

## 2018-03-28 DIAGNOSIS — Z98.890 POST-OPERATIVE STATE: ICD-10-CM

## 2018-03-28 DIAGNOSIS — N47.2 PARAPHIMOSIS: ICD-10-CM

## 2018-03-28 DIAGNOSIS — R39.198 SLOW URINARY STREAM: Primary | ICD-10-CM

## 2018-03-28 DIAGNOSIS — N40.1 BPH WITH OBSTRUCTION/LOWER URINARY TRACT SYMPTOMS: ICD-10-CM

## 2018-03-28 DIAGNOSIS — R33.9 URINARY RETENTION: ICD-10-CM

## 2018-03-28 DIAGNOSIS — N13.8 BPH WITH OBSTRUCTION/LOWER URINARY TRACT SYMPTOMS: ICD-10-CM

## 2018-03-28 DIAGNOSIS — R35.1 NOCTURIA: ICD-10-CM

## 2018-03-28 PROCEDURE — 99999 PR PBB SHADOW E&M-EST. PATIENT-LVL III: CPT | Mod: PBBFAC,,, | Performed by: UROLOGY

## 2018-03-28 PROCEDURE — 99213 OFFICE O/P EST LOW 20 MIN: CPT | Mod: PBBFAC,PO | Performed by: UROLOGY

## 2018-03-28 PROCEDURE — 99024 POSTOP FOLLOW-UP VISIT: CPT | Mod: POP,,, | Performed by: UROLOGY

## 2018-03-28 RX ORDER — ALBUTEROL SULFATE 0.83 MG/ML
SOLUTION RESPIRATORY (INHALATION)
Status: ON HOLD | COMMUNITY
Start: 2018-03-20 | End: 2018-08-24 | Stop reason: HOSPADM

## 2018-03-28 RX ORDER — KETOCONAZOLE 20 MG/ML
SHAMPOO, SUSPENSION TOPICAL
COMMUNITY
Start: 2018-03-20 | End: 2019-06-13

## 2018-03-28 NOTE — PROGRESS NOTES
"Fidel Allan is a 89 y.o. male patient.   No diagnosis found.  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     Blindness of right eye     Bone disorder     Bradycardia     CAD (coronary artery disease)     CHF (congestive heart failure)     acute on chronic diastolic chf    Constipation     COPD (chronic obstructive pulmonary disease)     Dysphagia     Dyspnea     Falls frequently     GERD (gastroesophageal reflux disease)     Glaucoma     Hearing loss     HLD (hyperlipidemia)     Hypertension     Insomnia     Major depressive disorder     Melanoma     left neck    Urinary tract infection     Vascular dementia      Past Surgical History Pertinent Negatives:   Procedure Date Noted    CYSTOSCOPY 03/13/2018    PROSTATE SURGERY 03/13/2018    VASECTOMY 03/13/2018     Scheduled Meds:  Continuous Infusions:  PRN Meds:    Review of patient's allergies indicates:  No Known Allergies  There are no hospital problems to display for this patient.    Height 5' 9" (1.753 m), weight 75.3 kg (166 lb 0.1 oz).    Subjective:   Diet: Adequate intake.  Patient reports no nausea or vomiting.    Activity level: Normal.    Pain control: Well controlled.      Objective:  Vital signs (most recent): Height 5' 9" (1.753 m), weight 75.3 kg (166 lb 0.1 oz).  General appearance: Comfortable, well-appearing, in no acute distress and not in pain.    Lungs:  Normal respiratory rate and normal effort.  Breath sounds normal.    Heart: Normal rate.  Regular rhythm.  S1 normal.    Chest: Symmetric chest wall expansion.    Abdomen: Abdomen is soft.    Bowel sounds:  Bowel sounds are normal.    Tenderness: There is no abdominal tenderness tenderness.    Extremities: There is normal range of motion.    Neurological: The patient is alert and oriented to person, place and time.  Normal strength.  Pupils are equal, round, and reactive to light.       Assessment: "   Post-op: 9 days.    Condition: In stable condition.     Plan:  Encourage ambulation.  Discontinue drain (remove jones).  Regular diet.  Resume oral medications.    Paraphimosis reduced.    Patient Instructions   Remove foly today. If no void in 8 hours replace jones.  F/U 3 months and as needed.         Memo Nieto MD  3/28/2018

## 2018-04-03 ENCOUNTER — OFFICE VISIT (OUTPATIENT)
Dept: UROLOGY | Facility: CLINIC | Age: 83
End: 2018-04-03
Payer: MEDICARE

## 2018-04-03 DIAGNOSIS — N13.8 BPH WITH OBSTRUCTION/LOWER URINARY TRACT SYMPTOMS: Primary | ICD-10-CM

## 2018-04-03 DIAGNOSIS — Z98.890 POST-OPERATIVE STATE: ICD-10-CM

## 2018-04-03 DIAGNOSIS — N40.1 BPH WITH OBSTRUCTION/LOWER URINARY TRACT SYMPTOMS: Primary | ICD-10-CM

## 2018-04-03 PROCEDURE — 99999 PR PBB SHADOW E&M-EST. PATIENT-LVL II: CPT | Mod: PBBFAC,,, | Performed by: UROLOGY

## 2018-04-03 PROCEDURE — 99212 OFFICE O/P EST SF 10 MIN: CPT | Mod: PBBFAC,PO | Performed by: UROLOGY

## 2018-04-03 PROCEDURE — 99024 POSTOP FOLLOW-UP VISIT: CPT | Mod: POP,,, | Performed by: UROLOGY

## 2018-04-03 RX ORDER — SULFAMETHOXAZOLE AND TRIMETHOPRIM 800; 160 MG/1; MG/1
TABLET ORAL
Status: ON HOLD | COMMUNITY
Start: 2018-04-01 | End: 2018-08-24 | Stop reason: HOSPADM

## 2018-04-03 NOTE — PROGRESS NOTES
Fidel Allan is a 89 y.o. male patient.   1. BPH with obstruction/lower urinary tract symptoms    2. 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     Blindness of right eye     Bone disorder     Bradycardia     CAD (coronary artery disease)     CHF (congestive heart failure)     acute on chronic diastolic chf    Constipation     COPD (chronic obstructive pulmonary disease)     Dysphagia     Dyspnea     Falls frequently     GERD (gastroesophageal reflux disease)     Glaucoma     Hearing loss     HLD (hyperlipidemia)     Hypertension     Insomnia     Major depressive disorder     Melanoma     left neck    Urinary tract infection     Vascular dementia      Past Surgical History Pertinent Negatives:   Procedure Date Noted    CYSTOSCOPY 03/13/2018    PROSTATE SURGERY 03/13/2018    VASECTOMY 03/13/2018     Scheduled Meds:  Continuous Infusions:  PRN Meds:    Review of patient's allergies indicates:  No Known Allergies  There are no hospital problems to display for this patient.    There were no vitals taken for this visit.    Subjective:   Diet: Adequate intake.  Patient reports no nausea or vomiting.    Activity level: Normal.    Pain control: Well controlled.      Objective:  Vital signs (most recent): There were no vitals taken for this visit.  General appearance: Comfortable, well-appearing, in no acute distress and not in pain.    Lungs:  Normal respiratory rate and normal effort.  Breath sounds normal.    Heart: Normal rate.  Regular rhythm.  S1 normal.    Chest: Symmetric chest wall expansion.    Abdomen: Abdomen is soft.    Bowel sounds:  Bowel sounds are normal.    Tenderness: There is no abdominal tenderness tenderness.    Extremities: There is normal range of motion.    Neurological: The patient is alert and oriented to person, place and time.  Normal strength.  Pupils are equal, round, and  reactive to light.       Assessment:   Post-op: 15 days.    Condition: In stable condition.     Plan:  Encourage ambulation.  Discontinue drain (Voiding trial).  Regular diet.  Recheck labs as ordered.          Patient Instructions   F/U 6 weeks      Memo Nieto MD  4/3/2018

## 2018-06-20 ENCOUNTER — OFFICE VISIT (OUTPATIENT)
Dept: UROLOGY | Facility: CLINIC | Age: 83
End: 2018-06-20
Payer: MEDICARE

## 2018-06-20 VITALS
WEIGHT: 150 LBS | HEART RATE: 90 BPM | SYSTOLIC BLOOD PRESSURE: 113 MMHG | HEIGHT: 72 IN | RESPIRATION RATE: 17 BRPM | BODY MASS INDEX: 20.32 KG/M2 | DIASTOLIC BLOOD PRESSURE: 65 MMHG | OXYGEN SATURATION: 98 %

## 2018-06-20 DIAGNOSIS — C61 PROSTATE CANCER: Primary | ICD-10-CM

## 2018-06-20 PROCEDURE — 99213 OFFICE O/P EST LOW 20 MIN: CPT | Mod: PBBFAC,PO | Performed by: UROLOGY

## 2018-06-20 PROCEDURE — 99214 OFFICE O/P EST MOD 30 MIN: CPT | Mod: S$PBB,,, | Performed by: UROLOGY

## 2018-06-20 PROCEDURE — 99999 PR PBB SHADOW E&M-EST. PATIENT-LVL III: CPT | Mod: PBBFAC,,, | Performed by: UROLOGY

## 2018-06-20 NOTE — PROGRESS NOTES
Fidel Allan is a 89 y.o. male patient.   No diagnosis found.  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     Blindness of right eye     Bone disorder     Bradycardia     CAD (coronary artery disease)     CHF (congestive heart failure)     acute on chronic diastolic chf    Constipation     COPD (chronic obstructive pulmonary disease)     Dysphagia     Dyspnea     Falls frequently     GERD (gastroesophageal reflux disease)     Glaucoma     Hearing loss     HLD (hyperlipidemia)     Hypertension     Insomnia     Major depressive disorder     Melanoma     left neck    Urinary tract infection     Vascular dementia      Past Surgical History Pertinent Negatives:   Procedure Date Noted    CYSTOSCOPY 03/13/2018    PROSTATE SURGERY 03/13/2018    VASECTOMY 03/13/2018     Scheduled Meds:  Continuous Infusions:  PRN Meds:    Review of patient's allergies indicates:  No Known Allergies  There are no hospital problems to display for this patient.    Blood pressure 113/65, pulse 90, resp. rate 17, height 6' (1.829 m), weight 68 kg (150 lb), SpO2 98 %.    Subjective:   Diet: Adequate intake.  Patient reports no nausea or vomiting.    Activity level: Normal.    Pain control: Well controlled.    Nocturia x 1-2, Frequency x 8 or more.  No dysuria, no stranguria   Pt informed of CAP and plan for active surveillance    Objective:  Vital signs (most recent): Blood pressure 113/65, pulse 90, resp. rate 17, height 6' (1.829 m), weight 68 kg (150 lb), SpO2 98 %.  General appearance: Comfortable, well-appearing, in no acute distress and not in pain.    Lungs:  Normal respiratory rate and normal effort.  Breath sounds normal.    Heart: Normal rate.  Regular rhythm.  S1 normal.    Chest: Symmetric chest wall expansion.    Abdomen: Abdomen is soft.    Bowel sounds:  Bowel sounds are normal.    Tenderness: There is no abdominal tenderness tenderness.     Extremities: There is normal range of motion.    Neurological: The patient is alert and oriented to person, place and time.  Normal strength.  Pupils are equal, round, and reactive to light.       Assessment:   Condition: In stable condition.     Plan:  Regular diet.  Resume oral medications and recheck labs as ordered.    Check PSA  Patient Instructions   F/U 3 months with a PSA test.             Memo Nieto MD  6/20/2018

## 2018-08-21 ENCOUNTER — HOSPITAL ENCOUNTER (INPATIENT)
Facility: HOSPITAL | Age: 83
LOS: 5 days | Discharge: NURSING, REHAB OR LONG-TERM CARE | DRG: 439 | End: 2018-08-26
Attending: HOSPITALIST | Admitting: HOSPITALIST
Payer: MEDICARE

## 2018-08-21 DIAGNOSIS — K85.10 ACUTE GALLSTONE PANCREATITIS: Primary | ICD-10-CM

## 2018-08-21 DIAGNOSIS — K80.50 COMMON BILE DUCT STONE: ICD-10-CM

## 2018-08-21 PROCEDURE — 11000001 HC ACUTE MED/SURG PRIVATE ROOM

## 2018-08-21 RX ORDER — NITROGLYCERIN 0.3 MG/1
0.3 TABLET SUBLINGUAL EVERY 5 MIN PRN
Status: DISCONTINUED | OUTPATIENT
Start: 2018-08-22 | End: 2018-08-26 | Stop reason: HOSPADM

## 2018-08-21 RX ORDER — LISINOPRIL 5 MG/1
5 TABLET ORAL EVERY MORNING
Status: DISCONTINUED | OUTPATIENT
Start: 2018-08-22 | End: 2018-08-25

## 2018-08-21 RX ORDER — ESCITALOPRAM OXALATE 10 MG/1
10 TABLET ORAL EVERY MORNING
Status: DISCONTINUED | OUTPATIENT
Start: 2018-08-22 | End: 2018-08-26 | Stop reason: HOSPADM

## 2018-08-21 RX ORDER — POLYETHYLENE GLYCOL 3350 17 G/17G
17 POWDER, FOR SOLUTION ORAL DAILY PRN
Status: DISCONTINUED | OUTPATIENT
Start: 2018-08-22 | End: 2018-08-26 | Stop reason: HOSPADM

## 2018-08-21 RX ORDER — FINASTERIDE 5 MG/1
5 TABLET, FILM COATED ORAL DAILY
Status: DISCONTINUED | OUTPATIENT
Start: 2018-08-22 | End: 2018-08-26 | Stop reason: HOSPADM

## 2018-08-21 RX ORDER — FLUTICASONE FUROATE AND VILANTEROL 100; 25 UG/1; UG/1
1 POWDER RESPIRATORY (INHALATION) DAILY
Status: DISCONTINUED | OUTPATIENT
Start: 2018-08-22 | End: 2018-08-26 | Stop reason: HOSPADM

## 2018-08-21 RX ORDER — AMOXICILLIN 250 MG
1 CAPSULE ORAL DAILY
Status: DISCONTINUED | OUTPATIENT
Start: 2018-08-23 | End: 2018-08-26 | Stop reason: HOSPADM

## 2018-08-21 RX ORDER — TAMSULOSIN HYDROCHLORIDE 0.4 MG/1
0.4 CAPSULE ORAL DAILY
Status: DISCONTINUED | OUTPATIENT
Start: 2018-08-22 | End: 2018-08-26 | Stop reason: HOSPADM

## 2018-08-21 RX ORDER — MORPHINE SULFATE 4 MG/ML
5 INJECTION, SOLUTION INTRAMUSCULAR; INTRAVENOUS EVERY 4 HOURS PRN
Status: DISCONTINUED | OUTPATIENT
Start: 2018-08-22 | End: 2018-08-23

## 2018-08-21 RX ORDER — ACETAMINOPHEN 325 MG/1
650 TABLET ORAL EVERY 8 HOURS PRN
Status: DISCONTINUED | OUTPATIENT
Start: 2018-08-22 | End: 2018-08-23

## 2018-08-21 RX ORDER — PANTOPRAZOLE SODIUM 40 MG/10ML
40 INJECTION, POWDER, LYOPHILIZED, FOR SOLUTION INTRAVENOUS EVERY 24 HOURS
Status: DISCONTINUED | OUTPATIENT
Start: 2018-08-22 | End: 2018-08-21 | Stop reason: CLARIF

## 2018-08-21 RX ORDER — MEMANTINE HYDROCHLORIDE 5 MG/1
5 TABLET ORAL DAILY
Status: DISCONTINUED | OUTPATIENT
Start: 2018-08-23 | End: 2018-08-26 | Stop reason: HOSPADM

## 2018-08-21 RX ORDER — ONDANSETRON 2 MG/ML
8 INJECTION INTRAMUSCULAR; INTRAVENOUS EVERY 8 HOURS PRN
Status: DISCONTINUED | OUTPATIENT
Start: 2018-08-22 | End: 2018-08-26 | Stop reason: HOSPADM

## 2018-08-21 RX ORDER — PROMETHAZINE HYDROCHLORIDE 25 MG/1
25 SUPPOSITORY RECTAL EVERY 6 HOURS PRN
Status: DISCONTINUED | OUTPATIENT
Start: 2018-08-22 | End: 2018-08-26 | Stop reason: HOSPADM

## 2018-08-21 RX ORDER — BUSPIRONE HYDROCHLORIDE 5 MG/1
5 TABLET ORAL 2 TIMES DAILY
Status: DISCONTINUED | OUTPATIENT
Start: 2018-08-22 | End: 2018-08-26 | Stop reason: HOSPADM

## 2018-08-21 RX ORDER — SODIUM CHLORIDE 9 MG/ML
INJECTION, SOLUTION INTRAVENOUS CONTINUOUS
Status: DISCONTINUED | OUTPATIENT
Start: 2018-08-22 | End: 2018-08-26 | Stop reason: HOSPADM

## 2018-08-21 RX ORDER — DEXTROMETHORPHAN POLISTIREX 30 MG/5 ML
1 SUSPENSION, EXTENDED RELEASE 12 HR ORAL DAILY PRN
Status: DISCONTINUED | OUTPATIENT
Start: 2018-08-22 | End: 2018-08-26 | Stop reason: HOSPADM

## 2018-08-21 RX ORDER — MORPHINE SULFATE 4 MG/ML
2 INJECTION, SOLUTION INTRAMUSCULAR; INTRAVENOUS EVERY 4 HOURS PRN
Status: DISCONTINUED | OUTPATIENT
Start: 2018-08-22 | End: 2018-08-23

## 2018-08-21 RX ORDER — SODIUM CHLORIDE 0.9 % (FLUSH) 0.9 %
3 SYRINGE (ML) INJECTION EVERY 8 HOURS
Status: DISCONTINUED | OUTPATIENT
Start: 2018-08-22 | End: 2018-08-26 | Stop reason: HOSPADM

## 2018-08-21 RX ORDER — BISACODYL 10 MG
10 SUPPOSITORY, RECTAL RECTAL DAILY PRN
Status: DISCONTINUED | OUTPATIENT
Start: 2018-08-22 | End: 2018-08-26 | Stop reason: HOSPADM

## 2018-08-21 RX ORDER — GABAPENTIN 100 MG/1
100 CAPSULE ORAL 3 TIMES DAILY
Status: DISCONTINUED | OUTPATIENT
Start: 2018-08-22 | End: 2018-08-26 | Stop reason: HOSPADM

## 2018-08-21 RX ORDER — TIOTROPIUM BROMIDE 18 UG/1
1 CAPSULE ORAL; RESPIRATORY (INHALATION) DAILY
Status: DISCONTINUED | OUTPATIENT
Start: 2018-08-22 | End: 2018-08-26 | Stop reason: HOSPADM

## 2018-08-21 RX ORDER — DONEPEZIL HYDROCHLORIDE 10 MG/1
10 TABLET, FILM COATED ORAL NIGHTLY
Status: DISCONTINUED | OUTPATIENT
Start: 2018-08-22 | End: 2018-08-26 | Stop reason: HOSPADM

## 2018-08-21 RX ORDER — LATANOPROST 50 UG/ML
1 SOLUTION/ DROPS OPHTHALMIC NIGHTLY
Status: DISCONTINUED | OUTPATIENT
Start: 2018-08-22 | End: 2018-08-26 | Stop reason: HOSPADM

## 2018-08-22 ENCOUNTER — ANESTHESIA EVENT (OUTPATIENT)
Dept: ENDOSCOPY | Facility: HOSPITAL | Age: 83
DRG: 439 | End: 2018-08-22
Payer: MEDICARE

## 2018-08-22 ENCOUNTER — ANESTHESIA (OUTPATIENT)
Dept: ENDOSCOPY | Facility: HOSPITAL | Age: 83
DRG: 439 | End: 2018-08-22
Payer: MEDICARE

## 2018-08-22 LAB
ALBUMIN SERPL BCP-MCNC: 3 G/DL
ALP SERPL-CCNC: 554 U/L
ALT SERPL W/O P-5'-P-CCNC: 188 U/L
ANION GAP SERPL CALC-SCNC: 10 MMOL/L
AST SERPL-CCNC: 157 U/L
BASOPHILS # BLD AUTO: 0.01 K/UL
BASOPHILS NFR BLD: 0.1 %
BILIRUB SERPL-MCNC: 4 MG/DL
BUN SERPL-MCNC: 18 MG/DL
CALCIUM SERPL-MCNC: 8.9 MG/DL
CHLORIDE SERPL-SCNC: 100 MMOL/L
CO2 SERPL-SCNC: 24 MMOL/L
CREAT SERPL-MCNC: 1.2 MG/DL
DIFFERENTIAL METHOD: ABNORMAL
EOSINOPHIL # BLD AUTO: 0 K/UL
EOSINOPHIL NFR BLD: 0.3 %
ERYTHROCYTE [DISTWIDTH] IN BLOOD BY AUTOMATED COUNT: 14.8 %
EST. GFR  (AFRICAN AMERICAN): >60 ML/MIN/1.73 M^2
EST. GFR  (NON AFRICAN AMERICAN): 53 ML/MIN/1.73 M^2
GLUCOSE SERPL-MCNC: 92 MG/DL
HCT VFR BLD AUTO: 34.7 %
HGB BLD-MCNC: 11.2 G/DL
INR PPP: 1
LIPASE SERPL-CCNC: 884 U/L
LYMPHOCYTES # BLD AUTO: 1.9 K/UL
LYMPHOCYTES NFR BLD: 16 %
MAGNESIUM SERPL-MCNC: 2.2 MG/DL
MCH RBC QN AUTO: 29.2 PG
MCHC RBC AUTO-ENTMCNC: 32.3 G/DL
MCV RBC AUTO: 91 FL
MONOCYTES # BLD AUTO: 1.1 K/UL
MONOCYTES NFR BLD: 8.9 %
NEUTROPHILS # BLD AUTO: 8.8 K/UL
NEUTROPHILS NFR BLD: 74.5 %
PHOSPHATE SERPL-MCNC: 3.3 MG/DL
PLATELET # BLD AUTO: 213 K/UL
PMV BLD AUTO: 11.2 FL
POTASSIUM SERPL-SCNC: 5.1 MMOL/L
PROT SERPL-MCNC: 6.8 G/DL
PROTHROMBIN TIME: 10.7 SEC
RBC # BLD AUTO: 3.83 M/UL
SODIUM SERPL-SCNC: 134 MMOL/L
WBC # BLD AUTO: 11.78 K/UL

## 2018-08-22 PROCEDURE — 63600175 PHARM REV CODE 636 W HCPCS: Performed by: HOSPITALIST

## 2018-08-22 PROCEDURE — C1769 GUIDE WIRE: HCPCS | Performed by: INTERNAL MEDICINE

## 2018-08-22 PROCEDURE — 25000003 PHARM REV CODE 250: Performed by: HOSPITALIST

## 2018-08-22 PROCEDURE — 25000242 PHARM REV CODE 250 ALT 637 W/ HCPCS: Performed by: HOSPITALIST

## 2018-08-22 PROCEDURE — 43262 ENDO CHOLANGIOPANCREATOGRAPH: CPT | Performed by: INTERNAL MEDICINE

## 2018-08-22 PROCEDURE — 84100 ASSAY OF PHOSPHORUS: CPT

## 2018-08-22 PROCEDURE — 11000001 HC ACUTE MED/SURG PRIVATE ROOM

## 2018-08-22 PROCEDURE — 25000003 PHARM REV CODE 250: Performed by: INTERNAL MEDICINE

## 2018-08-22 PROCEDURE — 27201674 HC SPHINCTERTOME: Performed by: INTERNAL MEDICINE

## 2018-08-22 PROCEDURE — 37000009 HC ANESTHESIA EA ADD 15 MINS: Performed by: INTERNAL MEDICINE

## 2018-08-22 PROCEDURE — 83735 ASSAY OF MAGNESIUM: CPT

## 2018-08-22 PROCEDURE — 63600175 PHARM REV CODE 636 W HCPCS: Performed by: NURSE ANESTHETIST, CERTIFIED REGISTERED

## 2018-08-22 PROCEDURE — 85610 PROTHROMBIN TIME: CPT

## 2018-08-22 PROCEDURE — 37000008 HC ANESTHESIA 1ST 15 MINUTES: Performed by: INTERNAL MEDICINE

## 2018-08-22 PROCEDURE — C9113 INJ PANTOPRAZOLE SODIUM, VIA: HCPCS | Performed by: HOSPITALIST

## 2018-08-22 PROCEDURE — 36415 COLL VENOUS BLD VENIPUNCTURE: CPT

## 2018-08-22 PROCEDURE — A4216 STERILE WATER/SALINE, 10 ML: HCPCS | Performed by: HOSPITALIST

## 2018-08-22 PROCEDURE — 25000003 PHARM REV CODE 250: Performed by: NURSE ANESTHETIST, CERTIFIED REGISTERED

## 2018-08-22 PROCEDURE — 80053 COMPREHEN METABOLIC PANEL: CPT

## 2018-08-22 PROCEDURE — D9220A PRA ANESTHESIA: Mod: ANES,,, | Performed by: ANESTHESIOLOGY

## 2018-08-22 PROCEDURE — 83690 ASSAY OF LIPASE: CPT

## 2018-08-22 PROCEDURE — 94761 N-INVAS EAR/PLS OXIMETRY MLT: CPT

## 2018-08-22 PROCEDURE — 43264 ERCP REMOVE DUCT CALCULI: CPT | Performed by: INTERNAL MEDICINE

## 2018-08-22 PROCEDURE — 27200999 HC RETRIEVAL BALLOON, ERCP: Performed by: INTERNAL MEDICINE

## 2018-08-22 PROCEDURE — 85025 COMPLETE CBC W/AUTO DIFF WBC: CPT

## 2018-08-22 PROCEDURE — D9220A PRA ANESTHESIA: Mod: CRNA,,, | Performed by: NURSE ANESTHETIST, CERTIFIED REGISTERED

## 2018-08-22 PROCEDURE — 94640 AIRWAY INHALATION TREATMENT: CPT

## 2018-08-22 RX ORDER — IPRATROPIUM BROMIDE AND ALBUTEROL SULFATE 2.5; .5 MG/3ML; MG/3ML
3 SOLUTION RESPIRATORY (INHALATION) EVERY 4 HOURS PRN
Status: DISCONTINUED | OUTPATIENT
Start: 2018-08-22 | End: 2018-08-26 | Stop reason: HOSPADM

## 2018-08-22 RX ORDER — SODIUM CHLORIDE 9 MG/ML
INJECTION, SOLUTION INTRAVENOUS CONTINUOUS PRN
Status: DISCONTINUED | OUTPATIENT
Start: 2018-08-22 | End: 2018-08-22

## 2018-08-22 RX ORDER — ETOMIDATE 2 MG/ML
INJECTION INTRAVENOUS
Status: COMPLETED
Start: 2018-08-22 | End: 2018-08-22

## 2018-08-22 RX ORDER — CEFEPIME HYDROCHLORIDE 1 G/50ML
1 INJECTION, SOLUTION INTRAVENOUS
Status: DISCONTINUED | OUTPATIENT
Start: 2018-08-22 | End: 2018-08-23

## 2018-08-22 RX ORDER — PHENYLEPHRINE HCL IN 0.9% NACL 1 MG/10 ML
SYRINGE (ML) INTRAVENOUS
Status: DISPENSED
Start: 2018-08-22 | End: 2018-08-23

## 2018-08-22 RX ORDER — ETOMIDATE 2 MG/ML
INJECTION INTRAVENOUS
Status: DISCONTINUED | OUTPATIENT
Start: 2018-08-22 | End: 2018-08-22

## 2018-08-22 RX ORDER — ROCURONIUM BROMIDE 10 MG/ML
INJECTION, SOLUTION INTRAVENOUS
Status: DISCONTINUED | OUTPATIENT
Start: 2018-08-22 | End: 2018-08-22

## 2018-08-22 RX ORDER — ONDANSETRON 2 MG/ML
INJECTION INTRAMUSCULAR; INTRAVENOUS
Status: DISCONTINUED | OUTPATIENT
Start: 2018-08-22 | End: 2018-08-22

## 2018-08-22 RX ORDER — INDOMETHACIN 50 MG/1
100 SUPPOSITORY RECTAL ONCE
Status: COMPLETED | OUTPATIENT
Start: 2018-08-22 | End: 2018-08-22

## 2018-08-22 RX ORDER — ROCURONIUM BROMIDE 10 MG/ML
INJECTION, SOLUTION INTRAVENOUS
Status: COMPLETED
Start: 2018-08-22 | End: 2018-08-22

## 2018-08-22 RX ORDER — FENTANYL CITRATE 50 UG/ML
INJECTION, SOLUTION INTRAMUSCULAR; INTRAVENOUS
Status: DISCONTINUED | OUTPATIENT
Start: 2018-08-22 | End: 2018-08-22

## 2018-08-22 RX ORDER — FENTANYL CITRATE 50 UG/ML
INJECTION, SOLUTION INTRAMUSCULAR; INTRAVENOUS
Status: COMPLETED
Start: 2018-08-22 | End: 2018-08-22

## 2018-08-22 RX ORDER — LIDOCAINE HCL/PF 100 MG/5ML
SYRINGE (ML) INTRAVENOUS
Status: DISCONTINUED | OUTPATIENT
Start: 2018-08-22 | End: 2018-08-22

## 2018-08-22 RX ORDER — LIDOCAINE HCL/PF 100 MG/5ML
SYRINGE (ML) INTRAVENOUS
Status: COMPLETED
Start: 2018-08-22 | End: 2018-08-22

## 2018-08-22 RX ORDER — GLUCAGON 1 MG
KIT INJECTION
Status: DISCONTINUED
Start: 2018-08-22 | End: 2018-08-22 | Stop reason: WASHOUT

## 2018-08-22 RX ORDER — PHENYLEPHRINE HYDROCHLORIDE 10 MG/ML
INJECTION INTRAVENOUS
Status: DISCONTINUED | OUTPATIENT
Start: 2018-08-22 | End: 2018-08-22

## 2018-08-22 RX ORDER — ONDANSETRON 2 MG/ML
INJECTION INTRAMUSCULAR; INTRAVENOUS
Status: COMPLETED
Start: 2018-08-22 | End: 2018-08-22

## 2018-08-22 RX ADMIN — FLUTICASONE FUROATE AND VILANTEROL TRIFENATATE 1 PUFF: 100; 25 POWDER RESPIRATORY (INHALATION) at 08:08

## 2018-08-22 RX ADMIN — TIOTROPIUM BROMIDE 18 MCG: 18 CAPSULE ORAL; RESPIRATORY (INHALATION) at 08:08

## 2018-08-22 RX ADMIN — PHENYLEPHRINE HYDROCHLORIDE 100 MCG: 10 INJECTION INTRAVENOUS at 01:08

## 2018-08-22 RX ADMIN — DEXTROSE 40 MG: 50 INJECTION, SOLUTION INTRAVENOUS at 08:08

## 2018-08-22 RX ADMIN — FENTANYL CITRATE 50 MCG: 50 INJECTION INTRAMUSCULAR; INTRAVENOUS at 12:08

## 2018-08-22 RX ADMIN — ROCURONIUM BROMIDE 5 MG: 10 INJECTION, SOLUTION INTRAVENOUS at 12:08

## 2018-08-22 RX ADMIN — LATANOPROST 1 DROP: 50 SOLUTION/ DROPS OPHTHALMIC at 09:08

## 2018-08-22 RX ADMIN — SODIUM CHLORIDE: 0.9 INJECTION, SOLUTION INTRAVENOUS at 12:08

## 2018-08-22 RX ADMIN — ONDANSETRON 4 MG: 2 INJECTION, SOLUTION INTRAMUSCULAR; INTRAVENOUS at 01:08

## 2018-08-22 RX ADMIN — ETOMIDATE 12 MG: 2 INJECTION, SOLUTION INTRAVENOUS at 12:08

## 2018-08-22 RX ADMIN — PHENYLEPHRINE HYDROCHLORIDE 50 MCG: 10 INJECTION INTRAVENOUS at 12:08

## 2018-08-22 RX ADMIN — PHENYLEPHRINE HYDROCHLORIDE 50 MCG: 10 INJECTION INTRAVENOUS at 01:08

## 2018-08-22 RX ADMIN — BUSPIRONE HYDROCHLORIDE 5 MG: 5 TABLET ORAL at 09:08

## 2018-08-22 RX ADMIN — GABAPENTIN 100 MG: 100 CAPSULE ORAL at 09:08

## 2018-08-22 RX ADMIN — MORPHINE SULFATE 5 MG: 4 INJECTION INTRAVENOUS at 04:08

## 2018-08-22 RX ADMIN — LISINOPRIL 5 MG: 5 TABLET ORAL at 06:08

## 2018-08-22 RX ADMIN — LIDOCAINE HYDROCHLORIDE 80 MG: 20 INJECTION, SOLUTION INTRAVENOUS at 12:08

## 2018-08-22 RX ADMIN — INDOMETHACIN 100 MG: 50 SUPPOSITORY RECTAL at 02:08

## 2018-08-22 RX ADMIN — PHENYLEPHRINE HYDROCHLORIDE 100 MCG: 10 INJECTION INTRAVENOUS at 12:08

## 2018-08-22 RX ADMIN — DONEPEZIL HYDROCHLORIDE 10 MG: 10 TABLET, FILM COATED ORAL at 09:08

## 2018-08-22 RX ADMIN — Medication 3 ML: at 10:08

## 2018-08-22 RX ADMIN — SODIUM CHLORIDE: 0.9 INJECTION, SOLUTION INTRAVENOUS at 10:08

## 2018-08-22 RX ADMIN — ESCITALOPRAM OXALATE 10 MG: 10 TABLET ORAL at 06:08

## 2018-08-22 RX ADMIN — CEFEPIME 1 G: 1 INJECTION, POWDER, FOR SOLUTION INTRAMUSCULAR; INTRAVENOUS at 01:08

## 2018-08-22 NOTE — [employer] PATIENT INSTRUCTIONS
Discharge Summary/Instructions after an Endoscopic Procedure  Patient Name: Fidel Allan  Patient MRN: [id number]  Patient YOB: 1927 Wednesday, August 22, 2018  Baron Owens MD  RESTRICTIONS:  During your procedure today, you received medications for sedation.  These   medications may affect your judgment, balance and coordination.  Therefore,   for 24 hours, you have the following restrictions:   - DO NOT drive a car, operate machinery, make legal/financial decisions,   sign important papers or drink alcohol.    ACTIVITY:  Today: no heavy lifting, straining or running due to procedural   sedation/anesthesia.  The following day: return to full activity including work.  DIET:  Eat and drink normally unless instructed otherwise.     TREATMENT FOR COMMON SIDE EFFECTS:  - Mild abdominal pain, nausea, belching, bloating or excessive gas:  rest,   eat lightly and use a heating pad.  - Sore Throat: treat with throat lozenges and/or gargle with warm salt   water.  - Because air was used during the procedure, expelling large amounts of air   from your rectum or belching is normal.  - If a bowel prep was taken, you may not have a bowel movement for 1-3 days.    This is normal.  SYMPTOMS TO WATCH FOR AND REPORT TO YOUR PHYSICIAN:  1. Abdominal pain or bloating, other than gas cramps.  2. Chest pain.  3. Back pain.  4. Signs of infection such as: chills or fever occurring within 24 hours   after the procedure.  5. Rectal bleeding, which would show as bright red, maroon, or black stools.   (A tablespoon of blood from the rectum is not serious, especially if   hemorrhoids are present.)  6. Vomiting.  7. Weakness or dizziness.  GO DIRECTLY TO THE NEAREST EMERGENCY ROOM IF YOU HAVE ANY OF THE FOLLOWING:      Difficulty breathing              Chills and/or fever over 101 F   Persistent vomiting and/or vomiting blood   Severe abdominal pain   Severe chest pain   Black, tarry stools   Bleeding- more than one  tablespoon   Any other symptom or condition that you feel may need urgent attention  Your doctor recommends these additional instructions:  If any biopsies were taken, your doctors clinic will contact you in 1 to 2   weeks with any results.  - Monitor for signs of worsening pancreatitis.  The patient was given   lactated ringers before the procedure and rectal indomethacin.  The   pancreatic duct was not entered.  - Monitor liver enzymes.  - [facility].  For questions, problems or results please call your physician - Baron Owens MD at Work:  [phone].  [facility] can be reached at [phone]     IF A COMPLICATION OR EMERGENCY SITUATION ARISES AND YOU ARE UNABLE TO REACH   YOUR PHYSICIAN - GO DIRECTLY TO THE EMERGENCY ROOM.  Baron Owens MD  8/22/2018 2:10:33 PM  This report has been verified and signed electronically.  [employer]

## 2018-08-22 NOTE — PLAN OF CARE
08/22/18 1109   Discharge Assessment   Assessment Type Discharge Planning Assessment   Confirmed/corrected address and phone number on facesheet? Yes   Assessment information obtained from? Patient   Prior to hospitilization cognitive status: Alert/Oriented   Prior to hospitalization functional status: Assistive Equipment;Needs Assistance   Current cognitive status: Alert/Oriented   Current Functional Status: Assistive Equipment;Needs Assistance   Facility Arrived From: [facility] but stays at [facility] [phone]   Lives With facility resident   Able to Return to Prior Arrangements yes   Is patient able to care for self after discharge? Yes   Who are your caregiver(s) and their phone number(s)? ESTUARDO Mendoza and staff   Patient's perception of discharge disposition [facility] care facility   Readmission Within The Last 30 Days no previous admission in last 30 days   Equipment Currently Used at Home walker, rolling;wheelchair;oxygen   Do you have any problems affording any of your prescribed medications? No   Is the patient taking medications as prescribed? yes   Does the patient have transportation home? Yes   Transportation Available agency transportation   Discharge Plan A Return to nursing home   Discharge Plan B Return to Nursing Home   Patient/Family In Agreement With Plan yes

## 2018-08-22 NOTE — ASSESSMENT & PLAN NOTE
Patient noted to have choledocholithiasis with acute pancreatitis.  Hx of cholecystectomy.  Placed NPO.  IVF's, anti emetics and pain control.  GI consulted.  ERCP today.

## 2018-08-22 NOTE — PLAN OF CARE
Problem: Patient Care Overview  Goal: Plan of Care Review  Patient remains free from injuries/falls this shift, AAO , can make his needs known, s/p EDG, patient to remain NPO, oxygen in use @ 2 L/NC, turn q 2hrs, incontinent care PRN, safety maintained.

## 2018-08-22 NOTE — HPI
Fidel Allan is a 89 y.o. male that (in part) has a past medical history of coronary artery disease status post CABG, CHF, biliary obstruction with cholecystectomy, and colectomy who presents to [facility] who presents to the ER with epigastric pain that started over the last day with some nausea.   history of limited due to underlying vascular dementia.  Patient is a nursing home resident.      In the emergency department he was found to have a 12mm stone in the CBD with severe obstruction and acute pancreatitis.   lipase levels are greater than 4000.  No fever leukocytosis.  Patient remained hemodynamically stable.     Dr. Owens with GI at [facility] was contacted, who was willing to assist in the case after discussing it with Dr. Alicea.  He has no signs of acute cholangitis.  He was given empiric Rocephin and Flagyl at the ED.  Transfer center was contacted for admission to [facility].    Hospital medicine has been asked to admit for further evaluation and treatment.

## 2018-08-22 NOTE — H&P
[facility] Hospital Medicine  History & Physical    Patient Name: Fidel Allan  MRN: [id number]  Admission Date: 08/22/2018  Attending Physician: Layton Faustin MD, MPH      PCP:     Memo Nieto MD    CC:     Chief Complaint   Patient presents with    Abdominal Pain     Transfer for acute pancreatitis secondary to 12 mm common bile duct stone       HISTORY OF PRESENT ILLNESS:     Fidel Allan is a 89 y.o. male that (in part) has a past medical history of coronary artery disease status post CABG, CHF, biliary obstruction with cholecystectomy, and colectomy who presents to [facility] Emergency Department who presents to the ER with epigastric pain that started over the last day with some nausea.   history of limited due to underlying vascular dementia.  Patient is a nursing home resident.      In the emergency department he was found to have a 12mm stone in the CBD with severe obstruction and acute pancreatitis.   lipase levels are greater than 4000.  No fever leukocytosis.  Patient remained hemodynamically stable.     Dr. Owens with GI at [facility] was contacted, who was willing to assist in the case after discussing it with Dr. Alicea.  He has no signs of acute cholangitis.  He was given empiric Rocephin and Flagyl at the ED.  Transfer center was contacted for admission to [facility].    Hospital medicine has been asked to admit for further evaluation and treatment.       REVIEW OF SYSTEMS:     Limited did review of systems other than what was available above in the HPI due to advanced dementia.    PAST MEDICAL / SURGICAL HISTORY:     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     Blindness of right eye     Bone disorder     Bradycardia     CAD (coronary artery disease)     CHF (congestive heart failure)     acute on chronic diastolic chf    Constipation     COPD (chronic  obstructive pulmonary disease)     Dysphagia     Dyspnea     Falls frequently     GERD (gastroesophageal reflux disease)     Glaucoma     Hearing loss     HLD (hyperlipidemia)     Hypertension     Insomnia     Major depressive disorder     Melanoma     left neck    Urinary tract infection     Vascular dementia      Past Surgical History:   Procedure Laterality Date    CARDIAC PACEMAKER PLACEMENT  2016    CHOLEDOCHODUODENOSTOMY      COLECTOMY      CORONARY ANGIOPLASTY WITH STENT PLACEMENT      CORONARY ARTERY BYPASS GRAFT      MALIGNANT SKIN LESION EXCISION Left 01/16/2017    neck melanoma         FAMILY HISTORY:     Family History   Problem Relation Age of Onset    Prostate cancer Neg Hx     Kidney disease Neg Hx          SOCIAL HISTORY:     Social History     Socioeconomic History    Marital status:      Spouse name: None    Number of children: None    Years of education: None    Highest education level: None   Social Needs    Financial resource strain: None    Food insecurity - worry: None    Food insecurity - inability: None    Transportation needs - medical: None    Transportation needs - non-medical: None   Occupational History    None   Tobacco Use    Smoking status: Never Smoker    Smokeless tobacco: Never Used   Substance and Sexual Activity    Alcohol use: No    Drug use: No    Sexual activity: No   Other Topics Concern    None   Social History Narrative    None         ALLERGIES:       Review of patient's allergies indicates:  No Known Allergies      HEALTH SCREENING:     Prevnar 13 pneumonia vaccine = no evidence of previous vaccination found in the medical record      HOME MEDICATIONS:     Prior to Admission medications    Medication Sig Start Date End Date Taking? Authorizing Provider   acetaminophen (TYLENOL) 325 MG tablet Take 1 tablet (325 mg total) by mouth every 6 (six) hours as needed for Pain. 3/19/18   Memo Nieto MD   albuterol (PROVENTIL) 2.5  mg /3 mL (0.083 %) nebulizer solution  3/20/18   Historical Provider, MD   albuterol sulfate 2.5 mg/0.5 mL Nebu Take 2.5 mg by nebulization every 4 (four) hours as needed. Rescue    Historical Provider, MD   aluminum & magnesium hydroxide-simethicone (MYLANTA MAX STRENGTH) 400-400-40 mg/5 mL suspension Take 30 mLs by mouth every 6 (six) hours as needed for Indigestion. 6/28/17 7/5/18  Pallavi Sunkara, MD   aspirin (ECOTRIN) 81 MG EC tablet Take 81 mg by mouth once daily.    Historical Provider, MD   benzonatate (TESSALON) 100 MG capsule Take 100 mg by mouth 3 (three) times daily as needed for Cough.    Historical Provider, MD BENSON ELLIPTA 100-25 mcg/dose diskus inhaler Inhale 1 puff into the lungs once daily.  2/2/18   Historical Provider, MD   busPIRone (BUSPAR) 5 MG Tab Take 5 mg by mouth 2 (two) times daily.    Historical Provider, MD   clobetasol 0.05% (TEMOVATE) 0.05 % Oint daily as needed. To face and neck 2/2/18   Historical Provider, MD   cyanocobalamin (VITAMIN B-12) 1000 MCG tablet Take 1,000 mcg by mouth once daily.     Historical Provider, MD   docusate sodium (COLACE) 100 MG capsule Take 100 mg by mouth 2 (two) times daily.    Historical Provider, MD   donepezil (ARICEPT) 10 MG tablet Take 10 mg by mouth every evening.    Historical Provider, MD   escitalopram oxalate (LEXAPRO) 10 MG tablet Take 10 mg by mouth every morning.     Historical Provider, MD   ferrous sulfate 325 (65 FE) MG EC tablet Take 325 mg by mouth 2 (two) times daily.    Historical Provider, MD   finasteride (PROSCAR) 5 mg tablet Take 1 tablet (5 mg total) by mouth once daily. 2/16/18 2/16/19  Memo Nieto MD   fish oil-omega-3 fatty acids 300-1,000 mg capsule Take 1 g by mouth every morning.     Historical Provider, MD   gabapentin (NEURONTIN) 100 MG capsule Take 100 mg by mouth 3 (three) times daily.    Historical Provider, MD   GUAIFENESIN/DEXTROMETHORPHAN (ROBITUSSIN-DM ORAL) Take 10 mLs by mouth every 6 (six) hours as  needed.    Historical Provider, MD   hydrocodone-acetaminophen 10-325mg (NORCO)  mg Tab Take 1 tablet by mouth every 6 (six) hours as needed for Pain. 3/19/18   Memo Nieto MD   hydrocodone-acetaminophen 5-325mg (NORCO) 5-325 mg per tablet Take 1 tablet by mouth every 6 (six) hours as needed. 1/8/18   Sunitha Christine MD   ketoconazole (NIZORAL) 2 % shampoo  3/20/18   Historical Provider, MD   latanoprost 0.005 % ophthalmic solution Place 1 drop into both eyes every evening.     Historical Provider, MD   lisinopril (PRINIVIL,ZESTRIL) 5 MG tablet Take 5 mg by mouth every morning. Hold for SBP < 120    Historical Provider, MD   LORazepam (ATIVAN) 0.5 MG tablet Take 1 tablet (0.5 mg total) by mouth 2 (two) times daily as needed for Anxiety.  Patient taking differently: Take 0.5 mg by mouth 2 (two) times daily.  1/8/18   Sunitha Christine MD   magnesium hydroxide 400 mg/5 ml (MILK OF MAGNESIA) 400 mg/5 mL Susp Take 30 mLs by mouth every evening.     Historical Provider, MD   mirtazapine (REMERON) 15 MG tablet Take 15 mg by mouth every evening.  1/9/17   Historical Provider, MD   multivitamin (ONE DAILY MULTIVITAMIN) per tablet Take 1 tablet by mouth once daily.    Historical Provider, MD   NAMENDA XR 14 mg CSpX Take 14 mg by mouth once daily.  1/17/17   Historical Provider, MD   NITROSTAT 0.3 mg SL tablet Place 0.3 mg under the tongue every 5 (five) minutes as needed for Chest pain.  1/5/17   Historical Provider, MD   omeprazole (PRILOSEC) 40 MG capsule Take 40 mg by mouth every morning.    Historical Provider, MD   pravastatin (PRAVACHOL) 40 MG tablet Take 40 mg by mouth every evening.     Historical Provider, MD   sulfamethoxazole-trimethoprim 800-160mg (BACTRIM DS) 800-160 mg Tab  4/1/18   Historical Provider, MD   tamsulosin (FLOMAX) 0.4 mg Cp24 Take 1 capsule (0.4 mg total) by mouth once daily. 1/9/18 1/9/19  Sunitha Christine MD   tiotropium (SPIRIVA) 18 mcg inhalation capsule Inhale 1 capsule (18 mcg  "total) into the lungs once daily. Controller 6/28/17 8/21/18  Pallavi Sunkara, MD          [facility] MEDICATIONS:     Scheduled Meds:   Continuous Infusions:   PRN Meds:       PHYSICAL EXAM:     Wt Readings from Last 1 Encounters:   08/21/18 2205 68.3 kg (150 lb 8 oz)     Body mass index is 25.04 kg/m².  Vitals:    08/21/18 2205 08/21/18 2334   BP: (!) 122/58 (!) 106/54   BP Location: Left arm Left arm   Patient Position: Lying Lying   Pulse: 60 60   Resp: 18 20   Temp: 97.8 °F (36.6 °C) 97.6 °F (36.4 °C)   TempSrc: Oral Oral   SpO2: 99% 99%   Weight: 68.3 kg (150 lb 8 oz)    Height: 5' 5" (1.651 m)           -- General appearance:  Chronically ill-appearing elderly male who is lying in bed.  No apparent distress.  well developed. appears stated age   -- Head: normocephalic, atraumatic   -- Eyes: conjunctivae clear. Extraocular muscles intact  -- Nose: Nares normal. Septum midline.   -- Mouth/Throat: lips, mucosa, and tongue normal. no throat erythema.   -- Neck: supple, symmetrical, trachea midline, no JVD and thyroid not grossly enlarged, appears symmetric  -- Lungs: clear to auscultation bilaterally. normal respiratory effort. No use of accessory muscles.   -- Chest wall: no tenderness. equal bilateral chest rise   -- Heart: regular rate and regular rhythm. S1, S2 normal.  no click, rub or gallop   -- Abdomen:  Right upper quadrant abdominal pain. soft, non-distended, non-tympanic; bowel sounds decreased; no masses.  Well-healed abdominal surgical scars  -- Extremities: no cyanosis, clubbing or edema.   -- Pulses: 2+ and symmetric   -- Skin: color normal, texture normal, turgor normal. No rashes or lesions.   -- Neurologic:  Pleasantly demented.  The locally decreased muscle strength and tone. No focal numbness or weakness. CNII-XII intact. Rylee coma scale: eyes open spontaneously-4, oriented & converses-5, obeys commands-6.      LABORATORY STUDIES:     Recent Results (from the past 36 hour(s))   CBC auto " differential    Collection Time: 08/21/18  3:54 PM   Result Value Ref Range    WBC 8.86 3.90 - 12.70 K/uL    RBC 4.14 (L) 4.60 - 6.20 M/uL    Hemoglobin 11.9 (L) 14.0 - 18.0 g/dL    Hematocrit 37.6 (L) 40.0 - 54.0 %    MCV 91 82 - 98 fL    MCH 28.7 27.0 - 31.0 pg    MCHC 31.6 (L) 32.0 - 36.0 g/dL    RDW 14.5 11.5 - 14.5 %    Platelets 237 150 - 350 K/uL    MPV 9.9 9.2 - 12.9 fL    Gran # (ANC) 6.6 1.8 - 7.7 K/uL    Lymph # 1.2 1.0 - 4.8 K/uL    Mono # 1.0 0.3 - 1.0 K/uL    Eos # 0.1 0.0 - 0.5 K/uL    Baso # 0.02 0.00 - 0.20 K/uL    Gran% 74.5 (H) 38.0 - 73.0 %    Lymph% 13.8 (L) 18.0 - 48.0 %    Mono% 10.8 4.0 - 15.0 %    Eosinophil% 0.7 0.0 - 8.0 %    Basophil% 0.2 0.0 - 1.9 %    Differential Method Automated    Comprehensive metabolic panel    Collection Time: 08/21/18  3:54 PM   Result Value Ref Range    Sodium 137 136 - 145 mmol/L    Potassium 4.3 3.5 - 5.1 mmol/L    Chloride 99 95 - 110 mmol/L    CO2 27 23 - 29 mmol/L    Glucose 101 70 - 110 mg/dL    BUN, Bld 20 2 - 20 mg/dL    Creatinine 1.20 0.50 - 1.40 mg/dL    Calcium 9.5 8.7 - 10.5 mg/dL    Total Protein 7.9 6.0 - 8.4 g/dL    Albumin 4.3 3.5 - 5.2 g/dL    Total Bilirubin 4.0 (H) 0.1 - 1.0 mg/dL    Alkaline Phosphatase 540 (H) 38 - 126 U/L     (H) 15 - 46 U/L     (H) 10 - 44 U/L    Anion Gap 11 8 - 16 mmol/L    eGFR if African American >60.0 >60 mL/min/1.73 m^2    eGFR if non African American 52.9 (A) >60 mL/min/1.73 m^2   Lipase    Collection Time: 08/21/18  3:54 PM   Result Value Ref Range    Lipase Result >4000 (H) 23 - 300 U/L   Lactic acid, plasma    Collection Time: 08/21/18  3:54 PM   Result Value Ref Range    Lactate (Lactic Acid) 1.7 0.5 - 2.2 mmol/L   Troponin I    Collection Time: 08/21/18  3:54 PM   Result Value Ref Range    Troponin I <0.012 0.012 - 0.034 ng/mL   NT-Pro Natriuretic Peptide    Collection Time: 08/21/18  3:54 PM   Result Value Ref Range    NT-proBNP 489 5 - 1800 pg/mL   Urinalysis    Collection Time: 08/21/18  4:36  PM   Result Value Ref Range    Specimen UA Urine, Clean Catch     Color, UA Yellow Yellow, Straw, Maryjane    Appearance, UA Clear Clear    pH, UA 6.0 5.0 - 8.0    Specific Gravity, UA 1.010 1.005 - 1.030    Protein, UA Negative Negative    Glucose, UA Negative Negative    Ketones, UA Negative Negative    Bilirubin (UA) 2+ (A) Negative    Occult Blood UA Negative Negative    Nitrite, UA Negative Negative    Urobilinogen, UA Negative <2.0 EU/dL    Leukocytes, UA Negative Negative       Lab Results   Component Value Date    INR 1.1 03/29/2018    INR 1.2 01/02/2018    INR 1.2 12/17/2017     No results found for: HGBA1C  No results for input(s): POCTGLUCOSE in the last 72 hours.        MICROBIOLOGY DATA:     Urine Culture, Routine   Date Value Ref Range Status   03/29/2018 STAPHYLOCOCCUS AUREUS  > 100,000 cfu/ml    Final   02/16/2018 No growth  Final   01/03/2018 No growth  Final   12/17/2017 No growth  Final   06/25/2017   Final    Multiple organisms isolated. None in predominance.  Repeat if   06/25/2017 clinically necessary.  Final       Microbiology x 7d:   Microbiology Results (last 7 days)     ** No results found for the last 168 hours. **            IMAGING:           CONSULTS:     IP CONSULT TO GI       ASSESSMENT & PLAN:     Primary Diagnosis:  Common bile duct stone    Active Hospital Problems    Diagnosis  POA    *Common bile duct stone [K80.50]  Yes     Priority: 1 - High    Acute gallstone pancreatitis [K85.10]  Yes     Priority: 2     Benign prostatic hyperplasia with urinary hesitancy [N40.1, R39.11]  Yes    Chronic diastolic heart failure [I50.32]  Yes    Coronary artery disease involving coronary bypass graft of native heart without angina pectoris [I25.810]  Yes     Chronic    Essential hypertension [I10]  Yes     Chronic    Nursing home resident [Z59.3]  Not Applicable     [facility] ([address], [place] [address])      Stage 3 chronic kidney disease [N18.3]  Yes      Chronic    Vascular dementia [F01.50]  Yes     Chronic    COPD (chronic obstructive pulmonary disease) [J44.9]  Yes     Chronic    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     Acute gallstone pancreatitis  · Evidenced by hx, physical exam, imaging, and laboratory studies  · 12 mm common bile duct gallstone identified on CT.    · Dr. Owens with GI at [facility] was contacted, who was willing to assist in the case after discussing it with Dr. Alicea.    · no signs of acute cholangitis.    · given empiric Rocephin and Flagyl at the ED.    · Lipase =  greater than 4000    Florissant's Criteria @ TIME OF ADMISSION  · Age >55 = yes  · WBC >16K = no  · BG >200 = no  · AST > 250 = no  · LDH > 350 = not ordered at the time of admission    Conclusion:  Patient has less than three criteria; does not meet ICU admission criteria.    · NPO / Bowel rest  · IV fluids  · Monitor serial lipase levels    Coronary artery disease with history of CABG  · No evidence of acute coronary syndrome at this time  · Maintain adequate blood pressure control  · Heart healthy diet  · Aspirin?  Will hold for now and defer to GI  · Statin  · Continue beta-blocker perioperatively        Hypertension  · Goal while inpatient is a systolic blood pressure less than 160mmHg  · BP in acceptable range at this time  · Continue current home regimen with hold parameters  · PRN antihypertensives available    Chronic kidney disease  · Renal dose medications  · Avoid nephrotoxic agents  · Maintain euvolemic state  Stable COPD  · No acute issues  · Continue with home medication regimen  · Nebulizer treatments (albuterol/atrovent)  · Titrate O2 sats between 88 to 93%.  No supplemental 02 for 02 saturation greater than 93% due to V/Q mismatch  · Consider initiating regimen of Breo, Advair 500/50mg,  Spiriva 18mcg, and/or Daliresp 500mcg at or before discharge.  May also defer to outpatient pulmonology after formal pulmonary function testing.  · Mucolytics as needed  · Outpatient referral to pulmonology for further optimization  · Tobacco cessation counseling        VTE Risk Mitigation (From admission, onward)        Ordered     Place V[place] hose  Until discontinued      08/21/18 2326     Place sequential compression device  Until discontinued      08/21/18 2326     IP VTE HIGH RISK PATIENT  Once      08/21/18 2326            Adult PRN medications available   DVT prophylaxis given       DISPOSITION:     Will admit to the Hospital Medicine service for further evaluation and treatment.    Chart reviewed and updated where applicable.    High Risk Conditions:  Patient has a condition that poses threat to life and bodily function:  Acute gallstone pancreatitis      ===============================================================    Layton Faustin MD, MPH  Department of Hospital Medicine   [facility]  [phone] pg  (7pm - 6am)          This note is dictated using Dragon Medical 360 voice recognition software.  There are word recognition mistakes that are occasionally missed on review.

## 2018-08-22 NOTE — ANESTHESIA PREPROCEDURE EVALUATION
08/22/2018  Fidel Allan is a 89 y.o., male.    Anesthesia Evaluation         Review of Systems  Anesthesia Hx:  No problems with previous Anesthesia   Social:  Non-Smoker    Hematology/Oncology:  Hematology Normal   Oncology Normal     EENT/Dental:EENT/Dental Normal   Cardiovascular:   Pacemaker CABG/stent Angina CHF    Pulmonary:   Pneumonia COPD Shortness of breath    Renal/:   Chronic Renal Disease    Hepatic/GI:   GERD    Musculoskeletal:  Musculoskeletal Normal    Neurological:  Neurology Normal    Endocrine:  Endocrine Normal    Dermatological:  Skin Normal    Psych:   Psychiatric History          Physical Exam  General:  Well nourished    Airway/Jaw/Neck:  Airway Findings: Mallampati: II TM Distance: 4 - 6 cm      Dental:  Dental Findings: Edentulous   Chest/Lungs:  Chest/Lungs Clear    Heart/Vascular:  Heart Findings: Normal       Mental Status:  Mental Status Findings:  Cooperative, Alert and Oriented         Anesthesia Plan  Type of Anesthesia, risks & benefits discussed:  Anesthesia Type:  general  Patient's Preference:   Intra-op Monitoring Plan: standard ASA monitors  Intra-op Monitoring Plan Comments:   Post Op Pain Control Plan: multimodal analgesia, IV/PO Opioids PRN and per primary service following discharge from PACU  Post Op Pain Control Plan Comments:   Induction:    Beta Blocker:  Patient is not currently on a Beta-Blocker (No further documentation required).       Informed Consent: Patient understands risks and agrees with Anesthesia plan.  Questions answered. Anesthesia consent signed with patient.  ASA Score: 3     Day of Surgery Review of History & Physical:    H&P update referred to the provider.  H&P completed by Anesthesiologist.   Anesthesia Plan Notes: npo        Ready For Surgery From Anesthesia Perspective.

## 2018-08-22 NOTE — PLAN OF CARE
"     Outside Transfer Acceptance Note     Patients name: Fidel Allan, MRN: [id number]     Transferring Physician or Mid-Level provider/Clinic giving report:    Dr. Layton Alicea at [facility] ED    Accepting Physician for admission to hospital: Tejas Stone M.D.      Date of acceptance:  8/21/18     Reason for transfer:  Stone in CBD with acute pancreatitis    Past Medical History:    HPI: Mr. Schuler is a 89 yo male with a past medical history of coronary disease, CHF, biliary obstruction with cholecystectomy in the past, who presents to the ER with epigastric pain that started over the last day with some nausea.  He was found to have a 12mm stone in the CBD with severe obstruction and acute pancreatitis.      Dr. Owens with GI at [facility] was contacted, who was willing to assist in the case after discussing it with Dr. Alicea.  He has no signs of acute cholangitis.  He was given empiric Rocephin and Flagyl at the ED.    VS: BP (!) 117/56   Pulse 63   Temp 98.4 °F (36.9 °C) (Oral)   Resp 18   Ht 5' 5" (1.651 m)   Wt 68.9 kg (152 lb)   SpO2 100%   BMI 25.29 kg/m²    Labs:   Recent Results (from the past 24 hour(s))   CBC auto differential    Collection Time: 08/21/18  3:54 PM   Result Value Ref Range    WBC 8.86 3.90 - 12.70 K/uL    RBC 4.14 (L) 4.60 - 6.20 M/uL    Hemoglobin 11.9 (L) 14.0 - 18.0 g/dL    Hematocrit 37.6 (L) 40.0 - 54.0 %    MCV 91 82 - 98 fL    MCH 28.7 27.0 - 31.0 pg    MCHC 31.6 (L) 32.0 - 36.0 g/dL    RDW 14.5 11.5 - 14.5 %    Platelets 237 150 - 350 K/uL    MPV 9.9 9.2 - 12.9 fL    Gran # (ANC) 6.6 1.8 - 7.7 K/uL    Lymph # 1.2 1.0 - 4.8 K/uL    Mono # 1.0 0.3 - 1.0 K/uL    Eos # 0.1 0.0 - 0.5 K/uL    Baso # 0.02 0.00 - 0.20 K/uL    Gran% 74.5 (H) 38.0 - 73.0 %    Lymph% 13.8 (L) 18.0 - 48.0 %    Mono% 10.8 4.0 - 15.0 %    Eosinophil% 0.7 0.0 - 8.0 %    Basophil% 0.2 0.0 - 1.9 %    Differential Method Automated    Comprehensive metabolic panel    Collection Time: 08/21/18  " 3:54 PM   Result Value Ref Range    Sodium 137 136 - 145 mmol/L    Potassium 4.3 3.5 - 5.1 mmol/L    Chloride 99 95 - 110 mmol/L    CO2 27 23 - 29 mmol/L    Glucose 101 70 - 110 mg/dL    BUN, Bld 20 2 - 20 mg/dL    Creatinine 1.20 0.50 - 1.40 mg/dL    Calcium 9.5 8.7 - 10.5 mg/dL    Total Protein 7.9 6.0 - 8.4 g/dL    Albumin 4.3 3.5 - 5.2 g/dL    Total Bilirubin 4.0 (H) 0.1 - 1.0 mg/dL    Alkaline Phosphatase 540 (H) 38 - 126 U/L     (H) 15 - 46 U/L     (H) 10 - 44 U/L    Anion Gap 11 8 - 16 mmol/L    eGFR if African American >60.0 >60 mL/min/1.73 m^2    eGFR if non African American 52.9 (A) >60 mL/min/1.73 m^2   Lipase    Collection Time: 08/21/18  3:54 PM   Result Value Ref Range    Lipase Result >4000 (H) 23 - 300 U/L   Lactic acid, plasma    Collection Time: 08/21/18  3:54 PM   Result Value Ref Range    Lactate (Lactic Acid) 1.7 0.5 - 2.2 mmol/L   Troponin I    Collection Time: 08/21/18  3:54 PM   Result Value Ref Range    Troponin I <0.012 0.012 - 0.034 ng/mL   NT-Pro Natriuretic Peptide    Collection Time: 08/21/18  3:54 PM   Result Value Ref Range    NT-proBNP 489 5 - 1800 pg/mL   Urinalysis    Collection Time: 08/21/18  4:36 PM   Result Value Ref Range    Specimen UA Urine, Clean Catch     Color, UA Yellow Yellow, Straw, Maryjane    Appearance, UA Clear Clear    pH, UA 6.0 5.0 - 8.0    Specific Gravity, UA 1.010 1.005 - 1.030    Protein, UA Negative Negative    Glucose, UA Negative Negative    Ketones, UA Negative Negative    Bilirubin (UA) 2+ (A) Negative    Occult Blood UA Negative Negative    Nitrite, UA Negative Negative    Urobilinogen, UA Negative <2.0 EU/dL    Leukocytes, UA Negative Negative     Radiographs: as above in HPI     To Do List upon arrival:    Consult GI  NPO  IV fluids and iV antibiotics for prophylaxis against cholangitis

## 2018-08-22 NOTE — CONSULTS
Chief complaint: Abdominal pain.    History of present illness: The patient is a 89 year old male with a history of HTN, CAD, CHF, hyperlipidemia, depression, anxiety, melanoma and vascular dementia presenting with biliary pancreatitis.  He reports vague, intermittent periumbilical pain that has been occurring over the past several weeks.  Yesterday, the pain became constant and unbearable.  It does not radiate.  He describes it as a cramping sensation.  There are no obvious aggravating or alleviating factors.  No associated nausea or vomiting.  He denies fever or chills.  He reports diffuse pruritus and dark urine over last 1-2 days.  No jaundice.  Work-up in the ED revealed a lipase > 4,000 with CT scan remarkable for a 12 mm stone in the common bile duct.  He has a history of cholangitis in January 2017 and underwent an ERCP at that time.  He is s/p cholecystectomy and gastrojejunostomy.    Past medical history:  Hypertension.  Coronary artery disease.  Hyperlipidemia.  Congestive heart failure.  Vascular dementia.  Depression/anxiety.  Melanoma.    Surgical history:  Cholecystectomy.  Coronary artery bypass graft.  Pacemaker placement.  Billroth II gastrojejunostomy.  Colectomy.    Social history:  Tobacco use: denies.  Alcohol use: denies.  Illicit drug use: denies.    Family history:  No family history of liver or colon cancer.    Allergies:  No known drug allergies.    Home medications:  Current Facility-Administered Medications on File Prior to Encounter   Medication Dose Route Frequency Provider Last Rate Last Dose    [COMPLETED] cefTRIAXone (ROCEPHIN) 1 g in dextrose 5 % 50 mL IVPB  1 g Intravenous ED 1 Time John Peterson MD   Stopped at 08/21/18 1759    [COMPLETED] hydrOXYzine pamoate capsule 25 mg  25 mg Oral ED 1 Time Layton Alicea MD   25 mg at 08/21/18 2059    [COMPLETED] metronidazole IVPB 500 mg  500 mg Intravenous ED 1 Time John Peterson MD   Stopped at 08/21/18 1858    [COMPLETED]  morphine injection 2 mg  2 mg Intravenous ED 1 Time John Peterson MD   2 mg at 08/21/18 1601    [COMPLETED] morphine injection 2 mg  2 mg Intravenous ED 1 Time John Peterson MD   2 mg at 08/21/18 1700    [COMPLETED] morphine injection 2 mg  2 mg Intravenous ED 1 Time Layton Alicea MD   2 mg at 08/21/18 2030    [COMPLETED] ondansetron injection 4 mg  4 mg Intravenous ED 1 Time John Peterson MD   4 mg at 08/21/18 1601     Current Outpatient Medications on File Prior to Encounter   Medication Sig Dispense Refill    acetaminophen (TYLENOL) 325 MG tablet Take 1 tablet (325 mg total) by mouth every 6 (six) hours as needed for Pain.      albuterol (PROVENTIL) 2.5 mg /3 mL (0.083 %) nebulizer solution       albuterol sulfate 2.5 mg/0.5 mL Nebu Take 2.5 mg by nebulization every 4 (four) hours as needed. Rescue      aluminum & magnesium hydroxide-simethicone (MYLANTA MAX STRENGTH) 400-400-40 mg/5 mL suspension Take 30 mLs by mouth every 6 (six) hours as needed for Indigestion.  0    aspirin (ECOTRIN) 81 MG EC tablet Take 81 mg by mouth once daily.      benzonatate (TESSALON) 100 MG capsule Take 100 mg by mouth 3 (three) times daily as needed for Cough.      BREO ELLIPTA 100-25 mcg/dose diskus inhaler Inhale 1 puff into the lungs once daily.       busPIRone (BUSPAR) 5 MG Tab Take 5 mg by mouth 2 (two) times daily.      clobetasol 0.05% (TEMOVATE) 0.05 % Oint daily as needed. To face and neck      cyanocobalamin (VITAMIN B-12) 1000 MCG tablet Take 1,000 mcg by mouth once daily.       docusate sodium (COLACE) 100 MG capsule Take 100 mg by mouth 2 (two) times daily.      donepezil (ARICEPT) 10 MG tablet Take 10 mg by mouth every evening.      escitalopram oxalate (LEXAPRO) 10 MG tablet Take 10 mg by mouth every morning.       ferrous sulfate 325 (65 FE) MG EC tablet Take 325 mg by mouth 2 (two) times daily.      finasteride (PROSCAR) 5 mg tablet Take 1 tablet (5 mg total) by mouth once daily. 30  tablet 11    fish oil-omega-3 fatty acids 300-1,000 mg capsule Take 1 g by mouth every morning.       gabapentin (NEURONTIN) 100 MG capsule Take 100 mg by mouth 3 (three) times daily.      GUAIFENESIN/DEXTROMETHORPHAN (ROBITUSSIN-DM ORAL) Take 10 mLs by mouth every 6 (six) hours as needed.      hydrocodone-acetaminophen 10-325mg (NORCO)  mg Tab Take 1 tablet by mouth every 6 (six) hours as needed for Pain. 20 tablet 0    hydrocodone-acetaminophen 5-325mg (NORCO) 5-325 mg per tablet Take 1 tablet by mouth every 6 (six) hours as needed. 10 tablet 0    ketoconazole (NIZORAL) 2 % shampoo       latanoprost 0.005 % ophthalmic solution Place 1 drop into both eyes every evening.       lisinopril (PRINIVIL,ZESTRIL) 5 MG tablet Take 5 mg by mouth every morning. Hold for SBP < 120      LORazepam (ATIVAN) 0.5 MG tablet Take 1 tablet (0.5 mg total) by mouth 2 (two) times daily as needed for Anxiety. (Patient taking differently: Take 0.5 mg by mouth 2 (two) times daily. ) 10 tablet 0    magnesium hydroxide 400 mg/5 ml (MILK OF MAGNESIA) 400 mg/5 mL Susp Take 30 mLs by mouth every evening.       mirtazapine (REMERON) 15 MG tablet Take 15 mg by mouth every evening.       multivitamin (ONE DAILY MULTIVITAMIN) per tablet Take 1 tablet by mouth once daily.      NAMENDA XR 14 mg CSpX Take 14 mg by mouth once daily.       NITROSTAT 0.3 mg SL tablet Place 0.3 mg under the tongue every 5 (five) minutes as needed for Chest pain.       omeprazole (PRILOSEC) 40 MG capsule Take 40 mg by mouth every morning.      pravastatin (PRAVACHOL) 40 MG tablet Take 40 mg by mouth every evening.       sulfamethoxazole-trimethoprim 800-160mg (BACTRIM DS) 800-160 mg Tab       tamsulosin (FLOMAX) 0.4 mg Cp24 Take 1 capsule (0.4 mg total) by mouth once daily. 30 capsule 3    tiotropium (SPIRIVA) 18 mcg inhalation capsule Inhale 1 capsule (18 mcg total) into the lungs once daily. Controller  0     Review of systems:  CONSTITUTIONAL:  Negative for fever, chills, weakness, weight loss, weight gain.  HEENT: Negative for blurred vision, hearing loss, nasal congestion, dry mouth, sore throat.  CARDIOVASCULAR: Negative for chest pain or palpitations.  RESPIRATORY: Negative for SOB or cough.  GASTROINTESTINAL: See HPI  GENITOURINARY: Negative for dysuria or hematuria.  MUSCULOSKELETAL: Negative for osteoarthritis or muscle pain.  SKIN: Negative for rashes/lesions.  NEUROLOGIC: Negative for headaches, numbness/tingling.  ENDOCRINE: Negative for diabetes or thyroid abnormalities.  HEMATOLOGIC: Negative for anemia or blood dyscrasias.    Physical exam:  Vitals:    08/22/18 0815   BP:    Pulse: 60   Resp: 18   Temp:        General: Elderly male in no acute distress.  HENT: Normocephalic, atraumatic, hearing grossly intact  Eyes: PERRLA, EOMI, mildly icteric sclera  Cardiovascular: Regular rate and rhythm. No murmurs appreciated.  Lungs: Non-labored respirations. Breath sounds equal.   Abdomen: Normal bowel sounds. Mild periumbilical TTP without rebound or guarding.  Extremities: No C/C/E, 2+ dorsalis pedis pulses bilaterally  Neuro: AA&O x 3, no asterixes or tremors  Psych: Appropriate mood and affect. No SI.  Skin: No jaundice, rashes or lesions  Musculoskeletal: 5/5 strength bilaterally    Recent Labs   Lab  08/22/18   0331   WBC  11.78   RBC  3.83*   HGB  11.2*   HCT  34.7*   PLT  213   MCV  91   MCH  29.2   MCHC  32.3   ALT= 1881  AST= 157  ALP= 554  Tbili= 4.0    Imaging:  CT abd/pelvis reviewed.    Impression: 89 year old male with a history of HTN, CAD, CHF, hyperlipidemia, depression, anxiety, melanoma and vascular dementia presenting with biliary pancreatitis and evidence of choledocholithiasis on CT scan with cholestatic elevation of liver enzymes.  He is afebrile with a normal white count and stable vital signs, suggesting against cholangitis.  He is s/p cholecystectomy and Billroth II gastrojejunostomy.    Plan:  1.  ERCP today.  2.  Continue  IV antibiotics and IV fluid resuscitation with monitoring of renal function.  3.  Monitor for fever and daily LFTs.  4.  NPO for now. Anesthesia consult.

## 2018-08-22 NOTE — DISCHARGE SUMMARY
[facility]  Discharge Summary      Admit Date: 8/21/2018    Discharge Date and Time:  08/22/2018 1:59 PM    Attending Physician: Darwin Palafox MD     Reason for Admission: Biliary Pancreatitis    Procedures Performed: Procedure(s) (LRB):  ERCP (ENDOSCOPIC RETROGRADE CHOLANGIOPANCREATOGRAPHY) (N/A)    Hospital Course (synopsis of major diagnoses, care, treatment, and services provided during the course of the hospital stay): Ongoing     Consults: GI    Significant Diagnostic Studies: ERCP    Final Diagnoses:    Principal Problem: Common bile duct stone   Secondary Diagnoses: Biliary Pancreatitis    Discharged Condition: good    Disposition: Admitted as an Inpatient    Follow Up/Patient Instructions: Follow-up with referring physician             Resume previous diet and activity.    Medications:  Transfer Medications (for Discharge Readmit only):   Current Facility-Administered Medications   Medication Dose Route Frequency Provider Last Rate Last Dose    0.9%  NaCl infusion   Intravenous Continuous Layton Faustin  mL/hr at 08/22/18 0038      acetaminophen tablet 650 mg  650 mg Oral Q8H PRN Layton Faustin MD        bisacodyl suppository 10 mg  10 mg Rectal Daily PRN Layton Faustin MD        busPIRone tablet 5 mg  5 mg Oral BID Layton Faustin MD        cefepime in dextrose 5 % 1 gram/50 mL IVPB 1 g  1 g Intravenous Q12H Layton Faustin MD   Stopped at 08/22/18 0300    donepezil tablet 10 mg  10 mg Oral QHS Layton Faustin MD        escitalopram oxalate tablet 10 mg  10 mg Oral QAM Layton Faustin MD   10 mg at 08/22/18 0632    finasteride tablet 5 mg  5 mg Oral Daily Layton Faustin MD        fluticasone-vilanterol 100-25 mcg/dose diskus inhaler 1 puff  1 puff Inhalation Daily Layton Faustin MD   1 puff at 08/22/18 0815    gabapentin capsule 100 mg  100 mg Oral TID Layton Faustin MD        glucagon (human recombinant) 1 mg injection              indomethacin 50 mg suppository 100 mg  100 mg Rectal Once Baron Owens MD        latanoprost 0.005 % ophthalmic solution 1 drop  1 drop Both Eyes QHS Layton Faustin MD        lisinopril tablet 5 mg  5 mg Oral QAM Layton Faustin MD   5 mg at 08/22/18 0632    [START ON 8/23/2018] memantine tablet 5 mg  5 mg Oral Daily Layton Faustin MD        mineral oil enema 1 enema  1 enema Rectal Daily PRN Layton Faustin MD        morphine injection 2 mg  2 mg Intravenous Q4H PRN Layton Faustin MD        morphine injection 5 mg  5 mg Intravenous Q4H PRN Layton Faustin MD   5 mg at 08/22/18 0409    nitroGLYCERIN SL tablet 0.3 mg  0.3 mg Sublingual Q5 Min PRN Layton Faustin MD        omnipaque 300 iohexol 300 mg iodine/mL             ondansetron injection 8 mg  8 mg Intravenous Q8H PRN Layton Faustin MD        pantoprazole 40 mg in dextrose 5 % 100 mL infusion (ready to mix system)  40 mg Intravenous Daily Layton Faustin MD   40 mg at 08/22/18 0829    polyethylene glycol packet 17 g  17 g Oral Daily PRN Layton Faustin MD        promethazine suppository 25 mg  25 mg Rectal Q6H PRN Layton Faustin MD        [START ON 8/23/2018] senna-docusate 8.6-50 mg per tablet 1 tablet  1 tablet Oral Daily Layton Faustin MD        sodium chloride 0.9% flush 3 mL  3 mL Intravenous Q8H Layton Faustin MD        tamsulosin 24 hr capsule 0.4 mg  0.4 mg Oral Daily Layton Faustin MD        tiotropium inhalation capsule 18 mcg  1 capsule Inhalation Daily Layton Faustin MD   18 mcg at 08/22/18 0817     Facility-Administered Medications Ordered in Other Encounters   Medication Dose Route Frequency Provider Last Rate Last Dose    0.9%  NaCl infusion    Continuous PRN Frank Marx, CRNA        etomidate injection   Intravenous PRN Frank Marx, CRNA   12 mg at 08/22/18 1218    fentaNYL injection    PRN Frank Marx, CRNA   50 mcg at 08/22/18 1225     lidocaine (cardiac) injection    PRN Frank Marx CRNA   80 mg at 08/22/18 1218    ondansetron injection    PRN Frank Marx CRNA   4 mg at 08/22/18 1304    phenylephrine injection    PRN Frank Marx CRNA   50 mcg at 08/22/18 1353    rocuronium injection    PRN Frank Marx, CRNA   5 mg at 08/22/18 1218     No discharge procedures on file.  Follow-up Information     [facility].    Specialties:  Nursing Home Agency, SNF Agency  Contact information:  [address] [facility] [address] [phone]

## 2018-08-22 NOTE — TRANSFER OF CARE
"Anesthesia Transfer of Care Note    Patient: Fidel Allan    Procedure(s) Performed: Procedure(s) (LRB):  ERCP (ENDOSCOPIC RETROGRADE CHOLANGIOPANCREATOGRAPHY) (N/A)    Patient location: GI    Anesthesia Type: general    Transport from OR: Transported from OR on 100% O2 by closed face mask with adequate spontaneous ventilation    Post pain: adequate analgesia    Post assessment: no apparent anesthetic complications and tolerated procedure well    Post vital signs: stable    Level of consciousness: awake and alert    Nausea/Vomiting: no nausea/vomiting    Complications: none    Transfer of care protocol was followed      Last vitals:   Visit Vitals  BP (!) 114/55 (BP Location: Left arm)   Pulse 60   Temp 36.6 °C (97.9 °F) (Axillary)   Resp 18   Ht 5' 5" (1.651 m)   Wt 68.3 kg (150 lb 8 oz)   SpO2 100%   BMI 25.04 kg/m²     "

## 2018-08-22 NOTE — PROGRESS NOTES
[facility] Hospital Medicine  Progress Note    Patient Name: Fidel Allan  MRN: [id number]  Patient Class: IP- Inpatient   Admission Date: 8/21/2018  Length of Stay: 1 days  Attending Physician: Darwin Palafox MD  Primary Care Provider: Memo Nieto MD        Subjective:     Principal Problem:Acute gallstone pancreatitis    HPI:  Fidel Allan is a 89 y.o. male that (in part) has a past medical history of coronary artery disease status post CABG, CHF, biliary obstruction with cholecystectomy, and colectomy who presents to [facility] Emergency Department who presents to the ER with epigastric pain that started over the last day with some nausea.   history of limited due to underlying vascular dementia.  Patient is a nursing home resident.      In the emergency department he was found to have a 12mm stone in the CBD with severe obstruction and acute pancreatitis.   lipase levels are greater than 4000.  No fever leukocytosis.  Patient remained hemodynamically stable.     Dr. Owens with GI at [facility] was contacted, who was willing to assist in the case after discussing it with Dr. Alicea.  He has no signs of acute cholangitis.  He was given empiric Rocephin and Flagyl at the ED.  Transfer center was contacted for admission to [facility].    Hospital medicine has been asked to admit for further evaluation and treatment.     Hospital Course:  89 y/o male admitted with choledocholithiasis and acute pancreatitis.  Placed NPO.  Started on IVF's, ABx's, anti emetics and pain control.  GI consulted.    Interval History: No new complaints.    Review of Systems   HENT: Negative for ear discharge and ear pain.    Eyes: Negative for pain and itching.   Genitourinary: Negative for difficulty urinating and dysuria.   Neurological: Negative for seizures and syncope.     Objective:     Vital Signs (Most Recent):  Temp: 97.9 °F (36.6 °C) (08/22/18 1642)  Pulse: 60 (08/22/18  1642)  Resp: 18 (08/22/18 1642)  BP: (!) 142/63 (08/22/18 1642)  SpO2: (!) 91 % (08/22/18 1642) Vital Signs (24h Range):  Temp:  [97.6 °F (36.4 °C)-98.7 °F (37.1 °C)] 97.9 °F (36.6 °C)  Pulse:  [59-78] 60  Resp:  [15-20] 18  SpO2:  [91 %-100 %] 91 %  BP: (105-145)/(54-70) 142/63     Weight: 68.3 kg (150 lb 8 oz)  Body mass index is 25.04 kg/m².    Intake/Output Summary (Last 24 hours) at 8/22/2018 1710  Last data filed at 8/22/2018 1408  Gross per 24 hour   Intake 1520.83 ml   Output 775 ml   Net 745.83 ml      Physical Exam   Constitutional: He is oriented to person, place, and time. No distress.   Elderly   HENT:   Head: Normocephalic and atraumatic.   Eyes: Conjunctivae are normal. Right eye exhibits no discharge. Left eye exhibits no discharge.   Neck: Neck supple.   Cardiovascular: Normal rate, regular rhythm and normal heart sounds.   Pulmonary/Chest: Effort normal and breath sounds normal.   Abdominal: Soft. He exhibits no distension. There is no guarding.   Tender to palpation over mid epigastric region   Musculoskeletal: Normal range of motion. He exhibits no deformity.   Neurological: He is alert and oriented to person, place, and time.   Skin: Skin is warm and dry.       Significant Labs:   CBC:   Recent Labs   Lab  08/21/18   1554  08/22/18   0331   WBC  8.86  11.78   HGB  11.9*  11.2*   HCT  37.6*  34.7*   PLT  237  213     CMP:   Recent Labs   Lab  08/21/18   1554  08/22/18   0331   NA  137  134*   K  4.3  5.1   CL  99  100   CO2  27  24   GLU  101  92   BUN  20  18   CREATININE  1.20  1.2   CALCIUM  9.5  8.9   PROT  7.9  6.8   ALBUMIN  4.3  3.0*   BILITOT  4.0*  4.0*   ALKPHOS  540*  554*   AST  223*  157*   ALT  241*  188*   ANIONGAP  11  10   EGFRNONAA  52.9*  53*     Lipase:   Recent Labs   Lab  08/22/18   0331   LIPASE  884*       Significant Imaging: I have reviewed all pertinent imaging results/findings within the past 24 hours.    Assessment/Plan:      * Acute gallstone pancreatitis    Patient  noted to have choledocholithiasis with acute pancreatitis.  Hx of cholecystectomy.  Placed NPO.  IVF's, anti emetics and pain control.  GI consulted.  ERCP today.          Common bile duct stone    As above.          Chronic diastolic heart failure    No evidence of acute decompensation.          Essential hypertension    Continue home regimen.          Coronary artery disease involving coronary bypass graft of native heart without angina pectoris              Stage 3 chronic kidney disease    Creat at baseline.  Avoid nephrotoxic medications.        COPD (chronic obstructive pulmonary disease)    Stable.  Prn Duonebs.          Vascular dementia    Seems at baseline.          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

## 2018-08-22 NOTE — SUBJECTIVE & OBJECTIVE
Interval History: No new complaints.    Review of Systems   HENT: Negative for ear discharge and ear pain.    Eyes: Negative for pain and itching.   Genitourinary: Negative for difficulty urinating and dysuria.   Neurological: Negative for seizures and syncope.     Objective:     Vital Signs (Most Recent):  Temp: 97.9 °F (36.6 °C) (08/22/18 1642)  Pulse: 60 (08/22/18 1642)  Resp: 18 (08/22/18 1642)  BP: (!) 142/63 (08/22/18 1642)  SpO2: (!) 91 % (08/22/18 1642) Vital Signs (24h Range):  Temp:  [97.6 °F (36.4 °C)-98.7 °F (37.1 °C)] 97.9 °F (36.6 °C)  Pulse:  [59-78] 60  Resp:  [15-20] 18  SpO2:  [91 %-100 %] 91 %  BP: (105-145)/(54-70) 142/63     Weight: 68.3 kg (150 lb 8 oz)  Body mass index is 25.04 kg/m².    Intake/Output Summary (Last 24 hours) at 8/22/2018 1710  Last data filed at 8/22/2018 1408  Gross per 24 hour   Intake 1520.83 ml   Output 775 ml   Net 745.83 ml      Physical Exam   Constitutional: He is oriented to person, place, and time. No distress.   Elderly   HENT:   Head: Normocephalic and atraumatic.   Eyes: Conjunctivae are normal. Right eye exhibits no discharge. Left eye exhibits no discharge.   Neck: Neck supple.   Cardiovascular: Normal rate, regular rhythm and normal heart sounds.   Pulmonary/Chest: Effort normal and breath sounds normal.   Abdominal: Soft. He exhibits no distension. There is no guarding.   Tender to palpation over mid epigastric region   Musculoskeletal: Normal range of motion. He exhibits no deformity.   Neurological: He is alert and oriented to person, place, and time.   Skin: Skin is warm and dry.       Significant Labs:   CBC:   Recent Labs   Lab  08/21/18   1554  08/22/18   0331   WBC  8.86  11.78   HGB  11.9*  11.2*   HCT  37.6*  34.7*   PLT  237  213     CMP:   Recent Labs   Lab  08/21/18   1554  08/22/18   0331   NA  137  134*   K  4.3  5.1   CL  99  100   CO2  27  24   GLU  101  92   BUN  20  18   CREATININE  1.20  1.2   CALCIUM  9.5  8.9   PROT  7.9  6.8   ALBUMIN   4.3  3.0*   BILITOT  4.0*  4.0*   ALKPHOS  540*  554*   AST  223*  157*   ALT  241*  188*   ANIONGAP  11  10   EGFRNONAA  52.9*  53*     Lipase:   Recent Labs   Lab  08/22/18   0331   LIPASE  884*       Significant Imaging: I have reviewed all pertinent imaging results/findings within the past 24 hours.

## 2018-08-22 NOTE — MEDICAL/APP STUDENT
"[facility] Hospital Medicine  Progress Note    Patient Name: Fidel Allan  MRN: [id number]  Patient Class: IP- Inpatient   Admission Date: 8/21/2018  Length of Stay: 1 days  Attending Physician: Darwin Palafox MD  Primary Care Provider: Memo Nieto MD        Subjective:     Principal Problem:Common bile duct stone    HPI: Patient is a 89 y/o male with a past medical history of HTN, CAD, CHF, hyperlipidemia, depression, anxiety, melanoma and vascular dementia presenting with biliary pancreatitis.  He reports vague, intermittent periumbilical pain that has been occurring over the past several weeks.  Yesterday, the pain became constant and unbearable.  It does not radiate.  He describes it as a cramping sensation.  There are no obvious aggravating or alleviating factors.  Patient complains of mild nausea but no vomiting.  He denies fever or chills.  He reports a diffuse "itch" and dark urine over last 1-2 days.  No jaundice.  Work-up in the [facility] ED revealed an elevated lipase with CT scan showing a 12 mm stone in the common bile duct.  He has a history of cholangitis in January 2017 and underwent an ERCP at that time.  He is s/p cholecystectomy and Billroth IIgastrojejunostomy.        Hospital Course: Patient first presented to [facility] ED, where CT showed a 12 mm stone in the CBD and elevated lipase.  Patient was transferred to [facility] for GI consult.  Patient is NPO in preparation for possible ERCP.    Interval History: No acute events overnight.  Patient states that the pain is a little bit better with the IV morphine.    Review of Systems   Constitutional: Negative for activity change, appetite change, chills, diaphoresis, fatigue, fever and unexpected weight change.   HENT: Negative for congestion, dental problem, facial swelling, hearing loss, sore throat and trouble swallowing.    Eyes: Negative for visual disturbance.   Respiratory: Negative for " apnea, cough, choking, chest tightness, shortness of breath, wheezing and stridor.    Cardiovascular: Negative for chest pain, palpitations and leg swelling.   Gastrointestinal: Positive for abdominal pain and nausea (mild). Negative for abdominal distention, blood in stool, constipation, diarrhea and vomiting.   Genitourinary: Negative for difficulty urinating, dysuria, flank pain and hematuria.   Musculoskeletal: Negative for arthralgias, back pain, joint swelling and myalgias.   Skin: Negative for color change and pallor.   Neurological: Negative for dizziness, tremors, seizures, syncope, facial asymmetry, speech difficulty, light-headedness, numbness and headaches.   Hematological: Negative for adenopathy.     Objective:     Vital Signs (Most Recent):  Temp: 98.5 °F (36.9 °C) (08/22/18 1140)  Pulse: 78 (08/22/18 1140)  Resp: 18 (08/22/18 1140)  BP: (!) 120/58 (08/22/18 1140)  SpO2: 98 % (08/22/18 1140) Vital Signs (24h Range):  Temp:  [97.6 °F (36.4 °C)-98.7 °F (37.1 °C)] 98.5 °F (36.9 °C)  Pulse:  [59-78] 78  Resp:  [15-22] 18  SpO2:  [98 %-100 %] 98 %  BP: (105-145)/(54-71) 120/58     Weight: 68.3 kg (150 lb 8 oz)  Body mass index is 25.04 kg/m².    Intake/Output Summary (Last 24 hours) at 8/22/2018 1202  Last data filed at 8/22/2018 0922  Gross per 24 hour   Intake 720.83 ml   Output 775 ml   Net -54.17 ml      Physical Exam   Constitutional: No distress.   Elderly man that looks his stated age, looks quite debilitated   HENT:   Head: Normocephalic and atraumatic.   Eyes: Conjunctivae and EOM are normal. Pupils are equal, round, and reactive to light.   Neck: Normal range of motion. Neck supple. No JVD present. No tracheal deviation present. No thyromegaly present.   Cardiovascular: Normal rate, regular rhythm, normal heart sounds and intact distal pulses. Exam reveals no gallop and no friction rub.   No murmur heard.  Pulmonary/Chest: Effort normal and breath sounds normal. No stridor. No respiratory  distress. He has no wheezes. He has no rales. He exhibits no tenderness.   Abdominal: Soft. Bowel sounds are normal. He exhibits no distension and no mass. There is tenderness (diffuse epigastric worse in RUQ). There is no rebound and no guarding.   Musculoskeletal: Normal range of motion. He exhibits no edema, tenderness or deformity.   Lymphadenopathy:     He has no cervical adenopathy.   Neurological: He is alert. He displays normal reflexes. No cranial nerve deficit or sensory deficit. He exhibits normal muscle tone. Coordination normal.   Pleasantly demented   Skin: Skin is warm and dry. He is not diaphoretic.       Significant Labs:   Bilirubin:   Recent Labs   Lab  08/21/18   1554  08/22/18   0331   BILITOT  4.0*  4.0*     Blood Culture:   Recent Labs   Lab  08/21/18   1720  08/21/18   1726   LABBLOO  No Growth to date  No Growth to date     BMP:   Recent Labs   Lab  08/22/18   0331   GLU  92   NA  134*   K  5.1   CL  100   CO2  24   BUN  18   CREATININE  1.2   CALCIUM  8.9   MG  2.2     CBC:   Recent Labs   Lab  08/21/18   1554  08/22/18   0331   WBC  8.86  11.78   HGB  11.9*  11.2*   HCT  37.6*  34.7*   PLT  237  213     Lipase:   Recent Labs   Lab  08/22/18   0331   LIPASE  884*       Significant Imaging: CT: I have reviewed all pertinent results/findings within the past 24 hours and my personal findings are:  12 mm common bile duct stone with evidence of acute pancreatitis    Assessment/Plan:      Patient is a 89 y/o male presenting for diffuse abdominal pain and mild nausea likely due to a biliary pancreatitis.    Biliary pancreatitis  -CT abdomen showed a 12 mm common bile duct gallstone and evidence of pancreatic inflammation  -lipase elevated >4000  -patient given empiric rocephin and flagyl in the ED, and has since been switched to cefepime  -patient shows no signs of cholangitis with no fever and no leukocytosis  -ERCP scheduled for today for definitive treatment, patient NPO with IVF  resuscitation      CAD  -no evidence of acute coronary syndrome at this time  -maintain adequate BP control  -withholding aspirin until ERCP is complete  -statin therapy    HTN  -PRN antihypertensives available    CKD  -renally dose medications  -avoid nephrotoxic agents  -maintain euvolemic state    Stable COPD  -no acute issues  -PRN nebulizer treatments    Vascular dementia  -maintain donepezil regimen    VTE Risk Mitigation (From admission, onward)        Ordered     Place V[place] hose  Until discontinued      08/21/18 2326     Place sequential compression device  Until discontinued      08/21/18 2326     IP VTE HIGH RISK PATIENT  Once      08/21/18 2326             Rachid Cummings  Department of Hospital Medicine   [facility]

## 2018-08-22 NOTE — HOSPITAL COURSE
89 y/o male admitted with choledocholithiasis and acute pancreatitis.  Placed NPO.  Started on IVF's, ABx's, anti emetics and pain control.  GI consulted.  ERCP on 8/22 with stone removal.  Started on clear liquid diet and tolerated.  BCx growing 1/4 GNR.  Possible GNR bacteremia and continued on Cefepime.  Repeat BCx obtained.  Repeat BCx negative, but awaiting GNR id and sensitivities.  BCx showing Fusobacterium species.  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  Case discussed with ID.  Patient has remained afebrile.  Normal WBC throughout hospital stay.  He is currently tolerating diet and asymptomatic.  Lipase and LFT's now close to normal.  Since repeat BCx negative and we have obtained source control, ID recommends just a few more days of Augmentin and ok to discharge back to NH.  Patient will be discharged to NH once arranged.

## 2018-08-23 LAB
ALBUMIN SERPL BCP-MCNC: 2.4 G/DL
ALP SERPL-CCNC: 503 U/L
ALT SERPL W/O P-5'-P-CCNC: 115 U/L
ANION GAP SERPL CALC-SCNC: 5 MMOL/L
AST SERPL-CCNC: 83 U/L
BASOPHILS # BLD AUTO: 0.02 K/UL
BASOPHILS NFR BLD: 0.3 %
BILIRUB SERPL-MCNC: 3.4 MG/DL
BUN SERPL-MCNC: 20 MG/DL
CALCIUM SERPL-MCNC: 8.3 MG/DL
CHLORIDE SERPL-SCNC: 107 MMOL/L
CO2 SERPL-SCNC: 24 MMOL/L
CREAT SERPL-MCNC: 1.1 MG/DL
DIFFERENTIAL METHOD: ABNORMAL
EOSINOPHIL # BLD AUTO: 0.1 K/UL
EOSINOPHIL NFR BLD: 1 %
ERYTHROCYTE [DISTWIDTH] IN BLOOD BY AUTOMATED COUNT: 15.2 %
EST. GFR  (AFRICAN AMERICAN): >60 ML/MIN/1.73 M^2
EST. GFR  (NON AFRICAN AMERICAN): 59 ML/MIN/1.73 M^2
GLUCOSE SERPL-MCNC: 70 MG/DL
HCT VFR BLD AUTO: 29.5 %
HGB BLD-MCNC: 9.6 G/DL
LIPASE SERPL-CCNC: 421 U/L
LYMPHOCYTES # BLD AUTO: 1.7 K/UL
LYMPHOCYTES NFR BLD: 25.1 %
MAGNESIUM SERPL-MCNC: 1.7 MG/DL
MCH RBC QN AUTO: 29.5 PG
MCHC RBC AUTO-ENTMCNC: 32.5 G/DL
MCV RBC AUTO: 91 FL
MONOCYTES # BLD AUTO: 0.7 K/UL
MONOCYTES NFR BLD: 9.8 %
NEUTROPHILS # BLD AUTO: 4.3 K/UL
NEUTROPHILS NFR BLD: 63.8 %
PHOSPHATE SERPL-MCNC: 2.6 MG/DL
PLATELET # BLD AUTO: 200 K/UL
PMV BLD AUTO: 10.5 FL
POTASSIUM SERPL-SCNC: 4.4 MMOL/L
PROT SERPL-MCNC: 5.6 G/DL
RBC # BLD AUTO: 3.25 M/UL
SODIUM SERPL-SCNC: 136 MMOL/L
WBC # BLD AUTO: 6.81 K/UL

## 2018-08-23 PROCEDURE — 11000001 HC ACUTE MED/SURG PRIVATE ROOM

## 2018-08-23 PROCEDURE — 94640 AIRWAY INHALATION TREATMENT: CPT

## 2018-08-23 PROCEDURE — 80053 COMPREHEN METABOLIC PANEL: CPT

## 2018-08-23 PROCEDURE — 25000003 PHARM REV CODE 250: Performed by: HOSPITALIST

## 2018-08-23 PROCEDURE — 63600175 PHARM REV CODE 636 W HCPCS: Performed by: HOSPITALIST

## 2018-08-23 PROCEDURE — A4216 STERILE WATER/SALINE, 10 ML: HCPCS | Performed by: HOSPITALIST

## 2018-08-23 PROCEDURE — 85025 COMPLETE CBC W/AUTO DIFF WBC: CPT

## 2018-08-23 PROCEDURE — C9113 INJ PANTOPRAZOLE SODIUM, VIA: HCPCS | Performed by: HOSPITALIST

## 2018-08-23 PROCEDURE — 84100 ASSAY OF PHOSPHORUS: CPT

## 2018-08-23 PROCEDURE — 83690 ASSAY OF LIPASE: CPT

## 2018-08-23 PROCEDURE — 83735 ASSAY OF MAGNESIUM: CPT

## 2018-08-23 PROCEDURE — 36415 COLL VENOUS BLD VENIPUNCTURE: CPT

## 2018-08-23 PROCEDURE — 27000221 HC OXYGEN, UP TO 24 HOURS

## 2018-08-23 RX ORDER — MORPHINE SULFATE 4 MG/ML
2 INJECTION, SOLUTION INTRAMUSCULAR; INTRAVENOUS EVERY 4 HOURS PRN
Status: DISCONTINUED | OUTPATIENT
Start: 2018-08-23 | End: 2018-08-26 | Stop reason: HOSPADM

## 2018-08-23 RX ORDER — OXYCODONE HYDROCHLORIDE 5 MG/1
5 TABLET ORAL EVERY 4 HOURS PRN
Status: DISCONTINUED | OUTPATIENT
Start: 2018-08-23 | End: 2018-08-26 | Stop reason: HOSPADM

## 2018-08-23 RX ORDER — CEFEPIME HYDROCHLORIDE 1 G/50ML
1 INJECTION, SOLUTION INTRAVENOUS
Status: DISCONTINUED | OUTPATIENT
Start: 2018-08-23 | End: 2018-08-26 | Stop reason: HOSPADM

## 2018-08-23 RX ORDER — ACETAMINOPHEN 325 MG/1
650 TABLET ORAL EVERY 4 HOURS PRN
Status: DISCONTINUED | OUTPATIENT
Start: 2018-08-23 | End: 2018-08-26 | Stop reason: HOSPADM

## 2018-08-23 RX ADMIN — LISINOPRIL 5 MG: 5 TABLET ORAL at 06:08

## 2018-08-23 RX ADMIN — OXYCODONE HYDROCHLORIDE 5 MG: 5 TABLET ORAL at 08:08

## 2018-08-23 RX ADMIN — DEXTROSE 40 MG: 50 INJECTION, SOLUTION INTRAVENOUS at 09:08

## 2018-08-23 RX ADMIN — MEMANTINE HYDROCHLORIDE 5 MG: 5 TABLET ORAL at 09:08

## 2018-08-23 RX ADMIN — BUSPIRONE HYDROCHLORIDE 5 MG: 5 TABLET ORAL at 09:08

## 2018-08-23 RX ADMIN — Medication 3 ML: at 10:08

## 2018-08-23 RX ADMIN — FINASTERIDE 5 MG: 5 TABLET, FILM COATED ORAL at 09:08

## 2018-08-23 RX ADMIN — BUSPIRONE HYDROCHLORIDE 5 MG: 5 TABLET ORAL at 08:08

## 2018-08-23 RX ADMIN — SODIUM CHLORIDE: 0.9 INJECTION, SOLUTION INTRAVENOUS at 05:08

## 2018-08-23 RX ADMIN — LATANOPROST 1 DROP: 50 SOLUTION/ DROPS OPHTHALMIC at 08:08

## 2018-08-23 RX ADMIN — CEFEPIME 1 G: 1 INJECTION, POWDER, FOR SOLUTION INTRAMUSCULAR; INTRAVENOUS at 02:08

## 2018-08-23 RX ADMIN — GABAPENTIN 100 MG: 100 CAPSULE ORAL at 08:08

## 2018-08-23 RX ADMIN — DONEPEZIL HYDROCHLORIDE 10 MG: 10 TABLET, FILM COATED ORAL at 08:08

## 2018-08-23 RX ADMIN — TAMSULOSIN HYDROCHLORIDE 0.4 MG: 0.4 CAPSULE ORAL at 09:08

## 2018-08-23 RX ADMIN — DOCUSATE SODIUM AND SENNOSIDES 1 TABLET: 8.6; 5 TABLET, FILM COATED ORAL at 09:08

## 2018-08-23 RX ADMIN — Medication 3 ML: at 02:08

## 2018-08-23 RX ADMIN — GABAPENTIN 100 MG: 100 CAPSULE ORAL at 09:08

## 2018-08-23 RX ADMIN — ESCITALOPRAM OXALATE 10 MG: 10 TABLET ORAL at 06:08

## 2018-08-23 RX ADMIN — TIOTROPIUM BROMIDE 18 MCG: 18 CAPSULE ORAL; RESPIRATORY (INHALATION) at 08:08

## 2018-08-23 RX ADMIN — MORPHINE SULFATE 2 MG: 4 INJECTION INTRAVENOUS at 05:08

## 2018-08-23 RX ADMIN — GABAPENTIN 100 MG: 100 CAPSULE ORAL at 03:08

## 2018-08-23 RX ADMIN — CEFEPIME 1 G: 1 INJECTION, POWDER, FOR SOLUTION INTRAMUSCULAR; INTRAVENOUS at 08:08

## 2018-08-23 RX ADMIN — OXYCODONE HYDROCHLORIDE 5 MG: 5 TABLET ORAL at 10:08

## 2018-08-23 RX ADMIN — FLUTICASONE FUROATE AND VILANTEROL TRIFENATATE 1 PUFF: 100; 25 POWDER RESPIRATORY (INHALATION) at 08:08

## 2018-08-23 RX ADMIN — OXYCODONE HYDROCHLORIDE 5 MG: 5 TABLET ORAL at 03:08

## 2018-08-23 NOTE — NURSING
Blood culture from 8/21 resulted back with anaerobic bottle with gram negative rods. Dr. Palafox notified, new orders received. Will cont to monitor.

## 2018-08-23 NOTE — ANESTHESIA POSTPROCEDURE EVALUATION
"Anesthesia Post Evaluation    Patient: Fidel Allan    Procedure(s) Performed: Procedure(s) (LRB):  ERCP (ENDOSCOPIC RETROGRADE CHOLANGIOPANCREATOGRAPHY) (N/A)    Final Anesthesia Type: general  Patient location during evaluation: PACU  Patient participation: Yes- Able to Participate  Level of consciousness: awake and alert, oriented and awake  Post-procedure vital signs: reviewed and stable  Pain management: adequate  Airway patency: patent  PONV status at discharge: No PONV  Anesthetic complications: no      Cardiovascular status: blood pressure returned to baseline, hemodynamically stable and stable  Respiratory status: unassisted and spontaneous ventilation  Hydration status: euvolemic  Follow-up not needed.        Visit Vitals  BP (!) 148/65 (BP Location: Left arm, Patient Position: Lying)   Pulse 63   Temp 36.7 °C (98.1 °F) (Oral)   Resp 17   Ht 5' 5" (1.651 m)   Wt 68.3 kg (150 lb 8.6 oz)   SpO2 97%   BMI 25.05 kg/m²       Pain/Fior Score: Pain Assessment Performed: Yes (8/23/2018 10:00 AM)  Presence of Pain: complains of pain/discomfort (8/23/2018 10:00 AM)  Pain Rating Prior to Med Admin: 10 (8/23/2018 10:28 AM)  Pain Rating Post Med Admin: 3 (8/22/2018  8:01 PM)  Fior Score: 10 (8/22/2018  2:24 PM)        "

## 2018-08-23 NOTE — PLAN OF CARE
Problem: Patient Care Overview  Goal: Plan of Care Review  Outcome: Ongoing (interventions implemented as appropriate)  Pt free from falls, injury or any further trauma throughout shift, pt AAOx4. Continued medications as ordered. Complaints of pain, controlled with medications. Pt turned q2. Pt in no distress. Will cont to monitor.    08/23/18 8334   Coping/Psychosocial   Plan Of Care Reviewed With patient

## 2018-08-23 NOTE — SUBJECTIVE & OBJECTIVE
Interval History: Improved abdominal pain.    Review of Systems   HENT: Negative for ear discharge and ear pain.    Eyes: Negative for pain and itching.   Genitourinary: Negative for difficulty urinating and dysuria.   Neurological: Negative for seizures and syncope.     Objective:     Vital Signs (Most Recent):  Temp: 98.2 °F (36.8 °C) (08/23/18 0840)  Pulse: 60 (08/23/18 0840)  Resp: 17 (08/23/18 0840)  BP: (!) 144/63 (08/23/18 0840)  SpO2: 100 % (08/23/18 0840) Vital Signs (24h Range):  Temp:  [97.9 °F (36.6 °C)-98.5 °F (36.9 °C)] 98.2 °F (36.8 °C)  Pulse:  [60-78] 60  Resp:  [17-20] 17  SpO2:  [89 %-100 %] 100 %  BP: (105-144)/(55-66) 144/63     Weight: 68.3 kg (150 lb 8.6 oz)  Body mass index is 25.05 kg/m².    Intake/Output Summary (Last 24 hours) at 8/23/2018 1022  Last data filed at 8/23/2018 0548  Gross per 24 hour   Intake 850 ml   Output 930 ml   Net -80 ml      Physical Exam   Constitutional: He is oriented to person, place, and time. No distress.   Elderly   HENT:   Head: Normocephalic and atraumatic.   Eyes: Conjunctivae are normal. Right eye exhibits no discharge. Left eye exhibits no discharge.   Neck: Neck supple.   Cardiovascular: Normal rate, regular rhythm and normal heart sounds.   Pulmonary/Chest: Effort normal and breath sounds normal.   Abdominal: Soft. He exhibits no distension. There is no guarding.   Decreased tenderness to palpation over mid epigastric region   Musculoskeletal: Normal range of motion. He exhibits no deformity.   Neurological: He is alert and oriented to person, place, and time.   Skin: Skin is warm and dry.       Significant Labs:   CBC:   Recent Labs   Lab  08/21/18   1554  08/22/18   0331  08/23/18   0413   WBC  8.86  11.78  6.81   HGB  11.9*  11.2*  9.6*   HCT  37.6*  34.7*  29.5*   PLT  237  213  200     CMP:   Recent Labs   Lab  08/21/18   1554  08/22/18   0331  08/23/18   0413   NA  137  134*  136   K  4.3  5.1  4.4   CL  99  100  107   CO2  27  24  24   GLU  101   92  70   BUN  20  18  20   CREATININE  1.20  1.2  1.1   CALCIUM  9.5  8.9  8.3*   PROT  7.9  6.8  5.6*   ALBUMIN  4.3  3.0*  2.4*   BILITOT  4.0*  4.0*  3.4*   ALKPHOS  540*  554*  503*   AST  223*  157*  83*   ALT  241*  188*  115*   ANIONGAP  11  10  5*   EGFRNONAA  52.9*  53*  59*     Lipase:   Recent Labs   Lab  08/22/18   0331  08/23/18   0413   LIPASE  884*  421*       Significant Imaging: I have reviewed all pertinent imaging results/findings within the past 24 hours.

## 2018-08-23 NOTE — PROGRESS NOTES
[facility] Hospital Medicine  Progress Note    Patient Name: Fidel Allan  MRN: [id number]  Patient Class: IP- Inpatient   Admission Date: 8/21/2018  Length of Stay: 2 days  Attending Physician: Darwin Palafox MD  Primary Care Provider: Memo Nieto MD        Subjective:     Principal Problem:Acute gallstone pancreatitis    HPI:  Fidel Allan is a 89 y.o. male that (in part) has a past medical history of coronary artery disease status post CABG, CHF, biliary obstruction with cholecystectomy, and colectomy who presents to [facility] Emergency Department who presents to the ER with epigastric pain that started over the last day with some nausea.   history of limited due to underlying vascular dementia.  Patient is a nursing home resident.      In the emergency department he was found to have a 12mm stone in the CBD with severe obstruction and acute pancreatitis.   lipase levels are greater than 4000.  No fever leukocytosis.  Patient remained hemodynamically stable.     Dr. Owens with GI at [facility] was contacted, who was willing to assist in the case after discussing it with Dr. Alicea.  He has no signs of acute cholangitis.  He was given empiric Rocephin and Flagyl at the ED.  Transfer center was contacted for admission to [facility].    Hospital medicine has been asked to admit for further evaluation and treatment.     Hospital Course:  89 y/o male admitted with choledocholithiasis and acute pancreatitis.  Placed NPO.  Started on IVF's, ABx's, anti emetics and pain control.  GI consulted.  ERCP on 8/22 with stone removal.    Interval History: Improved abdominal pain.    Review of Systems   HENT: Negative for ear discharge and ear pain.    Eyes: Negative for pain and itching.   Genitourinary: Negative for difficulty urinating and dysuria.   Neurological: Negative for seizures and syncope.     Objective:     Vital Signs (Most Recent):  Temp: 98.2 °F (36.8 °C)  (08/23/18 0840)  Pulse: 60 (08/23/18 0840)  Resp: 17 (08/23/18 0840)  BP: (!) 144/63 (08/23/18 0840)  SpO2: 100 % (08/23/18 0840) Vital Signs (24h Range):  Temp:  [97.9 °F (36.6 °C)-98.5 °F (36.9 °C)] 98.2 °F (36.8 °C)  Pulse:  [60-78] 60  Resp:  [17-20] 17  SpO2:  [89 %-100 %] 100 %  BP: (105-144)/(55-66) 144/63     Weight: 68.3 kg (150 lb 8.6 oz)  Body mass index is 25.05 kg/m².    Intake/Output Summary (Last 24 hours) at 8/23/2018 1022  Last data filed at 8/23/2018 0548  Gross per 24 hour   Intake 850 ml   Output 930 ml   Net -80 ml      Physical Exam   Constitutional: He is oriented to person, place, and time. No distress.   Elderly   HENT:   Head: Normocephalic and atraumatic.   Eyes: Conjunctivae are normal. Right eye exhibits no discharge. Left eye exhibits no discharge.   Neck: Neck supple.   Cardiovascular: Normal rate, regular rhythm and normal heart sounds.   Pulmonary/Chest: Effort normal and breath sounds normal.   Abdominal: Soft. He exhibits no distension. There is no guarding.   Decreased tenderness to palpation over mid epigastric region   Musculoskeletal: Normal range of motion. He exhibits no deformity.   Neurological: He is alert and oriented to person, place, and time.   Skin: Skin is warm and dry.       Significant Labs:   CBC:   Recent Labs   Lab  08/21/18   1554  08/22/18   0331  08/23/18   0413   WBC  8.86  11.78  6.81   HGB  11.9*  11.2*  9.6*   HCT  37.6*  34.7*  29.5*   PLT  237  213  200     CMP:   Recent Labs   Lab  08/21/18   1554  08/22/18   0331  08/23/18   0413   NA  137  134*  136   K  4.3  5.1  4.4   CL  99  100  107   CO2  27  24  24   GLU  101  92  70   BUN  20  18  20   CREATININE  1.20  1.2  1.1   CALCIUM  9.5  8.9  8.3*   PROT  7.9  6.8  5.6*   ALBUMIN  4.3  3.0*  2.4*   BILITOT  4.0*  4.0*  3.4*   ALKPHOS  540*  554*  503*   AST  223*  157*  83*   ALT  241*  188*  115*   ANIONGAP  11  10  5*   EGFRNONAA  52.9*  53*  59*     Lipase:   Recent Labs   Lab  08/22/18   0336   08/23/18   0413   LIPASE  884*  421*       Significant Imaging: I have reviewed all pertinent imaging results/findings within the past 24 hours.    Assessment/Plan:      * Acute gallstone pancreatitis    Patient noted to have choledocholithiasis with acute pancreatitis.  Hx of cholecystectomy.  Placed NPO.  IVF's, anti emetics and pain control.  GI consulted.  ERCP with stone removal.  Improved lipase.  Will give clear liquid trial.          Common bile duct stone    As above.          Chronic diastolic heart failure    No evidence of acute decompensation.          Essential hypertension    Continue home regimen.          Coronary artery disease involving coronary bypass graft of native heart without angina pectoris              Stage 3 chronic kidney disease    Creat at baseline.  Avoid nephrotoxic medications.        COPD (chronic obstructive pulmonary disease)    Stable.  Prn Duonebs.          Vascular dementia    Seems at baseline.          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

## 2018-08-23 NOTE — PROGRESS NOTES
Gastroenterology Progress Note    Reason for Consult: gallstone pancreatitis    Subjective:  Patient underwent ERCP yesterday with removal of stones from the CBD.  His bilirubin is trending down.  He reports not getting much sleep last night due to itching.  No fevers/chills.  He had some chest and abdominal pain. Chest pain is better.  He still feels some abdominal pain which he thinks is gas related.  No N/V.  He does not yet feel like eating.    ROS:  Gen: no F/C  CV: no palpitations, + CP as per subjective above  Resp: no SOB/wheezing    Physical Exam  Vitals:    08/23/18 0105   BP: (!) 142/66   Pulse: 60   Resp: 19   Temp: 98.1 °F (36.7 °C)     Physical Exam   Constitutional: He appears well-developed and well-nourished. No distress.   HENT:   Head: Normocephalic and atraumatic.   Mouth/Throat: Oropharynx is clear and moist.   Eyes: EOM are normal. Pupils are equal, round, and reactive to light. No scleral icterus.   Cardiovascular: Normal rate, regular rhythm and normal heart sounds.   Pulmonary/Chest: Effort normal and breath sounds normal. No respiratory distress.   Abdominal: Soft. Bowel sounds are normal. There is tenderness (periumbilical). There is no rebound and no guarding.   Musculoskeletal: Normal range of motion. He exhibits no edema.   Neurological: He is alert. No sensory deficit.   Skin: Skin is warm and dry. No rash noted.       Medications/Infusions:  Current Facility-Administered Medications   Medication Dose Route Frequency Provider Last Rate Last Dose    0.9%  NaCl infusion   Intravenous Continuous Layton Faustin  mL/hr at 08/23/18 0548      acetaminophen tablet 650 mg  650 mg Oral Q8H PRN Layton Faustin MD        albuterol-ipratropium 2.5 mg-0.5 mg/3 mL nebulizer solution 3 mL  3 mL Nebulization Q4H PRN Darwin Palafox MD        bisacodyl suppository 10 mg  10 mg Rectal Daily PRN Layton Faustin MD        busPIRone tablet 5 mg  5 mg Oral BID Layton Faustin MD    5 mg at 08/22/18 2125    cefepime in dextrose 5 % 1 gram/50 mL IVPB 1 g  1 g Intravenous Q12H Layton Faustin  mL/hr at 08/23/18 0230 1 g at 08/23/18 0230    donepezil tablet 10 mg  10 mg Oral QHS Layton Faustin MD   10 mg at 08/22/18 2125    escitalopram oxalate tablet 10 mg  10 mg Oral SHAYM Layton Faustin MD   10 mg at 08/23/18 0633    finasteride tablet 5 mg  5 mg Oral Daily Layton Faustin MD        fluticasone-vilanterol 100-25 mcg/dose diskus inhaler 1 puff  1 puff Inhalation Daily Layton Faustin MD   1 puff at 08/22/18 0815    gabapentin capsule 100 mg  100 mg Oral TID Layton Faustin MD   100 mg at 08/22/18 2125    latanoprost 0.005 % ophthalmic solution 1 drop  1 drop Both Eyes QHS Layton Faustin MD   1 drop at 08/22/18 2127    lisinopril tablet 5 mg  5 mg Oral LUZ Faustin MD   5 mg at 08/23/18 0633    memantine tablet 5 mg  5 mg Oral Daily Layton Faustin MD        mineral oil enema 1 enema  1 enema Rectal Daily PRN Layton Faustin MD        morphine injection 2 mg  2 mg Intravenous Q4H PRN Layton Faustin MD   2 mg at 08/23/18 0547    morphine injection 5 mg  5 mg Intravenous Q4H PRN Layton Faustin MD   5 mg at 08/22/18 0409    nitroGLYCERIN SL tablet 0.3 mg  0.3 mg Sublingual Q5 Min PRN Layton Faustin MD        ondansetron injection 8 mg  8 mg Intravenous Q8H PRN Layton Faustin MD        pantoprazole 40 mg in dextrose 5 % 100 mL infusion (ready to mix system)  40 mg Intravenous Daily Layton Faustin MD   40 mg at 08/22/18 0829    polyethylene glycol packet 17 g  17 g Oral Daily PRN Layton Faustin MD        promethazine suppository 25 mg  25 mg Rectal Q6H PRN Layton Faustin MD        senna-docusate 8.6-50 mg per tablet 1 tablet  1 tablet Oral Daily Layton Faustin MD        sodium chloride 0.9% flush 3 mL  3 mL Intravenous Q8H Layton Faustin MD   3 mL at 08/22/18 8931    tamsulosin 24  hr capsule 0.4 mg  0.4 mg Oral Daily Layton Faustin MD        tiotropium inhalation capsule 18 mcg  1 capsule Inhalation Daily Layton Faustin MD   18 mcg at 08/22/18 0817       Intake and Output:    Intake/Output Summary (Last 24 hours) at 8/23/2018 0801  Last data filed at 8/23/2018 0548  Gross per 24 hour   Intake 850 ml   Output 1180 ml   Net -330 ml       Labs:  Lab Results   Component Value Date/Time    WBC 6.81 08/23/2018 04:13 AM    HGB 9.6 (L) 08/23/2018 04:13 AM    HCT 29.5 (L) 08/23/2018 04:13 AM     08/23/2018 04:13 AM    MCV 91 08/23/2018 04:13 AM     08/23/2018 04:13 AM    K 4.4 08/23/2018 04:13 AM     08/23/2018 04:13 AM    CO2 24 08/23/2018 04:13 AM    BUN 20 08/23/2018 04:13 AM    CREATININE 1.1 08/23/2018 04:13 AM    GLU 70 08/23/2018 04:13 AM    CALCIUM 8.3 (L) 08/23/2018 04:13 AM    MG 1.7 08/23/2018 04:13 AM    PHOS 2.6 (L) 08/23/2018 04:13 AM    INR 1.0 08/22/2018 03:32 AM    APTT 25.7 03/29/2018 06:32 PM   ]  Lab Results   Component Value Date/Time    PROT 5.6 (L) 08/23/2018 04:13 AM    ALBUMIN 2.4 (L) 08/23/2018 04:13 AM    BILITOT 3.4 (H) 08/23/2018 04:13 AM    BILIDIR 0.3 02/03/2017 04:43 AM    AST 83 (H) 08/23/2018 04:13 AM     (H) 08/23/2018 04:13 AM    ALKPHOS 503 (H) 08/23/2018 04:13 AM   ]    Imaging and Procedures:  Labs and imaging results were reviewed.  ERCP 8/22/18:  - The majory papilla was in an altered location due to a previous Billroth 2 procedure. A previous sphincterotomy was seen.   - Choledocholithiasis was found. Complete removal was accomplished by biliary sphincterotomy and balloon extraction.  - A gastroscope then a pediatric colonoscope were used to perform the procedure as the side-viewing duodenoscopy could not be advance to the ampulla.    Assessment:  Fidel Allan is a 89 y.o. male with a history of CAD s/p CABG, CHF, prior Billroth II surgery, cholecystectomy and vascular dementia who presented with abdominal pain and nausea.   He was found to have gallstone pancreatitis with a 12 mm stone in the CBD.    Plan:  - s/p ERCP with slowing improving LFTs  - continue IV fluids and anti-emetics  - okay to discontinue antibiotics as there was no evidence of cholangitis  - may need a topical lotion to help with itching v. PRN benadryl/atarax  - would wait until patient has an appetite/asks for food before starting a diet    Shana Higgins MD

## 2018-08-23 NOTE — NURSING
Dr. Palafox notified of wife stating that patient has nectar thickened liquids @ nursing home. New orders given. Pt in no distress. Will cont to monitor.

## 2018-08-23 NOTE — ASSESSMENT & PLAN NOTE
Patient noted to have choledocholithiasis with acute pancreatitis.  Hx of cholecystectomy.  Placed NPO.  IVF's, anti emetics and pain control.  GI consulted.  ERCP with stone removal.  Improved lipase.  Will give clear liquid trial.

## 2018-08-24 LAB
ALBUMIN SERPL BCP-MCNC: 2.5 G/DL
ALP SERPL-CCNC: 553 U/L
ALT SERPL W/O P-5'-P-CCNC: 114 U/L
ANION GAP SERPL CALC-SCNC: 4 MMOL/L
AST SERPL-CCNC: 94 U/L
BASOPHILS # BLD AUTO: 0.02 K/UL
BASOPHILS NFR BLD: 0.3 %
BILIRUB SERPL-MCNC: 2.5 MG/DL
BUN SERPL-MCNC: 13 MG/DL
CALCIUM SERPL-MCNC: 8.8 MG/DL
CHLORIDE SERPL-SCNC: 106 MMOL/L
CO2 SERPL-SCNC: 25 MMOL/L
CREAT SERPL-MCNC: 0.9 MG/DL
DIFFERENTIAL METHOD: ABNORMAL
EOSINOPHIL # BLD AUTO: 0.1 K/UL
EOSINOPHIL NFR BLD: 1.4 %
ERYTHROCYTE [DISTWIDTH] IN BLOOD BY AUTOMATED COUNT: 15.2 %
EST. GFR  (AFRICAN AMERICAN): >60 ML/MIN/1.73 M^2
EST. GFR  (NON AFRICAN AMERICAN): >60 ML/MIN/1.73 M^2
GLUCOSE SERPL-MCNC: 70 MG/DL
HCT VFR BLD AUTO: 31.2 %
HGB BLD-MCNC: 9.9 G/DL
LIPASE SERPL-CCNC: 238 U/L
LYMPHOCYTES # BLD AUTO: 1.8 K/UL
LYMPHOCYTES NFR BLD: 31.2 %
MCH RBC QN AUTO: 29.4 PG
MCHC RBC AUTO-ENTMCNC: 31.7 G/DL
MCV RBC AUTO: 93 FL
MONOCYTES # BLD AUTO: 0.6 K/UL
MONOCYTES NFR BLD: 10.6 %
NEUTROPHILS # BLD AUTO: 3.3 K/UL
NEUTROPHILS NFR BLD: 56.3 %
PLATELET # BLD AUTO: 217 K/UL
PMV BLD AUTO: 10.7 FL
POTASSIUM SERPL-SCNC: 4 MMOL/L
PROT SERPL-MCNC: 5.8 G/DL
RBC # BLD AUTO: 3.37 M/UL
SODIUM SERPL-SCNC: 135 MMOL/L
WBC # BLD AUTO: 5.77 K/UL

## 2018-08-24 PROCEDURE — 94761 N-INVAS EAR/PLS OXIMETRY MLT: CPT

## 2018-08-24 PROCEDURE — 87040 BLOOD CULTURE FOR BACTERIA: CPT

## 2018-08-24 PROCEDURE — 36415 COLL VENOUS BLD VENIPUNCTURE: CPT

## 2018-08-24 PROCEDURE — 11000001 HC ACUTE MED/SURG PRIVATE ROOM

## 2018-08-24 PROCEDURE — 25000003 PHARM REV CODE 250: Performed by: HOSPITALIST

## 2018-08-24 PROCEDURE — 80053 COMPREHEN METABOLIC PANEL: CPT

## 2018-08-24 PROCEDURE — 85025 COMPLETE CBC W/AUTO DIFF WBC: CPT

## 2018-08-24 PROCEDURE — A4216 STERILE WATER/SALINE, 10 ML: HCPCS | Performed by: HOSPITALIST

## 2018-08-24 PROCEDURE — 27000221 HC OXYGEN, UP TO 24 HOURS

## 2018-08-24 PROCEDURE — 99900035 HC TECH TIME PER 15 MIN (STAT)

## 2018-08-24 PROCEDURE — 83690 ASSAY OF LIPASE: CPT

## 2018-08-24 PROCEDURE — 63600175 PHARM REV CODE 636 W HCPCS: Performed by: HOSPITALIST

## 2018-08-24 PROCEDURE — 94640 AIRWAY INHALATION TREATMENT: CPT

## 2018-08-24 PROCEDURE — C9113 INJ PANTOPRAZOLE SODIUM, VIA: HCPCS | Performed by: HOSPITALIST

## 2018-08-24 RX ORDER — HYDROCODONE BITARTRATE AND ACETAMINOPHEN 5; 325 MG/1; MG/1
1 TABLET ORAL EVERY 6 HOURS PRN
Qty: 10 TABLET | Refills: 0 | Status: ON HOLD | OUTPATIENT
Start: 2018-08-24 | End: 2018-09-18 | Stop reason: HOSPADM

## 2018-08-24 RX ORDER — ACETAMINOPHEN 325 MG/1
650 TABLET ORAL EVERY 6 HOURS PRN
Refills: 0 | COMMUNITY
Start: 2018-08-24

## 2018-08-24 RX ORDER — DIPHENHYDRAMINE HCL 25 MG
25 CAPSULE ORAL EVERY 8 HOURS PRN
Status: DISCONTINUED | OUTPATIENT
Start: 2018-08-24 | End: 2018-08-26 | Stop reason: HOSPADM

## 2018-08-24 RX ORDER — LORAZEPAM 0.5 MG/1
0.5 TABLET ORAL 2 TIMES DAILY PRN
Qty: 10 TABLET | Refills: 0 | Status: ON HOLD | OUTPATIENT
Start: 2018-08-24 | End: 2018-09-18 | Stop reason: SDUPTHER

## 2018-08-24 RX ORDER — AMOXICILLIN AND CLAVULANATE POTASSIUM 875; 125 MG/1; MG/1
1 TABLET, FILM COATED ORAL 2 TIMES DAILY
Qty: 20 TABLET | Refills: 0
Start: 2018-08-24 | End: 2018-09-03

## 2018-08-24 RX ADMIN — ESCITALOPRAM OXALATE 10 MG: 10 TABLET ORAL at 06:08

## 2018-08-24 RX ADMIN — Medication 3 ML: at 09:08

## 2018-08-24 RX ADMIN — DIPHENHYDRAMINE HYDROCHLORIDE 25 MG: 25 CAPSULE ORAL at 05:08

## 2018-08-24 RX ADMIN — BUSPIRONE HYDROCHLORIDE 5 MG: 5 TABLET ORAL at 08:08

## 2018-08-24 RX ADMIN — DEXTROSE 40 MG: 50 INJECTION, SOLUTION INTRAVENOUS at 08:08

## 2018-08-24 RX ADMIN — OXYCODONE HYDROCHLORIDE 5 MG: 5 TABLET ORAL at 12:08

## 2018-08-24 RX ADMIN — FINASTERIDE 5 MG: 5 TABLET, FILM COATED ORAL at 08:08

## 2018-08-24 RX ADMIN — SODIUM CHLORIDE: 0.9 INJECTION, SOLUTION INTRAVENOUS at 12:08

## 2018-08-24 RX ADMIN — LATANOPROST 1 DROP: 50 SOLUTION/ DROPS OPHTHALMIC at 08:08

## 2018-08-24 RX ADMIN — LISINOPRIL 5 MG: 5 TABLET ORAL at 06:08

## 2018-08-24 RX ADMIN — Medication 3 ML: at 02:08

## 2018-08-24 RX ADMIN — GABAPENTIN 100 MG: 100 CAPSULE ORAL at 04:08

## 2018-08-24 RX ADMIN — GABAPENTIN 100 MG: 100 CAPSULE ORAL at 08:08

## 2018-08-24 RX ADMIN — BISACODYL 10 MG: 10 SUPPOSITORY RECTAL at 06:08

## 2018-08-24 RX ADMIN — TIOTROPIUM BROMIDE 18 MCG: 18 CAPSULE ORAL; RESPIRATORY (INHALATION) at 08:08

## 2018-08-24 RX ADMIN — POLYETHYLENE GLYCOL 3350 17 G: 17 POWDER, FOR SOLUTION ORAL at 04:08

## 2018-08-24 RX ADMIN — DONEPEZIL HYDROCHLORIDE 10 MG: 10 TABLET, FILM COATED ORAL at 08:08

## 2018-08-24 RX ADMIN — OXYCODONE HYDROCHLORIDE 5 MG: 5 TABLET ORAL at 06:08

## 2018-08-24 RX ADMIN — TAMSULOSIN HYDROCHLORIDE 0.4 MG: 0.4 CAPSULE ORAL at 08:08

## 2018-08-24 RX ADMIN — FLUTICASONE FUROATE AND VILANTEROL TRIFENATATE 1 PUFF: 100; 25 POWDER RESPIRATORY (INHALATION) at 08:08

## 2018-08-24 RX ADMIN — OXYCODONE HYDROCHLORIDE 5 MG: 5 TABLET ORAL at 04:08

## 2018-08-24 RX ADMIN — DOCUSATE SODIUM AND SENNOSIDES 1 TABLET: 8.6; 5 TABLET, FILM COATED ORAL at 08:08

## 2018-08-24 RX ADMIN — MEMANTINE HYDROCHLORIDE 5 MG: 5 TABLET ORAL at 08:08

## 2018-08-24 RX ADMIN — CEFEPIME 1 G: 1 INJECTION, POWDER, FOR SOLUTION INTRAMUSCULAR; INTRAVENOUS at 08:08

## 2018-08-24 NOTE — PROGRESS NOTES
[facility] Hospital Medicine  Progress Note    Patient Name: Fidel Allan  MRN: [id number]  Patient Class: IP- Inpatient   Admission Date: 8/21/2018  Length of Stay: 3 days  Attending Physician: Darwin Palafox MD  Primary Care Provider: Memo Nieto MD        Subjective:     Principal Problem:Acute gallstone pancreatitis    HPI:  Fidel Allan is a 89 y.o. male that (in part) has a past medical history of coronary artery disease status post CABG, CHF, biliary obstruction with cholecystectomy, and colectomy who presents to [facility] Emergency Department who presents to the ER with epigastric pain that started over the last day with some nausea.   history of limited due to underlying vascular dementia.  Patient is a nursing home resident.      In the emergency department he was found to have a 12mm stone in the CBD with severe obstruction and acute pancreatitis.   lipase levels are greater than 4000.  No fever leukocytosis.  Patient remained hemodynamically stable.     Dr. wOens with GI at [facility] was contacted, who was willing to assist in the case after discussing it with Dr. Alicea.  He has no signs of acute cholangitis.  He was given empiric Rocephin and Flagyl at the ED.  Transfer center was contacted for admission to [facility].    Hospital medicine has been asked to admit for further evaluation and treatment.     Hospital Course:  89 y/o male admitted with choledocholithiasis and acute pancreatitis.  Placed NPO.  Started on IVF's, ABx's, anti emetics and pain control.  GI consulted.  ERCP on 8/22 with stone removal.  Started on clear liquid diet and tolerated.  BCx growing 1/4 GNR.  Possible GNR bacteremia and continue on Cefepime.  Repeat BCx obtained.    Interval History: Feeling better. Wants to go home.    Review of Systems   HENT: Negative for ear discharge and ear pain.    Eyes: Negative for pain and itching.   Genitourinary: Negative for  difficulty urinating and dysuria.   Neurological: Negative for seizures and syncope.     Objective:     Vital Signs (Most Recent):  Temp: 98 °F (36.7 °C) (08/24/18 1144)  Pulse: 60 (08/24/18 1144)  Resp: 16 (08/24/18 1144)  BP: (!) 161/73 (08/24/18 1144)  SpO2: 96 % (08/24/18 1144) Vital Signs (24h Range):  Temp:  [97.9 °F (36.6 °C)-98.1 °F (36.7 °C)] 98 °F (36.7 °C)  Pulse:  [59-74] 60  Resp:  [16-20] 16  SpO2:  [96 %-99 %] 96 %  BP: (124-178)/(58-81) 161/73     Weight: 68.5 kg (151 lb 1 oz)  Body mass index is 25.14 kg/m².    Intake/Output Summary (Last 24 hours) at 8/24/2018 1256  Last data filed at 8/24/2018 1235  Gross per 24 hour   Intake 2751 ml   Output 1200 ml   Net 1551 ml      Physical Exam   Constitutional: He is oriented to person, place, and time. No distress.   Elderly   HENT:   Head: Normocephalic and atraumatic.   Eyes: Conjunctivae are normal. Right eye exhibits no discharge. Left eye exhibits no discharge.   Neck: Neck supple.   Cardiovascular: Normal rate, regular rhythm and normal heart sounds.   Pulmonary/Chest: Effort normal and breath sounds normal.   Abdominal: Soft. He exhibits no distension. There is no guarding.   Decreased tenderness to palpation over mid epigastric region   Musculoskeletal: Normal range of motion. He exhibits no deformity.   Neurological: He is alert and oriented to person, place, and time.   Skin: Skin is warm and dry.       Significant Labs:   CBC:   Recent Labs   Lab  08/23/18 0413 08/24/18   0438   WBC  6.81  5.77   HGB  9.6*  9.9*   HCT  29.5*  31.2*   PLT  200  217     CMP:   Recent Labs   Lab  08/23/18 0413 08/24/18   0438   NA  136  135*   K  4.4  4.0   CL  107  106   CO2  24  25   GLU  70  70   BUN  20  13   CREATININE  1.1  0.9   CALCIUM  8.3*  8.8   PROT  5.6*  5.8*   ALBUMIN  2.4*  2.5*   BILITOT  3.4*  2.5*   ALKPHOS  503*  553*   AST  83*  94*   ALT  115*  114*   ANIONGAP  5*  4*   EGFRNONAA  59*  >60     Lipase:   Recent Labs   Lab  08/23/18   0413   08/24/18   0438   LIPASE  421*  238*       Significant Imaging: I have reviewed all pertinent imaging results/findings within the past 24 hours.    Assessment/Plan:      * Acute gallstone pancreatitis    Patient noted to have choledocholithiasis with acute pancreatitis.  Hx of cholecystectomy.  Placed NPO.  IVF's, anti emetics and pain control.  GI consulted.  ERCP with stone removal.  Slowly improving lipase.  Tolerating clear liquid diet.  Advance diet.  GNR bacteremia--1/4 cultures.  Continue Cefepime.  Await final cultures and sensitivities.  Can switch to PO ABx's and back to NH tomorrow if repeat Cx negative.          Common bile duct stone    As above.          Chronic diastolic heart failure    No evidence of acute decompensation.          Essential hypertension    Continue home regimen.          Coronary artery disease involving coronary bypass graft of native heart without angina pectoris              Stage 3 chronic kidney disease    Creat at baseline.  Avoid nephrotoxic medications.        COPD (chronic obstructive pulmonary disease)    Stable.  Prn Duonebs.          Vascular dementia    Seems at baseline.          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

## 2018-08-24 NOTE — PLAN OF CARE
Problem: Patient Care Overview  Goal: Plan of Care Review  Outcome: Ongoing (interventions implemented as appropriate)  Pt free from falls, injury or any further trauma throughout shift, pt AAOx4. IV antibiotics administered as ordered.    08/24/18 2741   Coping/Psychosocial   Plan Of Care Reviewed With patient   Continued medications as ordered. Complaints of pain, controlled with medications. Pt turned q2, however able to pull self up in the bed. Pt in no distress. Will cont to monitor.

## 2018-08-24 NOTE — ASSESSMENT & PLAN NOTE
Patient noted to have choledocholithiasis with acute pancreatitis.  Hx of cholecystectomy.  Placed NPO.  IVF's, anti emetics and pain control.  GI consulted.  ERCP with stone removal.  Slowly improving lipase.  Tolerating clear liquid diet.  Advance diet.  GNR bacteremia--1/4 cultures.  Continue Cefepime.  Await final cultures and sensitivities.  Can switch to PO ABx's and back to NH tomorrow if repeat Cx negative.

## 2018-08-24 NOTE — PROGRESS NOTES
CANDY spoke with Marycarmen, [facility], and informed her that pt will be dischargin on tomorrow pending cultures. Екатерина inquired about transportation. CANDY voiced that since pt is a resident they will have to provide transportation. Екатерина voiced that it would have to be approved. CANDY voiced that she will waiting for answer from admissions.    CANDY also inquired about call report for tomorrow. Marycarmen voiced that she will review with DON and stated that she will call SW back with information and transportation. CANDY voiced understanding.     3:45pm- CANDY spoke with Marycarmen. Stated that pt can transport by [facility] on tomorrow and when the nurse call reports to ask for the nurse for Rm 103.

## 2018-08-24 NOTE — UM SECONDARY REVIEW
Medical Affairs    Level of Care Issue     89 y/o male admitted with choledocholithiasis and acute pancreatitis.  Placed NPO.  Started on IVF's, ABx's, anti emetics and pain control.  GI consulted.  ERCP on 8/22 with stone removal.  Started on clear liquid diet and tolerated.  BCx growing 1/4 GNR.  Possible GNR bacteremia and continue on Cefepime.  Repeat BCx obtained.    8/24:   His bilirubin is still trending down. No fevers/chills.  Blood cultures are growing GNRs.  He remains on cefepime.  He denies N/V, abdominal pain today.  He tolerated 2 liquid meals yesterday without issues.  He would like to go home    GI recs:  s/p ERCP with slowly improving LFTs  - continue IV fluids and anti-emetics as needed  - continue cefepime for GNR bacteremia - could hopefully transition to PO antibiotics once culture results are back  - advance diet as tolerated  - please call back if LFTs do not continue to trend down    meds:  NS @ 75 ml/hr  cefepime 1g IV q 12hrs  donepezil , finasteride, gabapentin, lisinopril, memantine, tamsulosin  oxycodone 5mg PO prn x 5  protonix 40mg IV daily    Afebrile , wbc normal. Waiting on repeat cx, if negative should dc tomorrow back to NH   Tbili 4 on admit, now 2.5        {[facility]  Review Outcome:[id number]}

## 2018-08-24 NOTE — PLAN OF CARE
Problem: Patient Care Overview  Goal: Plan of Care Review  Outcome: Ongoing (interventions implemented as appropriate)   08/24/18 013   Coping/Psychosocial   Plan Of Care Reviewed With patient   No falls, trauma or injury this shift. Pain managed with oral pain medication every 4 hours as needed. No pressure injuries observed. Continue with plan of care and continue to monitor patient.

## 2018-08-24 NOTE — PLAN OF CARE
[facility] [address] [place] [address]  [phone]        Facility Transfer Orders                        08/26/2018    Admit to: NH    Diagnoses:  Active Hospital Problems    Diagnosis  POA    *Acute gallstone pancreatitis [K85.10]  Yes     Priority: 1 - High    Common bile duct stone [K80.50]  Yes     Priority: 2     Benign prostatic hyperplasia with urinary hesitancy [N40.1, R39.11]  Yes    Chronic diastolic heart failure [I50.32]  Yes    Coronary artery disease involving coronary bypass graft of native heart without angina pectoris [I25.810]  Yes     Chronic    Essential hypertension [I10]  Yes     Chronic    Nursing home resident [Z59.3]  Not Applicable     [facility] ([address] , [place] [address])      Stage 3 chronic kidney disease [N18.3]  Yes     Chronic    Vascular dementia [F01.50]  Yes     Chronic    COPD (chronic obstructive pulmonary disease) [J44.9]  Yes     Chronic    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       Allergies:Review of patient's allergies indicates:  No Known Allergies    Vitals:     Once weekly      Diet: Mechanical soft cardiac diet with nectar thickened liquids         Activity:      - Up in a chair each morning as tolerated   - Ambulate with assistance to bathroom   - May ambulate independently    Nursing Precautions:    - Aspiration precautions:             -  Upright 90 degrees befor during and after meals    - Fall precautions   - Decubitus precautions:        -  for positioning   - Pressure reducing foam mattress   - Turn patient every two hours. Use wedge pillows to anchor patient          Medications: Discontinue all previous medication orders, if any. See new list below.        Mr. Jose De Jesus Allan   Home Medication Instructions  [facility]:[id number]    Printed on:08/24/18 1309   Medication Information                      acetaminophen (TYLENOL) 325 MG tablet  Take 2 tablets (650 mg total) by mouth every 6 (six) hours as needed for Pain or Temperature greater than (100).             albuterol sulfate 2.5 mg/0.5 mL Nebu  Take 2.5 mg by nebulization every 4 (four) hours as needed for wheezing or SOB             aluminum & magnesium hydroxide-simethicone (MYLANTA MAX STRENGTH) 400-400-40 mg/5 mL suspension  Take 30 mLs by mouth every 6 (six) hours as needed for Indigestion.             amoxicillin-clavulanate 875-125mg (AUGMENTIN) 875-125 mg per tablet  Take 1 tablet by mouth 2 (two) times daily. for 3 days             aspirin (ECOTRIN) 81 MG EC tablet  Take 81 mg by mouth once daily.             benzonatate (TESSALON) 100 MG capsule  Take 100 mg by mouth 3 (three) times daily as needed for Cough.             BREO ELLIPTA 100-25 mcg/dose diskus inhaler  Inhale 1 puff into the lungs once daily.              busPIRone (BUSPAR) 5 MG Tab  Take 5 mg by mouth 2 (two) times daily.             clobetasol 0.05% (TEMOVATE) 0.05 % Oint  daily as needed. To face and neck             cyanocobalamin (VITAMIN B-12) 1000 MCG tablet  Take 1,000 mcg by mouth once daily.              docusate sodium (COLACE) 100 MG capsule  Take 100 mg by mouth 2 (two) times daily.             donepezil (ARICEPT) 10 MG tablet  Take 10 mg by mouth every evening.             escitalopram oxalate (LEXAPRO) 10 MG tablet  Take 10 mg by mouth every morning.              ferrous sulfate 325 (65 FE) MG EC tablet  Take 325 mg by mouth 2 (two) times daily.             finasteride (PROSCAR) 5 mg tablet  Take 1 tablet (5 mg total) by mouth once daily.             fish oil-omega-3 fatty acids 300-1,000 mg capsule  Take 1 g by mouth every morning.              gabapentin (NEURONTIN) 100 MG capsule  Take 100 mg by mouth 3 (three) times daily.             GUAIFENESIN/DEXTROMETHORPHAN (ROBITUSSIN-DM  ORAL)  Take 10 mLs by mouth every 6 (six) hours as needed.  For cough           HYDROcodone-acetaminophen (NORCO) 5-325 mg per tablet  Take 1 tablet by mouth every 6 (six) hours as needed for severe pain.                        latanoprost 0.005 % ophthalmic solution  Place 1 drop into both eyes every evening.              lisinopril (PRINIVIL,ZESTRIL) 5 MG tablet  Take 5 mg by mouth every morning. Hold for SBP < 120             LORazepam (ATIVAN) 0.5 MG tablet  Take 1 tablet (0.5 mg total) by mouth 2 (two) times daily as needed for Anxiety.             magnesium hydroxide 400 mg/5 ml (MILK OF MAGNESIA) 400 mg/5 mL Susp  Take 30 mLs by mouth every evening.              mirtazapine (REMERON) 15 MG tablet  Take 15 mg by mouth every evening.              multivitamin (ONE DAILY MULTIVITAMIN) per tablet  Take 1 tablet by mouth once daily.             NAMENDA XR 14 mg CSpX  Take 14 mg by mouth once daily.              NITROSTAT 0.3 mg SL tablet  Place 0.3 mg under the tongue every 5 (five) minutes as needed for Chest pain.              omeprazole (PRILOSEC) 40 MG capsule  Take 40 mg by mouth every morning.             pravastatin (PRAVACHOL) 40 MG tablet  Take 40 mg by mouth every evening.              tamsulosin (FLOMAX) 0.4 mg Cp24  Take 1 capsule (0.4 mg total) by mouth once daily.             tiotropium (SPIRIVA) 18 mcg inhalation capsule  Inhale 1 capsule (18 mcg total) into the lungs once daily.                        _________________________________  Darwin Palafox MD  08/24/2018

## 2018-08-24 NOTE — NURSING
Md notified of elevated /72, pt asymptomatic and complaints of itching. New orders given to monitor BP and for PO benadryl. Will cont to monitor.

## 2018-08-24 NOTE — PROGRESS NOTES
Gastroenterology Progress Note    Reason for Consult: gallstone pancreatitis    Subjective:  His bilirubin is still trending down. No fevers/chills.  Blood cultures are growing GNRs.  He remains on cefepime.  He denies N/V, abdominal pain today.  He tolerated 2 liquid meals yesterday without issues.  He would like to go home.    ROS:  Gen: no F/C  CV: no palpitations or CP  Resp: no SOB/wheezing    Physical Exam  Vitals:    08/24/18 0802   BP:    Pulse: 74   Resp: 18   Temp:      Physical Exam   Constitutional: He appears well-developed and well-nourished. No distress.   HENT:   Head: Normocephalic and atraumatic.   Mouth/Throat: Oropharynx is clear and moist.   Eyes: EOM are normal. Pupils are equal, round, and reactive to light. No scleral icterus.   Cardiovascular: Normal rate, regular rhythm and normal heart sounds.   Pulmonary/Chest: Effort normal and breath sounds normal. No respiratory distress.   Abdominal: Soft. Bowel sounds are normal. There is no tenderness. There is no rebound and no guarding.   Musculoskeletal: Normal range of motion. He exhibits no edema.   Neurological: He is alert. No sensory deficit.   Skin: Skin is warm and dry. No rash noted.       Medications/Infusions:  Current Facility-Administered Medications   Medication Dose Route Frequency Provider Last Rate Last Dose    0.9%  NaCl infusion   Intravenous Continuous Darwin Palafox MD 75 mL/hr at 08/23/18 1021      acetaminophen tablet 650 mg  650 mg Oral Q4H PRN Darwin Palafox MD        albuterol-ipratropium 2.5 mg-0.5 mg/3 mL nebulizer solution 3 mL  3 mL Nebulization Q4H PRN Darwin Palafox MD        bisacodyl suppository 10 mg  10 mg Rectal Daily PRN Layton Faustin MD        busPIRone tablet 5 mg  5 mg Oral BID Layton Faustin MD   5 mg at 08/23/18 2015    cefepime in dextrose 5 % 1 gram/50 mL IVPB 1 g  1 g Intravenous Q12H Darwin Palafox  mL/hr at 08/23/18 2016 1 g at 08/23/18 2016    donepezil tablet 10  mg  10 mg Oral QHS Layton Faustin MD   10 mg at 08/23/18 2015    escitalopram oxalate tablet 10 mg  10 mg Oral SHAYM Layton Faustin MD   10 mg at 08/24/18 0640    finasteride tablet 5 mg  5 mg Oral Daily Layton Faustin MD   5 mg at 08/23/18 0924    fluticasone-vilanterol 100-25 mcg/dose diskus inhaler 1 puff  1 puff Inhalation Daily Layton Faustin MD   1 puff at 08/24/18 0802    gabapentin capsule 100 mg  100 mg Oral TID Layton Faustin MD   100 mg at 08/23/18 2022    latanoprost 0.005 % ophthalmic solution 1 drop  1 drop Both Eyes QHS Layton Faustin MD   1 drop at 08/23/18 2020    lisinopril tablet 5 mg  5 mg Oral LUZ Faustin MD   5 mg at 08/24/18 0640    memantine tablet 5 mg  5 mg Oral Daily Layton Faustin MD   5 mg at 08/23/18 0924    mineral oil enema 1 enema  1 enema Rectal Daily PRN Layton Faustin MD        morphine injection 2 mg  2 mg Intravenous Q4H PRN Darwin Palafox MD        nitroGLYCERIN SL tablet 0.3 mg  0.3 mg Sublingual Q5 Min PRN Layton Faustin MD        ondansetron injection 8 mg  8 mg Intravenous Q8H PRN Layton Faustin MD        oxyCODONE immediate release tablet 5 mg  5 mg Oral Q4H PRN Darwin Palafox MD   5 mg at 08/24/18 0640    pantoprazole 40 mg in dextrose 5 % 100 mL infusion (ready to mix system)  40 mg Intravenous Daily Layton Faustin MD   40 mg at 08/23/18 0923    polyethylene glycol packet 17 g  17 g Oral Daily PRN Layton Faustin MD        promethazine suppository 25 mg  25 mg Rectal Q6H PRN Layton Faustin MD        senna-docusate 8.6-50 mg per tablet 1 tablet  1 tablet Oral Daily Layton Faustin MD   1 tablet at 08/23/18 0924    sodium chloride 0.9% flush 3 mL  3 mL Intravenous Q8H Layton Faustin MD   3 mL at 08/23/18 1400    tamsulosin 24 hr capsule 0.4 mg  0.4 mg Oral Daily Layton Faustin MD   0.4 mg at 08/23/18 0924    tiotropium inhalation capsule 18 mcg  1 capsule  Inhalation Daily Layton Faustin MD   18 mcg at 08/24/18 0802       Intake and Output:    Intake/Output Summary (Last 24 hours) at 8/24/2018 0851  Last data filed at 8/24/2018 0600  Gross per 24 hour   Intake 2991 ml   Output 750 ml   Net 2241 ml       Labs:  Lab Results   Component Value Date/Time    WBC 5.77 08/24/2018 04:38 AM    HGB 9.9 (L) 08/24/2018 04:38 AM    HCT 31.2 (L) 08/24/2018 04:38 AM     08/24/2018 04:38 AM    MCV 93 08/24/2018 04:38 AM     (L) 08/24/2018 04:38 AM    K 4.0 08/24/2018 04:38 AM     08/24/2018 04:38 AM    CO2 25 08/24/2018 04:38 AM    BUN 13 08/24/2018 04:38 AM    CREATININE 0.9 08/24/2018 04:38 AM    GLU 70 08/24/2018 04:38 AM    CALCIUM 8.8 08/24/2018 04:38 AM    MG 1.7 08/23/2018 04:13 AM    PHOS 2.6 (L) 08/23/2018 04:13 AM    INR 1.0 08/22/2018 03:32 AM    APTT 25.7 03/29/2018 06:32 PM   ]  Lab Results   Component Value Date/Time    PROT 5.8 (L) 08/24/2018 04:38 AM    ALBUMIN 2.5 (L) 08/24/2018 04:38 AM    BILITOT 2.5 (H) 08/24/2018 04:38 AM    BILIDIR 0.3 02/03/2017 04:43 AM    AST 94 (H) 08/24/2018 04:38 AM     (H) 08/24/2018 04:38 AM    ALKPHOS 553 (H) 08/24/2018 04:38 AM   ]    Imaging and Procedures:  Labs and imaging results were reviewed.  ERCP 8/22/18:  - The majory papilla was in an altered location due to a previous Billroth 2 procedure. A previous sphincterotomy was seen.   - Choledocholithiasis was found. Complete removal was accomplished by biliary sphincterotomy and balloon extraction.  - A gastroscope then a pediatric colonoscope were used to perform the procedure as the side-viewing duodenoscopy could not be advance to the ampulla.    Assessment:  Fidel Allan is a 89 y.o. male with a history of CAD s/p CABG, CHF, prior Billroth II surgery, cholecystectomy and vascular dementia who presented with abdominal pain and nausea.  He was found to have gallstone pancreatitis with a 12 mm stone in the CBD.    Plan:  - s/p ERCP with slowly  improving LFTs  - continue IV fluids and anti-emetics as needed  - continue cefepime for GNR bacteremia - could hopefully transition to PO antibiotics once culture results are back  - advance diet as tolerated  - please call back if LFTs do not continue to trend down    Shana Higgins MD

## 2018-08-24 NOTE — SUBJECTIVE & OBJECTIVE
Interval History: Feeling better. Wants to go home.    Review of Systems   HENT: Negative for ear discharge and ear pain.    Eyes: Negative for pain and itching.   Genitourinary: Negative for difficulty urinating and dysuria.   Neurological: Negative for seizures and syncope.     Objective:     Vital Signs (Most Recent):  Temp: 98 °F (36.7 °C) (08/24/18 1144)  Pulse: 60 (08/24/18 1144)  Resp: 16 (08/24/18 1144)  BP: (!) 161/73 (08/24/18 1144)  SpO2: 96 % (08/24/18 1144) Vital Signs (24h Range):  Temp:  [97.9 °F (36.6 °C)-98.1 °F (36.7 °C)] 98 °F (36.7 °C)  Pulse:  [59-74] 60  Resp:  [16-20] 16  SpO2:  [96 %-99 %] 96 %  BP: (124-178)/(58-81) 161/73     Weight: 68.5 kg (151 lb 1 oz)  Body mass index is 25.14 kg/m².    Intake/Output Summary (Last 24 hours) at 8/24/2018 1256  Last data filed at 8/24/2018 1235  Gross per 24 hour   Intake 2751 ml   Output 1200 ml   Net 1551 ml      Physical Exam   Constitutional: He is oriented to person, place, and time. No distress.   Elderly   HENT:   Head: Normocephalic and atraumatic.   Eyes: Conjunctivae are normal. Right eye exhibits no discharge. Left eye exhibits no discharge.   Neck: Neck supple.   Cardiovascular: Normal rate, regular rhythm and normal heart sounds.   Pulmonary/Chest: Effort normal and breath sounds normal.   Abdominal: Soft. He exhibits no distension. There is no guarding.   Decreased tenderness to palpation over mid epigastric region   Musculoskeletal: Normal range of motion. He exhibits no deformity.   Neurological: He is alert and oriented to person, place, and time.   Skin: Skin is warm and dry.       Significant Labs:   CBC:   Recent Labs   Lab  08/23/18 0413 08/24/18   0438   WBC  6.81  5.77   HGB  9.6*  9.9*   HCT  29.5*  31.2*   PLT  200  217     CMP:   Recent Labs   Lab  08/23/18 0413 08/24/18   0438   NA  136  135*   K  4.4  4.0   CL  107  106   CO2  24  25   GLU  70  70   BUN  20  13   CREATININE  1.1  0.9   CALCIUM  8.3*  8.8   PROT  5.6*   5.8*   ALBUMIN  2.4*  2.5*   BILITOT  3.4*  2.5*   ALKPHOS  503*  553*   AST  83*  94*   ALT  115*  114*   ANIONGAP  5*  4*   EGFRNONAA  59*  >60     Lipase:   Recent Labs   Lab  08/23/18   0413  08/24/18   0438   LIPASE  421*  238*       Significant Imaging: I have reviewed all pertinent imaging results/findings within the past 24 hours.

## 2018-08-25 LAB
ALBUMIN SERPL BCP-MCNC: 2.4 G/DL
ALP SERPL-CCNC: 561 U/L
ALT SERPL W/O P-5'-P-CCNC: 97 U/L
ANION GAP SERPL CALC-SCNC: 6 MMOL/L
AST SERPL-CCNC: 74 U/L
BASOPHILS # BLD AUTO: 0.04 K/UL
BASOPHILS NFR BLD: 0.6 %
BILIRUB SERPL-MCNC: 2.2 MG/DL
BUN SERPL-MCNC: 11 MG/DL
CALCIUM SERPL-MCNC: 8.5 MG/DL
CHLORIDE SERPL-SCNC: 107 MMOL/L
CO2 SERPL-SCNC: 26 MMOL/L
CREAT SERPL-MCNC: 0.8 MG/DL
DIFFERENTIAL METHOD: ABNORMAL
EOSINOPHIL # BLD AUTO: 0.1 K/UL
EOSINOPHIL NFR BLD: 0.9 %
ERYTHROCYTE [DISTWIDTH] IN BLOOD BY AUTOMATED COUNT: 15 %
EST. GFR  (AFRICAN AMERICAN): >60 ML/MIN/1.73 M^2
EST. GFR  (NON AFRICAN AMERICAN): >60 ML/MIN/1.73 M^2
GLUCOSE SERPL-MCNC: 98 MG/DL
HCT VFR BLD AUTO: 29.9 %
HGB BLD-MCNC: 9.8 G/DL
LIPASE SERPL-CCNC: 121 U/L
LYMPHOCYTES # BLD AUTO: 1.6 K/UL
LYMPHOCYTES NFR BLD: 24.3 %
MCH RBC QN AUTO: 29.6 PG
MCHC RBC AUTO-ENTMCNC: 32.8 G/DL
MCV RBC AUTO: 90 FL
MONOCYTES # BLD AUTO: 0.7 K/UL
MONOCYTES NFR BLD: 9.8 %
NEUTROPHILS # BLD AUTO: 4.3 K/UL
NEUTROPHILS NFR BLD: 64.4 %
PLATELET # BLD AUTO: 272 K/UL
PMV BLD AUTO: 10.3 FL
POTASSIUM SERPL-SCNC: 4.1 MMOL/L
PROT SERPL-MCNC: 5.6 G/DL
RBC # BLD AUTO: 3.31 M/UL
SODIUM SERPL-SCNC: 139 MMOL/L
WBC # BLD AUTO: 6.62 K/UL

## 2018-08-25 PROCEDURE — 83690 ASSAY OF LIPASE: CPT

## 2018-08-25 PROCEDURE — 25000003 PHARM REV CODE 250: Performed by: HOSPITALIST

## 2018-08-25 PROCEDURE — A4216 STERILE WATER/SALINE, 10 ML: HCPCS | Performed by: HOSPITALIST

## 2018-08-25 PROCEDURE — 94640 AIRWAY INHALATION TREATMENT: CPT

## 2018-08-25 PROCEDURE — 94761 N-INVAS EAR/PLS OXIMETRY MLT: CPT

## 2018-08-25 PROCEDURE — C9113 INJ PANTOPRAZOLE SODIUM, VIA: HCPCS | Performed by: HOSPITALIST

## 2018-08-25 PROCEDURE — 63600175 PHARM REV CODE 636 W HCPCS: Performed by: HOSPITALIST

## 2018-08-25 PROCEDURE — 27000221 HC OXYGEN, UP TO 24 HOURS

## 2018-08-25 PROCEDURE — 11000001 HC ACUTE MED/SURG PRIVATE ROOM

## 2018-08-25 PROCEDURE — 80053 COMPREHEN METABOLIC PANEL: CPT

## 2018-08-25 PROCEDURE — 36415 COLL VENOUS BLD VENIPUNCTURE: CPT

## 2018-08-25 PROCEDURE — 85025 COMPLETE CBC W/AUTO DIFF WBC: CPT

## 2018-08-25 RX ORDER — CLONIDINE HYDROCHLORIDE 0.1 MG/1
0.1 TABLET ORAL EVERY 8 HOURS PRN
Status: DISCONTINUED | OUTPATIENT
Start: 2018-08-25 | End: 2018-08-26 | Stop reason: HOSPADM

## 2018-08-25 RX ORDER — LISINOPRIL 5 MG/1
10 TABLET ORAL EVERY MORNING
Status: DISCONTINUED | OUTPATIENT
Start: 2018-08-26 | End: 2018-08-26 | Stop reason: HOSPADM

## 2018-08-25 RX ADMIN — DEXTROSE 40 MG: 50 INJECTION, SOLUTION INTRAVENOUS at 08:08

## 2018-08-25 RX ADMIN — ESCITALOPRAM OXALATE 10 MG: 10 TABLET ORAL at 06:08

## 2018-08-25 RX ADMIN — FINASTERIDE 5 MG: 5 TABLET, FILM COATED ORAL at 08:08

## 2018-08-25 RX ADMIN — DOCUSATE SODIUM AND SENNOSIDES 1 TABLET: 8.6; 5 TABLET, FILM COATED ORAL at 09:08

## 2018-08-25 RX ADMIN — BUSPIRONE HYDROCHLORIDE 5 MG: 5 TABLET ORAL at 08:08

## 2018-08-25 RX ADMIN — CEFEPIME 1 G: 1 INJECTION, POWDER, FOR SOLUTION INTRAMUSCULAR; INTRAVENOUS at 07:08

## 2018-08-25 RX ADMIN — MEMANTINE HYDROCHLORIDE 5 MG: 5 TABLET ORAL at 09:08

## 2018-08-25 RX ADMIN — TIOTROPIUM BROMIDE 18 MCG: 18 CAPSULE ORAL; RESPIRATORY (INHALATION) at 08:08

## 2018-08-25 RX ADMIN — GABAPENTIN 100 MG: 100 CAPSULE ORAL at 08:08

## 2018-08-25 RX ADMIN — CEFEPIME 1 G: 1 INJECTION, POWDER, FOR SOLUTION INTRAMUSCULAR; INTRAVENOUS at 08:08

## 2018-08-25 RX ADMIN — Medication 3 ML: at 05:08

## 2018-08-25 RX ADMIN — FLUTICASONE FUROATE AND VILANTEROL TRIFENATATE 1 PUFF: 100; 25 POWDER RESPIRATORY (INHALATION) at 08:08

## 2018-08-25 RX ADMIN — LATANOPROST 1 DROP: 50 SOLUTION/ DROPS OPHTHALMIC at 08:08

## 2018-08-25 RX ADMIN — DONEPEZIL HYDROCHLORIDE 10 MG: 10 TABLET, FILM COATED ORAL at 08:08

## 2018-08-25 RX ADMIN — LISINOPRIL 5 MG: 5 TABLET ORAL at 06:08

## 2018-08-25 RX ADMIN — CLONIDINE HYDROCHLORIDE 0.1 MG: 0.1 TABLET ORAL at 08:08

## 2018-08-25 RX ADMIN — SODIUM CHLORIDE: 0.9 INJECTION, SOLUTION INTRAVENOUS at 04:08

## 2018-08-25 RX ADMIN — GABAPENTIN 100 MG: 100 CAPSULE ORAL at 02:08

## 2018-08-25 RX ADMIN — OXYCODONE HYDROCHLORIDE 5 MG: 5 TABLET ORAL at 06:08

## 2018-08-25 RX ADMIN — TAMSULOSIN HYDROCHLORIDE 0.4 MG: 0.4 CAPSULE ORAL at 09:08

## 2018-08-25 NOTE — ASSESSMENT & PLAN NOTE
Patient noted to have choledocholithiasis with acute pancreatitis.  Hx of cholecystectomy.  Placed NPO.  IVF's, anti emetics and pain control.  GI consulted.  ERCP with stone removal.  Slowly improving lipase.  Tolerating clear liquid diet.  Advanced diet.  GNR bacteremia--1/4 cultures.  Continue Cefepime.    Repeat BCx negative, but still awaiting GNR id and sensitivities.  Back to NH once results come back and able to choose ABx.

## 2018-08-25 NOTE — PROGRESS NOTES
[facility] Hospital Medicine  Progress Note    Patient Name: Fidel Allan  MRN: [id number]  Patient Class: IP- Inpatient   Admission Date: 8/21/2018  Length of Stay: 4 days  Attending Physician: Darwin Palafox MD  Primary Care Provider: Memo Nieto MD        Subjective:     Principal Problem:Acute gallstone pancreatitis    HPI:  Fidel Allan is a 89 y.o. male that (in part) has a past medical history of coronary artery disease status post CABG, CHF, biliary obstruction with cholecystectomy, and colectomy who presents to [facility] Emergency Department who presents to the ER with epigastric pain that started over the last day with some nausea.   history of limited due to underlying vascular dementia.  Patient is a nursing home resident.      In the emergency department he was found to have a 12mm stone in the CBD with severe obstruction and acute pancreatitis.   lipase levels are greater than 4000.  No fever leukocytosis.  Patient remained hemodynamically stable.     Dr. Owens with GI at [facility] was contacted, who was willing to assist in the case after discussing it with Dr. Alicea.  He has no signs of acute cholangitis.  He was given empiric Rocephin and Flagyl at the ED.  Transfer center was contacted for admission to [facility].    Hospital medicine has been asked to admit for further evaluation and treatment.     Hospital Course:  89 y/o male admitted with choledocholithiasis and acute pancreatitis.  Placed NPO.  Started on IVF's, ABx's, anti emetics and pain control.  GI consulted.  ERCP on 8/22 with stone removal.  Started on clear liquid diet and tolerated.  BCx growing 1/4 GNR.  Possible GNR bacteremia and continue on Cefepime.  Repeat BCx obtained.  Repeat BCx negative, but awaiting GNR id and sensitivities.    Interval History: No complaints.    Review of Systems   HENT: Negative for ear discharge and ear pain.    Eyes: Negative for pain and  itching.   Genitourinary: Negative for difficulty urinating and dysuria.   Neurological: Negative for seizures and syncope.     Objective:     Vital Signs (Most Recent):  Temp: 98.2 °F (36.8 °C) (08/25/18 0820)  Pulse: 61 (08/25/18 0820)  Resp: 19 (08/25/18 0820)  BP: (!) 169/72 (08/25/18 0820)  SpO2: (!) 93 % (08/25/18 0820) Vital Signs (24h Range):  Temp:  [97.8 °F (36.6 °C)-98.2 °F (36.8 °C)] 98.2 °F (36.8 °C)  Pulse:  [59-61] 61  Resp:  [16-19] 19  SpO2:  [93 %-100 %] 93 %  BP: (163-192)/(72-79) 169/72     Weight: 68.5 kg (151 lb 1 oz)  Body mass index is 25.14 kg/m².    Intake/Output Summary (Last 24 hours) at 8/25/2018 1312  Last data filed at 8/25/2018 0904  Gross per 24 hour   Intake 1310 ml   Output 1206 ml   Net 104 ml      Physical Exam   Constitutional: He is oriented to person, place, and time. No distress.   Elderly   HENT:   Head: Normocephalic and atraumatic.   Eyes: Conjunctivae are normal. Right eye exhibits no discharge. Left eye exhibits no discharge.   Neck: Neck supple.   Cardiovascular: Normal rate, regular rhythm and normal heart sounds.   Pulmonary/Chest: Effort normal and breath sounds normal.   Abdominal: Soft. He exhibits no distension. There is no guarding.   Decreased tenderness to palpation over mid epigastric region   Musculoskeletal: Normal range of motion. He exhibits no deformity.   Neurological: He is alert and oriented to person, place, and time.   Skin: Skin is warm and dry.       Significant Labs:   CBC:   Recent Labs   Lab  08/24/18 0438  08/25/18 0420   WBC  5.77  6.62   HGB  9.9*  9.8*   HCT  31.2*  29.9*   PLT  217  272     CMP:   Recent Labs   Lab  08/24/18   0438  08/25/18   0420   NA  135*  139   K  4.0  4.1   CL  106  107   CO2  25  26   GLU  70  98   BUN  13  11   CREATININE  0.9  0.8   CALCIUM  8.8  8.5*   PROT  5.8*  5.6*   ALBUMIN  2.5*  2.4*   BILITOT  2.5*  2.2*   ALKPHOS  553*  561*   AST  94*  74*   ALT  114*  97*   ANIONGAP  4*  6*   EGFRNONAA  >60  >60      Lipase:   Recent Labs   Lab  08/24/18   0438  08/25/18   0420   LIPASE  238*  121*       Significant Imaging: I have reviewed all pertinent imaging results/findings within the past 24 hours.    Assessment/Plan:      * Acute gallstone pancreatitis    Patient noted to have choledocholithiasis with acute pancreatitis.  Hx of cholecystectomy.  Placed NPO.  IVF's, anti emetics and pain control.  GI consulted.  ERCP with stone removal.  Slowly improving lipase.  Tolerating clear liquid diet.  Advanced diet.  GNR bacteremia--1/4 cultures.  Continue Cefepime.    Repeat BCx negative, but still awaiting GNR id and sensitivities.  Back to NH once results come back and able to choose ABx.          Common bile duct stone    As above.          Chronic diastolic heart failure    No evidence of acute decompensation.          Essential hypertension    Continue home regimen.          Coronary artery disease involving coronary bypass graft of native heart without angina pectoris              Stage 3 chronic kidney disease    Creat at baseline.  Avoid nephrotoxic medications.        COPD (chronic obstructive pulmonary disease)    Stable.  Prn Duonebs.          Vascular dementia    Seems at baseline.          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

## 2018-08-25 NOTE — PLAN OF CARE
Problem: Patient Care Overview  Goal: Plan of Care Review  Outcome: Ongoing (interventions implemented as appropriate)  Monitored freq.throughout the shift. Pt.resting at present with no complaints & no acute distress noted. Iv fluids & antibiotics cont.as ordered. Call light in reach.

## 2018-08-25 NOTE — SUBJECTIVE & OBJECTIVE
Interval History: No complaints.    Review of Systems   HENT: Negative for ear discharge and ear pain.    Eyes: Negative for pain and itching.   Genitourinary: Negative for difficulty urinating and dysuria.   Neurological: Negative for seizures and syncope.     Objective:     Vital Signs (Most Recent):  Temp: 98.2 °F (36.8 °C) (08/25/18 0820)  Pulse: 61 (08/25/18 0820)  Resp: 19 (08/25/18 0820)  BP: (!) 169/72 (08/25/18 0820)  SpO2: (!) 93 % (08/25/18 0820) Vital Signs (24h Range):  Temp:  [97.8 °F (36.6 °C)-98.2 °F (36.8 °C)] 98.2 °F (36.8 °C)  Pulse:  [59-61] 61  Resp:  [16-19] 19  SpO2:  [93 %-100 %] 93 %  BP: (163-192)/(72-79) 169/72     Weight: 68.5 kg (151 lb 1 oz)  Body mass index is 25.14 kg/m².    Intake/Output Summary (Last 24 hours) at 8/25/2018 1312  Last data filed at 8/25/2018 0904  Gross per 24 hour   Intake 1310 ml   Output 1206 ml   Net 104 ml      Physical Exam   Constitutional: He is oriented to person, place, and time. No distress.   Elderly   HENT:   Head: Normocephalic and atraumatic.   Eyes: Conjunctivae are normal. Right eye exhibits no discharge. Left eye exhibits no discharge.   Neck: Neck supple.   Cardiovascular: Normal rate, regular rhythm and normal heart sounds.   Pulmonary/Chest: Effort normal and breath sounds normal.   Abdominal: Soft. He exhibits no distension. There is no guarding.   Decreased tenderness to palpation over mid epigastric region   Musculoskeletal: Normal range of motion. He exhibits no deformity.   Neurological: He is alert and oriented to person, place, and time.   Skin: Skin is warm and dry.       Significant Labs:   CBC:   Recent Labs   Lab  08/24/18   0438  08/25/18   0420   WBC  5.77  6.62   HGB  9.9*  9.8*   HCT  31.2*  29.9*   PLT  217  272     CMP:   Recent Labs   Lab  08/24/18 0438  08/25/18   0420   NA  135*  139   K  4.0  4.1   CL  106  107   CO2  25  26   GLU  70  98   BUN  13  11   CREATININE  0.9  0.8   CALCIUM  8.8  8.5*   PROT  5.8*  5.6*   ALBUMIN   2.5*  2.4*   BILITOT  2.5*  2.2*   ALKPHOS  553*  561*   AST  94*  74*   ALT  114*  97*   ANIONGAP  4*  6*   EGFRNONAA  >60  >60     Lipase:   Recent Labs   Lab  08/24/18   0438  08/25/18   0420   LIPASE  238*  121*       Significant Imaging: I have reviewed all pertinent imaging results/findings within the past 24 hours.

## 2018-08-25 NOTE — PLAN OF CARE
Problem: Patient Care Overview  Goal: Plan of Care Review  Outcome: Ongoing (interventions implemented as appropriate)   08/25/18 0245   Coping/Psychosocial   Plan Of Care Reviewed With patient   Resting quietly during night, continues with iv fluids and iv abx as ordered. Took po meds with thickened liquids, tolerating diet. Voiding per urinal.    Problem: Fall Risk (Adult)  Goal: Absence of Falls  Patient will demonstrate the desired outcomes by discharge/transition of care.  Outcome: Ongoing (interventions implemented as appropriate)   08/25/18 0245   Fall Risk (Adult)   Absence of Falls making progress toward outcome   Located close to nursing station with bed alarm on, instructed patient to call for assistance when needed.    Problem: Pain, Acute (Adult)  Goal: Acceptable Pain Control/Comfort Level  Patient will demonstrate the desired outcomes by discharge/transition of care.  Outcome: Ongoing (interventions implemented as appropriate)   08/25/18 0245   Pain, Acute (Adult)   Acceptable Pain Control/Comfort Level making progress toward outcome   No report of pain  or discomfort during night.

## 2018-08-26 VITALS
RESPIRATION RATE: 17 BRPM | SYSTOLIC BLOOD PRESSURE: 146 MMHG | HEIGHT: 65 IN | DIASTOLIC BLOOD PRESSURE: 64 MMHG | HEART RATE: 60 BPM | TEMPERATURE: 98 F | WEIGHT: 151.06 LBS | OXYGEN SATURATION: 98 % | BODY MASS INDEX: 25.17 KG/M2

## 2018-08-26 PROBLEM — K80.50 COMMON BILE DUCT STONE: Status: RESOLVED | Noted: 2018-08-21 | Resolved: 2018-08-26

## 2018-08-26 PROBLEM — K85.10 ACUTE GALLSTONE PANCREATITIS: Status: RESOLVED | Noted: 2018-08-21 | Resolved: 2018-08-26

## 2018-08-26 LAB
ALBUMIN SERPL BCP-MCNC: 2.3 G/DL
ALP SERPL-CCNC: 467 U/L
ALT SERPL W/O P-5'-P-CCNC: 87 U/L
ANION GAP SERPL CALC-SCNC: 5 MMOL/L
AST SERPL-CCNC: 74 U/L
BASOPHILS # BLD AUTO: 0.05 K/UL
BASOPHILS NFR BLD: 0.9 %
BILIRUB SERPL-MCNC: 2 MG/DL
BUN SERPL-MCNC: 8 MG/DL
CALCIUM SERPL-MCNC: 8.6 MG/DL
CHLORIDE SERPL-SCNC: 105 MMOL/L
CO2 SERPL-SCNC: 27 MMOL/L
CREAT SERPL-MCNC: 0.8 MG/DL
DIFFERENTIAL METHOD: ABNORMAL
EOSINOPHIL # BLD AUTO: 0.1 K/UL
EOSINOPHIL NFR BLD: 1.8 %
ERYTHROCYTE [DISTWIDTH] IN BLOOD BY AUTOMATED COUNT: 14.6 %
EST. GFR  (AFRICAN AMERICAN): >60 ML/MIN/1.73 M^2
EST. GFR  (NON AFRICAN AMERICAN): >60 ML/MIN/1.73 M^2
GLUCOSE SERPL-MCNC: 98 MG/DL
HCT VFR BLD AUTO: 30 %
HGB BLD-MCNC: 9.8 G/DL
LIPASE SERPL-CCNC: 62 U/L
LYMPHOCYTES # BLD AUTO: 1.9 K/UL
LYMPHOCYTES NFR BLD: 34 %
MCH RBC QN AUTO: 29.3 PG
MCHC RBC AUTO-ENTMCNC: 32.7 G/DL
MCV RBC AUTO: 90 FL
MONOCYTES # BLD AUTO: 0.8 K/UL
MONOCYTES NFR BLD: 14 %
NEUTROPHILS # BLD AUTO: 2.8 K/UL
NEUTROPHILS NFR BLD: 49.3 %
PLATELET # BLD AUTO: 254 K/UL
PMV BLD AUTO: 10 FL
POTASSIUM SERPL-SCNC: 3.6 MMOL/L
PROT SERPL-MCNC: 5.6 G/DL
RBC # BLD AUTO: 3.35 M/UL
SODIUM SERPL-SCNC: 137 MMOL/L
WBC # BLD AUTO: 5.7 K/UL

## 2018-08-26 PROCEDURE — C9113 INJ PANTOPRAZOLE SODIUM, VIA: HCPCS | Performed by: HOSPITALIST

## 2018-08-26 PROCEDURE — 25000003 PHARM REV CODE 250: Performed by: HOSPITALIST

## 2018-08-26 PROCEDURE — 36415 COLL VENOUS BLD VENIPUNCTURE: CPT

## 2018-08-26 PROCEDURE — 94761 N-INVAS EAR/PLS OXIMETRY MLT: CPT

## 2018-08-26 PROCEDURE — 83690 ASSAY OF LIPASE: CPT

## 2018-08-26 PROCEDURE — 27000221 HC OXYGEN, UP TO 24 HOURS

## 2018-08-26 PROCEDURE — 85025 COMPLETE CBC W/AUTO DIFF WBC: CPT

## 2018-08-26 PROCEDURE — 94640 AIRWAY INHALATION TREATMENT: CPT

## 2018-08-26 PROCEDURE — 63600175 PHARM REV CODE 636 W HCPCS: Performed by: HOSPITALIST

## 2018-08-26 PROCEDURE — 80053 COMPREHEN METABOLIC PANEL: CPT

## 2018-08-26 RX ADMIN — ESCITALOPRAM OXALATE 10 MG: 10 TABLET ORAL at 09:08

## 2018-08-26 RX ADMIN — CLONIDINE HYDROCHLORIDE 0.1 MG: 0.1 TABLET ORAL at 06:08

## 2018-08-26 RX ADMIN — CEFEPIME 1 G: 1 INJECTION, POWDER, FOR SOLUTION INTRAMUSCULAR; INTRAVENOUS at 09:08

## 2018-08-26 RX ADMIN — DOCUSATE SODIUM AND SENNOSIDES 1 TABLET: 8.6; 5 TABLET, FILM COATED ORAL at 09:08

## 2018-08-26 RX ADMIN — BUSPIRONE HYDROCHLORIDE 5 MG: 5 TABLET ORAL at 09:08

## 2018-08-26 RX ADMIN — FLUTICASONE FUROATE AND VILANTEROL TRIFENATATE 1 PUFF: 100; 25 POWDER RESPIRATORY (INHALATION) at 07:08

## 2018-08-26 RX ADMIN — OXYCODONE HYDROCHLORIDE 5 MG: 5 TABLET ORAL at 06:08

## 2018-08-26 RX ADMIN — SODIUM CHLORIDE: 0.9 INJECTION, SOLUTION INTRAVENOUS at 06:08

## 2018-08-26 RX ADMIN — GABAPENTIN 100 MG: 100 CAPSULE ORAL at 02:08

## 2018-08-26 RX ADMIN — MEMANTINE HYDROCHLORIDE 5 MG: 5 TABLET ORAL at 09:08

## 2018-08-26 RX ADMIN — FINASTERIDE 5 MG: 5 TABLET, FILM COATED ORAL at 09:08

## 2018-08-26 RX ADMIN — GABAPENTIN 100 MG: 100 CAPSULE ORAL at 09:08

## 2018-08-26 RX ADMIN — LISINOPRIL 10 MG: 5 TABLET ORAL at 09:08

## 2018-08-26 RX ADMIN — ACETAMINOPHEN 650 MG: 325 TABLET, FILM COATED ORAL at 02:08

## 2018-08-26 RX ADMIN — DEXTROSE 40 MG: 50 INJECTION, SOLUTION INTRAVENOUS at 10:08

## 2018-08-26 RX ADMIN — TAMSULOSIN HYDROCHLORIDE 0.4 MG: 0.4 CAPSULE ORAL at 09:08

## 2018-08-26 RX ADMIN — TIOTROPIUM BROMIDE 18 MCG: 18 CAPSULE ORAL; RESPIRATORY (INHALATION) at 07:08

## 2018-08-26 NOTE — PLAN OF CARE
Problem: Patient Care Overview  Goal: Plan of Care Review  Outcome: Ongoing (interventions implemented as appropriate)  Pt has no c/o pain throughout the night.PRN Clonidine given for elevated BP.Pain managed with PRN meds.Pt has thickened liquids @ bedside.IVFs continued.Teds and SCDs placed.Call bell placed within reach.

## 2018-08-26 NOTE — PROGRESS NOTES
TN called back to [facility] [phone], spoke to Stella says  will be here by 2:30pm with w/c to transport pt to [facility]..Kourtney Washington RN, BSN, [facility]  8/26/2018    TN informed med surg nurse Sagrario of the above..Kourtney Washington RN, BSN, [facility]  8/26/2018

## 2018-08-26 NOTE — PROGRESS NOTES
TN calling [facility] to schedule Wheel Chair van to bring wheel chair. Med surg nurse confirmed that pt can travel per w/c.  Galdinolen with [facility] says she cannot contact her  at present for TN to call back...Kourtney Washington RN, BSN, [facility]  8/26/2018

## 2018-08-26 NOTE — PROGRESS NOTES
TN provided call report to med surg nurse Lynnette to call nurse Joyce Freitas -[phone]..Kourtney Washington RN, BSN, [facility]  8/26/2018

## 2018-08-26 NOTE — DISCHARGE SUMMARY
[facility] Hospital Medicine  Discharge Summary      Patient Name: Fidel Allan  MRN: [id number]  Admission Date: 8/21/2018  Hospital Length of Stay: 5 days  Discharge Date and Time:  08/26/2018 10:09 AM  Attending Physician: Darwin Palafox MD   Discharging Provider: Darwin Palafox MD  Primary Care Provider: Memo Nieto MD      HPI:   Fidel Allan is a 89 y.o. male that (in part) has a past medical history of coronary artery disease status post CABG, CHF, biliary obstruction with cholecystectomy, and colectomy who presents to [facility] Emergency Department who presents to the ER with epigastric pain that started over the last day with some nausea.   history of limited due to underlying vascular dementia.  Patient is a nursing home resident.      In the emergency department he was found to have a 12mm stone in the CBD with severe obstruction and acute pancreatitis.   lipase levels are greater than 4000.  No fever leukocytosis.  Patient remained hemodynamically stable.     Dr. Owens with GI at [facility] was contacted, who was willing to assist in the case after discussing it with Dr. Alicea.  He has no signs of acute cholangitis.  He was given empiric Rocephin and Flagyl at the ED.  Transfer center was contacted for admission to [facility].    Hospital medicine has been asked to admit for further evaluation and treatment.     Procedure(s) (LRB):  ERCP (ENDOSCOPIC RETROGRADE CHOLANGIOPANCREATOGRAPHY) (N/A)      Hospital Course:   89 y/o male admitted with choledocholithiasis and acute pancreatitis.  Placed NPO.  Started on IVF's, ABx's, anti emetics and pain control.  GI consulted.  ERCP on 8/22 with stone removal.  Started on clear liquid diet and tolerated.  BCx growing 1/4 GNR.  Possible GNR bacteremia and continued on Cefepime.  Repeat BCx obtained.  Repeat BCx negative, but awaiting GNR id and sensitivities.  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  Case discussed with ID.  Patient has remained afebrile.  Normal WBC throughout hospital stay.  He is currently tolerating diet and asymptomatic.  Lipase and LFT's now close to normal.  Since repeat BCx negative and we have obtained source control, ID recommends just a few more days of Augmentin and ok to discharge back to NH.  Patient will be discharged to NH once arranged.     Consults:   Consults (From admission, onward)        Status Ordering Provider     Inpatient consult to Gastroenterology  Once     Provider:  Baron Owens MD    Completed ESPERANZA STEPHENS     Inpatient consult to Social Work  Once     Provider:  (Not yet assigned)    Ordered GIGI CACERES          No new Assessment & Plan notes have been filed under this hospital service since the last note was generated.  Service: Hospital Medicine    Final Active Diagnoses:    Diagnosis Date Noted POA    Benign prostatic hyperplasia with urinary hesitancy [N40.1, R39.11] 02/16/2018 Yes    Chronic diastolic heart failure [I50.32] 12/18/2017 Yes    Coronary artery disease involving coronary bypass graft of native heart without angina pectoris [I25.810] 01/30/2017 Yes     Chronic    Essential hypertension [I10] 01/30/2017 Yes     Chronic    Nursing home resident [Z59.3] 01/30/2017 Not Applicable     Chronic    Stage 3 chronic kidney disease [N18.3] 01/29/2017 Yes     Chronic    Vascular dementia [F01.50]  Yes     Chronic    COPD (chronic obstructive pulmonary disease) [J44.9]  Yes     Chronic    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    Specialties:  Nursing Home Agency, SNF Agency  Contact information:  [address]  [facility] [address] [phone]             Memo Nieto MD In 1 week.    Specialty:  Urology  Contact information:  [address] [facility]  [address]  [place] [id number] [phone]                 Patient Instructions:      Diet Cardiac     Notify your health care provider if you experience any of the following:  temperature >100.4     Notify your health care provider if you experience any of the following:  persistent nausea and vomiting or diarrhea     Notify your health care provider if you experience any of the following:  severe uncontrolled pain     Notify your health care provider if you experience any of the following:  difficulty breathing or increased cough     Notify your health care provider if you experience any of the following:  persistent dizziness, light-headedness, or visual disturbances     Notify your health care provider if you experience any of the following:  increased confusion or weakness     Activity as tolerated         Pending Diagnostic Studies:     None         Medications:  Reconciled Home Medications:      Medication List      START taking these medications    amoxicillin-clavulanate 875-125mg 875-125 mg per tablet  Commonly known as:  AUGMENTIN  Take 1 tablet by mouth 2 (two) times daily. for 10 days        CHANGE how you take these medications    acetaminophen 325 MG tablet  Commonly known as:  TYLENOL  Take 2 tablets (650 mg total) by mouth every 6 (six) hours as needed for Pain or Temperature greater than (100).  What changed:    · how much to take  · reasons to take this     albuterol sulfate 2.5 mg/0.5 mL Nebu  Take 2.5 mg by nebulization every 4 (four) hours as needed. Rescue  What changed:  Another medication with the same name was removed. Continue taking this medication, and follow the  directions you see here.     HYDROcodone-acetaminophen 5-325 mg per tablet  Commonly known as:  NORCO  Take 1 tablet by mouth every 6 (six) hours as needed.  What changed:  Another medication with the same name was removed. Continue taking this medication, and follow the directions you see here.     LORazepam 0.5 MG tablet  Commonly known as:  ATIVAN  Take 1 tablet (0.5 mg total) by mouth 2 (two) times daily as needed for Anxiety.  What changed:    · when to take this  · reasons to take this        CONTINUE taking these medications    aluminum & magnesium hydroxide-simethicone 400-400-40 mg/5 mL suspension  Commonly known as:  MYLANTA MAX STRENGTH  Take 30 mLs by mouth every 6 (six) hours as needed for Indigestion.     aspirin 81 MG EC tablet  Commonly known as:  ECOTRIN  Take 81 mg by mouth once daily.     benzonatate 100 MG capsule  Commonly known as:  TESSALON  Take 100 mg by mouth 3 (three) times daily as needed for Cough.     BREO ELLIPTA 100-25 mcg/dose diskus inhaler  Generic drug:  fluticasone-vilanterol  Inhale 1 puff into the lungs once daily.     busPIRone 5 MG Tab  Commonly known as:  BUSPAR  Take 5 mg by mouth 2 (two) times daily.     clobetasol 0.05% 0.05 % Oint  Commonly known as:  TEMOVATE  daily as needed. To face and neck     docusate sodium 100 MG capsule  Commonly known as:  COLACE  Take 100 mg by mouth 2 (two) times daily.     donepezil 10 MG tablet  Commonly known as:  ARICEPT  Take 10 mg by mouth every evening.     escitalopram oxalate 10 MG tablet  Commonly known as:  LEXAPRO  Take 10 mg by mouth every morning.     ferrous sulfate 325 (65 FE) MG EC tablet  Take 325 mg by mouth 2 (two) times daily.     finasteride 5 mg tablet  Commonly known as:  PROSCAR  Take 1 tablet (5 mg total) by mouth once daily.     fish oil-omega-3 fatty acids 300-1,000 mg capsule  Take 1 g by mouth every morning.     gabapentin 100 MG capsule  Commonly known as:  NEURONTIN  Take 100 mg by mouth 3 (three) times  daily.     ketoconazole 2 % shampoo  Commonly known as:  NIZORAL     latanoprost 0.005 % ophthalmic solution  Place 1 drop into both eyes every evening.     lisinopril 5 MG tablet  Commonly known as:  PRINIVIL,ZESTRIL  Take 5 mg by mouth every morning. Hold for SBP < 120     MILK OF MAGNESIA 400 mg/5 mL Susp  Generic drug:  magnesium hydroxide 400 mg/5 ml  Take 30 mLs by mouth every evening.     mirtazapine 15 MG tablet  Commonly known as:  REMERON  Take 15 mg by mouth every evening.     NAMENDA XR 14 mg Cspx  Generic drug:  memantine  Take 14 mg by mouth once daily.     NITROSTAT 0.3 MG SL tablet  Generic drug:  nitroGLYCERIN  Place 0.3 mg under the tongue every 5 (five) minutes as needed for Chest pain.     omeprazole 40 MG capsule  Commonly known as:  PRILOSEC  Take 40 mg by mouth every morning.     ONE DAILY MULTIVITAMIN per tablet  Generic drug:  multivitamin  Take 1 tablet by mouth once daily.     pravastatin 40 MG tablet  Commonly known as:  PRAVACHOL  Take 40 mg by mouth every evening.     ROBITUSSIN-DM ORAL  Take 10 mLs by mouth every 6 (six) hours as needed.     tamsulosin 0.4 mg Cap  Commonly known as:  FLOMAX  Take 1 capsule (0.4 mg total) by mouth once daily.     tiotropium 18 mcg inhalation capsule  Commonly known as:  SPIRIVA  Inhale 1 capsule (18 mcg total) into the lungs once daily. Controller     VITAMIN B-12 1000 MCG tablet  Generic drug:  cyanocobalamin  Take 1,000 mcg by mouth once daily.        STOP taking these medications    sulfamethoxazole-trimethoprim 800-160mg 800-160 mg Tab  Commonly known as:  BACTRIM DS            Indwelling Lines/Drains at time of discharge:   Lines/Drains/Airways          None          Time spent on the discharge of patient: >30 minutes  Patient was seen and examined on the date of discharge and determined to be suitable for discharge.         Darwin Palafox MD  Department of Hospital Medicine  [facility]

## 2018-08-26 NOTE — PROGRESS NOTES
visited with new orders noted. Pt.to transfer back to [facility] today. Report called to Joyce Freitas LPN.  Iv fluids & saline lock d/c. Awaiting transportation.

## 2018-08-26 NOTE — PROGRESS NOTES
TN called [facility] at [phone], spoke to Joyce Freitas LPN who is patient's nurse.   Wanted TN to fax order to [phone].  TN faxed face sheet, plan of care orders H&P, lab, mar, and narcotic RXs..Kourtney Washington RN, BSN, [facility]  8/26/2018

## 2018-08-26 NOTE — DISCHARGE INSTRUCTIONS
Pancreatitis  The pancreas is an organ in the abdomen that secretes digestive juices into the stomach. Pancreatitis is an inflammation of the pancreas. In many cases, it is caused when the duct that connects the pancreas and gallbladder is blocked by a gallstone. Heavy alcohol use can also cause it. Less common causes can include medications, trauma, certain medical procedures, viruses, and toxins. Sometimes the cause of pancreatitis cannot be found.  Symptoms of pancreatitis include:  · Severe abdominal pain   · Nausea, vomiting  · Severe indigestion  · Racing heart  · Fever  At first, pancreatitis may be treated in the hospital. There, fluids and medications can be provided. The underlying cause of the problem must also be treated to prevent further problems. If gallstones are the cause, you and your healthcare provider can discuss options for treating them. If alcohol is the cause, talk to your healthcare provider about a program to help you stop drinking.  Home care  · Avoid alcohol.  · Rest in bed or sit up in a chair until you feel better.  · Take medicines as prescribed. If you were given an antibiotic for infection, take it until it is gone, even if you feel better. Let your healthcare provider know if you vomit up your medicine.  · Eating and drinking:  ¨ If instructed, avoid eating or drinking until nausea and vomiting go away.  ¨ Try sipping clear liquids to prevent dehydration.   ¨ When you begin eating again, start with small amounts. Have small, more frequent meals rather than larger meals.  Follow-up care  Follow up with your healthcare provider as advised.  When to seek medical advice  Call your healthcare provider for if any of the following:  · Continued or worsening pain  · Repeated vomiting  · Dizziness, weakness  · Fever of 100.4º F (38.0º C) or higher, or as directed by your healthcare provider  · Severe muscle cramps  Call 911  Get emergency medical care for any of the following:  · Vomiting  blood or large amounts of blood in stool  · Seizure  · Loss of consciousness  Date Last Reviewed: 6/15/2015  © 0072-1476 [employer]. [address] [place] [address], [place] [address]. All rights reserved. This information is not intended as a substitute for professional medical care. Always follow your healthcare professional's instructions.

## 2018-08-26 NOTE — PLAN OF CARE
08/26/18 1320   Final Note   Assessment Type Final Discharge Note   Discharge Disposition Int Care Fac   What phone number can be called within the next 1-3 days to see how you are doing after discharge? (see chart for [facility])   Hospital Follow Up  Appt(s) scheduled? Yes   Discharge plans and expectations educations in teach back method with documentation complete? Yes   Right Care Referral Info   Post Acute Recommendation SNF / Sub-Acute Rehab   [facility] per SPD w/c

## 2018-08-29 LAB — BACTERIA BLD CULT: NORMAL

## 2018-09-11 ENCOUNTER — HOSPITAL ENCOUNTER (INPATIENT)
Facility: HOSPITAL | Age: 83
LOS: 7 days | Discharge: NURSING, REHAB OR LONG-TERM CARE | DRG: 445 | End: 2018-09-18
Attending: HOSPITALIST | Admitting: INTERNAL MEDICINE
Payer: MEDICARE

## 2018-09-11 DIAGNOSIS — K80.50 CHOLEDOCHOLITHIASIS: ICD-10-CM

## 2018-09-11 PROBLEM — D63.8 ANEMIA OF CHRONIC DISEASE: Chronic | Status: ACTIVE | Noted: 2017-12-18

## 2018-09-11 PROBLEM — R39.11 BENIGN PROSTATIC HYPERPLASIA WITH URINARY HESITANCY: Chronic | Status: ACTIVE | Noted: 2018-02-16

## 2018-09-11 PROBLEM — N40.1 BENIGN PROSTATIC HYPERPLASIA WITH URINARY HESITANCY: Chronic | Status: ACTIVE | Noted: 2018-02-16

## 2018-09-11 PROCEDURE — 11000001 HC ACUTE MED/SURG PRIVATE ROOM

## 2018-09-11 RX ORDER — PANTOPRAZOLE SODIUM 40 MG/1
40 TABLET, DELAYED RELEASE ORAL DAILY
Status: DISCONTINUED | OUTPATIENT
Start: 2018-09-12 | End: 2018-09-18 | Stop reason: HOSPADM

## 2018-09-11 RX ORDER — SODIUM CHLORIDE, SODIUM LACTATE, POTASSIUM CHLORIDE, CALCIUM CHLORIDE 600; 310; 30; 20 MG/100ML; MG/100ML; MG/100ML; MG/100ML
INJECTION, SOLUTION INTRAVENOUS CONTINUOUS
Status: ACTIVE | OUTPATIENT
Start: 2018-09-12 | End: 2018-09-13

## 2018-09-11 RX ORDER — ONDANSETRON 2 MG/ML
8 INJECTION INTRAMUSCULAR; INTRAVENOUS EVERY 8 HOURS PRN
Status: DISCONTINUED | OUTPATIENT
Start: 2018-09-11 | End: 2018-09-11

## 2018-09-11 RX ORDER — AMOXICILLIN 250 MG
1 CAPSULE ORAL 2 TIMES DAILY PRN
Status: DISCONTINUED | OUTPATIENT
Start: 2018-09-11 | End: 2018-09-18 | Stop reason: HOSPADM

## 2018-09-11 RX ORDER — ACETAMINOPHEN 500 MG
500 TABLET ORAL EVERY 6 HOURS PRN
Status: DISCONTINUED | OUTPATIENT
Start: 2018-09-11 | End: 2018-09-18 | Stop reason: HOSPADM

## 2018-09-11 RX ORDER — TRAMADOL HYDROCHLORIDE 50 MG/1
50 TABLET ORAL EVERY 6 HOURS PRN
Status: DISCONTINUED | OUTPATIENT
Start: 2018-09-12 | End: 2018-09-18 | Stop reason: HOSPADM

## 2018-09-11 RX ORDER — CLONIDINE HYDROCHLORIDE 0.1 MG/1
0.1 TABLET ORAL 3 TIMES DAILY PRN
Status: DISCONTINUED | OUTPATIENT
Start: 2018-09-12 | End: 2018-09-18 | Stop reason: HOSPADM

## 2018-09-11 RX ORDER — PRAVASTATIN SODIUM 40 MG/1
40 TABLET ORAL NIGHTLY
Status: DISCONTINUED | OUTPATIENT
Start: 2018-09-12 | End: 2018-09-18 | Stop reason: HOSPADM

## 2018-09-11 RX ORDER — ESCITALOPRAM OXALATE 10 MG/1
10 TABLET ORAL EVERY MORNING
Status: DISCONTINUED | OUTPATIENT
Start: 2018-09-12 | End: 2018-09-18 | Stop reason: HOSPADM

## 2018-09-11 RX ORDER — IPRATROPIUM BROMIDE AND ALBUTEROL SULFATE 2.5; .5 MG/3ML; MG/3ML
3 SOLUTION RESPIRATORY (INHALATION) EVERY 6 HOURS PRN
Status: DISCONTINUED | OUTPATIENT
Start: 2018-09-12 | End: 2018-09-18 | Stop reason: HOSPADM

## 2018-09-11 RX ORDER — GABAPENTIN 100 MG/1
100 CAPSULE ORAL 3 TIMES DAILY
Status: DISCONTINUED | OUTPATIENT
Start: 2018-09-12 | End: 2018-09-18 | Stop reason: HOSPADM

## 2018-09-11 RX ORDER — DONEPEZIL HYDROCHLORIDE 10 MG/1
10 TABLET, FILM COATED ORAL NIGHTLY
Status: DISCONTINUED | OUTPATIENT
Start: 2018-09-12 | End: 2018-09-18 | Stop reason: HOSPADM

## 2018-09-11 RX ORDER — FINASTERIDE 5 MG/1
5 TABLET, FILM COATED ORAL DAILY
Status: DISCONTINUED | OUTPATIENT
Start: 2018-09-12 | End: 2018-09-18 | Stop reason: HOSPADM

## 2018-09-11 RX ORDER — BUSPIRONE HYDROCHLORIDE 5 MG/1
5 TABLET ORAL 2 TIMES DAILY
Status: DISCONTINUED | OUTPATIENT
Start: 2018-09-12 | End: 2018-09-18 | Stop reason: HOSPADM

## 2018-09-11 RX ORDER — RAMELTEON 8 MG/1
8 TABLET ORAL NIGHTLY PRN
Status: DISCONTINUED | OUTPATIENT
Start: 2018-09-11 | End: 2018-09-11

## 2018-09-11 RX ORDER — SODIUM CHLORIDE 9 MG/ML
INJECTION, SOLUTION INTRAVENOUS CONTINUOUS
Status: DISCONTINUED | OUTPATIENT
Start: 2018-09-11 | End: 2018-09-11

## 2018-09-11 RX ORDER — ASPIRIN 81 MG/1
81 TABLET ORAL DAILY
Status: DISCONTINUED | OUTPATIENT
Start: 2018-09-12 | End: 2018-09-18 | Stop reason: HOSPADM

## 2018-09-11 RX ORDER — MEMANTINE HYDROCHLORIDE 10 MG/1
10 TABLET ORAL DAILY
Status: DISCONTINUED | OUTPATIENT
Start: 2018-09-12 | End: 2018-09-18 | Stop reason: HOSPADM

## 2018-09-11 RX ORDER — FLUTICASONE FUROATE AND VILANTEROL 100; 25 UG/1; UG/1
1 POWDER RESPIRATORY (INHALATION) DAILY
Status: DISCONTINUED | OUTPATIENT
Start: 2018-09-12 | End: 2018-09-18 | Stop reason: HOSPADM

## 2018-09-11 RX ORDER — ENOXAPARIN SODIUM 100 MG/ML
40 INJECTION SUBCUTANEOUS EVERY 24 HOURS
Status: DISCONTINUED | OUTPATIENT
Start: 2018-09-11 | End: 2018-09-18 | Stop reason: HOSPADM

## 2018-09-11 RX ORDER — MIRTAZAPINE 15 MG/1
15 TABLET, FILM COATED ORAL NIGHTLY
Status: DISCONTINUED | OUTPATIENT
Start: 2018-09-12 | End: 2018-09-18 | Stop reason: HOSPADM

## 2018-09-12 LAB
ALBUMIN SERPL BCP-MCNC: 2.8 G/DL
ALP SERPL-CCNC: 412 U/L
ALT SERPL W/O P-5'-P-CCNC: 122 U/L
ANION GAP SERPL CALC-SCNC: 8 MMOL/L
AST SERPL-CCNC: 145 U/L
BASOPHILS # BLD AUTO: 0.02 K/UL
BASOPHILS NFR BLD: 0.2 %
BILIRUB SERPL-MCNC: 3.3 MG/DL
BUN SERPL-MCNC: 11 MG/DL
CALCIUM SERPL-MCNC: 8.6 MG/DL
CHLORIDE SERPL-SCNC: 104 MMOL/L
CO2 SERPL-SCNC: 24 MMOL/L
CREAT SERPL-MCNC: 0.9 MG/DL
DIFFERENTIAL METHOD: ABNORMAL
EOSINOPHIL # BLD AUTO: 0.1 K/UL
EOSINOPHIL NFR BLD: 0.6 %
ERYTHROCYTE [DISTWIDTH] IN BLOOD BY AUTOMATED COUNT: 14.2 %
EST. GFR  (AFRICAN AMERICAN): >60 ML/MIN/1.73 M^2
EST. GFR  (NON AFRICAN AMERICAN): >60 ML/MIN/1.73 M^2
GLUCOSE SERPL-MCNC: 85 MG/DL
HCT VFR BLD AUTO: 32.2 %
HGB BLD-MCNC: 10.3 G/DL
LIPASE SERPL-CCNC: 39 U/L
LYMPHOCYTES # BLD AUTO: 0.9 K/UL
LYMPHOCYTES NFR BLD: 10.3 %
MAGNESIUM SERPL-MCNC: 1.6 MG/DL
MCH RBC QN AUTO: 29.8 PG
MCHC RBC AUTO-ENTMCNC: 32 G/DL
MCV RBC AUTO: 93 FL
MONOCYTES # BLD AUTO: 0.8 K/UL
MONOCYTES NFR BLD: 9.3 %
NEUTROPHILS # BLD AUTO: 6.6 K/UL
NEUTROPHILS NFR BLD: 79.4 %
PHOSPHATE SERPL-MCNC: 2.3 MG/DL
PLATELET # BLD AUTO: 114 K/UL
PMV BLD AUTO: 11.2 FL
POTASSIUM SERPL-SCNC: 4.3 MMOL/L
PROT SERPL-MCNC: 6 G/DL
RBC # BLD AUTO: 3.46 M/UL
SODIUM SERPL-SCNC: 136 MMOL/L
WBC # BLD AUTO: 8.35 K/UL

## 2018-09-12 PROCEDURE — 83735 ASSAY OF MAGNESIUM: CPT

## 2018-09-12 PROCEDURE — 83690 ASSAY OF LIPASE: CPT

## 2018-09-12 PROCEDURE — 36415 COLL VENOUS BLD VENIPUNCTURE: CPT

## 2018-09-12 PROCEDURE — 80053 COMPREHEN METABOLIC PANEL: CPT

## 2018-09-12 PROCEDURE — 25000242 PHARM REV CODE 250 ALT 637 W/ HCPCS: Performed by: INTERNAL MEDICINE

## 2018-09-12 PROCEDURE — 25000003 PHARM REV CODE 250: Performed by: INTERNAL MEDICINE

## 2018-09-12 PROCEDURE — 27000221 HC OXYGEN, UP TO 24 HOURS

## 2018-09-12 PROCEDURE — 84100 ASSAY OF PHOSPHORUS: CPT

## 2018-09-12 PROCEDURE — 94640 AIRWAY INHALATION TREATMENT: CPT

## 2018-09-12 PROCEDURE — 11000001 HC ACUTE MED/SURG PRIVATE ROOM

## 2018-09-12 PROCEDURE — 94761 N-INVAS EAR/PLS OXIMETRY MLT: CPT

## 2018-09-12 PROCEDURE — 63600175 PHARM REV CODE 636 W HCPCS: Performed by: INTERNAL MEDICINE

## 2018-09-12 PROCEDURE — 85025 COMPLETE CBC W/AUTO DIFF WBC: CPT

## 2018-09-12 RX ADMIN — SODIUM CHLORIDE, SODIUM LACTATE, POTASSIUM CHLORIDE, AND CALCIUM CHLORIDE: .6; .31; .03; .02 INJECTION, SOLUTION INTRAVENOUS at 06:09

## 2018-09-12 RX ADMIN — PIPERACILLIN AND TAZOBACTAM 4.5 G: 4; .5 INJECTION, POWDER, LYOPHILIZED, FOR SOLUTION INTRAVENOUS; PARENTERAL at 08:09

## 2018-09-12 RX ADMIN — TRAMADOL HYDROCHLORIDE 50 MG: 50 TABLET, FILM COATED ORAL at 01:09

## 2018-09-12 RX ADMIN — SODIUM CHLORIDE, SODIUM LACTATE, POTASSIUM CHLORIDE, AND CALCIUM CHLORIDE: .6; .31; .03; .02 INJECTION, SOLUTION INTRAVENOUS at 12:09

## 2018-09-12 RX ADMIN — PRAVASTATIN SODIUM 40 MG: 40 TABLET ORAL at 10:09

## 2018-09-12 RX ADMIN — BUSPIRONE HYDROCHLORIDE 5 MG: 5 TABLET ORAL at 10:09

## 2018-09-12 RX ADMIN — ASPIRIN 81 MG: 81 TABLET, COATED ORAL at 08:09

## 2018-09-12 RX ADMIN — GABAPENTIN 100 MG: 100 CAPSULE ORAL at 08:09

## 2018-09-12 RX ADMIN — BUSPIRONE HYDROCHLORIDE 5 MG: 5 TABLET ORAL at 08:09

## 2018-09-12 RX ADMIN — DONEPEZIL HYDROCHLORIDE 10 MG: 10 TABLET, FILM COATED ORAL at 10:09

## 2018-09-12 RX ADMIN — MIRTAZAPINE 15 MG: 15 TABLET, FILM COATED ORAL at 10:09

## 2018-09-12 RX ADMIN — FINASTERIDE 5 MG: 5 TABLET, FILM COATED ORAL at 08:09

## 2018-09-12 RX ADMIN — PIPERACILLIN AND TAZOBACTAM 4.5 G: 4; .5 INJECTION, POWDER, LYOPHILIZED, FOR SOLUTION INTRAVENOUS; PARENTERAL at 06:09

## 2018-09-12 RX ADMIN — FLUTICASONE FUROATE AND VILANTEROL TRIFENATATE 1 PUFF: 100; 25 POWDER RESPIRATORY (INHALATION) at 07:09

## 2018-09-12 RX ADMIN — MEMANTINE 10 MG: 10 TABLET ORAL at 08:09

## 2018-09-12 RX ADMIN — ESCITALOPRAM OXALATE 10 MG: 10 TABLET ORAL at 06:09

## 2018-09-12 RX ADMIN — GABAPENTIN 100 MG: 100 CAPSULE ORAL at 10:09

## 2018-09-12 RX ADMIN — ACETAMINOPHEN 500 MG: 500 TABLET, FILM COATED ORAL at 11:09

## 2018-09-12 RX ADMIN — ENOXAPARIN SODIUM 40 MG: 40 INJECTION SUBCUTANEOUS at 06:09

## 2018-09-12 RX ADMIN — ENOXAPARIN SODIUM 40 MG: 40 INJECTION SUBCUTANEOUS at 12:09

## 2018-09-12 RX ADMIN — GABAPENTIN 100 MG: 100 CAPSULE ORAL at 04:09

## 2018-09-12 RX ADMIN — TRAMADOL HYDROCHLORIDE 50 MG: 50 TABLET, FILM COATED ORAL at 04:09

## 2018-09-12 RX ADMIN — TRAMADOL HYDROCHLORIDE 50 MG: 50 TABLET, FILM COATED ORAL at 08:09

## 2018-09-12 RX ADMIN — PANTOPRAZOLE SODIUM 40 MG: 40 TABLET, DELAYED RELEASE ORAL at 08:09

## 2018-09-12 RX ADMIN — PIPERACILLIN AND TAZOBACTAM 4.5 G: 4; .5 INJECTION, POWDER, LYOPHILIZED, FOR SOLUTION INTRAVENOUS; PARENTERAL at 01:09

## 2018-09-12 NOTE — H&P
[facility] Medicine  History & Physical    Patient Name: Fidel Allan  MRN: [id number]  Admission Date: 9/11/2018  Attending Physician: Radhika Moreau MD   Primary Care Provider: Memo Nieto MD         Patient information was obtained from ER records.     Subjective:     Principal Problem:Choledocholithiasis    Chief Complaint: Fever today.    HPI: Mr. Fidel Allan is a 89 y.o. male [facility] resident with essential hypertension, hyperlipidemia (31.6 Dec 2018), CAD s/p CABG, CKD Stage 3, COPD, anemia of chronic disease, permanent pacemaker in place, aortic stenosis, BPH, GERD, dementia, and depression who presented initially to [facility] ED with complaints of fever and weakness this morning.  He otherwise has no complaints but is demented.  Further history is limited at this time.    Chart Review:  Previous Hospitalizations  Date Hospital Diagnosis   Aug 2018 [facility] Gallstone pancreatitis s/p ERCP   Mar 2018 [facility] TURP   Jan 2018 [facility] Pneumonia    Dec 2017 [facility] Cholangitis with Streptococcus gallolyticus bacteremia    Jun 25, 2017 [facility] Pneumonia    Jun 11, 2017 [facility] Pneumonia    Jan 29, 2017 [facility] Cholangitis    Jan 16, 2017 [facility] Local wide excision of thick T4 melanoma of left neck   Dec 2016 [facility] Pneumonia    Nov 21, 2016 [facility] Placement of dual-chamber pacemaker under C-arm imaging   Nov 5, 2016 [facility] Chest pain - ruled out for ACS     Outpatient Follow-Up  Date of Visit Physician Service   Jun 2018 Memo Nieto MD Urology    Feb 2017 Abraham Villafuerte MD General Surgery      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     Blindness of right eye     Bone disorder     Bradycardia     CAD (coronary artery disease)     CHF (congestive heart failure)     acute on chronic diastolic chf    Constipation      COPD (chronic obstructive pulmonary disease)     Dysphagia     Dyspnea     Falls frequently     GERD (gastroesophageal reflux disease)     Glaucoma     Hearing loss     HLD (hyperlipidemia)     Hypertension     Insomnia     Major depressive disorder     Melanoma     left neck    Urinary tract infection     Vascular dementia        Past Surgical History:   Procedure Laterality Date    BIOPSY-SENTINEL NODE N/A 1/16/2017    Performed by [facility]te [facility] at [facility] OR 2ND FLR    CARDIAC PACEMAKER PLACEMENT  2016    CHOLEDOCHODUODENOSTOMY      COLECTOMY      CORONARY ANGIOPLASTY WITH STENT PLACEMENT      CORONARY ARTERY BYPASS GRAFT      CYSTOSCOPY N/A 3/19/2018    Per[facility] by [facility] at [facility] OR    ERCP N/A 8/22/2018    Procedure: ERCP (ENDOSCOPIC RETROGRADE CHOLANGIOPANCREATOGRAPHY);  Surgeon: Baron Owens MD;  Location: [facility] ENDO;  Service: Endoscopy;  Laterality: N/A;    ERCP N/A 1/30/2017    Performed by [facility]rum [facility] at [facility] ENDO    ERCP (ENDOSCOPIC RETROGRADE CHOLANGIOPANCREATOGRAPHY) N/A 8/22/2018    Performed by Baron Owens MD at [facility] ENDO    EXCISION-WIDE LOCAL 2 cm Left 1/16/2017    Performed by [facility]te MD at [facility] OR 2ND FLR    HEART CATH-LEFT Right 11/22/2016    Performed by Andre Benton MD at [facility] CATH LAB    INSERTION-PACEMAKER-DUAL Right 11/23/2016    Performed by LELE Fofana MD at [facility] OR    MALIGNANT SKIN LESION EXCISION Left 01/16/2017    neck melanoma    TRANSESOPHAGEAL ECHOCARDIOGRAM (BASSEM) N/A 2/3/2017    Performed by Y[facility]any [facility]man MD at [facility] OR    TURP WITH BIPOLAR N/A 3/19/2018    Performed by Memo [facility]ieto [facility] at [facility] OR       Review of patient's allergies indicates:  No Known Allergies    Current Facility-Administered Medications on File Prior to Encounter   Medication    [COMPLETED] 0.9%  NaCl infusion    [COMPLETED] metronidazole IVPB 500 mg    [COMPLETED] piperacillin-tazobactam 4.5 g in dextrose 5  % 100 mL IVPB (ready to mix system)    [COMPLETED] sodium chloride 0.9% bolus 1,000 mL    [COMPLETED] sodium chloride 0.9% bolus 1,000 mL    [COMPLETED] sodium chloride 0.9% bolus 1,000 mL    [DISCONTINUED] sodium chloride 0.9% bolus 2,055 mL     Current Outpatient Medications on File Prior to Encounter   Medication Sig    acetaminophen (TYLENOL) 325 MG tablet Take 2 tablets (650 mg total) by mouth every 6 (six) hours as needed for Pain or Temperature greater than (100).    albuterol sulfate 2.5 mg/0.5 mL Nebu Take 2.5 mg by nebulization every 4 (four) hours as needed. Rescue    aluminum & magnesium hydroxide-simethicone (MYLANTA MAX STRENGTH) 400-400-40 mg/5 mL suspension Take 30 mLs by mouth every 6 (six) hours as needed for Indigestion.    aspirin (ECOTRIN) 81 MG EC tablet Take 81 mg by mouth once daily.    benzonatate (TESSALON) 100 MG capsule Take 100 mg by mouth 3 (three) times daily as needed for Cough.    BREO ELLIPTA 100-25 mcg/dose diskus inhaler Inhale 1 puff into the lungs once daily.     busPIRone (BUSPAR) 5 MG Tab Take 5 mg by mouth 2 (two) times daily.    clobetasol 0.05% (TEMOVATE) 0.05 % Oint daily as needed. To face and neck    cyanocobalamin (VITAMIN B-12) 1000 MCG tablet Take 1,000 mcg by mouth once daily.     docusate sodium (COLACE) 100 MG capsule Take 100 mg by mouth 2 (two) times daily.    donepezil (ARICEPT) 10 MG tablet Take 10 mg by mouth every evening.    escitalopram oxalate (LEXAPRO) 10 MG tablet Take 10 mg by mouth every morning.     ferrous sulfate 325 (65 FE) MG EC tablet Take 325 mg by mouth 2 (two) times daily.    finasteride (PROSCAR) 5 mg tablet Take 1 tablet (5 mg total) by mouth once daily.    fish oil-omega-3 fatty acids 300-1,000 mg capsule Take 1 g by mouth every morning.     gabapentin (NEURONTIN) 100 MG capsule Take 100 mg by mouth 3 (three) times daily.    GUAIFENESIN/DEXTROMETHORPHAN (ROBITUSSIN-DM ORAL) Take 10 mLs by mouth every 6 (six) hours as  needed.    HYDROcodone-acetaminophen (NORCO) 5-325 mg per tablet Take 1 tablet by mouth every 6 (six) hours as needed.    ketoconazole (NIZORAL) 2 % shampoo     latanoprost 0.005 % ophthalmic solution Place 1 drop into both eyes every evening.     lisinopril (PRINIVIL,ZESTRIL) 5 MG tablet Take 5 mg by mouth every morning. Hold for SBP < 120    LORazepam (ATIVAN) 0.5 MG tablet Take 1 tablet (0.5 mg total) by mouth 2 (two) times daily as needed for Anxiety.    magnesium hydroxide 400 mg/5 ml (MILK OF MAGNESIA) 400 mg/5 mL Susp Take 30 mLs by mouth every evening.     mirtazapine (REMERON) 15 MG tablet Take 15 mg by mouth every evening.     multivitamin (ONE DAILY MULTIVITAMIN) per tablet Take 1 tablet by mouth once daily.    NAMENDA XR 14 mg CSpX Take 14 mg by mouth once daily.     NITROSTAT 0.3 mg SL tablet Place 0.3 mg under the tongue every 5 (five) minutes as needed for Chest pain.     omeprazole (PRILOSEC) 40 MG capsule Take 40 mg by mouth every morning.    pravastatin (PRAVACHOL) 40 MG tablet Take 40 mg by mouth every evening.     tamsulosin (FLOMAX) 0.4 mg Cp24 Take 1 capsule (0.4 mg total) by mouth once daily.    tiotropium (SPIRIVA) 18 mcg inhalation capsule Inhale 1 capsule (18 mcg total) into the lungs once daily. Controller     Family History     None        Tobacco Use    Smoking status: Never Smoker    Smokeless tobacco: Never Used   Substance and Sexual Activity    Alcohol use: No    Drug use: No    Sexual activity: No     Review of Systems   Unable to perform ROS: Dementia     Objective:     Vital Signs (Most Recent):  Temp: 97.9 °F (36.6 °C) (09/11/18 2324)  Pulse: 61 (09/11/18 2324)  Resp: 20 (09/11/18 2324)  BP: (!) 162/69 (09/11/18 2324)  SpO2: 100 % (09/11/18 2324) Vital Signs (24h Range):  Temp:  [97.8 °F (36.6 °C)-100.2 °F (37.9 °C)] 97.9 °F (36.6 °C)  Pulse:  [60-71] 61  Resp:  [15-20] 20  SpO2:  [95 %-100 %] 100 %  BP: ()/(37-69) 162/69        There is no height or  "weight on file to calculate BMI.    Physical Exam   Constitutional: He appears well-developed and well-nourished. No distress.   HENT:   Head: Normocephalic and atraumatic.   Right Ear: External ear normal.   Left Ear: External ear normal.   Nose: Nose normal.   Eyes: Right eye exhibits no discharge. Left eye exhibits no discharge.   Neck: Normal range of motion.   Cardiovascular: Normal rate, regular rhythm, normal heart sounds and intact distal pulses. Exam reveals no gallop and no friction rub.   No murmur heard.  Pulmonary/Chest: Effort normal and breath sounds normal. No stridor. No respiratory distress. He has no wheezes. He has no rales. He exhibits no tenderness.   Abdominal: Soft. Bowel sounds are normal. He exhibits no distension. There is no tenderness. There is no rebound and no guarding.   Musculoskeletal: Normal range of motion. He exhibits no edema.   Neurological:   Awake, alert, pleasant, cooperative, but oriented only to self   Skin: Skin is warm and dry. He is not diaphoretic. No erythema. There is pallor.   Psychiatric: He has a normal mood and affect. His behavior is normal. Judgment and thought content normal.   Nursing note and vitals reviewed.          Significant Labs: All pertinent labs within the past 24 hours have been reviewed.    Significant Imaging: I have reviewed and interpreted all pertinent imaging results/findings within the past 24 hours.    Assessment/Plan:     * Choledocholithiasis    Patient had a low-grade fever of 100.2 °F upon arrival to the ED but has not had any since then.  His blood pressure initially was 96/43 but went as low as 84/37.  Patient reportedly had a fever of 102 °F  at the NH.  A CT-abd/pelvis was obtained which was significant for "[p]ersistent choledocholithiasis with pronounced dilatation of the intrahepatic biliary tree.  A minimal amount of edema about the pancreatitic head and neck.  Peripancreatic edema seems reduced since prior of 08/21/2018."  " Given that he had an admission in Dec 2017 for cholangitis and given his abnormal LFTs here, there is certainly a concern for acute cholangitis.  Of note, he was admitted here last month for gallstone pancreatitis for which he underwent ERCP per Dr. Baron Owens with the following results:    Impression:            - The majory papilla was in an altered location due to a previous Billroth 2 procedure. A previous sphincterotomy was seen.  - Choledocholithiasis was found. Complete removal was accomplished by biliary sphincterotomy and balloon extraction.  - A gastroscope then a pediatric colonoscope were used to perform the procedure as the side-viewing duodenoscopy could not be advance to the ampulla.    Today, he has a total bilirubin of 1.8 which is better compared to his previous labs last month.  His AST/ALT are worse at 315/183.  He had been started on piperacillin/tazobactam and metronidazole but I asked the ED physician to discontinue the metronidazole.  He doesn't technically meet criteria for sepsis.  Will provide supportive care and consult Gastroenterology for further recommendations.        Essential hypertension    His blood pressure has been borderline low so will hold his home regimen of lisinopril and tamsulosin and provide as-needed clonidine.        HLD (hyperlipidemia)    Well-controlled; will continue patient's home regimen of pravatatin.        Coronary artery disease involving coronary bypass graft of native heart without angina pectoris    Stable; will continue his home regimen of aspirin and pravastatin.        CKD Stage 3    His renal function appears to be at his baseline; will continue to monitor his urine output.        COPD (chronic obstructive pulmonary disease)    Clinically-stable without wheezing.  Will continue home regimen of ICS?LABA and have as-needed RISSA/LAMA available.        Anemia of chronic disease    The patient's H/H is stable and consistent with previous laboratory  measurements, and the patient exhibits no signs or symptoms of acute bleeding; there is no indication for transfusion.  Will continue to monitor.        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        Moderate aortic stenosis    Stable; there are no acute issues.        Benign prostatic hyperplasia with urinary hesitancy    Stable; will continue his home regimen of finasteride and restart lisinopril when his blood pressure improves.        GERD (gastroesophageal reflux disease)    Will substitute his home regimen of omeprazole for pantoprazole while he is inpatient.        Vascular dementia    Stable; will continue his home regimen of donepezil and memantine.        Major depressive disorder    Stable; will continue his home regimen of buspirone and escitalopram.          VTE Risk Mitigation (From admission, onward)        Ordered     enoxaparin injection 40 mg  Daily      09/11/18 2230     IP VTE HIGH RISK PATIENT  Once      09/11/18 2230           Hank Nielsen M.D.  Staff Nocturnist  Department of Hospital Medicine  [facility]  Pager: [phone]

## 2018-09-12 NOTE — ASSESSMENT & PLAN NOTE
Stable; will continue his home regimen of finasteride and restart lisinopril when his blood pressure improves.

## 2018-09-12 NOTE — ASSESSMENT & PLAN NOTE
His blood pressure has been borderline low so will hold his home regimen of lisinopril and tamsulosin and provide as-needed clonidine.

## 2018-09-12 NOTE — SUBJECTIVE & OBJECTIVE
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     Blindness of right eye     Bone disorder     Bradycardia     CAD (coronary artery disease)     CHF (congestive heart failure)     acute on chronic diastolic chf    Constipation     COPD (chronic obstructive pulmonary disease)     Dysphagia     Dyspnea     Falls frequently     GERD (gastroesophageal reflux disease)     Glaucoma     Hearing loss     HLD (hyperlipidemia)     Hypertension     Insomnia     Major depressive disorder     Melanoma     left neck    Urinary tract infection     Vascular dementia        Past Surgical History:   Procedure Laterality Date    BIOPSY-SENTINEL NODE N/A 1/16/2017    Performed by Abraham [facility]te MD at [facility] OR [address] [place] [address]    CARDIAC PACEMAKER PLACEMENT  2016    CHOLEDOCHODUODENOSTOMY      COLECTOMY      CORONARY ANGIOPLASTY WITH STENT PLACEMENT      CORONARY ARTERY BYPASS GRAFT      CYSTOSCOPY N/A 3/19/2018    Performed by Memo Nieto MD at [facility] OR    ERCP N/A 8/22/2018    Procedure: ERCP (ENDOSCOPIC RETROGRADE CHOLANGIOPANCREATOGRAPHY);  Surgeon: Baron Owens MD;  Location: [facility] ENDO;  Service: Endoscopy;  Laterality: N/A;    ERCP N/A 1/30/2017    Performed by [facility]rum MD at [facility] ENDO    ERCP (ENDOSCOPIC RETROGRADE CHOLANGIOPANCREATOGRAPHY) N/A 8/22/2018    Performed by Baron Owens MD at [facility] ENDO    EXCISION-WIDE LOCAL 2 cm Left 1/16/2017    Performed by Abraham Vill[facility]te MD at [facility] OR [address] [place] [address]    HEART CATH-LEFT Right 11/22/2016    Performed by Andre Benton MD at [facility] CATH LAB    INSERTION-PACEMAKER-DUAL Right 11/23/2016    Performed by LELE Fofana MD at [facility] OR    MALIGNANT SKIN LESION EXCISION Left 01/16/2017    neck melanoma    TRANSESOPHAGEAL ECHOCARDIOGRAM (BASSEM) N/A 2/3/2017    Performed by Y[facility]any Gu[facility]man MD at [facility] OR    TURP WITH BIPOLAR N/A 3/19/2018    Performed by Memo [facility]o MD  at [facility] OR       Review of patient's allergies indicates:  No Known Allergies    Current Facility-Administered Medications on File Prior to Encounter   Medication    [COMPLETED] 0.9%  NaCl infusion    [COMPLETED] metronidazole IVPB 500 mg    [COMPLETED] piperacillin-tazobactam 4.5 g in dextrose 5 % 100 mL IVPB (ready to mix system)    [COMPLETED] sodium chloride 0.9% bolus 1,000 mL    [COMPLETED] sodium chloride 0.9% bolus 1,000 mL    [COMPLETED] sodium chloride 0.9% bolus 1,000 mL    [DISCONTINUED] sodium chloride 0.9% bolus 2,055 mL     Current Outpatient Medications on File Prior to Encounter   Medication Sig    acetaminophen (TYLENOL) 325 MG tablet Take 2 tablets (650 mg total) by mouth every 6 (six) hours as needed for Pain or Temperature greater than (100).    albuterol sulfate 2.5 mg/0.5 mL Nebu Take 2.5 mg by nebulization every 4 (four) hours as needed. Rescue    aluminum & magnesium hydroxide-simethicone (MYLANTA MAX STRENGTH) 400-400-40 mg/5 mL suspension Take 30 mLs by mouth every 6 (six) hours as needed for Indigestion.    aspirin (ECOTRIN) 81 MG EC tablet Take 81 mg by mouth once daily.    benzonatate (TESSALON) 100 MG capsule Take 100 mg by mouth 3 (three) times daily as needed for Cough.    BREO ELLIPTA 100-25 mcg/dose diskus inhaler Inhale 1 puff into the lungs once daily.     busPIRone (BUSPAR) 5 MG Tab Take 5 mg by mouth 2 (two) times daily.    clobetasol 0.05% (TEMOVATE) 0.05 % Oint daily as needed. To face and neck    cyanocobalamin (VITAMIN B-12) 1000 MCG tablet Take 1,000 mcg by mouth once daily.     docusate sodium (COLACE) 100 MG capsule Take 100 mg by mouth 2 (two) times daily.    donepezil (ARICEPT) 10 MG tablet Take 10 mg by mouth every evening.    escitalopram oxalate (LEXAPRO) 10 MG tablet Take 10 mg by mouth every morning.     ferrous sulfate 325 (65 FE) MG EC tablet Take 325 mg by mouth 2 (two) times daily.    finasteride (PROSCAR) 5 mg tablet Take 1 tablet (5 mg  total) by mouth once daily.    fish oil-omega-3 fatty acids 300-1,000 mg capsule Take 1 g by mouth every morning.     gabapentin (NEURONTIN) 100 MG capsule Take 100 mg by mouth 3 (three) times daily.    GUAIFENESIN/DEXTROMETHORPHAN (ROBITUSSIN-DM ORAL) Take 10 mLs by mouth every 6 (six) hours as needed.    HYDROcodone-acetaminophen (NORCO) 5-325 mg per tablet Take 1 tablet by mouth every 6 (six) hours as needed.    ketoconazole (NIZORAL) 2 % shampoo     latanoprost 0.005 % ophthalmic solution Place 1 drop into both eyes every evening.     lisinopril (PRINIVIL,ZESTRIL) 5 MG tablet Take 5 mg by mouth every morning. Hold for SBP < 120    LORazepam (ATIVAN) 0.5 MG tablet Take 1 tablet (0.5 mg total) by mouth 2 (two) times daily as needed for Anxiety.    magnesium hydroxide 400 mg/5 ml (MILK OF MAGNESIA) 400 mg/5 mL Susp Take 30 mLs by mouth every evening.     mirtazapine (REMERON) 15 MG tablet Take 15 mg by mouth every evening.     multivitamin (ONE DAILY MULTIVITAMIN) per tablet Take 1 tablet by mouth once daily.    NAMENDA XR 14 mg CSpX Take 14 mg by mouth once daily.     NITROSTAT 0.3 mg SL tablet Place 0.3 mg under the tongue every 5 (five) minutes as needed for Chest pain.     omeprazole (PRILOSEC) 40 MG capsule Take 40 mg by mouth every morning.    pravastatin (PRAVACHOL) 40 MG tablet Take 40 mg by mouth every evening.     tamsulosin (FLOMAX) 0.4 mg Cp24 Take 1 capsule (0.4 mg total) by mouth once daily.    tiotropium (SPIRIVA) 18 mcg inhalation capsule Inhale 1 capsule (18 mcg total) into the lungs once daily. Controller     Family History     None        Tobacco Use    Smoking status: Never Smoker    Smokeless tobacco: Never Used   Substance and Sexual Activity    Alcohol use: No    Drug use: No    Sexual activity: No     Review of Systems   Unable to perform ROS: Dementia     Objective:     Vital Signs (Most Recent):  Temp: 97.9 °F (36.6 °C) (09/11/18 2324)  Pulse: 61 (09/11/18  2324)  Resp: 20 (09/11/18 2324)  BP: (!) 162/69 (09/11/18 2324)  SpO2: 100 % (09/11/18 2324) Vital Signs (24h Range):  Temp:  [97.8 °F (36.6 °C)-100.2 °F (37.9 °C)] 97.9 °F (36.6 °C)  Pulse:  [60-71] 61  Resp:  [15-20] 20  SpO2:  [95 %-100 %] 100 %  BP: ()/(37-69) 162/69        There is no height or weight on file to calculate BMI.    Physical Exam   Constitutional: He appears well-developed and well-nourished. No distress.   HENT:   Head: Normocephalic and atraumatic.   Right Ear: External ear normal.   Left Ear: External ear normal.   Nose: Nose normal.   Eyes: Right eye exhibits no discharge. Left eye exhibits no discharge.   Neck: Normal range of motion.   Cardiovascular: Normal rate, regular rhythm, normal heart sounds and intact distal pulses. Exam reveals no gallop and no friction rub.   No murmur heard.  Pulmonary/Chest: Effort normal and breath sounds normal. No stridor. No respiratory distress. He has no wheezes. He has no rales. He exhibits no tenderness.   Abdominal: Soft. Bowel sounds are normal. He exhibits no distension. There is no tenderness. There is no rebound and no guarding.   Musculoskeletal: Normal range of motion. He exhibits no edema.   Neurological:   Awake, alert, pleasant, cooperative, but oriented only to self   Skin: Skin is warm and dry. He is not diaphoretic. No erythema. There is pallor.   Psychiatric: He has a normal mood and affect. His behavior is normal. Judgment and thought content normal.   Nursing note and vitals reviewed.          Significant Labs: All pertinent labs within the past 24 hours have been reviewed.    Significant Imaging: I have reviewed and interpreted all pertinent imaging results/findings within the past 24 hours.

## 2018-09-12 NOTE — PLAN OF CARE
09/12/18 1126   Discharge Assessment   Assessment Type Discharge Planning Assessment   Confirmed/corrected address and phone number on facesheet? Yes   Assessment information obtained from? Medical Record   Communicated expected length of stay with patient/caregiver no   Prior to hospitilization cognitive status: Alert/Oriented   Prior to hospitalization functional status: Needs Assistance;Assistive Equipment   Current cognitive status: Alert/Oriented   Current Functional Status: Assistive Equipment;Needs Assistance   Lives With facility resident  ([facility] [phone])   Able to Return to Prior Arrangements yes   Is patient able to care for self after discharge? Unable to determine at this time (comments)   Who are your caregiver(s) and their phone number(s)? ([facility]'s staff, Carmelo MARTE)   Patient's perception of discharge disposition admitted as an inpatient   Readmission Within The Last 30 Days (Previous admit 08/21/18, same dx)   Patient currently being followed by outpatient case management? No   Patient currently receives any other outside agency services? No   Equipment Currently Used at Home oxygen;walker, rolling;wheelchair   Do you have any problems affording any of your prescribed medications? No   Is the patient taking medications as prescribed? yes   Does the patient have transportation home? Yes   Transportation Available agency transportation   Does the patient receive services at the [facility]? No   Discharge Plan A Return to nursing home  ([facility])   Patient/Family In Agreement With Plan yes

## 2018-09-12 NOTE — PLAN OF CARE
Problem: Pressure Ulcer Risk (Mike Scale) (Adult,Obstetrics,Pediatric)  Intervention: Promote/Optimize Nutrition   09/12/18 1754   Nutrition Interventions   Oral Nutrition Promotion physical activity promoted;rest periods promoted;social interaction promoted     Intervention: Prevent/Manage Excess Moisture   09/12/18 0800 09/12/18 1754   Skin Interventions   Skin Protection --  Tubing/devices free from skin contact   Hygiene Care   Perineal Care absorbent pad changed;diaper changed --    Bathing/Skin Care bath, complete;linen changed --      Intervention: Maintain Head of Bed Elevation Less Than 30 Degrees as Tolerated   09/11/18 2324   Positioning   Head of Bed (HOB) HOB at 30-45 degrees     Intervention: Prevent/Minimize Sheer/Friction Injuries   09/11/18 2324   Skin Interventions   Pressure Reduction Techniques frequent weight shift encouraged;heels elevated off bed   Positioning   Positioning/Transfer Devices pillows;in use     Intervention: Turn/Reposition Often   09/11/18 2324 09/12/18 0521   Skin Interventions   Pressure Reduction Techniques frequent weight shift encouraged;heels elevated off bed --    Positioning   Body Position --  supine       Goal: Identify Related Risk Factors and Signs and Symptoms  Related risk factors and signs and symptoms are identified upon initiation of Human Response Clinical Practice Guideline (CPG)  Outcome: Ongoing (interventions implemented as appropriate)   09/12/18 1754   Pressure Ulcer Risk (Mike Scale)   Related Risk Factors (Pressure Ulcer Risk (Mike Scale)) age extremes;cognitive impairment;critical care admission;infection;mobility impaired     Goal: Skin Integrity  Patient will demonstrate the desired outcomes by discharge/transition of care.  Outcome: Ongoing (interventions implemented as appropriate)   09/12/18 1754   Pressure Ulcer Risk (Mike Scale) (Adult,Obstetrics,Pediatric)   Skin Integrity making progress toward outcome       Comments: Patient remains  free from falls and injury. No distress noted. VSS. No complaint of pain, n/v, diarrhea, or SOB. Will continue to monitor, will continue with plan of care.

## 2018-09-12 NOTE — ASSESSMENT & PLAN NOTE
Clinically-stable without wheezing.  Will continue home regimen of ICS?LABA and have as-needed RISSA/LAMA available.

## 2018-09-12 NOTE — ASSESSMENT & PLAN NOTE
"Patient had a low-grade fever of 100.2 °F upon arrival to the ED but has not had any since then.  His blood pressure initially was 96/43 but went as low as 84/37.  Patient reportedly had a fever of 102 °F at the NH.  A CT-abd/pelvis was obtained which was significant for "[p]ersistent choledocholithiasis with pronounced dilatation of the intrahepatic biliary tree.  A minimal amount of edema about the pancreatitic head and neck.  Peripancreatic edema seems reduced since prior of 08/21/2018."  Given that he had an admission in Dec 2017 for cholangitis and given his abnormal LFTs here, there is certainly a concern for acute cholangitis.  Of note, he was admitted here last month for gallstone pancreatitis for which he underwent ERCP per Dr. Baron Owens with the following results:    Impression:            - The majory papilla was in an altered location due to a previous Billroth 2 procedure. A previous sphincterotomy was seen.  - Choledocholithiasis was found. Complete removal was accomplished by biliary sphincterotomy and balloon extraction.  - A gastroscope then a pediatric colonoscope were used to perform the procedure as the side-viewing duodenoscopy could not be advance to the ampulla.    Today, he has a total bilirubin of 1.8 which is better compared to his previous labs last month.  His AST/ALT are worse at 315/183.  He had been started on piperacillin/tazobactam and metronidazole but I asked the ED physician to discontinue the metronidazole.  He doesn't technically meet criteria for sepsis.  Will provide supportive care and consult Gastroenterology for further recommendations.  "

## 2018-09-12 NOTE — NURSING
Patient arrived from [facility] via ambulance awake, alert and oriented times 4.  paged to notify of direct and obtain  orders.

## 2018-09-13 ENCOUNTER — ANESTHESIA (OUTPATIENT)
Dept: ENDOSCOPY | Facility: HOSPITAL | Age: 83
DRG: 445 | End: 2018-09-13
Payer: MEDICARE

## 2018-09-13 ENCOUNTER — ANESTHESIA EVENT (OUTPATIENT)
Dept: ENDOSCOPY | Facility: HOSPITAL | Age: 83
DRG: 445 | End: 2018-09-13
Payer: MEDICARE

## 2018-09-13 PROCEDURE — 37000008 HC ANESTHESIA 1ST 15 MINUTES: Performed by: INTERNAL MEDICINE

## 2018-09-13 PROCEDURE — D9220A PRA ANESTHESIA: Mod: ANES,,, | Performed by: ANESTHESIOLOGY

## 2018-09-13 PROCEDURE — 0FC98ZZ EXTIRPATION OF MATTER FROM COMMON BILE DUCT, VIA NATURAL OR ARTIFICIAL OPENING ENDOSCOPIC: ICD-10-PCS | Performed by: INTERNAL MEDICINE

## 2018-09-13 PROCEDURE — 63600175 PHARM REV CODE 636 W HCPCS: Performed by: INTERNAL MEDICINE

## 2018-09-13 PROCEDURE — 25000003 PHARM REV CODE 250: Performed by: HOSPITALIST

## 2018-09-13 PROCEDURE — 63600175 PHARM REV CODE 636 W HCPCS: Performed by: REGISTERED NURSE

## 2018-09-13 PROCEDURE — 27200949 HC CANNULATOME: Performed by: INTERNAL MEDICINE

## 2018-09-13 PROCEDURE — 0F798ZZ DILATION OF COMMON BILE DUCT, VIA NATURAL OR ARTIFICIAL OPENING ENDOSCOPIC: ICD-10-PCS | Performed by: INTERNAL MEDICINE

## 2018-09-13 PROCEDURE — 94761 N-INVAS EAR/PLS OXIMETRY MLT: CPT

## 2018-09-13 PROCEDURE — 25000003 PHARM REV CODE 250: Performed by: INTERNAL MEDICINE

## 2018-09-13 PROCEDURE — 43264 ERCP REMOVE DUCT CALCULI: CPT | Performed by: INTERNAL MEDICINE

## 2018-09-13 PROCEDURE — 11000001 HC ACUTE MED/SURG PRIVATE ROOM

## 2018-09-13 PROCEDURE — 25000003 PHARM REV CODE 250: Performed by: ANESTHESIOLOGY

## 2018-09-13 PROCEDURE — 94640 AIRWAY INHALATION TREATMENT: CPT

## 2018-09-13 PROCEDURE — 27200999 HC RETRIEVAL BALLOON, ERCP: Performed by: INTERNAL MEDICINE

## 2018-09-13 PROCEDURE — 37000009 HC ANESTHESIA EA ADD 15 MINS: Performed by: INTERNAL MEDICINE

## 2018-09-13 PROCEDURE — C1769 GUIDE WIRE: HCPCS | Performed by: INTERNAL MEDICINE

## 2018-09-13 PROCEDURE — 43262 ENDO CHOLANGIOPANCREATOGRAPH: CPT | Performed by: INTERNAL MEDICINE

## 2018-09-13 PROCEDURE — 25000003 PHARM REV CODE 250: Performed by: REGISTERED NURSE

## 2018-09-13 PROCEDURE — D9220A PRA ANESTHESIA: Mod: CRNA,,, | Performed by: REGISTERED NURSE

## 2018-09-13 PROCEDURE — 27000221 HC OXYGEN, UP TO 24 HOURS

## 2018-09-13 RX ORDER — GLUCAGON 1 MG
KIT INJECTION
Status: DISCONTINUED
Start: 2018-09-13 | End: 2018-09-13 | Stop reason: WASHOUT

## 2018-09-13 RX ORDER — NEOSTIGMINE METHYLSULFATE 1 MG/ML
INJECTION, SOLUTION INTRAVENOUS
Status: DISCONTINUED
Start: 2018-09-13 | End: 2018-09-13 | Stop reason: WASHOUT

## 2018-09-13 RX ORDER — LIDOCAINE HCL/PF 100 MG/5ML
SYRINGE (ML) INTRAVENOUS
Status: COMPLETED
Start: 2018-09-13 | End: 2018-09-13

## 2018-09-13 RX ORDER — FENTANYL CITRATE 50 UG/ML
INJECTION, SOLUTION INTRAMUSCULAR; INTRAVENOUS
Status: COMPLETED
Start: 2018-09-13 | End: 2018-09-13

## 2018-09-13 RX ORDER — ROCURONIUM BROMIDE 10 MG/ML
INJECTION, SOLUTION INTRAVENOUS
Status: COMPLETED
Start: 2018-09-13 | End: 2018-09-13

## 2018-09-13 RX ORDER — LISINOPRIL 5 MG/1
10 TABLET ORAL DAILY
Status: DISCONTINUED | OUTPATIENT
Start: 2018-09-13 | End: 2018-09-18 | Stop reason: HOSPADM

## 2018-09-13 RX ORDER — ONDANSETRON 2 MG/ML
INJECTION INTRAMUSCULAR; INTRAVENOUS
Status: COMPLETED
Start: 2018-09-13 | End: 2018-09-13

## 2018-09-13 RX ORDER — INDOMETHACIN 50 MG/1
100 SUPPOSITORY RECTAL ONCE
Status: COMPLETED | OUTPATIENT
Start: 2018-09-13 | End: 2018-09-13

## 2018-09-13 RX ORDER — PHENYLEPHRINE HCL IN 0.9% NACL 1 MG/10 ML
SYRINGE (ML) INTRAVENOUS
Status: DISPENSED
Start: 2018-09-13 | End: 2018-09-14

## 2018-09-13 RX ORDER — SODIUM CHLORIDE 0.9 % (FLUSH) 0.9 %
3 SYRINGE (ML) INJECTION
Status: DISCONTINUED | OUTPATIENT
Start: 2018-09-13 | End: 2018-09-18 | Stop reason: HOSPADM

## 2018-09-13 RX ORDER — ONDANSETRON 2 MG/ML
INJECTION INTRAMUSCULAR; INTRAVENOUS
Status: DISCONTINUED | OUTPATIENT
Start: 2018-09-13 | End: 2018-09-13

## 2018-09-13 RX ORDER — GLYCOPYRROLATE 0.2 MG/ML
INJECTION INTRAMUSCULAR; INTRAVENOUS
Status: DISCONTINUED
Start: 2018-09-13 | End: 2018-09-13 | Stop reason: WASHOUT

## 2018-09-13 RX ORDER — LIDOCAINE HCL/PF 100 MG/5ML
SYRINGE (ML) INTRAVENOUS
Status: DISCONTINUED | OUTPATIENT
Start: 2018-09-13 | End: 2018-09-13

## 2018-09-13 RX ORDER — ETOMIDATE 2 MG/ML
INJECTION INTRAVENOUS
Status: COMPLETED
Start: 2018-09-13 | End: 2018-09-13

## 2018-09-13 RX ORDER — FENTANYL CITRATE 50 UG/ML
INJECTION, SOLUTION INTRAMUSCULAR; INTRAVENOUS
Status: DISCONTINUED | OUTPATIENT
Start: 2018-09-13 | End: 2018-09-13

## 2018-09-13 RX ORDER — SODIUM CHLORIDE, SODIUM LACTATE, POTASSIUM CHLORIDE, CALCIUM CHLORIDE 600; 310; 30; 20 MG/100ML; MG/100ML; MG/100ML; MG/100ML
INJECTION, SOLUTION INTRAVENOUS CONTINUOUS PRN
Status: DISCONTINUED | OUTPATIENT
Start: 2018-09-13 | End: 2018-09-13

## 2018-09-13 RX ORDER — ROCURONIUM BROMIDE 10 MG/ML
INJECTION, SOLUTION INTRAVENOUS
Status: DISCONTINUED | OUTPATIENT
Start: 2018-09-13 | End: 2018-09-13

## 2018-09-13 RX ORDER — ETOMIDATE 2 MG/ML
INJECTION INTRAVENOUS
Status: DISCONTINUED | OUTPATIENT
Start: 2018-09-13 | End: 2018-09-13

## 2018-09-13 RX ADMIN — PANTOPRAZOLE SODIUM 40 MG: 40 TABLET, DELAYED RELEASE ORAL at 08:09

## 2018-09-13 RX ADMIN — FLUTICASONE FUROATE AND VILANTEROL TRIFENATATE 1 PUFF: 100; 25 POWDER RESPIRATORY (INHALATION) at 07:09

## 2018-09-13 RX ADMIN — PIPERACILLIN AND TAZOBACTAM 4.5 G: 4; .5 INJECTION, POWDER, LYOPHILIZED, FOR SOLUTION INTRAVENOUS; PARENTERAL at 01:09

## 2018-09-13 RX ADMIN — ROCURONIUM BROMIDE 5 MG: 10 INJECTION, SOLUTION INTRAVENOUS at 02:09

## 2018-09-13 RX ADMIN — MIRTAZAPINE 15 MG: 15 TABLET, FILM COATED ORAL at 08:09

## 2018-09-13 RX ADMIN — CLONIDINE HYDROCHLORIDE 0.1 MG: 0.1 TABLET ORAL at 05:09

## 2018-09-13 RX ADMIN — ETOMIDATE 12 MG: 2 INJECTION, SOLUTION INTRAVENOUS at 02:09

## 2018-09-13 RX ADMIN — DONEPEZIL HYDROCHLORIDE 10 MG: 10 TABLET, FILM COATED ORAL at 08:09

## 2018-09-13 RX ADMIN — PRAVASTATIN SODIUM 40 MG: 40 TABLET ORAL at 08:09

## 2018-09-13 RX ADMIN — SODIUM CHLORIDE, SODIUM LACTATE, POTASSIUM CHLORIDE, AND CALCIUM CHLORIDE 1000 ML: .6; .31; .03; .02 INJECTION, SOLUTION INTRAVENOUS at 02:09

## 2018-09-13 RX ADMIN — TRAMADOL HYDROCHLORIDE 50 MG: 50 TABLET, FILM COATED ORAL at 11:09

## 2018-09-13 RX ADMIN — ESCITALOPRAM OXALATE 10 MG: 10 TABLET ORAL at 08:09

## 2018-09-13 RX ADMIN — ACETAMINOPHEN 500 MG: 500 TABLET, FILM COATED ORAL at 08:09

## 2018-09-13 RX ADMIN — BUSPIRONE HYDROCHLORIDE 5 MG: 5 TABLET ORAL at 08:09

## 2018-09-13 RX ADMIN — GABAPENTIN 100 MG: 100 CAPSULE ORAL at 08:09

## 2018-09-13 RX ADMIN — INDOMETHACIN 100 MG: 50 SUPPOSITORY RECTAL at 03:09

## 2018-09-13 RX ADMIN — FENTANYL CITRATE 50 MCG: 50 INJECTION INTRAMUSCULAR; INTRAVENOUS at 02:09

## 2018-09-13 RX ADMIN — ASPIRIN 81 MG: 81 TABLET, COATED ORAL at 08:09

## 2018-09-13 RX ADMIN — TRAMADOL HYDROCHLORIDE 50 MG: 50 TABLET, FILM COATED ORAL at 08:09

## 2018-09-13 RX ADMIN — CLONIDINE HYDROCHLORIDE 0.1 MG: 0.1 TABLET ORAL at 01:09

## 2018-09-13 RX ADMIN — SODIUM CHLORIDE, SODIUM LACTATE, POTASSIUM CHLORIDE, AND CALCIUM CHLORIDE: .6; .31; .03; .02 INJECTION, SOLUTION INTRAVENOUS at 02:09

## 2018-09-13 RX ADMIN — PIPERACILLIN AND TAZOBACTAM 4.5 G: 4; .5 INJECTION, POWDER, LYOPHILIZED, FOR SOLUTION INTRAVENOUS; PARENTERAL at 06:09

## 2018-09-13 RX ADMIN — LIDOCAINE HYDROCHLORIDE 80 MG: 20 INJECTION, SOLUTION INTRAVENOUS at 02:09

## 2018-09-13 RX ADMIN — PIPERACILLIN AND TAZOBACTAM 4.5 G: 4; .5 INJECTION, POWDER, LYOPHILIZED, FOR SOLUTION INTRAVENOUS; PARENTERAL at 08:09

## 2018-09-13 RX ADMIN — MEMANTINE 10 MG: 10 TABLET ORAL at 08:09

## 2018-09-13 RX ADMIN — ONDANSETRON 4 MG: 2 INJECTION, SOLUTION INTRAMUSCULAR; INTRAVENOUS at 03:09

## 2018-09-13 RX ADMIN — LISINOPRIL 10 MG: 5 TABLET ORAL at 05:09

## 2018-09-13 RX ADMIN — FINASTERIDE 5 MG: 5 TABLET, FILM COATED ORAL at 08:09

## 2018-09-13 NOTE — HOSPITAL COURSE
89 y/o male that was recently in hospital for gallstone pancreatitis.  He had undergone ERCP with stone removal and improvement of Lipase and LFT's.  He now presents again with abdominal pain and fevers.  Pt admitted with elevated LFTs/bili and concern for cholangitis. Started on IV zosyn. GI consulted.  Patient with repeat ERCP and stone removal.  LFT's have been slow to improve, but patient symptomatically much improved.  Currently tolerating diet without complaints.  AST/ALT much improved, T Bili is slowly improving.  Patient was noted to have Klebsiella bacteremia.  Bacteremia secondary to choledocholithiasis.  He was continued on Zosyn during hospital stay.  Will be discharged on 10 more days of PO Augmentin.  Discussed with GI and ok to discharge home.  Will recheck LFT in 3 days and follow up with PCP and GI.  He is currently afebrile and hemodynamically stable.  Discharge back to NH today.

## 2018-09-13 NOTE — PROGRESS NOTES
[facility] Hospital Medicine  Progress Note    Patient Name: Fidel Allan  MRN: [id number]  Patient Class: IP- Inpatient   Admission Date: 9/11/2018  Length of Stay: 2 days  Attending Physician: Radhika Moreau MD  Primary Care Provider: Memo Nieto MD        Subjective:     Principal Problem:Choledocholithiasis    HPI:  Mr. Fidel Allan is a 89 y.o. male [facility] resident with essential hypertension, hyperlipidemia (31.6 Dec 2018), CAD s/p CABG, CKD Stage 3, COPD, anemia of chronic disease, permanent pacemaker in place, aortic stenosis, BPH, GERD, dementia, and depression who presented initially to [facility] ED with complaints of fever and weakness this morning.  He otherwise has no complaints but is demented.  Further history is limited at this time.    Hospital Course:  Pt admitted with elevated LFTs/bili and concern for cholangitis. Started on IV zosyn. GI consulted, bili rising. ERCP 8/22 completed. Lipase in normal range. Pt still has persistent epigastric pain.     9/13- underwent ERCP, stone extraction completed, CMp in am    Interval History: attempt to d/w wife on phone but she is not understanding our conversation well    Review of Systems   Unable to perform ROS: Dementia     Objective:     Vital Signs (Most Recent):  Temp: 98 °F (36.7 °C) (09/13/18 1646)  Pulse: 60 (09/13/18 1646)  Resp: 18 (09/13/18 1646)  BP: (!) 180/82 (09/13/18 1646)  SpO2: 100 % (09/13/18 1646) Vital Signs (24h Range):  Temp:  [97.5 °F (36.4 °C)-98 °F (36.7 °C)] 98 °F (36.7 °C)  Pulse:  [59-76] 60  Resp:  [17-20] 18  SpO2:  [93 %-100 %] 100 %  BP: (129-194)/(64-97) 180/82     Weight: 72.3 kg (159 lb 8 oz)  Body mass index is 22.25 kg/m².    Intake/Output Summary (Last 24 hours) at 9/13/2018 1658  Last data filed at 9/13/2018 1616  Gross per 24 hour   Intake 900 ml   Output 1250 ml   Net -350 ml      Physical Exam   Constitutional: He appears well-developed and well-nourished. No distress.  "  HENT:   Head: Normocephalic and atraumatic.   Right Ear: External ear normal.   Left Ear: External ear normal.   Nose: Nose normal.   Eyes: Right eye exhibits no discharge. Left eye exhibits no discharge.   Neck: Normal range of motion.   Cardiovascular: Normal rate, regular rhythm, normal heart sounds and intact distal pulses. Exam reveals no gallop and no friction rub.   No murmur heard.  Pulmonary/Chest: Effort normal and breath sounds normal. No stridor. No respiratory distress. He has no wheezes. He has no rales. He exhibits no tenderness.   Abdominal: Soft. Bowel sounds are normal. He exhibits no distension. There is no tenderness. There is no rebound and no guarding.   Musculoskeletal: Normal range of motion. He exhibits no edema.   Neurological:   Awake, alert, pleasant, cooperative, but oriented only to self   Skin: Skin is warm and dry. He is not diaphoretic. No erythema. There is pallor.   Psychiatric: He has a normal mood and affect. His behavior is normal. Judgment and thought content normal.   Nursing note and vitals reviewed.      Significant Labs: All pertinent labs within the past 24 hours have been reviewed.    Significant Imaging: I have reviewed all pertinent imaging results/findings within the past 24 hours.    Assessment/Plan:      * Choledocholithiasis    Patient had a low-grade fever of 100.2 °F upon arrival to the ED but has not had any since then.  His blood pressure initially was 96/43 but went as low as 84/37.  Patient reportedly had a fever of 102 °F at the NH.  A CT-abd/pelvis was obtained which was significant for "[p]ersistent choledocholithiasis with pronounced dilatation of the intrahepatic biliary tree.  A minimal amount of edema about the pancreatitic head and neck.  Peripancreatic edema seems reduced since prior of 08/21/2018."  Given that he had an admission in Dec 2017 for cholangitis and given his abnormal LFTs here, there is certainly a concern for acute cholangitis.  Of " note, he was admitted here last month for gallstone pancreatitis for which he underwent ERCP per Dr. Baron Owens with the following results:    Impression:            - The majory papilla was in an altered location due to a previous Billroth 2 procedure. A previous sphincterotomy was seen.  - Choledocholithiasis was found. Complete removal was accomplished by biliary sphincterotomy and balloon extraction.  - A gastroscope then a pediatric colonoscope were used to perform the procedure as the side-viewing duodenoscopy could not be advance to the ampulla.    Today, he has a total bilirubin of 1.8 which is better compared to his previous labs last month.  His AST/ALT are worse at 315/183.  He had been started on piperacillin/tazobactam and metronidazole but I asked the ED physician to discontinue the metronidazole.  He doesn't technically meet criteria for sepsis.  Will provide supportive care and consult Gastroenterology for further recommendations.    ERCP 8/22  ERCP 9/13 - stone extraction   CMP in am         Benign prostatic hyperplasia with urinary hesitancy    Stable; will continue his home regimen of finasteride and restart lisinopril when his blood pressure improves.        Anemia of chronic disease    The patient's H/H is stable and consistent with previous laboratory measurements, and the patient exhibits no signs or symptoms of acute bleeding; there is no indication for transfusion.  Will continue to monitor.        Essential hypertension    Resume lisinopril  Clonidine PRN        Coronary artery disease involving coronary bypass graft of native heart without angina pectoris    Stable; will continue his home regimen of aspirin and pravastatin.        CKD Stage 3    His renal function appears to be at his baseline; will continue to monitor his urine output.        HLD (hyperlipidemia)    Well-controlled; will continue patient's home regimen of pravatatin.        Major depressive disorder    Stable; will  continue his home regimen of buspirone and escitalopram.        COPD (chronic obstructive pulmonary disease)    Clinically-stable without wheezing.  Will continue home regimen of ICS?LABA and have as-needed RISSA/LAMA available.        Vascular dementia    Stable; will continue his home regimen of donepezil and memantine.        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        Moderate aortic stenosis    Stable; there are no acute issues.        GERD (gastroesophageal reflux disease)    Will substitute his home regimen of omeprazole for pantoprazole while he is inpatient.          VTE Risk Mitigation (From admission, onward)        Ordered     enoxaparin injection 40 mg  Daily      09/11/18 2230     IP VTE HIGH RISK PATIENT  Once      09/11/18 2230              Radhika Moreau MD  Department of Hospital Medicine   [facility]

## 2018-09-13 NOTE — ANESTHESIA POSTPROCEDURE EVALUATION
"Anesthesia Post Evaluation    Patient: Fidel Allan    Procedure(s) Performed: Procedure(s) (LRB):  ERCP, WITH SPHINCTEROTOMY (N/A)    Final Anesthesia Type: general  Patient location during evaluation: PACU  Patient participation: Yes- Able to Participate  Level of consciousness: awake and alert and oriented  Post-procedure vital signs: reviewed and stable  Pain management: adequate  Airway patency: patent  PONV status at discharge: No PONV  Anesthetic complications: no      Cardiovascular status: blood pressure returned to baseline and hemodynamically stable  Respiratory status: unassisted, spontaneous ventilation and room air  Hydration status: euvolemic  Follow-up not needed.        Visit Vitals  BP (!) 180/82 (BP Location: Left arm, Patient Position: Lying)   Pulse 60   Temp 36.7 °C (98 °F) (Oral)   Resp 18   Ht 5' 11" (1.803 m)   Wt 72.3 kg (159 lb 8 oz)   SpO2 100%   BMI 22.25 kg/m²       Pain/Fior Score: Pain Assessment Performed: Yes (9/13/2018  4:46 PM)  Presence of Pain: denies (9/13/2018  4:46 PM)  Pain Rating Prior to Med Admin: 9 (9/13/2018  8:31 AM)  Pain Rating Post Med Admin: 0 (9/12/2018  2:39 AM)  Fior Score: 9 (9/13/2018  4:46 PM)        "

## 2018-09-13 NOTE — CONSULTS
Gastroenterology Consult    9/13/2018 12:47 PM    Consulting Physician:  Shana Higgins MD  Primary Care Provider: Memo Nieto MD    Reason for consultation: choledocholithiasis    HPI:  Fidel Allan is a 89 y.o. male with a history of HTN, HLD, CAD s/p CABG, CKD, COPD, chronic anemia, pacemaker, aortic stenosis, BPH, GERD, dementia and depression who presented with fever and weakness as well as epigastric abdominal pain.  He was found to have hyperbilirubinemia and evidence of choledocholithiasis on CT this admission.  He was recently admitted and underwent ERCP with stone removal on 8/22/18.  He reports some epigastric discomfort and loss of appetite.  He denies N/V, jaundice, constipation or diarrhea.      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     Blindness of right eye     Bone disorder     Bradycardia     CAD (coronary artery disease)     CHF (congestive heart failure)     acute on chronic diastolic chf    Constipation     COPD (chronic obstructive pulmonary disease)     Dysphagia     Dyspnea     Falls frequently     GERD (gastroesophageal reflux disease)     Glaucoma     Hearing loss     HLD (hyperlipidemia)     Hypertension     Insomnia     Major depressive disorder     Melanoma     left neck    Urinary tract infection     Vascular dementia        Past Surgical History:   Procedure Laterality Date    BIOPSY-SENTINEL NODE N/A 1/16/2017    Performed by Abraham Villafuerte MD at [facility] OR [address] [place] [address]    CARDIAC PACEMAKER PLACEMENT  2016    CHOLEDOCHODUODENOSTOMY      COLECTOMY      CORONARY ANGIOPLASTY WITH STENT PLACEMENT      CORONARY ARTERY BYPASS GRAFT      CYSTOSCOPY N/A 3/19/2018    Per[facility] at [facility] OR    ERCP N/A 8/22/2018    Procedure: ERCP (ENDOSCOPIC RETROGRADE CHOLANGIOPANCREATOGRAPHY);  Surgeon: Baron Owens MD;  Location: [facility];  Service: Endoscopy;  Laterality: N/A;     ERCP N/A 1/30/2017    Performed by Guillaume Landrum MD at [facility] ENDO    ERCP (ENDOSCOPIC RETROGRADE CHOLANGIOPANCREATOGRAPHY) N/A 8/22/2018    Performed by Baron Owens MD at [facility] ENDO    EXCISION-WIDE LOCAL 2 cm Left 1/16/2017    Performed by Abraham Villafuerte MD at [facility] OR 2ND FLR    HEART CATH-LEFT Right 11/22/2016    Performed by Andre Benton MD at [facility] CATH LAB    INSERTION-PACEMAKER-DUAL Right 11/23/2016    Performed by LELE Fofana MD at [facility] OR    MALIGNANT SKIN LESION EXCISION Left 01/16/2017    neck melanoma    TRANSESOPHAGEAL ECHOCARDIOGRAM (BASSEM) N/A 2/3/2017    Performed by Yovany Guzman MD at [facility] OR    TURP WITH BIPOLAR N/A 3/19/2018    Performed by Memo Nieto MD at [facility] OR       Social History     Socioeconomic History    Marital status:      Spouse name: None    Number of children: None    Years of education: None    Highest education level: None   Social Needs    Financial resource strain: None    Food insecurity - worry: None    Food insecurity - inability: None    Transportation needs - medical: None    Transportation needs - non-medical: None   Occupational History    None   Tobacco Use    Smoking status: Never Smoker    Smokeless tobacco: Never Used   Substance and Sexual Activity    Alcohol use: No    Drug use: No    Sexual activity: No   Other Topics Concern    None   Social History Narrative    None       Family History   Problem Relation Age of Onset    Prostate cancer Neg Hx     Kidney disease Neg Hx          Review of patient's allergies indicates:  No Known Allergies      Current Facility-Administered Medications:     acetaminophen tablet 500 mg, 500 mg, Oral, Q6H PRN, Hank Nielsen MD, 500 mg at 09/13/18 0831    albuterol-ipratropium 2.5 mg-0.5 mg/3 mL nebulizer solution 3 mL, 3 mL, Nebulization, Q6H PRN, Hank Nielsen MD    aspirin EC tablet 81 mg, 81 mg, Oral, Daily, Hank Nielsen MD, 81 mg at 09/13/18 0828    busPIRone  tablet 5 mg, 5 mg, Oral, BID, Hank Nielsen MD, 5 mg at 09/13/18 0828    cloNIDine tablet 0.1 mg, 0.1 mg, Oral, TID PRN, Hank Nielsen MD, 0.1 mg at 09/13/18 0142    donepezil tablet 10 mg, 10 mg, Oral, QHS, Hank Nielsen MD, 10 mg at 09/12/18 2200    enoxaparin injection 40 mg, 40 mg, Subcutaneous, Daily, Hank Nielsen MD, 40 mg at 09/12/18 1823    escitalopram oxalate tablet 10 mg, 10 mg, Oral, QAM, Hank Nielsen MD, 10 mg at 09/13/18 0827    finasteride tablet 5 mg, 5 mg, Oral, Daily, Hank Nielsen MD, 5 mg at 09/13/18 0828    fluticasone-vilanterol 100-25 mcg/dose diskus inhaler 1 puff, 1 puff, Inhalation, Daily, Hank Nielsen MD, 1 puff at 09/13/18 0714    gabapentin capsule 100 mg, 100 mg, Oral, TID, Hank Nielsen MD, 100 mg at 09/13/18 0828    influenza (FLUZONE HIGH-DOSE) vaccine 0.5 mL, 0.5 mL, Intramuscular, vaccine x 1 dose, Radhika Moreau MD    memantine tablet 10 mg, 10 mg, Oral, Daily, Hank Nielsen MD, 10 mg at 09/13/18 0827    mirtazapine tablet 15 mg, 15 mg, Oral, QHS, Hank Nielsen MD, 15 mg at 09/12/18 2200    pantoprazole EC tablet 40 mg, 40 mg, Oral, Daily, Hank Nielsen MD, 40 mg at 09/13/18 0828    piperacillin-tazobactam 4.5 g in sodium chloride 0.9% 100 mL IVPB (ready to mix system), 4.5 g, Intravenous, Q8H, Hank Nielsen MD, Last Rate: 25 mL/hr at 09/13/18 0827, 4.5 g at 09/13/18 0827    pravastatin tablet 40 mg, 40 mg, Oral, QHS, Hank Nielsen MD, 40 mg at 09/12/18 2200    promethazine (PHENERGAN) 6.25 mg in dextrose 5 % 50 mL IVPB, 6.25 mg, Intravenous, Q6H PRN, Hank Nielsen MD    senna-docusate 8.6-50 mg per tablet 1 tablet, 1 tablet, Oral, BID PRN, Hank Nielsen MD    traMADol tablet 50 mg, 50 mg, Oral, Q6H PRN, Hank Nielsen MD, 50 mg at 09/13/18 0831      Review of Systems   Constitutional: Positive for fever. Negative for chills and weight loss.   HENT: Negative.    Eyes: Negative.    Respiratory: Negative.    Cardiovascular: Negative.    Gastrointestinal:  "Positive for abdominal pain. Negative for blood in stool, constipation, diarrhea, heartburn, melena, nausea and vomiting.   Genitourinary: Negative.    Musculoskeletal: Negative.    Skin: Negative.    Neurological: Negative.    Endo/Heme/Allergies: Negative.          BP (!) 166/70 (BP Location: Left arm, Patient Position: Lying)   Pulse (!) 59   Temp 97.8 °F (36.6 °C) (Oral)   Resp 17   Ht 5' 11" (1.803 m)   Wt 72.3 kg (159 lb 8 oz)   SpO2 99%   BMI 22.25 kg/m²   Physical Exam   Constitutional: He is oriented to person, place, and time. He appears well-developed and well-nourished. No distress.   HENT:   Head: Normocephalic and atraumatic.   Mouth/Throat: Oropharynx is clear and moist.   Eyes: EOM are normal. Pupils are equal, round, and reactive to light. No scleral icterus.   Cardiovascular: Normal rate, regular rhythm and normal heart sounds.   No murmur heard.  Pulmonary/Chest: Effort normal and breath sounds normal. No respiratory distress.   Abdominal: Soft. Bowel sounds are normal. He exhibits no distension. There is tenderness (mild epigastric and RUQ  TTP). There is no rebound and no guarding.   Musculoskeletal: Normal range of motion. He exhibits no edema.   Neurological: He is alert and oriented to person, place, and time.   Skin: Skin is warm and dry. No rash noted.   Vitals reviewed.      Labs:  Lab Results   Component Value Date/Time    WBC 8.35 09/12/2018 04:59 AM    HGB 10.3 (L) 09/12/2018 04:59 AM    HCT 32.2 (L) 09/12/2018 04:59 AM     (L) 09/12/2018 04:59 AM    MCV 93 09/12/2018 04:59 AM     09/12/2018 04:59 AM    K 4.3 09/12/2018 04:59 AM     09/12/2018 04:59 AM    CO2 24 09/12/2018 04:59 AM    BUN 11 09/12/2018 04:59 AM    CREATININE 0.9 09/12/2018 04:59 AM    GLU 85 09/12/2018 04:59 AM    CALCIUM 8.6 (L) 09/12/2018 04:59 AM    MG 1.6 09/12/2018 04:59 AM    PHOS 2.3 (L) 09/12/2018 04:59 AM    INR 1.0 09/11/2018 01:25 PM    APTT 29.4 09/11/2018 01:25 PM   ]  Lab Results "   Component Value Date/Time    PROT 6.0 09/12/2018 04:59 AM    ALBUMIN 2.8 (L) 09/12/2018 04:59 AM    BILITOT 3.3 (H) 09/12/2018 04:59 AM    BILIDIR 0.3 02/03/2017 04:43 AM     (H) 09/12/2018 04:59 AM     (H) 09/12/2018 04:59 AM    ALKPHOS 412 (H) 09/12/2018 04:59 AM   ]    Imaging and Procedures:  I personally reviewed the imaging/procedures below.  CT A/P 9/11/18:  Intra and extrahepatic biliary ductal dilatation with a common duct filling defect consistent with choledocholithiasis which was present on previous CT dated 08/21/2018.    ERCP 8/22/18:  - The majory papilla was in an altered location due to a previous Billroth 2 procedure. A previous sphincterotomy was seen.  - Choledocholithiasis was found. Complete removal was accomplished by biliary sphincterotomy and  balloon extraction.  - A gastroscope then a pediatric colonoscope were used to perform the procedure as the side-viewing  duodenoscopy could not be advance to the ampulla.    Assessment:  Fidel Allan is a 89 y.o. male with a history of HTN, HLD, CAD s/p CABG, CKD, COPD, chronic anemia, pacemaker, aortic stenosis, BPH, GERD, dementia and depression who presented with fever and weakness as well as epigastric abdominal pain.  He was found to have choledocholithiasis with elevated bilirubin.    Plan:  - keep NPO  - ERCP today for stone removal  - IV hydration    Shana Higgins MD

## 2018-09-13 NOTE — ANESTHESIA PREPROCEDURE EVALUATION
09/13/2018  Fidel Allan is a 89 y.o., male.    Anesthesia Evaluation    I have reviewed the Patient Summary Reports.     I have reviewed the Medications.     Review of Systems  Anesthesia Hx:  No previous Anesthesia    Social:  Non-Smoker    Hematology/Oncology:  Hematology Normal   Oncology Normal     EENT/Dental:EENT/Dental Normal   Cardiovascular:   Pacemaker Hypertension CAD   Angina CHF Echo 55perc 2017   Pulmonary:   Pneumonia COPD Shortness of breath    Renal/:   Chronic Renal Disease    Hepatic/GI:  Hepatic/GI Normal    Musculoskeletal:  Musculoskeletal Normal    Neurological:  Neurology Normal    Endocrine:  Endocrine Normal    Dermatological:  Skin Normal    Psych:   Psychiatric History          Physical Exam  General:  Well nourished    Airway/Jaw/Neck:  Airway Findings: Mallampati: II     Dental:  Dental Findings: Edentulous   Chest/Lungs:  Chest/Lungs Clear    Heart/Vascular:  Heart Findings: Normal       Mental Status:  Mental Status Findings:  Cooperative, Alert and Oriented         Anesthesia Plan  Type of Anesthesia, risks & benefits discussed:  Anesthesia Type:  general  Patient's Preference:   Intra-op Monitoring Plan: standard ASA monitors  Intra-op Monitoring Plan Comments:   Post Op Pain Control Plan: multimodal analgesia, IV/PO Opioids PRN and per primary service following discharge from PACU  Post Op Pain Control Plan Comments:   Induction:    Beta Blocker:  Patient is not currently on a Beta-Blocker (No further documentation required).       Informed Consent: Patient understands risks and agrees with Anesthesia plan.  Questions answered. Anesthesia consent signed with patient.  ASA Score: 3     Day of Surgery Review of History & Physical:    H&P update referred to the provider.  H&P completed by Anesthesiologist.       Ready For Surgery From Anesthesia Perspective.

## 2018-09-13 NOTE — SUBJECTIVE & OBJECTIVE
Interval History: attempt to d/w wife on phone but she is not understanding our conversation well    Review of Systems   Unable to perform ROS: Dementia     Objective:     Vital Signs (Most Recent):  Temp: 98 °F (36.7 °C) (09/13/18 1646)  Pulse: 60 (09/13/18 1646)  Resp: 18 (09/13/18 1646)  BP: (!) 180/82 (09/13/18 1646)  SpO2: 100 % (09/13/18 1646) Vital Signs (24h Range):  Temp:  [97.5 °F (36.4 °C)-98 °F (36.7 °C)] 98 °F (36.7 °C)  Pulse:  [59-76] 60  Resp:  [17-20] 18  SpO2:  [93 %-100 %] 100 %  BP: (129-194)/(64-97) 180/82     Weight: 72.3 kg (159 lb 8 oz)  Body mass index is 22.25 kg/m².    Intake/Output Summary (Last 24 hours) at 9/13/2018 1658  Last data filed at 9/13/2018 1616  Gross per 24 hour   Intake 900 ml   Output 1250 ml   Net -350 ml      Physical Exam   Constitutional: He appears well-developed and well-nourished. No distress.   HENT:   Head: Normocephalic and atraumatic.   Right Ear: External ear normal.   Left Ear: External ear normal.   Nose: Nose normal.   Eyes: Right eye exhibits no discharge. Left eye exhibits no discharge.   Neck: Normal range of motion.   Cardiovascular: Normal rate, regular rhythm, normal heart sounds and intact distal pulses. Exam reveals no gallop and no friction rub.   No murmur heard.  Pulmonary/Chest: Effort normal and breath sounds normal. No stridor. No respiratory distress. He has no wheezes. He has no rales. He exhibits no tenderness.   Abdominal: Soft. Bowel sounds are normal. He exhibits no distension. There is no tenderness. There is no rebound and no guarding.   Musculoskeletal: Normal range of motion. He exhibits no edema.   Neurological:   Awake, alert, pleasant, cooperative, but oriented only to self   Skin: Skin is warm and dry. He is not diaphoretic. No erythema. There is pallor.   Psychiatric: He has a normal mood and affect. His behavior is normal. Judgment and thought content normal.   Nursing note and vitals reviewed.      Significant Labs: All  pertinent labs within the past 24 hours have been reviewed.    Significant Imaging: I have reviewed all pertinent imaging results/findings within the past 24 hours.

## 2018-09-13 NOTE — TRANSFER OF CARE
"Anesthesia Transfer of Care Note    Patient: Fidel lAlan    Procedure(s) Performed: Procedure(s) (LRB):  ERCP, WITH SPHINCTEROTOMY (N/A)    Patient location: GI    Anesthesia Type: general    Transport from OR: Transported from OR on 2-3 L/min O2 by NC with adequate spontaneous ventilation    Post pain: adequate analgesia    Post assessment: no apparent anesthetic complications and tolerated procedure well    Post vital signs: stable    Level of consciousness: awake and alert    Nausea/Vomiting: no nausea/vomiting    Complications: none    Transfer of care protocol was followed      Last vitals:   Visit Vitals  BP (!) 160/67   Pulse 60   Temp 36.4 °C (97.5 °F) (Oral)   Resp 20   Ht 5' 11" (1.803 m)   Wt 72.3 kg (159 lb 8 oz)   SpO2 100%   BMI 22.25 kg/m²     "

## 2018-09-13 NOTE — ASSESSMENT & PLAN NOTE
"Patient had a low-grade fever of 100.2 °F upon arrival to the ED but has not had any since then.  His blood pressure initially was 96/43 but went as low as 84/37.  Patient reportedly had a fever of 102 °F at the NH.  A CT-abd/pelvis was obtained which was significant for "[p]ersistent choledocholithiasis with pronounced dilatation of the intrahepatic biliary tree.  A minimal amount of edema about the pancreatitic head and neck.  Peripancreatic edema seems reduced since prior of 08/21/2018."  Given that he had an admission in Dec 2017 for cholangitis and given his abnormal LFTs here, there is certainly a concern for acute cholangitis.  Of note, he was admitted here last month for gallstone pancreatitis for which he underwent ERCP per Dr. Baron Owens with the following results:    Impression:            - The majory papilla was in an altered location due to a previous Billroth 2 procedure. A previous sphincterotomy was seen.  - Choledocholithiasis was found. Complete removal was accomplished by biliary sphincterotomy and balloon extraction.  - A gastroscope then a pediatric colonoscope were used to perform the procedure as the side-viewing duodenoscopy could not be advance to the ampulla.    Today, he has a total bilirubin of 1.8 which is better compared to his previous labs last month.  His AST/ALT are worse at 315/183.  He had been started on piperacillin/tazobactam and metronidazole but I asked the ED physician to discontinue the metronidazole.  He doesn't technically meet criteria for sepsis.  Will provide supportive care and consult Gastroenterology for further recommendations.    ERCP 8/22  ERCP 9/13 - stone extraction   CMP in am   "

## 2018-09-13 NOTE — PLAN OF CARE
Problem: Patient Care Overview  Goal: Plan of Care Review  Pt nausea free. Comfort met to allow long periods of rest. Breathing smoothly on 2L NC. Voiding well via urinal. AOx4. Fever free. No falls or injury. Pt BP elevated during shift requiring PRN BP med. PT BP decreased post PRN med. BP WDL. No s/s of distress

## 2018-09-13 NOTE — SUBJECTIVE & OBJECTIVE
Interval History: pt c/o hunger, epigastric pain 3/10    Review of Systems   Unable to perform ROS: Dementia     Objective:     Vital Signs (Most Recent):  Temp: 97 °F (36.1 °C) (09/12/18 1603)  Pulse: 61 (09/12/18 1603)  Resp: 18 (09/12/18 1603)  BP: (!) 189/84 (09/12/18 1603)  SpO2: 99 % (09/12/18 1603) Vital Signs (24h Range):  Temp:  [97 °F (36.1 °C)-99 °F (37.2 °C)] 97 °F (36.1 °C)  Pulse:  [59-63] 61  Resp:  [16-20] 18  SpO2:  [95 %-100 %] 99 %  BP: ()/(50-84) 189/84     Weight: 72.3 kg (159 lb 8 oz)  Body mass index is 22.25 kg/m².    Intake/Output Summary (Last 24 hours) at 9/12/2018 1909  Last data filed at 9/12/2018 1603  Gross per 24 hour   Intake 681.67 ml   Output 1220 ml   Net -538.33 ml      Physical Exam   Constitutional: He appears well-developed and well-nourished. No distress.   HENT:   Head: Normocephalic and atraumatic.   Right Ear: External ear normal.   Left Ear: External ear normal.   Nose: Nose normal.   Eyes: Right eye exhibits no discharge. Left eye exhibits no discharge.   Neck: Normal range of motion.   Cardiovascular: Normal rate, regular rhythm, normal heart sounds and intact distal pulses. Exam reveals no gallop and no friction rub.   No murmur heard.  Pulmonary/Chest: Effort normal and breath sounds normal. No stridor. No respiratory distress. He has no wheezes. He has no rales. He exhibits no tenderness.   Abdominal: Soft. Bowel sounds are normal. He exhibits no distension. There is no tenderness. There is no rebound and no guarding.   Musculoskeletal: Normal range of motion. He exhibits no edema.   Neurological:   Awake, alert, pleasant, cooperative, but oriented only to self   Skin: Skin is warm and dry. He is not diaphoretic. No erythema. There is pallor.   Psychiatric: He has a normal mood and affect. His behavior is normal. Judgment and thought content normal.   Nursing note and vitals reviewed.      Significant Labs:   Blood Culture:   Recent Labs   Lab  09/11/18   1525   09/11/18   1532   LABBLOO  Gram stain hanny bottle: Gram negative rods   Results called to and read back by:Abby Metcalf RN 09/12/2018  11:53  Gram stain hanny bottle: Gram negative rods   Results called to and read back by:Abby Metcalf RN  09/12/2018  11:53     CBC:   Recent Labs   Lab  09/11/18   1525  09/12/18   0459   WBC  8.76  8.35   HGB  11.2*  10.3*   HCT  34.9*  32.2*   PLT  144*  114*     CMP:   Recent Labs   Lab  09/11/18   1525  09/12/18   0459   NA  131*  136   K  5.1  4.3   CL  96  104   CO2  28  24   GLU  165*  85   BUN  17  11   CREATININE  1.13  0.9   CALCIUM  8.9  8.6*   PROT  7.0  6.0   ALBUMIN  3.8  2.8*   BILITOT  1.8*  3.3*   ALKPHOS  395*  412*   AST  315*  145*   ALT  183*  122*   ANIONGAP  7*  8   EGFRNONAA  56.9*  >60     Coagulation:   Recent Labs   Lab  09/11/18   1325   INR  1.0   APTT  29.4     Lipase:   Recent Labs   Lab  09/12/18   0459   LIPASE  39       Significant Imaging: I have reviewed all pertinent imaging results/findings within the past 24 hours.

## 2018-09-13 NOTE — [employer] PATIENT INSTRUCTIONS
Discharge Summary/Instructions after an Endoscopic Procedure  Patient Name: Fidel Allan  Patient MRN: [id number]  Patient YOB: 1927 Thursday, September 13, 2018  Shana Higgins MD  RESTRICTIONS:  During your procedure today, you received medications for sedation.  These   medications may affect your judgment, balance and coordination.  Therefore,   for 24 hours, you have the following restrictions:   - DO NOT drive a car, operate machinery, make legal/financial decisions,   sign important papers or drink alcohol.    ACTIVITY:  Today: no heavy lifting, straining or running due to procedural   sedation/anesthesia.  The following day: return to full activity including work.  DIET:  Eat and drink normally unless instructed otherwise.     TREATMENT FOR COMMON SIDE EFFECTS:  - Mild abdominal pain, nausea, belching, bloating or excessive gas:  rest,   eat lightly and use a heating pad.  - Sore Throat: treat with throat lozenges and/or gargle with warm salt   water.  - Because air was used during the procedure, expelling large amounts of air   from your rectum or belching is normal.  - If a bowel prep was taken, you may not have a bowel movement for 1-3 days.    This is normal.  SYMPTOMS TO WATCH FOR AND REPORT TO YOUR PHYSICIAN:  1. Abdominal pain or bloating, other than gas cramps.  2. Chest pain.  3. Back pain.  4. Signs of infection such as: chills or fever occurring within 24 hours   after the procedure.  5. Rectal bleeding, which would show as bright red, maroon, or black stools.   (A tablespoon of blood from the rectum is not serious, especially if   hemorrhoids are present.)  6. Vomiting.  7. Weakness or dizziness.  GO DIRECTLY TO THE NEAREST EMERGENCY ROOM IF YOU HAVE ANY OF THE FOLLOWING:      Difficulty breathing              Chills and/or fever over 101 F   Persistent vomiting and/or vomiting blood   Severe abdominal pain   Severe chest pain   Black, tarry stools   Bleeding- more than one  tablespoon   Any other symptom or condition that you feel may need urgent attention  Your doctor recommends these additional instructions:  If any biopsies were taken, your doctors clinic will contact you in 1 to 2   weeks with any results.  - [facility].   - Resume previous diet today.   - Observe patient's clinical course.   - Watch for pancreatitis, bleeding, perforation, and cholangitis.  For questions, problems or results please call your physician - Shana Higgins MD at Work:  [phone].  [facility] can be reached at [phone]     IF A COMPLICATION OR EMERGENCY SITUATION ARISES AND YOU ARE UNABLE TO REACH   YOUR PHYSICIAN - GO DIRECTLY TO THE EMERGENCY ROOM.  Shana Higgins MD  9/13/2018 4:36:58 PM  This report has been verified and signed electronically.  [employer]

## 2018-09-13 NOTE — PROGRESS NOTES
[facility] Hospital Medicine  Progress Note    Patient Name: Fidel Allan  MRN: [id number]  Patient Class: IP- Inpatient   Admission Date: 9/11/2018  Length of Stay: 1 days  Attending Physician: Radhika Moreau MD  Primary Care Provider: Memo Nieto MD        Subjective:     Principal Problem:Choledocholithiasis    HPI:  Mr. Fidel Allan is a 89 y.o. male [facility] resident with essential hypertension, hyperlipidemia (31.6 Dec 2018), CAD s/p CABG, CKD Stage 3, COPD, anemia of chronic disease, permanent pacemaker in place, aortic stenosis, BPH, GERD, dementia, and depression who presented initially to [facility] ED with complaints of fever and weakness this morning.  He otherwise has no complaints but is demented.  Further history is limited at this time.    Hospital Course:  Pt admitted with elevated LFTs/bili and concern for cholangitis. Started on IV zosyn. GI consulted, bili rising. ERCP 8/22 completed. Lipase in normal range. Pt still has persistent epigastric pain.     Interval History: pt c/o hunger, epigastric pain 3/10    Review of Systems   Unable to perform ROS: Dementia     Objective:     Vital Signs (Most Recent):  Temp: 97 °F (36.1 °C) (09/12/18 1603)  Pulse: 61 (09/12/18 1603)  Resp: 18 (09/12/18 1603)  BP: (!) 189/84 (09/12/18 1603)  SpO2: 99 % (09/12/18 1603) Vital Signs (24h Range):  Temp:  [97 °F (36.1 °C)-99 °F (37.2 °C)] 97 °F (36.1 °C)  Pulse:  [59-63] 61  Resp:  [16-20] 18  SpO2:  [95 %-100 %] 99 %  BP: ()/(50-84) 189/84     Weight: 72.3 kg (159 lb 8 oz)  Body mass index is 22.25 kg/m².    Intake/Output Summary (Last 24 hours) at 9/12/2018 1909  Last data filed at 9/12/2018 1603  Gross per 24 hour   Intake 681.67 ml   Output 1220 ml   Net -538.33 ml      Physical Exam   Constitutional: He appears well-developed and well-nourished. No distress.   HENT:   Head: Normocephalic and atraumatic.   Right Ear: External ear normal.   Left Ear: External ear  normal.   Nose: Nose normal.   Eyes: Right eye exhibits no discharge. Left eye exhibits no discharge.   Neck: Normal range of motion.   Cardiovascular: Normal rate, regular rhythm, normal heart sounds and intact distal pulses. Exam reveals no gallop and no friction rub.   No murmur heard.  Pulmonary/Chest: Effort normal and breath sounds normal. No stridor. No respiratory distress. He has no wheezes. He has no rales. He exhibits no tenderness.   Abdominal: Soft. Bowel sounds are normal. He exhibits no distension. There is no tenderness. There is no rebound and no guarding.   Musculoskeletal: Normal range of motion. He exhibits no edema.   Neurological:   Awake, alert, pleasant, cooperative, but oriented only to self   Skin: Skin is warm and dry. He is not diaphoretic. No erythema. There is pallor.   Psychiatric: He has a normal mood and affect. His behavior is normal. Judgment and thought content normal.   Nursing note and vitals reviewed.      Significant Labs:   Blood Culture:   Recent Labs   Lab  09/11/18   1525  09/11/18   1532   LABBLOO  Gram stain hanny bottle: Gram negative rods   Results called to and read back by:Abby Metcalf RN 09/12/2018  11:53  Gram stain hanny bottle: Gram negative rods   Results called to and read back by:Abby Metcalf RN  09/12/2018  11:53     CBC:   Recent Labs   Lab  09/11/18   1525  09/12/18   0459   WBC  8.76  8.35   HGB  11.2*  10.3*   HCT  34.9*  32.2*   PLT  144*  114*     CMP:   Recent Labs   Lab  09/11/18   1525  09/12/18   0459   NA  131*  136   K  5.1  4.3   CL  96  104   CO2  28  24   GLU  165*  85   BUN  17  11   CREATININE  1.13  0.9   CALCIUM  8.9  8.6*   PROT  7.0  6.0   ALBUMIN  3.8  2.8*   BILITOT  1.8*  3.3*   ALKPHOS  395*  412*   AST  315*  145*   ALT  183*  122*   ANIONGAP  7*  8   EGFRNONAA  56.9*  >60     Coagulation:   Recent Labs   Lab  09/11/18   1325   INR  1.0   APTT  29.4     Lipase:   Recent Labs   Lab  09/12/18   0459   LIPASE  39       Significant Imaging:  "I have reviewed all pertinent imaging results/findings within the past 24 hours.    Assessment/Plan:      * Choledocholithiasis    Patient had a low-grade fever of 100.2 °F upon arrival to the ED but has not had any since then.  His blood pressure initially was 96/43 but went as low as 84/37.  Patient reportedly had a fever of 102 °F  at the NH.  A CT-abd/pelvis was obtained which was significant for "[p]ersistent choledocholithiasis with pronounced dilatation of the intrahepatic biliary tree.  A minimal amount of edema about the pancreatitic head and neck.  Peripancreatic edema seems reduced since prior of 08/21/2018."  Given that he had an admission in Dec 2017 for cholangitis and given his abnormal LFTs here, there is certainly a concern for acute cholangitis.  Of note, he was admitted here last month for gallstone pancreatitis for which he underwent ERCP per Dr. Baron Owens with the following results:    Impression:            - The majory papilla was in an altered location due to a previous Billroth 2 procedure. A previous sphincterotomy was seen.  - Choledocholithiasis was found. Complete removal was accomplished by biliary sphincterotomy and balloon extraction.  - A gastroscope then a pediatric colonoscope were used to perform the procedure as the side-viewing duodenoscopy could not be advance to the ampulla.    Today, he has a total bilirubin of 1.8 which is better compared to his previous labs last month.  His AST/ALT are worse at 315/183.  He had been started on piperacillin/tazobactam and metronidazole but I asked the ED physician to discontinue the metronidazole.  He doesn't technically meet criteria for sepsis.  Will provide supportive care and consult Gastroenterology for further recommendations.        Benign prostatic hyperplasia with urinary hesitancy    Stable; will continue his home regimen of finasteride and restart lisinopril when his blood pressure improves.        Anemia of chronic disease "    The patient's H/H is stable and consistent with previous laboratory measurements, and the patient exhibits no signs or symptoms of acute bleeding; there is no indication for transfusion.  Will continue to monitor.        Essential hypertension    His blood pressure has been borderline low so will hold his home regimen of lisinopril and tamsulosin and provide as-needed clonidine.        Coronary artery disease involving coronary bypass graft of native heart without angina pectoris    Stable; will continue his home regimen of aspirin and pravastatin.        CKD Stage 3    His renal function appears to be at his baseline; will continue to monitor his urine output.        HLD (hyperlipidemia)    Well-controlled; will continue patient's home regimen of pravatatin.        Major depressive disorder    Stable; will continue his home regimen of buspirone and escitalopram.        COPD (chronic obstructive pulmonary disease)    Clinically-stable without wheezing.  Will continue home regimen of ICS?LABA and have as-needed RISSA/LAMA available.        Vascular dementia    Stable; will continue his home regimen of donepezil and memantine.        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        Moderate aortic stenosis    Stable; there are no acute issues.        GERD (gastroesophageal reflux disease)    Will substitute his home regimen of omeprazole for pantoprazole while he is inpatient.          VTE Risk Mitigation (From admission, onward)        Ordered     enoxaparin injection 40 mg  Daily      09/11/18 2230     IP VTE HIGH RISK PATIENT  Once      09/11/18 2230              Radhika Moreau MD  Department of Hospital Medicine   [facility]

## 2018-09-14 LAB
ALBUMIN SERPL BCP-MCNC: 2.3 G/DL
ALP SERPL-CCNC: 361 U/L
ALT SERPL W/O P-5'-P-CCNC: 66 U/L
ANION GAP SERPL CALC-SCNC: 10 MMOL/L
AST SERPL-CCNC: 59 U/L
BASOPHILS # BLD AUTO: 0.02 K/UL
BASOPHILS NFR BLD: 0.3 %
BILIRUB SERPL-MCNC: 3.5 MG/DL
BUN SERPL-MCNC: 10 MG/DL
CALCIUM SERPL-MCNC: 8.8 MG/DL
CHLORIDE SERPL-SCNC: 105 MMOL/L
CO2 SERPL-SCNC: 24 MMOL/L
CREAT SERPL-MCNC: 0.8 MG/DL
DIFFERENTIAL METHOD: ABNORMAL
EOSINOPHIL # BLD AUTO: 0.1 K/UL
EOSINOPHIL NFR BLD: 1.6 %
ERYTHROCYTE [DISTWIDTH] IN BLOOD BY AUTOMATED COUNT: 14.3 %
EST. GFR  (AFRICAN AMERICAN): >60 ML/MIN/1.73 M^2
EST. GFR  (NON AFRICAN AMERICAN): >60 ML/MIN/1.73 M^2
GLUCOSE SERPL-MCNC: 69 MG/DL
HCT VFR BLD AUTO: 32.6 %
HGB BLD-MCNC: 10.6 G/DL
LIPASE SERPL-CCNC: 494 U/L
LYMPHOCYTES # BLD AUTO: 1 K/UL
LYMPHOCYTES NFR BLD: 17.3 %
MCH RBC QN AUTO: 29.5 PG
MCHC RBC AUTO-ENTMCNC: 32.5 G/DL
MCV RBC AUTO: 91 FL
MONOCYTES # BLD AUTO: 0.6 K/UL
MONOCYTES NFR BLD: 10.7 %
NEUTROPHILS # BLD AUTO: 4.1 K/UL
NEUTROPHILS NFR BLD: 70.1 %
PLATELET # BLD AUTO: 112 K/UL
PMV BLD AUTO: 11.2 FL
POTASSIUM SERPL-SCNC: 3.8 MMOL/L
PROT SERPL-MCNC: 5.4 G/DL
RBC # BLD AUTO: 3.59 M/UL
SODIUM SERPL-SCNC: 139 MMOL/L
WBC # BLD AUTO: 5.79 K/UL

## 2018-09-14 PROCEDURE — 94640 AIRWAY INHALATION TREATMENT: CPT

## 2018-09-14 PROCEDURE — 63600175 PHARM REV CODE 636 W HCPCS: Performed by: INTERNAL MEDICINE

## 2018-09-14 PROCEDURE — 85025 COMPLETE CBC W/AUTO DIFF WBC: CPT

## 2018-09-14 PROCEDURE — 25000003 PHARM REV CODE 250: Performed by: INTERNAL MEDICINE

## 2018-09-14 PROCEDURE — 11000001 HC ACUTE MED/SURG PRIVATE ROOM

## 2018-09-14 PROCEDURE — 80053 COMPREHEN METABOLIC PANEL: CPT

## 2018-09-14 PROCEDURE — 94761 N-INVAS EAR/PLS OXIMETRY MLT: CPT

## 2018-09-14 PROCEDURE — 83690 ASSAY OF LIPASE: CPT

## 2018-09-14 PROCEDURE — 25000003 PHARM REV CODE 250: Performed by: HOSPITALIST

## 2018-09-14 PROCEDURE — 27000221 HC OXYGEN, UP TO 24 HOURS

## 2018-09-14 PROCEDURE — 36415 COLL VENOUS BLD VENIPUNCTURE: CPT

## 2018-09-14 RX ORDER — MORPHINE SULFATE 10 MG/ML
1 INJECTION INTRAMUSCULAR; INTRAVENOUS; SUBCUTANEOUS EVERY 4 HOURS PRN
Status: DISCONTINUED | OUTPATIENT
Start: 2018-09-14 | End: 2018-09-18 | Stop reason: HOSPADM

## 2018-09-14 RX ORDER — SODIUM CHLORIDE, SODIUM LACTATE, POTASSIUM CHLORIDE, CALCIUM CHLORIDE 600; 310; 30; 20 MG/100ML; MG/100ML; MG/100ML; MG/100ML
INJECTION, SOLUTION INTRAVENOUS CONTINUOUS
Status: ACTIVE | OUTPATIENT
Start: 2018-09-14 | End: 2018-09-14

## 2018-09-14 RX ORDER — MORPHINE SULFATE 4 MG/ML
1 INJECTION, SOLUTION INTRAMUSCULAR; INTRAVENOUS EVERY 4 HOURS PRN
Status: DISCONTINUED | OUTPATIENT
Start: 2018-09-14 | End: 2018-09-14

## 2018-09-14 RX ORDER — SODIUM CHLORIDE, SODIUM LACTATE, POTASSIUM CHLORIDE, CALCIUM CHLORIDE 600; 310; 30; 20 MG/100ML; MG/100ML; MG/100ML; MG/100ML
INJECTION, SOLUTION INTRAVENOUS CONTINUOUS
Status: DISCONTINUED | OUTPATIENT
Start: 2018-09-14 | End: 2018-09-18

## 2018-09-14 RX ORDER — TAMSULOSIN HYDROCHLORIDE 0.4 MG/1
0.4 CAPSULE ORAL DAILY
Status: DISCONTINUED | OUTPATIENT
Start: 2018-09-14 | End: 2018-09-18 | Stop reason: HOSPADM

## 2018-09-14 RX ADMIN — TRAMADOL HYDROCHLORIDE 50 MG: 50 TABLET, FILM COATED ORAL at 08:09

## 2018-09-14 RX ADMIN — GABAPENTIN 100 MG: 100 CAPSULE ORAL at 08:09

## 2018-09-14 RX ADMIN — FLUTICASONE FUROATE AND VILANTEROL TRIFENATATE 1 PUFF: 100; 25 POWDER RESPIRATORY (INHALATION) at 01:09

## 2018-09-14 RX ADMIN — BUSPIRONE HYDROCHLORIDE 5 MG: 5 TABLET ORAL at 08:09

## 2018-09-14 RX ADMIN — MEMANTINE 10 MG: 10 TABLET ORAL at 08:09

## 2018-09-14 RX ADMIN — PRAVASTATIN SODIUM 40 MG: 40 TABLET ORAL at 08:09

## 2018-09-14 RX ADMIN — ASPIRIN 81 MG: 81 TABLET, COATED ORAL at 08:09

## 2018-09-14 RX ADMIN — DONEPEZIL HYDROCHLORIDE 10 MG: 10 TABLET, FILM COATED ORAL at 08:09

## 2018-09-14 RX ADMIN — FINASTERIDE 5 MG: 5 TABLET, FILM COATED ORAL at 08:09

## 2018-09-14 RX ADMIN — LISINOPRIL 10 MG: 5 TABLET ORAL at 08:09

## 2018-09-14 RX ADMIN — MIRTAZAPINE 15 MG: 15 TABLET, FILM COATED ORAL at 08:09

## 2018-09-14 RX ADMIN — SODIUM CHLORIDE, SODIUM LACTATE, POTASSIUM CHLORIDE, AND CALCIUM CHLORIDE: .6; .31; .03; .02 INJECTION, SOLUTION INTRAVENOUS at 08:09

## 2018-09-14 RX ADMIN — TRAMADOL HYDROCHLORIDE 50 MG: 50 TABLET, FILM COATED ORAL at 02:09

## 2018-09-14 RX ADMIN — PIPERACILLIN AND TAZOBACTAM 4.5 G: 4; .5 INJECTION, POWDER, LYOPHILIZED, FOR SOLUTION INTRAVENOUS; PARENTERAL at 12:09

## 2018-09-14 RX ADMIN — SODIUM CHLORIDE, SODIUM LACTATE, POTASSIUM CHLORIDE, AND CALCIUM CHLORIDE: .6; .31; .03; .02 INJECTION, SOLUTION INTRAVENOUS at 02:09

## 2018-09-14 RX ADMIN — ESCITALOPRAM OXALATE 10 MG: 10 TABLET ORAL at 08:09

## 2018-09-14 RX ADMIN — DOCUSATE SODIUM AND SENNOSIDES 1 TABLET: 8.6; 5 TABLET, FILM COATED ORAL at 02:09

## 2018-09-14 RX ADMIN — PIPERACILLIN AND TAZOBACTAM 4.5 G: 4; .5 INJECTION, POWDER, LYOPHILIZED, FOR SOLUTION INTRAVENOUS; PARENTERAL at 04:09

## 2018-09-14 RX ADMIN — PANTOPRAZOLE SODIUM 40 MG: 40 TABLET, DELAYED RELEASE ORAL at 08:09

## 2018-09-14 RX ADMIN — ENOXAPARIN SODIUM 40 MG: 40 INJECTION SUBCUTANEOUS at 04:09

## 2018-09-14 RX ADMIN — GABAPENTIN 100 MG: 100 CAPSULE ORAL at 02:09

## 2018-09-14 RX ADMIN — PIPERACILLIN AND TAZOBACTAM 4.5 G: 4; .5 INJECTION, POWDER, LYOPHILIZED, FOR SOLUTION INTRAVENOUS; PARENTERAL at 08:09

## 2018-09-14 RX ADMIN — TAMSULOSIN HYDROCHLORIDE 0.4 MG: 0.4 CAPSULE ORAL at 12:09

## 2018-09-14 NOTE — PLAN OF CARE
Problem: Patient Care Overview  Goal: Plan of Care Review  Outcome: Ongoing (interventions implemented as appropriate)   09/14/18 4963   Coping/Psychosocial   Plan Of Care Reviewed With patient   Patient resting quietly with eyes closed, no signs of distress noted, pain managed with PO pain medications, NPO status maintained, patient receiving iv abx, instructed to call for assistance, bed alarm set, call light in reach, urinal in reach. Patient remains free from falls and injury at this time.

## 2018-09-14 NOTE — PROGRESS NOTES
TN contacted [facility] at [phone] to speak with Marycarmen to inform pt will discharge today and need to arrange transportation. Marycarmen stated to send clinicals via [employer] for Director of Nursing (DON) can review and the orders when written so they can schedule transportation.    [id number] [place] sent clinicals via [employer] with a message pt will not be discharged today due to pain, elevated bilirubin, and GI wants to continue to monitor.

## 2018-09-14 NOTE — PROGRESS NOTES
[facility] Hospital Medicine  Progress Note    Patient Name: Fidel Allan  MRN: [id number]  Patient Class: IP- Inpatient   Admission Date: 9/11/2018  Length of Stay: 3 days  Attending Physician: Radhika Moreau MD  Primary Care Provider: Memo Nieto MD        Subjective:     Principal Problem:Choledocholithiasis    HPI:  Mr. Fidel Allan is a 89 y.o. male [facility] resident with essential hypertension, hyperlipidemia (31.6 Dec 2018), CAD s/p CABG, CKD Stage 3, COPD, anemia of chronic disease, permanent pacemaker in place, aortic stenosis, BPH, GERD, dementia, and depression who presented initially to [facility] ED with complaints of fever and weakness this morning.  He otherwise has no complaints but is demented.  Further history is limited at this time.    Hospital Course:  Pt admitted with elevated LFTs/bili and concern for cholangitis. Started on IV zosyn. GI consulted, bili rising. ERCP 8/22 completed. Lipase in normal range. Pt still has persistent epigastric pain.     9/13- underwent ERCP, stone extraction completed, CMp in am  9/14- clear liquid diet, trend LFTS/bili, on LR IVF    Interval History: pt c/o mid upper abdominal pain     Review of Systems   Unable to perform ROS: Dementia     Objective:     Vital Signs (Most Recent):  Temp: 98.3 °F (36.8 °C) (09/14/18 1635)  Pulse: 66 (09/14/18 1635)  Resp: 18 (09/14/18 1635)  BP: 129/61 (09/14/18 1635)  SpO2: 97 % (09/14/18 1635) Vital Signs (24h Range):  Temp:  [97.4 °F (36.3 °C)-98.3 °F (36.8 °C)] 98.3 °F (36.8 °C)  Pulse:  [60-66] 66  Resp:  [18-20] 18  SpO2:  [97 %-99 %] 97 %  BP: (126-177)/(61-87) 129/61     Weight: 72.3 kg (159 lb 8 oz)  Body mass index is 22.25 kg/m².    Intake/Output Summary (Last 24 hours) at 9/14/2018 1700  Last data filed at 9/14/2018 1240  Gross per 24 hour   Intake 120 ml   Output 1600 ml   Net -1480 ml      Physical Exam   Constitutional: He appears well-developed and well-nourished. No  "distress.   HENT:   Head: Normocephalic and atraumatic.   Right Ear: External ear normal.   Left Ear: External ear normal.   Nose: Nose normal.   Eyes: Right eye exhibits no discharge. Left eye exhibits no discharge.   Neck: Normal range of motion.   Cardiovascular: Normal rate, regular rhythm, normal heart sounds and intact distal pulses. Exam reveals no gallop and no friction rub.   No murmur heard.  Pulmonary/Chest: Effort normal and breath sounds normal. No stridor. No respiratory distress. He has no wheezes. He has no rales. He exhibits no tenderness.   Abdominal: Soft. Bowel sounds are normal. He exhibits no distension. There is no tenderness. There is no rebound and no guarding.   Musculoskeletal: Normal range of motion. He exhibits no edema.   Neurological:   Awake, alert, pleasant, cooperative, but oriented only to self   Skin: Skin is warm and dry. He is not diaphoretic. No erythema. There is pallor.   Psychiatric: He has a normal mood and affect. His behavior is normal. Judgment and thought content normal.   Nursing note and vitals reviewed.      Significant Labs:   CBC:   Recent Labs   Lab  09/14/18   0513   WBC  5.79   HGB  10.6*   HCT  32.6*   PLT  112*     CMP:   Recent Labs   Lab  09/14/18 0513   NA  139   K  3.8   CL  105   CO2  24   GLU  69*   BUN  10   CREATININE  0.8   CALCIUM  8.8   PROT  5.4*   ALBUMIN  2.3*   BILITOT  3.5*   ALKPHOS  361*   AST  59*   ALT  66*   ANIONGAP  10   EGFRNONAA  >60     Lipase:   Recent Labs   Lab  09/14/18   0513   LIPASE  494*       Significant Imaging: I have reviewed all pertinent imaging results/findings within the past 24 hours.    Assessment/Plan:      * Choledocholithiasis    Patient had a low-grade fever of 100.2 °F upon arrival to the ED but has not had any since then.  His blood pressure initially was 96/43 but went as low as 84/37.  Patient reportedly had a fever of 102 °F at the NH.  A CT-abd/pelvis was obtained which was significant for "[p]ersistent " "choledocholithiasis with pronounced dilatation of the intrahepatic biliary tree.  A minimal amount of edema about the pancreatitic head and neck.  Peripancreatic edema seems reduced since prior of 08/21/2018."  Given that he had an admission in Dec 2017 for cholangitis and given his abnormal LFTs here, there is certainly a concern for acute cholangitis.  Of note, he was admitted here last month for gallstone pancreatitis for which he underwent ERCP per Dr. Baron Owens with the following results:    Impression:            - The majory papilla was in an altered location due to a previous Billroth 2 procedure. A previous sphincterotomy was seen.  - Choledocholithiasis was found. Complete removal was accomplished by biliary sphincterotomy and balloon extraction.  - A gastroscope then a pediatric colonoscope were used to perform the procedure as the side-viewing duodenoscopy could not be advance to the ampulla.    Today, he has a total bilirubin of 1.8 which is better compared to his previous labs last month.  His AST/ALT are worse at 315/183.  He had been started on piperacillin/tazobactam and metronidazole but I asked the ED physician to discontinue the metronidazole.  He doesn't technically meet criteria for sepsis.  Will provide supportive care and consult Gastroenterology for further recommendations.    ERCP 8/22  ERCP 9/13 - stone extraction   CMP in am   Advance liquid diet   Lipase elevated >400        Benign prostatic hyperplasia with urinary hesitancy    Stable; will continue his home regimen of finasteride and restart lisinopril when his blood pressure improves.        Anemia of chronic disease    The patient's H/H is stable and consistent with previous laboratory measurements, and the patient exhibits no signs or symptoms of acute bleeding; there is no indication for transfusion.  Will continue to monitor.        Essential hypertension    Resume lisinopril  Clonidine PRN        Coronary artery disease " involving coronary bypass graft of native heart without angina pectoris    Stable; will continue his home regimen of aspirin and pravastatin.        CKD Stage 3    His renal function appears to be at his baseline; will continue to monitor his urine output.        HLD (hyperlipidemia)    Well-controlled; will continue patient's home regimen of pravatatin.        Major depressive disorder    Stable; will continue his home regimen of buspirone and escitalopram.        COPD (chronic obstructive pulmonary disease)    Clinically-stable without wheezing.  Will continue home regimen of ICS?LABA and have as-needed RISSA/LAMA available.        Vascular dementia    Stable; will continue his home regimen of donepezil and memantine.        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        Moderate aortic stenosis    Stable; there are no acute issues.        GERD (gastroesophageal reflux disease)    Will substitute his home regimen of omeprazole for pantoprazole while he is inpatient.          VTE Risk Mitigation (From admission, onward)        Ordered     enoxaparin injection 40 mg  Daily      09/11/18 2230     IP VTE HIGH RISK PATIENT  Once      09/11/18 2230              Radhika Moreau MD  Department of Hospital Medicine   [facility]

## 2018-09-14 NOTE — ASSESSMENT & PLAN NOTE
"Patient had a low-grade fever of 100.2 °F upon arrival to the ED but has not had any since then.  His blood pressure initially was 96/43 but went as low as 84/37.  Patient reportedly had a fever of 102 °F at the NH.  A CT-abd/pelvis was obtained which was significant for "[p]ersistent choledocholithiasis with pronounced dilatation of the intrahepatic biliary tree.  A minimal amount of edema about the pancreatitic head and neck.  Peripancreatic edema seems reduced since prior of 08/21/2018."  Given that he had an admission in Dec 2017 for cholangitis and given his abnormal LFTs here, there is certainly a concern for acute cholangitis.  Of note, he was admitted here last month for gallstone pancreatitis for which he underwent ERCP per Dr. Baron Owens with the following results:    Impression:            - The majory papilla was in an altered location due to a previous Billroth 2 procedure. A previous sphincterotomy was seen.  - Choledocholithiasis was found. Complete removal was accomplished by biliary sphincterotomy and balloon extraction.  - A gastroscope then a pediatric colonoscope were used to perform the procedure as the side-viewing duodenoscopy could not be advance to the ampulla.    Today, he has a total bilirubin of 1.8 which is better compared to his previous labs last month.  His AST/ALT are worse at 315/183.  He had been started on piperacillin/tazobactam and metronidazole but I asked the ED physician to discontinue the metronidazole.  He doesn't technically meet criteria for sepsis.  Will provide supportive care and consult Gastroenterology for further recommendations.    ERCP 8/22  ERCP 9/13 - stone extraction   CMP in am   Advance liquid diet   Lipase elevated >400  "

## 2018-09-14 NOTE — PROGRESS NOTES
Gastroenterology Progress Note    Reason for Consult: choledocholithiasis    Subjective:  Patient underwent ERCP yesterday with removal of CBD stone.  Now with worsening epigastric abdominal pain extending to both sides.  No N/V/C/D or fevers.  Pain is worse than prior to his procedure.    ROS:  Gen: no F/C  CV: no CP/palpitations  Resp: no SOB/wheezing    Physical Exam  Vitals:    09/14/18 0739   BP: 139/62   Pulse: 60   Resp: 20   Temp: 97.9 °F (36.6 °C)     Physical Exam   Constitutional: He is oriented to person, place, and time. He appears well-developed and well-nourished. No distress.   Elderly gentleman   HENT:   Head: Normocephalic and atraumatic.   Mouth/Throat: Oropharynx is clear and moist.   Eyes: EOM are normal. Pupils are equal, round, and reactive to light. No scleral icterus.   Cardiovascular: Normal rate, regular rhythm and normal heart sounds.   No murmur heard.  Pulmonary/Chest: Effort normal and breath sounds normal. No respiratory distress.   Abdominal: Soft. Bowel sounds are normal. He exhibits no distension. There is tenderness (across epigastric area and RUQ/LUQ). There is no rebound and no guarding.   Musculoskeletal: Normal range of motion. He exhibits no edema.   Neurological: He is alert and oriented to person, place, and time.   Skin: Skin is warm and dry. No rash noted.   Vitals reviewed.      Medications/Infusions:  Current Facility-Administered Medications   Medication Dose Route Frequency Provider Last Rate Last Dose    acetaminophen tablet 500 mg  500 mg Oral Q6H PRN Hank Nielsen MD   500 mg at 09/13/18 0831    albuterol-ipratropium 2.5 mg-0.5 mg/3 mL nebulizer solution 3 mL  3 mL Nebulization Q6H PRN Hank Nielsen MD        aspirin EC tablet 81 mg  81 mg Oral Daily Hank Nielsen MD   81 mg at 09/13/18 0828    busPIRone tablet 5 mg  5 mg Oral BID Hank Nielsen MD   5 mg at 09/13/18 2033    cloNIDine tablet 0.1 mg  0.1 mg Oral TID PRN Hank Nielsen MD   0.1 mg at 09/13/18 1725     donepezil tablet 10 mg  10 mg Oral QHS Hank Nielsen MD   10 mg at 09/13/18 2033    enoxaparin injection 40 mg  40 mg Subcutaneous Daily Hank Nielsen MD   40 mg at 09/12/18 1823    escitalopram oxalate tablet 10 mg  10 mg Oral QAM Hank Nielsen MD   10 mg at 09/13/18 0827    finasteride tablet 5 mg  5 mg Oral Daily Hank Nielsen MD   5 mg at 09/13/18 0828    fluticasone-vilanterol 100-25 mcg/dose diskus inhaler 1 puff  1 puff Inhalation Daily Hank Nielsen MD   1 puff at 09/13/18 0714    gabapentin capsule 100 mg  100 mg Oral TID Hank Nielsen MD   100 mg at 09/13/18 2033    influenza (FLUZONE HIGH-DOSE) vaccine 0.5 mL  0.5 mL Intramuscular vaccine x 1 dose Radhika Moreau MD        lactated ringers infusion   Intravenous Continuous Shana Higgins MD        Followed by    lactated ringers infusion   Intravenous Continuous Shana Higgins MD        lisinopril tablet 10 mg  10 mg Oral Daily Radhika Moreau MD   10 mg at 09/13/18 1725    memantine tablet 10 mg  10 mg Oral Daily Hank Nielsen MD   10 mg at 09/13/18 0827    mirtazapine tablet 15 mg  15 mg Oral QHS Hank Nielsen MD   15 mg at 09/13/18 2033    pantoprazole EC tablet 40 mg  40 mg Oral Daily Hank Nielsen MD   40 mg at 09/13/18 0828    piperacillin-tazobactam 4.5 g in sodium chloride 0.9% 100 mL IVPB (ready to mix system)  4.5 g Intravenous Q8H Hank Nielsen MD 25 mL/hr at 09/14/18 0013 4.5 g at 09/14/18 0013    pravastatin tablet 40 mg  40 mg Oral QHS Hank Nielsen MD   40 mg at 09/13/18 2033    promethazine (PHENERGAN) 6.25 mg in dextrose 5 % 50 mL IVPB  6.25 mg Intravenous Q6H PRN Hank Nielsen MD        senna-docusate 8.6-50 mg per tablet 1 tablet  1 tablet Oral BID PRN Hank Nielsen MD        sodium chloride 0.9% flush 3 mL  3 mL Intravenous PRN Kvng Reyes MD        traMADol tablet 50 mg  50 mg Oral Q6H PRN Hank Nielsen MD   50 mg at 09/13/18 2303       Intake and Output:    Intake/Output Summary (Last 24 hours) at  9/14/2018 0755  Last data filed at 9/14/2018 0713  Gross per 24 hour   Intake 900 ml   Output 1600 ml   Net -700 ml       Labs:  Lab Results   Component Value Date/Time    WBC 5.79 09/14/2018 05:13 AM    HGB 10.6 (L) 09/14/2018 05:13 AM    HCT 32.6 (L) 09/14/2018 05:13 AM     (L) 09/14/2018 05:13 AM    MCV 91 09/14/2018 05:13 AM     09/14/2018 05:13 AM    K 3.8 09/14/2018 05:13 AM     09/14/2018 05:13 AM    CO2 24 09/14/2018 05:13 AM    BUN 10 09/14/2018 05:13 AM    CREATININE 0.8 09/14/2018 05:13 AM    GLU 69 (L) 09/14/2018 05:13 AM    CALCIUM 8.8 09/14/2018 05:13 AM    MG 1.6 09/12/2018 04:59 AM    PHOS 2.3 (L) 09/12/2018 04:59 AM    INR 1.0 09/11/2018 01:25 PM    APTT 29.4 09/11/2018 01:25 PM   ]  Lab Results   Component Value Date/Time    PROT 5.4 (L) 09/14/2018 05:13 AM    ALBUMIN 2.3 (L) 09/14/2018 05:13 AM    BILITOT 3.5 (H) 09/14/2018 05:13 AM    BILIDIR 0.3 02/03/2017 04:43 AM    AST 59 (H) 09/14/2018 05:13 AM    ALT 66 (H) 09/14/2018 05:13 AM    ALKPHOS 361 (H) 09/14/2018 05:13 AM   ]    Imaging and Procedures:  Labs and imaging results were reviewed.  ERCP 9/13/18:  - Billroth II anatomy  - Prior biliary sphincterotomy appeared open.  - A filling defect consistent with a stone was seen on the cholangiogram.  - The biliary system was dilated, with a stone causing an obstruction.  - The examination was suspicious for choledocholithiasis. Removal by biliary sphincterotomy was accomplished. Unsuccessful attempts at stone extraction were performed with a stone extractor balloon followed by small extension of the sphincterotomy.  - Stone retrieval was successful using biopsy forceps.    Assessment:  Fidel Allan is a 89 y.o. male with a history of HTN, HLD, CAD s/p CABG, CKD, COPD, chronic anemia, pacemaker, aortic stenosis, BPH, GERD, dementia and depression who presented with fever and weakness as well as epigastric abdominal pain.  He was found to have choledocholithiasis with elevated  bilirubin.  Now s/p ERCP with stone extraction, however with worsening abdominal pain, he likely has post ERCP pancreatitis.    Plan:  - keep NPO until he wants to eat, then can start clear liquids  - will give 1L of LR at 200 ccs/hr then decrease rate to 125 ccs/hr  - pain medication and anti-emetics per primary team  - continue to trending daily CMP  - can add on lipase to this morning's labs to help confirm suspicion of post ERCP pancreatitis    Shana Higgins MD

## 2018-09-14 NOTE — SUBJECTIVE & OBJECTIVE
Interval History: pt c/o mid upper abdominal pain     Review of Systems   Unable to perform ROS: Dementia     Objective:     Vital Signs (Most Recent):  Temp: 98.3 °F (36.8 °C) (09/14/18 1635)  Pulse: 66 (09/14/18 1635)  Resp: 18 (09/14/18 1635)  BP: 129/61 (09/14/18 1635)  SpO2: 97 % (09/14/18 1635) Vital Signs (24h Range):  Temp:  [97.4 °F (36.3 °C)-98.3 °F (36.8 °C)] 98.3 °F (36.8 °C)  Pulse:  [60-66] 66  Resp:  [18-20] 18  SpO2:  [97 %-99 %] 97 %  BP: (126-177)/(61-87) 129/61     Weight: 72.3 kg (159 lb 8 oz)  Body mass index is 22.25 kg/m².    Intake/Output Summary (Last 24 hours) at 9/14/2018 1700  Last data filed at 9/14/2018 1240  Gross per 24 hour   Intake 120 ml   Output 1600 ml   Net -1480 ml      Physical Exam   Constitutional: He appears well-developed and well-nourished. No distress.   HENT:   Head: Normocephalic and atraumatic.   Right Ear: External ear normal.   Left Ear: External ear normal.   Nose: Nose normal.   Eyes: Right eye exhibits no discharge. Left eye exhibits no discharge.   Neck: Normal range of motion.   Cardiovascular: Normal rate, regular rhythm, normal heart sounds and intact distal pulses. Exam reveals no gallop and no friction rub.   No murmur heard.  Pulmonary/Chest: Effort normal and breath sounds normal. No stridor. No respiratory distress. He has no wheezes. He has no rales. He exhibits no tenderness.   Abdominal: Soft. Bowel sounds are normal. He exhibits no distension. There is no tenderness. There is no rebound and no guarding.   Musculoskeletal: Normal range of motion. He exhibits no edema.   Neurological:   Awake, alert, pleasant, cooperative, but oriented only to self   Skin: Skin is warm and dry. He is not diaphoretic. No erythema. There is pallor.   Psychiatric: He has a normal mood and affect. His behavior is normal. Judgment and thought content normal.   Nursing note and vitals reviewed.      Significant Labs:   CBC:   Recent Labs   Lab  09/14/18   0513   WBC  5.79    HGB  10.6*   HCT  32.6*   PLT  112*     CMP:   Recent Labs   Lab  09/14/18   0513   NA  139   K  3.8   CL  105   CO2  24   GLU  69*   BUN  10   CREATININE  0.8   CALCIUM  8.8   PROT  5.4*   ALBUMIN  2.3*   BILITOT  3.5*   ALKPHOS  361*   AST  59*   ALT  66*   ANIONGAP  10   EGFRNONAA  >60     Lipase:   Recent Labs   Lab  09/14/18   0513   LIPASE  494*       Significant Imaging: I have reviewed all pertinent imaging results/findings within the past 24 hours.

## 2018-09-15 LAB
ALBUMIN SERPL BCP-MCNC: 2.2 G/DL
ALP SERPL-CCNC: 362 U/L
ALT SERPL W/O P-5'-P-CCNC: 55 U/L
ANION GAP SERPL CALC-SCNC: 5 MMOL/L
AST SERPL-CCNC: 56 U/L
BASOPHILS # BLD AUTO: 0.03 K/UL
BASOPHILS NFR BLD: 0.7 %
BILIRUB SERPL-MCNC: 2.9 MG/DL
BUN SERPL-MCNC: 9 MG/DL
CALCIUM SERPL-MCNC: 8.7 MG/DL
CHLORIDE SERPL-SCNC: 103 MMOL/L
CO2 SERPL-SCNC: 30 MMOL/L
CREAT SERPL-MCNC: 0.9 MG/DL
DIFFERENTIAL METHOD: ABNORMAL
EOSINOPHIL # BLD AUTO: 0.1 K/UL
EOSINOPHIL NFR BLD: 3 %
ERYTHROCYTE [DISTWIDTH] IN BLOOD BY AUTOMATED COUNT: 14.9 %
EST. GFR  (AFRICAN AMERICAN): >60 ML/MIN/1.73 M^2
EST. GFR  (NON AFRICAN AMERICAN): >60 ML/MIN/1.73 M^2
GLUCOSE SERPL-MCNC: 74 MG/DL
HCT VFR BLD AUTO: 31 %
HGB BLD-MCNC: 10.4 G/DL
LIPASE SERPL-CCNC: 583 U/L
LYMPHOCYTES # BLD AUTO: 1.6 K/UL
LYMPHOCYTES NFR BLD: 36.6 %
MCH RBC QN AUTO: 30.6 PG
MCHC RBC AUTO-ENTMCNC: 33.5 G/DL
MCV RBC AUTO: 91 FL
MONOCYTES # BLD AUTO: 0.6 K/UL
MONOCYTES NFR BLD: 14.4 %
NEUTROPHILS # BLD AUTO: 2 K/UL
NEUTROPHILS NFR BLD: 45.3 %
PLATELET # BLD AUTO: 126 K/UL
PMV BLD AUTO: 11.3 FL
POTASSIUM SERPL-SCNC: 3.6 MMOL/L
PROT SERPL-MCNC: 5.3 G/DL
RBC # BLD AUTO: 3.4 M/UL
SODIUM SERPL-SCNC: 138 MMOL/L
WBC # BLD AUTO: 4.37 K/UL

## 2018-09-15 PROCEDURE — 85025 COMPLETE CBC W/AUTO DIFF WBC: CPT

## 2018-09-15 PROCEDURE — 80053 COMPREHEN METABOLIC PANEL: CPT

## 2018-09-15 PROCEDURE — 11000001 HC ACUTE MED/SURG PRIVATE ROOM

## 2018-09-15 PROCEDURE — 25000003 PHARM REV CODE 250: Performed by: HOSPITALIST

## 2018-09-15 PROCEDURE — 25000003 PHARM REV CODE 250: Performed by: INTERNAL MEDICINE

## 2018-09-15 PROCEDURE — 94640 AIRWAY INHALATION TREATMENT: CPT

## 2018-09-15 PROCEDURE — 27000221 HC OXYGEN, UP TO 24 HOURS

## 2018-09-15 PROCEDURE — 63600175 PHARM REV CODE 636 W HCPCS: Performed by: INTERNAL MEDICINE

## 2018-09-15 PROCEDURE — 83690 ASSAY OF LIPASE: CPT

## 2018-09-15 PROCEDURE — 36415 COLL VENOUS BLD VENIPUNCTURE: CPT

## 2018-09-15 PROCEDURE — 94761 N-INVAS EAR/PLS OXIMETRY MLT: CPT

## 2018-09-15 RX ADMIN — MIRTAZAPINE 15 MG: 15 TABLET, FILM COATED ORAL at 09:09

## 2018-09-15 RX ADMIN — GABAPENTIN 100 MG: 100 CAPSULE ORAL at 03:09

## 2018-09-15 RX ADMIN — FLUTICASONE FUROATE AND VILANTEROL TRIFENATATE 1 PUFF: 100; 25 POWDER RESPIRATORY (INHALATION) at 11:09

## 2018-09-15 RX ADMIN — PIPERACILLIN AND TAZOBACTAM 4.5 G: 4; .5 INJECTION, POWDER, LYOPHILIZED, FOR SOLUTION INTRAVENOUS; PARENTERAL at 12:09

## 2018-09-15 RX ADMIN — DONEPEZIL HYDROCHLORIDE 10 MG: 10 TABLET, FILM COATED ORAL at 09:09

## 2018-09-15 RX ADMIN — PANTOPRAZOLE SODIUM 40 MG: 40 TABLET, DELAYED RELEASE ORAL at 08:09

## 2018-09-15 RX ADMIN — GABAPENTIN 100 MG: 100 CAPSULE ORAL at 09:09

## 2018-09-15 RX ADMIN — BUSPIRONE HYDROCHLORIDE 5 MG: 5 TABLET ORAL at 09:09

## 2018-09-15 RX ADMIN — SODIUM CHLORIDE, SODIUM LACTATE, POTASSIUM CHLORIDE, AND CALCIUM CHLORIDE: .6; .31; .03; .02 INJECTION, SOLUTION INTRAVENOUS at 10:09

## 2018-09-15 RX ADMIN — BUSPIRONE HYDROCHLORIDE 5 MG: 5 TABLET ORAL at 08:09

## 2018-09-15 RX ADMIN — ESCITALOPRAM OXALATE 10 MG: 10 TABLET ORAL at 07:09

## 2018-09-15 RX ADMIN — ENOXAPARIN SODIUM 40 MG: 40 INJECTION SUBCUTANEOUS at 05:09

## 2018-09-15 RX ADMIN — PIPERACILLIN AND TAZOBACTAM 4.5 G: 4; .5 INJECTION, POWDER, LYOPHILIZED, FOR SOLUTION INTRAVENOUS; PARENTERAL at 05:09

## 2018-09-15 RX ADMIN — TAMSULOSIN HYDROCHLORIDE 0.4 MG: 0.4 CAPSULE ORAL at 08:09

## 2018-09-15 RX ADMIN — MEMANTINE 10 MG: 10 TABLET ORAL at 08:09

## 2018-09-15 RX ADMIN — LISINOPRIL 10 MG: 5 TABLET ORAL at 08:09

## 2018-09-15 RX ADMIN — SODIUM CHLORIDE, SODIUM LACTATE, POTASSIUM CHLORIDE, AND CALCIUM CHLORIDE: .6; .31; .03; .02 INJECTION, SOLUTION INTRAVENOUS at 06:09

## 2018-09-15 RX ADMIN — PIPERACILLIN AND TAZOBACTAM 4.5 G: 4; .5 INJECTION, POWDER, LYOPHILIZED, FOR SOLUTION INTRAVENOUS; PARENTERAL at 08:09

## 2018-09-15 RX ADMIN — ASPIRIN 81 MG: 81 TABLET, COATED ORAL at 08:09

## 2018-09-15 RX ADMIN — FINASTERIDE 5 MG: 5 TABLET, FILM COATED ORAL at 08:09

## 2018-09-15 RX ADMIN — GABAPENTIN 100 MG: 100 CAPSULE ORAL at 08:09

## 2018-09-15 RX ADMIN — PRAVASTATIN SODIUM 40 MG: 40 TABLET ORAL at 09:09

## 2018-09-15 NOTE — PLAN OF CARE
Problem: Pressure Ulcer Risk (Mike Scale) (Adult,Obstetrics,Pediatric)  Goal: Identify Related Risk Factors and Signs and Symptoms  Related risk factors and signs and symptoms are identified upon initiation of Human Response Clinical Practice Guideline (CPG)  Outcome: Ongoing (interventions implemented as appropriate)   09/12/18 1754   Pressure Ulcer Risk (Mike Scale)   Related Risk Factors (Pressure Ulcer Risk (Mike Scale)) age extremes;cognitive impairment;critical care admission;infection;mobility impaired     Goal: Skin Integrity  Patient will demonstrate the desired outcomes by discharge/transition of care.  Outcome: Ongoing (interventions implemented as appropriate)   09/14/18 1951   Pressure Ulcer Risk (Mike Scale) (Adult,Obstetrics,Pediatric)   Skin Integrity making progress toward outcome

## 2018-09-15 NOTE — PROGRESS NOTES
[facility] Hospital Medicine  Progress Note    Patient Name: Fidel Allan  MRN: [id number]  Patient Class: IP- Inpatient   Admission Date: 9/11/2018  Length of Stay: 4 days  Attending Physician: Radhika Moreau MD  Primary Care Provider: Memo Nieto MD        Subjective:     Principal Problem:Choledocholithiasis    HPI:  Mr. Fidel Allan is a 89 y.o. male [facility] resident with essential hypertension, hyperlipidemia (31.6 Dec 2018), CAD s/p CABG, CKD Stage 3, COPD, anemia of chronic disease, permanent pacemaker in place, aortic stenosis, BPH, GERD, dementia, and depression who presented initially to [facility] ED with complaints of fever and weakness this morning.  He otherwise has no complaints but is demented.  Further history is limited at this time.    Hospital Course:  Pt admitted with elevated LFTs/bili and concern for cholangitis. Started on IV zosyn. GI consulted, bili rising. ERCP 8/22 completed. Lipase in normal range. Pt still has persistent epigastric pain.     9/13- underwent ERCP, stone extraction completed, CMp in am  9/14- clear liquid diet, trend LFTS/bili, on LR IVF  9/15- less pain, lipase up a bit but bili trending down, continue on clear liquids     Interval History: pt reports less abdominal pain     Review of Systems   Unable to perform ROS: Dementia     Objective:     Vital Signs (Most Recent):  Temp: 98.2 °F (36.8 °C) (09/15/18 1708)  Pulse: 67 (09/15/18 1708)  Resp: 18 (09/15/18 1708)  BP: (!) 161/74 (09/15/18 1708)  SpO2: 97 % (09/15/18 1708) Vital Signs (24h Range):  Temp:  [97.7 °F (36.5 °C)-98.3 °F (36.8 °C)] 98.2 °F (36.8 °C)  Pulse:  [58-70] 67  Resp:  [16-18] 18  SpO2:  [96 %-99 %] 97 %  BP: (140-161)/(63-74) 161/74     Weight: 72.3 kg (159 lb 8 oz)  Body mass index is 22.25 kg/m².    Intake/Output Summary (Last 24 hours) at 9/15/2018 1745  Last data filed at 9/15/2018 1419  Gross per 24 hour   Intake 1198 ml   Output 1875 ml   Net -677 ml     "  Physical Exam   Constitutional: He appears well-developed and well-nourished. No distress.   HENT:   Head: Normocephalic and atraumatic.   Right Ear: External ear normal.   Left Ear: External ear normal.   Nose: Nose normal.   Eyes: Right eye exhibits no discharge. Left eye exhibits no discharge.   Neck: Normal range of motion.   Cardiovascular: Normal rate, regular rhythm, normal heart sounds and intact distal pulses. Exam reveals no gallop and no friction rub.   No murmur heard.  Pulmonary/Chest: Effort normal and breath sounds normal. No stridor. No respiratory distress. He has no wheezes. He has no rales. He exhibits no tenderness.   Abdominal: Soft. Bowel sounds are normal. He exhibits no distension. There is no tenderness. There is no rebound and no guarding.   Musculoskeletal: Normal range of motion. He exhibits no edema.   Neurological:   Awake, alert, pleasant, cooperative, but oriented only to self   Skin: Skin is warm and dry. He is not diaphoretic. No erythema. There is pallor.   Psychiatric: He has a normal mood and affect. His behavior is normal. Judgment and thought content normal.   Nursing note and vitals reviewed.      Significant Labs: All pertinent labs within the past 24 hours have been reviewed.    Significant Imaging: I have reviewed all pertinent imaging results/findings within the past 24 hours.    Assessment/Plan:      * Choledocholithiasis    Patient had a low-grade fever of 100.2 °F upon arrival to the ED but has not had any since then.  His blood pressure initially was 96/43 but went as low as 84/37.  Patient reportedly had a fever of 102 °F at the NH.  A CT-abd/pelvis was obtained which was significant for "[p]ersistent choledocholithiasis with pronounced dilatation of the intrahepatic biliary tree.  A minimal amount of edema about the pancreatitic head and neck.  Peripancreatic edema seems reduced since prior of 08/21/2018."  Given that he had an admission in Dec 2017 for cholangitis " and given his abnormal LFTs here, there is certainly a concern for acute cholangitis.  Of note, he was admitted here last month for gallstone pancreatitis for which he underwent ERCP per Dr. Baron Owens with the following results:    Impression:            - The majory papilla was in an altered location due to a previous Billroth 2 procedure. A previous sphincterotomy was seen.  - Choledocholithiasis was found. Complete removal was accomplished by biliary sphincterotomy and balloon extraction.  - A gastroscope then a pediatric colonoscope were used to perform the procedure as the side-viewing duodenoscopy could not be advance to the ampulla.    Today, he has a total bilirubin of 1.8 which is better compared to his previous labs last month.  His AST/ALT are worse at 315/183.  He had been started on piperacillin/tazobactam and metronidazole but I asked the ED physician to discontinue the metronidazole.  He doesn't technically meet criteria for sepsis.  Will provide supportive care and consult Gastroenterology for further recommendations.    ERCP 8/22  ERCP 9/13 - stone extraction   CMP in am   Advance liquid diet   Lipase elevated >400        Benign prostatic hyperplasia with urinary hesitancy    Stable; will continue his home regimen of finasteride and restart lisinopril when his blood pressure improves.        Anemia of chronic disease    The patient's H/H is stable and consistent with previous laboratory measurements, and the patient exhibits no signs or symptoms of acute bleeding; there is no indication for transfusion.  Will continue to monitor.        Essential hypertension    Resume lisinopril  Clonidine PRN        Coronary artery disease involving coronary bypass graft of native heart without angina pectoris    Stable; will continue his home regimen of aspirin and pravastatin.        CKD Stage 3    His renal function appears to be at his baseline; will continue to monitor his urine output.        HLD  (hyperlipidemia)    Well-controlled; will continue patient's home regimen of pravatatin.        Major depressive disorder    Stable; will continue his home regimen of buspirone and escitalopram.        COPD (chronic obstructive pulmonary disease)    Clinically-stable without wheezing.  Will continue home regimen of ICS?LABA and have as-needed RISSA/LAMA available.        Vascular dementia    Stable; will continue his home regimen of donepezil and memantine.        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        Moderate aortic stenosis    Stable; there are no acute issues.        GERD (gastroesophageal reflux disease)    Will substitute his home regimen of omeprazole for pantoprazole while he is inpatient.          VTE Risk Mitigation (From admission, onward)        Ordered     enoxaparin injection 40 mg  Daily      09/11/18 2230     IP VTE HIGH RISK PATIENT  Once      09/11/18 2230              Radhika Moreau MD  Department of Hospital Medicine   [facility]

## 2018-09-15 NOTE — SUBJECTIVE & OBJECTIVE
Interval History: pt reports less abdominal pain     Review of Systems   Unable to perform ROS: Dementia     Objective:     Vital Signs (Most Recent):  Temp: 98.2 °F (36.8 °C) (09/15/18 1708)  Pulse: 67 (09/15/18 1708)  Resp: 18 (09/15/18 1708)  BP: (!) 161/74 (09/15/18 1708)  SpO2: 97 % (09/15/18 1708) Vital Signs (24h Range):  Temp:  [97.7 °F (36.5 °C)-98.3 °F (36.8 °C)] 98.2 °F (36.8 °C)  Pulse:  [58-70] 67  Resp:  [16-18] 18  SpO2:  [96 %-99 %] 97 %  BP: (140-161)/(63-74) 161/74     Weight: 72.3 kg (159 lb 8 oz)  Body mass index is 22.25 kg/m².    Intake/Output Summary (Last 24 hours) at 9/15/2018 1745  Last data filed at 9/15/2018 1419  Gross per 24 hour   Intake 1198 ml   Output 1875 ml   Net -677 ml      Physical Exam   Constitutional: He appears well-developed and well-nourished. No distress.   HENT:   Head: Normocephalic and atraumatic.   Right Ear: External ear normal.   Left Ear: External ear normal.   Nose: Nose normal.   Eyes: Right eye exhibits no discharge. Left eye exhibits no discharge.   Neck: Normal range of motion.   Cardiovascular: Normal rate, regular rhythm, normal heart sounds and intact distal pulses. Exam reveals no gallop and no friction rub.   No murmur heard.  Pulmonary/Chest: Effort normal and breath sounds normal. No stridor. No respiratory distress. He has no wheezes. He has no rales. He exhibits no tenderness.   Abdominal: Soft. Bowel sounds are normal. He exhibits no distension. There is no tenderness. There is no rebound and no guarding.   Musculoskeletal: Normal range of motion. He exhibits no edema.   Neurological:   Awake, alert, pleasant, cooperative, but oriented only to self   Skin: Skin is warm and dry. He is not diaphoretic. No erythema. There is pallor.   Psychiatric: He has a normal mood and affect. His behavior is normal. Judgment and thought content normal.   Nursing note and vitals reviewed.      Significant Labs: All pertinent labs within the past 24 hours have been  reviewed.    Significant Imaging: I have reviewed all pertinent imaging results/findings within the past 24 hours.

## 2018-09-15 NOTE — PROGRESS NOTES
"Progress Note    Subjective:resting better with not as much pain,       BP (!) 140/69 (BP Location: Right arm, Patient Position: Lying)   Pulse 62   Temp 98.2 °F (36.8 °C) (Oral)   Resp 18   Ht 5' 11" (1.803 m)   Wt 72.3 kg (159 lb 8 oz)   SpO2 99%   BMI 22.25 kg/m²     Current Facility-Administered Medications   Medication Dose Route Frequency Provider Last Rate Last Dose    acetaminophen tablet 500 mg  500 mg Oral Q6H PRN Hank Nielsen MD   500 mg at 09/13/18 0831    albuterol-ipratropium 2.5 mg-0.5 mg/3 mL nebulizer solution 3 mL  3 mL Nebulization Q6H PRN Hank Nielsen MD        aspirin EC tablet 81 mg  81 mg Oral Daily Hank Nielsen MD   81 mg at 09/15/18 0837    busPIRone tablet 5 mg  5 mg Oral BID Hank Nielsen MD   5 mg at 09/15/18 0836    cloNIDine tablet 0.1 mg  0.1 mg Oral TID PRN Hank Nielsen MD   0.1 mg at 09/13/18 1725    donepezil tablet 10 mg  10 mg Oral QHS Hank Nielsen MD   10 mg at 09/14/18 2048    enoxaparin injection 40 mg  40 mg Subcutaneous Daily Hank Nielsen MD   40 mg at 09/14/18 1644    escitalopram oxalate tablet 10 mg  10 mg Oral QAM Hank Nielsen MD   10 mg at 09/15/18 0759    finasteride tablet 5 mg  5 mg Oral Daily Hank Nielsen MD   5 mg at 09/15/18 0837    fluticasone-vilanterol 100-25 mcg/dose diskus inhaler 1 puff  1 puff Inhalation Daily Hank Nielsen MD   1 puff at 09/15/18 1100    gabapentin capsule 100 mg  100 mg Oral TID Hank Nielsen MD   100 mg at 09/15/18 0837    influenza (FLUZONE HIGH-DOSE) vaccine 0.5 mL  0.5 mL Intramuscular vaccine x 1 dose Radhika Moreau MD        lactated ringers infusion   Intravenous Continuous Shana Higgins  mL/hr at 09/15/18 0636      lisinopril tablet 10 mg  10 mg Oral Daily Radhika Moreau MD   10 mg at 09/15/18 0836    memantine tablet 10 mg  10 mg Oral Daily Hank Nielsen MD   10 mg at 09/15/18 0837    mirtazapine tablet 15 mg  15 mg Oral QHS Hank Nielsen MD   15 mg at 09/14/18 2048    morphine injection " 1 mg  1 mg Intravenous Q4H PRN Radhika Moreau MD        pantoprazole EC tablet 40 mg  40 mg Oral Daily Hank Nielsen MD   40 mg at 09/15/18 0837    piperacillin-tazobactam 4.5 g in sodium chloride 0.9% 100 mL IVPB (ready to mix system)  4.5 g Intravenous Q8H Hank Nielsen MD 25 mL/hr at 09/15/18 0837 4.5 g at 09/15/18 0837    pravastatin tablet 40 mg  40 mg Oral QHS Hank Nielsen MD   40 mg at 09/14/18 2048    promethazine (PHENERGAN) 6.25 mg in dextrose 5 % 50 mL IVPB  6.25 mg Intravenous Q6H PRN Hank Nielsen MD        senna-docusate 8.6-50 mg per tablet 1 tablet  1 tablet Oral BID PRN Hank Nielsen MD   1 tablet at 09/14/18 1436    sodium chloride 0.9% flush 3 mL  3 mL Intravenous PRN Kvng Reyes MD        tamsulosin 24 hr capsule 0.4 mg  0.4 mg Oral Daily Radhika Moreau MD   0.4 mg at 09/15/18 0837    traMADol tablet 50 mg  50 mg Oral Q6H PRN Hank Nielsen MD   50 mg at 09/14/18 2048       Objective:  gen alert and non toxic  Lung- clear bilaterally anterior and posterior  Heart-NSR s m or g  abd- soft,mild tender,no rebound or guarding, no masses      Labs:  Lab Results   Component Value Date    WBC 4.37 09/15/2018    HGB 10.4 (L) 09/15/2018    HCT 31.0 (L) 09/15/2018    MCV 91 09/15/2018     (L) 09/15/2018       CMP  Recent Labs   Lab  09/15/18   0446   GLU  74   CALCIUM  8.7   ALBUMIN  2.2*   PROT  5.3*   NA  138   K  3.6   CO2  30*   CL  103   BUN  9   CREATININE  0.9   ALKPHOS  362*   ALT  55*   AST  56*   BILITOT  2.9*       Lab Results   Component Value Date    LIPASE 583 (H) 09/15/2018       No results found for: AMYLASE    Assessment:   1. Post ercp stone removal, liver functions slow to improve not unexpected  2. abd pain better  3. Lipase elevation        Plan:  1.observe but looks better today, monitor

## 2018-09-15 NOTE — CONSULTS
Discharge planning--goal to discharge home to [facility] once stable. [facility] will schedule transportation once accepted to return. [phone] by DON.

## 2018-09-15 NOTE — PLAN OF CARE
Problem: Patient Care Overview  Goal: Plan of Care Review  Outcome: Ongoing (interventions implemented as appropriate)   09/15/18 0108   Coping/Psychosocial   Plan Of Care Reviewed With patient   Patient resting quietly with eyes closed, no signs of distress noted, tolerating clear liquid diet, iv fluids infusing without difficulty, patient receiving zosyn, urinal at bedside within reach, bed alarm set. Patient remains free from falls at this time.

## 2018-09-16 LAB
ALBUMIN SERPL BCP-MCNC: 2.1 G/DL
ALP SERPL-CCNC: 396 U/L
ALT SERPL W/O P-5'-P-CCNC: 54 U/L
ANION GAP SERPL CALC-SCNC: 9 MMOL/L
AST SERPL-CCNC: 51 U/L
BILIRUB SERPL-MCNC: 2.9 MG/DL
BUN SERPL-MCNC: 7 MG/DL
CALCIUM SERPL-MCNC: 9.1 MG/DL
CHLORIDE SERPL-SCNC: 106 MMOL/L
CO2 SERPL-SCNC: 28 MMOL/L
CREAT SERPL-MCNC: 0.9 MG/DL
EST. GFR  (AFRICAN AMERICAN): >60 ML/MIN/1.73 M^2
EST. GFR  (NON AFRICAN AMERICAN): >60 ML/MIN/1.73 M^2
GLUCOSE SERPL-MCNC: 81 MG/DL
POTASSIUM SERPL-SCNC: 3.4 MMOL/L
PROT SERPL-MCNC: 5.6 G/DL
SODIUM SERPL-SCNC: 143 MMOL/L

## 2018-09-16 PROCEDURE — 11000001 HC ACUTE MED/SURG PRIVATE ROOM

## 2018-09-16 PROCEDURE — 80053 COMPREHEN METABOLIC PANEL: CPT

## 2018-09-16 PROCEDURE — 25000003 PHARM REV CODE 250: Performed by: INTERNAL MEDICINE

## 2018-09-16 PROCEDURE — 94761 N-INVAS EAR/PLS OXIMETRY MLT: CPT

## 2018-09-16 PROCEDURE — 36415 COLL VENOUS BLD VENIPUNCTURE: CPT

## 2018-09-16 PROCEDURE — 94640 AIRWAY INHALATION TREATMENT: CPT

## 2018-09-16 PROCEDURE — 99900035 HC TECH TIME PER 15 MIN (STAT)

## 2018-09-16 PROCEDURE — 25000003 PHARM REV CODE 250: Performed by: HOSPITALIST

## 2018-09-16 PROCEDURE — 63600175 PHARM REV CODE 636 W HCPCS: Performed by: INTERNAL MEDICINE

## 2018-09-16 RX ADMIN — PANTOPRAZOLE SODIUM 40 MG: 40 TABLET, DELAYED RELEASE ORAL at 08:09

## 2018-09-16 RX ADMIN — DONEPEZIL HYDROCHLORIDE 10 MG: 10 TABLET, FILM COATED ORAL at 09:09

## 2018-09-16 RX ADMIN — ESCITALOPRAM OXALATE 10 MG: 10 TABLET ORAL at 06:09

## 2018-09-16 RX ADMIN — PIPERACILLIN AND TAZOBACTAM 4.5 G: 4; .5 INJECTION, POWDER, LYOPHILIZED, FOR SOLUTION INTRAVENOUS; PARENTERAL at 08:09

## 2018-09-16 RX ADMIN — TRAMADOL HYDROCHLORIDE 50 MG: 50 TABLET, FILM COATED ORAL at 09:09

## 2018-09-16 RX ADMIN — TRAMADOL HYDROCHLORIDE 50 MG: 50 TABLET, FILM COATED ORAL at 03:09

## 2018-09-16 RX ADMIN — ENOXAPARIN SODIUM 40 MG: 40 INJECTION SUBCUTANEOUS at 05:09

## 2018-09-16 RX ADMIN — FINASTERIDE 5 MG: 5 TABLET, FILM COATED ORAL at 08:09

## 2018-09-16 RX ADMIN — PIPERACILLIN AND TAZOBACTAM 4.5 G: 4; .5 INJECTION, POWDER, LYOPHILIZED, FOR SOLUTION INTRAVENOUS; PARENTERAL at 05:09

## 2018-09-16 RX ADMIN — PIPERACILLIN AND TAZOBACTAM 4.5 G: 4; .5 INJECTION, POWDER, LYOPHILIZED, FOR SOLUTION INTRAVENOUS; PARENTERAL at 12:09

## 2018-09-16 RX ADMIN — TAMSULOSIN HYDROCHLORIDE 0.4 MG: 0.4 CAPSULE ORAL at 08:09

## 2018-09-16 RX ADMIN — MEMANTINE 10 MG: 10 TABLET ORAL at 08:09

## 2018-09-16 RX ADMIN — GABAPENTIN 100 MG: 100 CAPSULE ORAL at 09:09

## 2018-09-16 RX ADMIN — MIRTAZAPINE 15 MG: 15 TABLET, FILM COATED ORAL at 09:09

## 2018-09-16 RX ADMIN — GABAPENTIN 100 MG: 100 CAPSULE ORAL at 08:09

## 2018-09-16 RX ADMIN — LISINOPRIL 10 MG: 5 TABLET ORAL at 08:09

## 2018-09-16 RX ADMIN — SODIUM CHLORIDE, SODIUM LACTATE, POTASSIUM CHLORIDE, AND CALCIUM CHLORIDE: .6; .31; .03; .02 INJECTION, SOLUTION INTRAVENOUS at 05:09

## 2018-09-16 RX ADMIN — FLUTICASONE FUROATE AND VILANTEROL TRIFENATATE 1 PUFF: 100; 25 POWDER RESPIRATORY (INHALATION) at 08:09

## 2018-09-16 RX ADMIN — BUSPIRONE HYDROCHLORIDE 5 MG: 5 TABLET ORAL at 09:09

## 2018-09-16 RX ADMIN — BUSPIRONE HYDROCHLORIDE 5 MG: 5 TABLET ORAL at 08:09

## 2018-09-16 RX ADMIN — PRAVASTATIN SODIUM 40 MG: 40 TABLET ORAL at 09:09

## 2018-09-16 RX ADMIN — DOCUSATE SODIUM AND SENNOSIDES 1 TABLET: 8.6; 5 TABLET, FILM COATED ORAL at 05:09

## 2018-09-16 RX ADMIN — GABAPENTIN 100 MG: 100 CAPSULE ORAL at 03:09

## 2018-09-16 RX ADMIN — ASPIRIN 81 MG: 81 TABLET, COATED ORAL at 08:09

## 2018-09-16 NOTE — PLAN OF CARE
Problem: Patient Care Overview  Goal: Plan of Care Review  Outcome: Ongoing (interventions implemented as appropriate)  No falls, trauma or injury this shift. No pressure injuries observed. Infection managed with Zosyn IV every 8 hours. Plan to discharge back to nursing home. Patient remained afebrile this shift. Continue with plan of care and continue to monitor patient .

## 2018-09-16 NOTE — PROGRESS NOTES
"Progress Note    Subjective:no further abd pain and wants to go back to see his wife in the nursing home      BP (!) 178/81   Pulse 60   Temp 99.3 °F (37.4 °C) (Oral)   Resp 18   Ht 5' 11" (1.803 m)   Wt 72.4 kg (159 lb 8.6 oz)   SpO2 98%   BMI 22.25 kg/m²     Current Facility-Administered Medications   Medication Dose Route Frequency Provider Last Rate Last Dose    acetaminophen tablet 500 mg  500 mg Oral Q6H PRN Hank Nielsen MD   500 mg at 09/13/18 0831    albuterol-ipratropium 2.5 mg-0.5 mg/3 mL nebulizer solution 3 mL  3 mL Nebulization Q6H PRN Hank Nielsen MD        aspirin EC tablet 81 mg  81 mg Oral Daily Hank Nielsen MD   81 mg at 09/16/18 0809    busPIRone tablet 5 mg  5 mg Oral BID Hank Nielsen MD   5 mg at 09/16/18 0808    cloNIDine tablet 0.1 mg  0.1 mg Oral TID PRN Hank Nielsen MD   0.1 mg at 09/13/18 1725    donepezil tablet 10 mg  10 mg Oral QHS Hank Nielsen MD   10 mg at 09/15/18 2116    enoxaparin injection 40 mg  40 mg Subcutaneous Daily Hank Nielsen MD   40 mg at 09/15/18 1702    escitalopram oxalate tablet 10 mg  10 mg Oral QAM Hank Nielsen MD   10 mg at 09/16/18 0606    finasteride tablet 5 mg  5 mg Oral Daily Hank Nielsen MD   5 mg at 09/16/18 0808    fluticasone-vilanterol 100-25 mcg/dose diskus inhaler 1 puff  1 puff Inhalation Daily Hank Nielsen MD   1 puff at 09/15/18 1100    gabapentin capsule 100 mg  100 mg Oral TID Hank Nielsen MD   100 mg at 09/16/18 0808    influenza (FLUZONE HIGH-DOSE) vaccine 0.5 mL  0.5 mL Intramuscular vaccine x 1 dose Radhika Moreau MD        lactated ringers infusion   Intravenous Continuous Shana Higgins  mL/hr at 09/15/18 2216      lisinopril tablet 10 mg  10 mg Oral Daily Radhika Moreau MD   10 mg at 09/16/18 0809    memantine tablet 10 mg  10 mg Oral Daily Hank Nielsen MD   10 mg at 09/16/18 0809    mirtazapine tablet 15 mg  15 mg Oral QHS Hank Nielsen MD   15 mg at 09/15/18 2116    morphine injection 1 mg  1 " mg Intravenous Q4H PRN Radhika Moreau MD        pantoprazole EC tablet 40 mg  40 mg Oral Daily Hank Nielsen MD   40 mg at 09/16/18 0808    piperacillin-tazobactam 4.5 g in sodium chloride 0.9% 100 mL IVPB (ready to mix system)  4.5 g Intravenous Q8H Hank Nielsen MD 25 mL/hr at 09/16/18 0809 4.5 g at 09/16/18 0809    pravastatin tablet 40 mg  40 mg Oral QHS Hank Nielsen MD   40 mg at 09/15/18 2117    promethazine (PHENERGAN) 6.25 mg in dextrose 5 % 50 mL IVPB  6.25 mg Intravenous Q6H PRN Hank Nielsen MD        senna-docusate 8.6-50 mg per tablet 1 tablet  1 tablet Oral BID PRN Hank Nielsen MD   1 tablet at 09/16/18 0514    sodium chloride 0.9% flush 3 mL  3 mL Intravenous PRN Kvng Reyes MD        tamsulosin 24 hr capsule 0.4 mg  0.4 mg Oral Daily Radhika Moreau MD   0.4 mg at 09/16/18 0808    traMADol tablet 50 mg  50 mg Oral Q6H PRN Hank Nielsen MD   50 mg at 09/14/18 2048       Objective:  gen alert no distress  abd- soft,nontender,no rebound or guarding, no masses      Labs:  Lab Results   Component Value Date    WBC 4.37 09/15/2018    HGB 10.4 (L) 09/15/2018    HCT 31.0 (L) 09/15/2018    MCV 91 09/15/2018     (L) 09/15/2018       CMP  Recent Labs   Lab  09/16/18   0509   GLU  81   CALCIUM  9.1   ALBUMIN  2.1*   PROT  5.6*   NA  143   K  3.4*   CO2  28   CL  106   BUN  7*   CREATININE  0.9   ALKPHOS  396*   ALT  54*   AST  51*   BILITOT  2.9*       Lab Results   Component Value Date    LIPASE 583 (H) 09/15/2018       No results found for: AMYLASE    Assessment:   1. Post ercp stone removal, liver functions slow to improve not unexpected  2. abd pain better        Plan:  1.observe but looks better today, monitor

## 2018-09-17 LAB
ALBUMIN SERPL BCP-MCNC: 2.1 G/DL
ALP SERPL-CCNC: 402 U/L
ALT SERPL W/O P-5'-P-CCNC: 55 U/L
ANION GAP SERPL CALC-SCNC: 6 MMOL/L
AST SERPL-CCNC: 57 U/L
BILIRUB SERPL-MCNC: 2.7 MG/DL
BUN SERPL-MCNC: 6 MG/DL
CALCIUM SERPL-MCNC: 8.8 MG/DL
CHLORIDE SERPL-SCNC: 103 MMOL/L
CO2 SERPL-SCNC: 31 MMOL/L
CREAT SERPL-MCNC: 0.9 MG/DL
EST. GFR  (AFRICAN AMERICAN): >60 ML/MIN/1.73 M^2
EST. GFR  (NON AFRICAN AMERICAN): >60 ML/MIN/1.73 M^2
GLUCOSE SERPL-MCNC: 83 MG/DL
LIPASE SERPL-CCNC: 113 U/L
POTASSIUM SERPL-SCNC: 3.1 MMOL/L
PROT SERPL-MCNC: 5.5 G/DL
SODIUM SERPL-SCNC: 140 MMOL/L

## 2018-09-17 PROCEDURE — 36415 COLL VENOUS BLD VENIPUNCTURE: CPT

## 2018-09-17 PROCEDURE — 27000221 HC OXYGEN, UP TO 24 HOURS

## 2018-09-17 PROCEDURE — 25000003 PHARM REV CODE 250: Performed by: HOSPITALIST

## 2018-09-17 PROCEDURE — 11000001 HC ACUTE MED/SURG PRIVATE ROOM

## 2018-09-17 PROCEDURE — 63600175 PHARM REV CODE 636 W HCPCS: Performed by: HOSPITALIST

## 2018-09-17 PROCEDURE — 80053 COMPREHEN METABOLIC PANEL: CPT

## 2018-09-17 PROCEDURE — 25000003 PHARM REV CODE 250: Performed by: INTERNAL MEDICINE

## 2018-09-17 PROCEDURE — 94640 AIRWAY INHALATION TREATMENT: CPT

## 2018-09-17 PROCEDURE — 83690 ASSAY OF LIPASE: CPT

## 2018-09-17 PROCEDURE — 63600175 PHARM REV CODE 636 W HCPCS: Performed by: INTERNAL MEDICINE

## 2018-09-17 PROCEDURE — 94761 N-INVAS EAR/PLS OXIMETRY MLT: CPT

## 2018-09-17 RX ORDER — HYDRALAZINE HYDROCHLORIDE 20 MG/ML
10 INJECTION INTRAMUSCULAR; INTRAVENOUS EVERY 6 HOURS PRN
Status: DISCONTINUED | OUTPATIENT
Start: 2018-09-17 | End: 2018-09-18 | Stop reason: HOSPADM

## 2018-09-17 RX ORDER — POTASSIUM CHLORIDE 20 MEQ/15ML
40 SOLUTION ORAL ONCE
Status: COMPLETED | OUTPATIENT
Start: 2018-09-17 | End: 2018-09-17

## 2018-09-17 RX ADMIN — CLONIDINE HYDROCHLORIDE 0.1 MG: 0.1 TABLET ORAL at 09:09

## 2018-09-17 RX ADMIN — PIPERACILLIN AND TAZOBACTAM 4.5 G: 4; .5 INJECTION, POWDER, LYOPHILIZED, FOR SOLUTION INTRAVENOUS; PARENTERAL at 09:09

## 2018-09-17 RX ADMIN — PRAVASTATIN SODIUM 40 MG: 40 TABLET ORAL at 09:09

## 2018-09-17 RX ADMIN — TRAMADOL HYDROCHLORIDE 50 MG: 50 TABLET, FILM COATED ORAL at 10:09

## 2018-09-17 RX ADMIN — GABAPENTIN 100 MG: 100 CAPSULE ORAL at 09:09

## 2018-09-17 RX ADMIN — ENOXAPARIN SODIUM 40 MG: 40 INJECTION SUBCUTANEOUS at 04:09

## 2018-09-17 RX ADMIN — TRAMADOL HYDROCHLORIDE 50 MG: 50 TABLET, FILM COATED ORAL at 11:09

## 2018-09-17 RX ADMIN — DONEPEZIL HYDROCHLORIDE 10 MG: 10 TABLET, FILM COATED ORAL at 09:09

## 2018-09-17 RX ADMIN — HYDRALAZINE HYDROCHLORIDE 10 MG: 20 INJECTION INTRAMUSCULAR; INTRAVENOUS at 01:09

## 2018-09-17 RX ADMIN — FINASTERIDE 5 MG: 5 TABLET, FILM COATED ORAL at 09:09

## 2018-09-17 RX ADMIN — BUSPIRONE HYDROCHLORIDE 5 MG: 5 TABLET ORAL at 09:09

## 2018-09-17 RX ADMIN — TAMSULOSIN HYDROCHLORIDE 0.4 MG: 0.4 CAPSULE ORAL at 09:09

## 2018-09-17 RX ADMIN — PANTOPRAZOLE SODIUM 40 MG: 40 TABLET, DELAYED RELEASE ORAL at 09:09

## 2018-09-17 RX ADMIN — MIRTAZAPINE 15 MG: 15 TABLET, FILM COATED ORAL at 09:09

## 2018-09-17 RX ADMIN — MEMANTINE 10 MG: 10 TABLET ORAL at 09:09

## 2018-09-17 RX ADMIN — FLUTICASONE FUROATE AND VILANTEROL TRIFENATATE 1 PUFF: 100; 25 POWDER RESPIRATORY (INHALATION) at 08:09

## 2018-09-17 RX ADMIN — CLONIDINE HYDROCHLORIDE 0.1 MG: 0.1 TABLET ORAL at 11:09

## 2018-09-17 RX ADMIN — TRAMADOL HYDROCHLORIDE 50 MG: 50 TABLET, FILM COATED ORAL at 04:09

## 2018-09-17 RX ADMIN — TRAMADOL HYDROCHLORIDE 50 MG: 50 TABLET, FILM COATED ORAL at 03:09

## 2018-09-17 RX ADMIN — ESCITALOPRAM OXALATE 10 MG: 10 TABLET ORAL at 06:09

## 2018-09-17 RX ADMIN — ASPIRIN 81 MG: 81 TABLET, COATED ORAL at 09:09

## 2018-09-17 RX ADMIN — POTASSIUM CHLORIDE 40 MEQ: 20 SOLUTION ORAL at 09:09

## 2018-09-17 RX ADMIN — SODIUM CHLORIDE, SODIUM LACTATE, POTASSIUM CHLORIDE, AND CALCIUM CHLORIDE: .6; .31; .03; .02 INJECTION, SOLUTION INTRAVENOUS at 09:09

## 2018-09-17 RX ADMIN — LISINOPRIL 10 MG: 5 TABLET ORAL at 09:09

## 2018-09-17 RX ADMIN — GABAPENTIN 100 MG: 100 CAPSULE ORAL at 04:09

## 2018-09-17 RX ADMIN — PIPERACILLIN AND TAZOBACTAM 4.5 G: 4; .5 INJECTION, POWDER, LYOPHILIZED, FOR SOLUTION INTRAVENOUS; PARENTERAL at 12:09

## 2018-09-17 NOTE — PHYSICIAN QUERY
PT Name: Fidel Allan  MR #: [id number]     Physician Query Form - Diagnosis Clarification      CDS/: Lorrie Gibson RN               Contact information:[email or web address]    This form is a permanent document in the medical record.     Query Date: September 17, 2018    By submitting this query, we are merely seeking further clarification of documentation.  Please utilize your independent clinical judgment when addressing the question(s) below.     The medical record contains the following:      Findings Supporting Clinical Information Location in Medical Record   Post ERCP pancreatitis choledocholithiasis with elevated bilirubin.  Now s/p ERCP with stone extraction, however with worsening abdominal pain, he likely has post ERCP pancreatitis.    less pain, lipase up a bit but bili trending down, continue on clear liquids     Post ercp stone removal, liver functions slow to improve not unexpected    Still has persistent epigastric pain.     Lipase 39  Lipase 494  Lipase 583  Lipase 113 Gastroenterology PN 9/14        Hospital Medicine  PN 9/15    Gastroenterology PN 9/16      Hospital Medicine PN 9/17    Labs 9/12  Labs 9/14  Labs 9/15  Labs 9/17     Please clarify if the _post ERCP pancreatitis___ diagnosis has been:    [  ] Ruled In  [  ] Ruled In, Now Resolved  [  ] Ruled Out  [  ] Clinically insignificant  [ x ] Clinically undetermined  [  ] Other/Clarification of findings (please specify)_______________________________    Please document in your progress notes daily for the duration of treatment, until resolved, and include in your discharge summary.

## 2018-09-17 NOTE — PLAN OF CARE
Problem: Patient Care Overview  Goal: Plan of Care Review  Outcome: Ongoing (interventions implemented as appropriate)  Plan of care discussed w/ pt.No new fall/Injuries noted during shift.Clear liquid diet tolerated well.Urinal at bedside within reach.IVFs continued.Infection managed w/ IV ABXs.Pt on 2L NC.Pain managed w/ PRN meds.Call light in reach.Bed alarm set.

## 2018-09-17 NOTE — PROGRESS NOTES
[facility] Hospital Medicine  Progress Note    Patient Name: Fidel Allan  MRN: [id number]  Patient Class: IP- Inpatient   Admission Date: 9/11/2018  Length of Stay: 6 days  Attending Physician: Radhika Moreau MD  Primary Care Provider: Memo Nieto MD        Subjective:     Principal Problem:Choledocholithiasis    HPI:  Mr. Fidel Allan is a 89 y.o. male [facility] resident with essential hypertension, hyperlipidemia (31.6 Dec 2018), CAD s/p CABG, CKD Stage 3, COPD, anemia of chronic disease, permanent pacemaker in place, aortic stenosis, BPH, GERD, dementia, and depression who presented initially to [facility] ED with complaints of fever and weakness this morning.  He otherwise has no complaints but is demented.  Further history is limited at this time.    Hospital Course:  Pt admitted with elevated LFTs/bili and concern for cholangitis. Started on IV zosyn. GI consulted, bili rising. ERCP 8/22 completed. Lipase in normal range. Pt still has persistent epigastric pain.     9/13- underwent ERCP, stone extraction completed, CMp in am  9/14- clear liquid diet, trend LFTS/bili, on LR IVF  9/15, 9/16- less pain, lipase up a bit but bili trending down, continue on clear liquids     Interval History: pt reports feeling better    Review of Systems   Unable to perform ROS: Dementia     Objective:     Vital Signs (Most Recent):  Temp: 98.2 °F (36.8 °C) (09/17/18 0000)  Pulse: 60 (09/17/18 0000)  Resp: 17 (09/17/18 0000)  BP: (!) 168/81 (09/17/18 0000)  SpO2: 95 % (09/17/18 0000) Vital Signs (24h Range):  Temp:  [98.2 °F (36.8 °C)-99.3 °F (37.4 °C)] 98.2 °F (36.8 °C)  Pulse:  [52-69] 60  Resp:  [17-18] 17  SpO2:  [94 %-98 %] 95 %  BP: (166-198)/(70-81) 168/81     Weight: 72.4 kg (159 lb 8.6 oz)  Body mass index is 22.25 kg/m².    Intake/Output Summary (Last 24 hours) at 9/17/2018 0651  Last data filed at 9/17/2018 0631  Gross per 24 hour   Intake 2650 ml   Output 1625 ml   Net 1025 ml  "     Physical Exam   Constitutional: He appears well-developed and well-nourished. No distress.   HENT:   Head: Normocephalic and atraumatic.   Right Ear: External ear normal.   Left Ear: External ear normal.   Nose: Nose normal.   Eyes: Right eye exhibits no discharge. Left eye exhibits no discharge.   Neck: Normal range of motion.   Cardiovascular: Normal rate, regular rhythm, normal heart sounds and intact distal pulses. Exam reveals no gallop and no friction rub.   No murmur heard.  Pulmonary/Chest: Effort normal and breath sounds normal. No stridor. No respiratory distress. He has no wheezes. He has no rales. He exhibits no tenderness.   Abdominal: Soft. Bowel sounds are normal. He exhibits no distension. There is no tenderness. There is no rebound and no guarding.   Musculoskeletal: Normal range of motion. He exhibits no edema.   Neurological:   Awake, alert, pleasant, cooperative, but oriented only to self   Skin: Skin is warm and dry. He is not diaphoretic. No erythema. There is pallor.   Psychiatric: He has a normal mood and affect. His behavior is normal. Judgment and thought content normal.   Nursing note and vitals reviewed.      Significant Labs: All pertinent labs within the past 24 hours have been reviewed.    Significant Imaging: I have reviewed all pertinent imaging results/findings within the past 24 hours.    Assessment/Plan:      * Choledocholithiasis    Patient had a low-grade fever of 100.2 °F upon arrival to the ED but has not had any since then.  His blood pressure initially was 96/43 but went as low as 84/37.  Patient reportedly had a fever of 102 °F at the NH.  A CT-abd/pelvis was obtained which was significant for "[p]ersistent choledocholithiasis with pronounced dilatation of the intrahepatic biliary tree.  A minimal amount of edema about the pancreatitic head and neck.  Peripancreatic edema seems reduced since prior of 08/21/2018."  Given that he had an admission in Dec 2017 for " cholangitis and given his abnormal LFTs here, there is certainly a concern for acute cholangitis.  Of note, he was admitted here last month for gallstone pancreatitis for which he underwent ERCP per Dr. Baron Owens with the following results:    Impression:            - The majory papilla was in an altered location due to a previous Billroth 2 procedure. A previous sphincterotomy was seen.  - Choledocholithiasis was found. Complete removal was accomplished by biliary sphincterotomy and balloon extraction.  - A gastroscope then a pediatric colonoscope were used to perform the procedure as the side-viewing duodenoscopy could not be advance to the ampulla.    Today, he has a total bilirubin of 1.8 which is better compared to his previous labs last month.  His AST/ALT are worse at 315/183.  He had been started on piperacillin/tazobactam and metronidazole but I asked the ED physician to discontinue the metronidazole.  He doesn't technically meet criteria for sepsis.  Will provide supportive care and consult Gastroenterology for further recommendations.    ERCP 8/22  ERCP 9/13 - stone extraction   CMP in am   Advance liquid diet   Lipase elevated >400        Benign prostatic hyperplasia with urinary hesitancy    Stable; will continue his home regimen of finasteride and restart lisinopril when his blood pressure improves.        Anemia of chronic disease    The patient's H/H is stable and consistent with previous laboratory measurements, and the patient exhibits no signs or symptoms of acute bleeding; there is no indication for transfusion.  Will continue to monitor.        Essential hypertension    Resume lisinopril  Clonidine PRN        Coronary artery disease involving coronary bypass graft of native heart without angina pectoris    Stable; will continue his home regimen of aspirin and pravastatin.        CKD Stage 3    His renal function appears to be at his baseline; will continue to monitor his urine output.         HLD (hyperlipidemia)    Well-controlled; will continue patient's home regimen of pravatatin.        Major depressive disorder    Stable; will continue his home regimen of buspirone and escitalopram.        COPD (chronic obstructive pulmonary disease)    Clinically-stable without wheezing.  Will continue home regimen of ICS?LABA and have as-needed RISSA/LAMA available.        Vascular dementia    Stable; will continue his home regimen of donepezil and memantine.        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        Moderate aortic stenosis    Stable; there are no acute issues.        GERD (gastroesophageal reflux disease)    Will substitute his home regimen of omeprazole for pantoprazole while he is inpatient.          VTE Risk Mitigation (From admission, onward)        Ordered     enoxaparin injection 40 mg  Daily      09/11/18 2230     IP VTE HIGH RISK PATIENT  Once      09/11/18 2230              Radhika Moreau MD  Department of Hospital Medicine   [facility]

## 2018-09-17 NOTE — PLAN OF CARE
09/17/18 1242   Discharge Reassessment   Assessment Type Discharge Planning Reassessment   Provided patient/caregiver education on the expected discharge date and the discharge plan Yes   Do you have any problems affording any of your prescribed medications? No   Discharge Plan A (Returning to [facility])   Patient choice form signed by patient/caregiver N/A   Can the patient answer the patient profile reliably? Yes, cognitively intact   How does the patient rate their overall health at the present time? Fair   How often would a person be available to care for the patient? Whenever needed   Number of comorbid conditions (as recorded on the chart) Five or more   During the past month, has the patient often been bothered by feeling down, depressed or hopeless? No   During the past month, has the patient often been bothered by little interest or pleasure in doing things? No

## 2018-09-17 NOTE — PLAN OF CARE
Problem: Patient Care Overview  Goal: Plan of Care Review  Outcome: Ongoing (interventions implemented as appropriate)  Patient is A/Ox4, remains free from falls, is afebrile, states chronic leg and back pain is being well managed with ordered pain medication.  Patient is tolerating full liquid diet well, no complaints of N/V or abdominal discomfort.  Patient voids per urinal, clear yellow urine.  Patient states he is ready for discharge.

## 2018-09-17 NOTE — PROGRESS NOTES
[place] contacted [facility] at [phone] to speak with Marycarmen to inform pt may discharge today and will need [place] to arrange transportation, unavailable. Left message to contact [place].

## 2018-09-17 NOTE — SUBJECTIVE & OBJECTIVE
Interval History: pt reports feeling better    Review of Systems   Unable to perform ROS: Dementia     Objective:     Vital Signs (Most Recent):  Temp: 98.2 °F (36.8 °C) (09/17/18 0000)  Pulse: 60 (09/17/18 0000)  Resp: 17 (09/17/18 0000)  BP: (!) 168/81 (09/17/18 0000)  SpO2: 95 % (09/17/18 0000) Vital Signs (24h Range):  Temp:  [98.2 °F (36.8 °C)-99.3 °F (37.4 °C)] 98.2 °F (36.8 °C)  Pulse:  [52-69] 60  Resp:  [17-18] 17  SpO2:  [94 %-98 %] 95 %  BP: (166-198)/(70-81) 168/81     Weight: 72.4 kg (159 lb 8.6 oz)  Body mass index is 22.25 kg/m².    Intake/Output Summary (Last 24 hours) at 9/17/2018 0651  Last data filed at 9/17/2018 0631  Gross per 24 hour   Intake 2650 ml   Output 1625 ml   Net 1025 ml      Physical Exam   Constitutional: He appears well-developed and well-nourished. No distress.   HENT:   Head: Normocephalic and atraumatic.   Right Ear: External ear normal.   Left Ear: External ear normal.   Nose: Nose normal.   Eyes: Right eye exhibits no discharge. Left eye exhibits no discharge.   Neck: Normal range of motion.   Cardiovascular: Normal rate, regular rhythm, normal heart sounds and intact distal pulses. Exam reveals no gallop and no friction rub.   No murmur heard.  Pulmonary/Chest: Effort normal and breath sounds normal. No stridor. No respiratory distress. He has no wheezes. He has no rales. He exhibits no tenderness.   Abdominal: Soft. Bowel sounds are normal. He exhibits no distension. There is no tenderness. There is no rebound and no guarding.   Musculoskeletal: Normal range of motion. He exhibits no edema.   Neurological:   Awake, alert, pleasant, cooperative, but oriented only to self   Skin: Skin is warm and dry. He is not diaphoretic. No erythema. There is pallor.   Psychiatric: He has a normal mood and affect. His behavior is normal. Judgment and thought content normal.   Nursing note and vitals reviewed.      Significant Labs: All pertinent labs within the past 24 hours have been  reviewed.    Significant Imaging: I have reviewed all pertinent imaging results/findings within the past 24 hours.

## 2018-09-18 VITALS
SYSTOLIC BLOOD PRESSURE: 181 MMHG | TEMPERATURE: 98 F | HEIGHT: 71 IN | DIASTOLIC BLOOD PRESSURE: 83 MMHG | RESPIRATION RATE: 20 BRPM | WEIGHT: 159.56 LBS | HEART RATE: 60 BPM | BODY MASS INDEX: 22.34 KG/M2 | OXYGEN SATURATION: 97 %

## 2018-09-18 LAB
ALBUMIN SERPL BCP-MCNC: 2.2 G/DL
ALP SERPL-CCNC: 413 U/L
ALT SERPL W/O P-5'-P-CCNC: 52 U/L
ANION GAP SERPL CALC-SCNC: 6 MMOL/L
AST SERPL-CCNC: 59 U/L
BILIRUB SERPL-MCNC: 2.7 MG/DL
BUN SERPL-MCNC: 5 MG/DL
CALCIUM SERPL-MCNC: 9 MG/DL
CHLORIDE SERPL-SCNC: 103 MMOL/L
CO2 SERPL-SCNC: 31 MMOL/L
CREAT SERPL-MCNC: 0.8 MG/DL
EST. GFR  (AFRICAN AMERICAN): >60 ML/MIN/1.73 M^2
EST. GFR  (NON AFRICAN AMERICAN): >60 ML/MIN/1.73 M^2
GLUCOSE SERPL-MCNC: 99 MG/DL
POTASSIUM SERPL-SCNC: 3.5 MMOL/L
PROT SERPL-MCNC: 5.6 G/DL
SODIUM SERPL-SCNC: 140 MMOL/L

## 2018-09-18 PROCEDURE — 94640 AIRWAY INHALATION TREATMENT: CPT

## 2018-09-18 PROCEDURE — 25000003 PHARM REV CODE 250: Performed by: HOSPITALIST

## 2018-09-18 PROCEDURE — 25000003 PHARM REV CODE 250: Performed by: INTERNAL MEDICINE

## 2018-09-18 PROCEDURE — 63600175 PHARM REV CODE 636 W HCPCS: Performed by: HOSPITALIST

## 2018-09-18 PROCEDURE — 27000221 HC OXYGEN, UP TO 24 HOURS

## 2018-09-18 PROCEDURE — 80053 COMPREHEN METABOLIC PANEL: CPT

## 2018-09-18 PROCEDURE — 36415 COLL VENOUS BLD VENIPUNCTURE: CPT

## 2018-09-18 RX ORDER — LISINOPRIL 5 MG/1
10 TABLET ORAL EVERY MORNING
Status: ON HOLD
Start: 2018-09-18 | End: 2018-12-20 | Stop reason: HOSPADM

## 2018-09-18 RX ORDER — AMOXICILLIN AND CLAVULANATE POTASSIUM 875; 125 MG/1; MG/1
1 TABLET, FILM COATED ORAL EVERY 12 HOURS
Qty: 20 TABLET | Refills: 0
Start: 2018-09-18 | End: 2018-09-28

## 2018-09-18 RX ORDER — LORAZEPAM 0.5 MG/1
0.5 TABLET ORAL 2 TIMES DAILY PRN
Qty: 10 TABLET | Refills: 0 | Status: ON HOLD | OUTPATIENT
Start: 2018-09-18 | End: 2020-07-28 | Stop reason: SDUPTHER

## 2018-09-18 RX ORDER — AMOXICILLIN AND CLAVULANATE POTASSIUM 875; 125 MG/1; MG/1
1 TABLET, FILM COATED ORAL EVERY 12 HOURS
Status: DISCONTINUED | OUTPATIENT
Start: 2018-09-18 | End: 2018-09-18 | Stop reason: HOSPADM

## 2018-09-18 RX ADMIN — FINASTERIDE 5 MG: 5 TABLET, FILM COATED ORAL at 08:09

## 2018-09-18 RX ADMIN — AMOXICILLIN AND CLAVULANATE POTASSIUM 1 TABLET: 875; 125 TABLET, FILM COATED ORAL at 10:09

## 2018-09-18 RX ADMIN — PANTOPRAZOLE SODIUM 40 MG: 40 TABLET, DELAYED RELEASE ORAL at 08:09

## 2018-09-18 RX ADMIN — TRAMADOL HYDROCHLORIDE 50 MG: 50 TABLET, FILM COATED ORAL at 09:09

## 2018-09-18 RX ADMIN — ACETAMINOPHEN 500 MG: 500 TABLET, FILM COATED ORAL at 07:09

## 2018-09-18 RX ADMIN — FLUTICASONE FUROATE AND VILANTEROL TRIFENATATE 1 PUFF: 100; 25 POWDER RESPIRATORY (INHALATION) at 08:09

## 2018-09-18 RX ADMIN — HYDRALAZINE HYDROCHLORIDE 10 MG: 20 INJECTION INTRAMUSCULAR; INTRAVENOUS at 01:09

## 2018-09-18 RX ADMIN — MEMANTINE 10 MG: 10 TABLET ORAL at 08:09

## 2018-09-18 RX ADMIN — ESCITALOPRAM OXALATE 10 MG: 10 TABLET ORAL at 07:09

## 2018-09-18 RX ADMIN — GABAPENTIN 100 MG: 100 CAPSULE ORAL at 08:09

## 2018-09-18 RX ADMIN — ASPIRIN 81 MG: 81 TABLET, COATED ORAL at 08:09

## 2018-09-18 RX ADMIN — BUSPIRONE HYDROCHLORIDE 5 MG: 5 TABLET ORAL at 08:09

## 2018-09-18 RX ADMIN — TAMSULOSIN HYDROCHLORIDE 0.4 MG: 0.4 CAPSULE ORAL at 08:09

## 2018-09-18 RX ADMIN — CLONIDINE HYDROCHLORIDE 0.1 MG: 0.1 TABLET ORAL at 03:09

## 2018-09-18 RX ADMIN — GABAPENTIN 100 MG: 100 CAPSULE ORAL at 03:09

## 2018-09-18 RX ADMIN — LISINOPRIL 10 MG: 5 TABLET ORAL at 08:09

## 2018-09-18 NOTE — PROGRESS NOTES
[facility] Hospital Medicine  Progress Note    Patient Name: Fidel Allan  MRN: [id number]  Patient Class: IP- Inpatient   Admission Date: 9/11/2018  Length of Stay: 6 days  Attending Physician: Radhika Moreau MD  Primary Care Provider: Memo Nieto MD        Subjective:     Principal Problem:Choledocholithiasis    HPI:  Mr. Fidel Allan is a 89 y.o. male [facility] resident with essential hypertension, hyperlipidemia (31.6 Dec 2018), CAD s/p CABG, CKD Stage 3, COPD, anemia of chronic disease, permanent pacemaker in place, aortic stenosis, BPH, GERD, dementia, and depression who presented initially to [facility] ED with complaints of fever and weakness this morning.  He otherwise has no complaints but is demented.  Further history is limited at this time.    Hospital Course:  Pt admitted with elevated LFTs/bili and concern for cholangitis. Started on IV zosyn. GI consulted, bili rising. ERCP 8/22 completed. Lipase in normal range. Pt still has persistent epigastric pain.     9/13- underwent ERCP, stone extraction completed, CMp in am  9/14- clear liquid diet, trend LFTS/bili, on LR IVF  9/15, 9/16- less pain, lipase up a bit but bili trending down, continue on clear liquids   9/17- lipase improved, LFTS still up, advance to full liquids and likely can dc tomorrow if continues to improve     Interval History: pt reports no abdominal pain     Review of Systems   Unable to perform ROS: Dementia     Objective:     Vital Signs (Most Recent):  Temp: 97.9 °F (36.6 °C) (09/17/18 1945)  Pulse: 60 (09/17/18 1945)  Resp: 20 (09/17/18 1945)  BP: (!) 174/79 (09/17/18 1945)  SpO2: 95 % (09/17/18 1945) Vital Signs (24h Range):  Temp:  [97 °F (36.1 °C)-98.2 °F (36.8 °C)] 97.9 °F (36.6 °C)  Pulse:  [60-75] 60  Resp:  [17-20] 20  SpO2:  [94 %-97 %] 95 %  BP: (114-197)/(53-89) 174/79     Weight: 72.4 kg (159 lb 8.6 oz)  Body mass index is 22.25 kg/m².    Intake/Output Summary (Last 24 hours) at  9/17/2018 2112  Last data filed at 9/17/2018 1859  Gross per 24 hour   Intake 2290 ml   Output 2100 ml   Net 190 ml      Physical Exam   Constitutional: He appears well-developed and well-nourished. No distress.   HENT:   Head: Normocephalic and atraumatic.   Right Ear: External ear normal.   Left Ear: External ear normal.   Nose: Nose normal.   Eyes: Right eye exhibits no discharge. Left eye exhibits no discharge.   Neck: Normal range of motion.   Cardiovascular: Normal rate, regular rhythm, normal heart sounds and intact distal pulses. Exam reveals no gallop and no friction rub.   No murmur heard.  Pulmonary/Chest: Effort normal and breath sounds normal. No stridor. No respiratory distress. He has no wheezes. He has no rales. He exhibits no tenderness.   Abdominal: Soft. Bowel sounds are normal. He exhibits no distension. There is no tenderness. There is no rebound and no guarding.   Musculoskeletal: Normal range of motion. He exhibits no edema.   Neurological:   Awake, alert, pleasant, cooperative, but oriented only to self   Skin: Skin is warm and dry. He is not diaphoretic. No erythema. There is pallor.   Psychiatric: He has a normal mood and affect. His behavior is normal. Judgment and thought content normal.   Nursing note and vitals reviewed.      Significant Labs:   CMP:   Recent Labs   Lab  09/16/18   0509  09/17/18   0438   NA  143  140   K  3.4*  3.1*   CL  106  103   CO2  28  31*   GLU  81  83   BUN  7*  6*   CREATININE  0.9  0.9   CALCIUM  9.1  8.8   PROT  5.6*  5.5*   ALBUMIN  2.1*  2.1*   BILITOT  2.9*  2.7*   ALKPHOS  396*  402*   AST  51*  57*   ALT  54*  55*   ANIONGAP  9  6*   EGFRNONAA  >60  >60       Significant Imaging: I have reviewed all pertinent imaging results/findings within the past 24 hours.    Assessment/Plan:      * Choledocholithiasis    Patient had a low-grade fever of 100.2 °F upon arrival to the ED but has not had any since then.  His blood pressure initially was 96/43 but went  "as low as 84/37.  Patient reportedly had a fever of 102 °F at the NH.  A CT-abd/pelvis was obtained which was significant for "[p]ersistent choledocholithiasis with pronounced dilatation of the intrahepatic biliary tree.  A minimal amount of edema about the pancreatitic head and neck.  Peripancreatic edema seems reduced since prior of 08/21/2018."  Given that he had an admission in Dec 2017 for cholangitis and given his abnormal LFTs here, there is certainly a concern for acute cholangitis.  Of note, he was admitted here last month for gallstone pancreatitis for which he underwent ERCP per Dr. Baron Owens with the following results:    Impression:            - The majory papilla was in an altered location due to a previous Billroth 2 procedure. A previous sphincterotomy was seen.  - Choledocholithiasis was found. Complete removal was accomplished by biliary sphincterotomy and balloon extraction.  - A gastroscope then a pediatric colonoscope were used to perform the procedure as the side-viewing duodenoscopy could not be advance to the ampulla.    Today, he has a total bilirubin of 1.8 which is better compared to his previous labs last month.  His AST/ALT are worse at 315/183.  He had been started on piperacillin/tazobactam and metronidazole but I asked the ED physician to discontinue the metronidazole.  He doesn't technically meet criteria for sepsis.  Will provide supportive care and consult Gastroenterology for further recommendations.    ERCP 8/22  ERCP 9/13 - stone extraction   CMP in am   Advance liquid diet   Lipase improved, LFTS still elevated, possible dc tomorrow         Benign prostatic hyperplasia with urinary hesitancy    Stable; will continue his home regimen of finasteride and restart lisinopril when his blood pressure improves.        Anemia of chronic disease    The patient's H/H is stable and consistent with previous laboratory measurements, and the patient exhibits no signs or symptoms of " acute bleeding; there is no indication for transfusion.  Will continue to monitor.        Essential hypertension    Resume lisinopril  Clonidine PRN        Coronary artery disease involving coronary bypass graft of native heart without angina pectoris    Stable; will continue his home regimen of aspirin and pravastatin.        CKD Stage 3    His renal function appears to be at his baseline; will continue to monitor his urine output.        HLD (hyperlipidemia)    Well-controlled; will continue patient's home regimen of pravatatin.        Major depressive disorder    Stable; will continue his home regimen of buspirone and escitalopram.        COPD (chronic obstructive pulmonary disease)    Clinically-stable without wheezing.  Will continue home regimen of ICS?LABA and have as-needed RISSA/LAMA available.        Vascular dementia    Stable; will continue his home regimen of donepezil and memantine.        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        Moderate aortic stenosis    Stable; there are no acute issues.        GERD (gastroesophageal reflux disease)    Will substitute his home regimen of omeprazole for pantoprazole while he is inpatient.          VTE Risk Mitigation (From admission, onward)        Ordered     enoxaparin injection 40 mg  Daily      09/11/18 2230     IP VTE HIGH RISK PATIENT  Once      09/11/18 2230              Radhika Moreau MD  Department of Hospital Medicine   [facility]

## 2018-09-18 NOTE — ASSESSMENT & PLAN NOTE
"Patient had a low-grade fever of 100.2 °F upon arrival to the ED but has not had any since then.  His blood pressure initially was 96/43 but went as low as 84/37.  Patient reportedly had a fever of 102 °F at the NH.  A CT-abd/pelvis was obtained which was significant for "[p]ersistent choledocholithiasis with pronounced dilatation of the intrahepatic biliary tree.  A minimal amount of edema about the pancreatitic head and neck.  Peripancreatic edema seems reduced since prior of 08/21/2018."  Given that he had an admission in Dec 2017 for cholangitis and given his abnormal LFTs here, there is certainly a concern for acute cholangitis.  Of note, he was admitted here last month for gallstone pancreatitis for which he underwent ERCP per Dr. Baron Owens with the following results:    Impression:            - The majory papilla was in an altered location due to a previous Billroth 2 procedure. A previous sphincterotomy was seen.  - Choledocholithiasis was found. Complete removal was accomplished by biliary sphincterotomy and balloon extraction.  - A gastroscope then a pediatric colonoscope were used to perform the procedure as the side-viewing duodenoscopy could not be advance to the ampulla.    Today, he has a total bilirubin of 1.8 which is better compared to his previous labs last month.  His AST/ALT are worse at 315/183.  He had been started on piperacillin/tazobactam and metronidazole but I asked the ED physician to discontinue the metronidazole.  He doesn't technically meet criteria for sepsis.  Will provide supportive care and consult Gastroenterology for further recommendations.    ERCP 8/22  ERCP 9/13 - stone extraction   CMP in am   Advance liquid diet   Lipase improved, LFTS still elevated, possible dc tomorrow   "

## 2018-09-18 NOTE — PLAN OF CARE
09/18/18 1228   Final Note   Assessment Type Final Discharge Note   Discharge Disposition SNF ME  ([facility])   What phone number can be called within the next 1-3 days to see how you are doing after discharge? (Listed in chart)   Hospital Follow Up  Appt(s) scheduled? Yes   Discharge plans and expectations educations in teach back method with documentation complete? Yes   Right Care Referral Info   Post Acute Recommendation SNF / Sub-Acute Rehab

## 2018-09-18 NOTE — PROGRESS NOTES
Follow-up Information     Memo Nieto MD On 10/2/2018.    Specialty:  Urology  Why:  Outpatient Services PCP Follow-Up Tuesday at 10:30 AM; placed on the waiting list  Contact information:  [address] [facility]  [address]  [place] [address]  [phone]             Bang Pearl Ii, MD On 10/5/2018.    Specialty:  Gastroenterology  Why:  Outpatient Services GI Follow-Up Friday at 3:00 PM; to arrive at 2:30 PM.  Contact information:  [address] [place] [address]  [id number]  [facility]  [place] [address]  [phone]               PLEASE BRING TO ALL FOLLOW UP APPOINTMENTS:   A COPY YOUR DISCHARGE INSTRUCTIONS, Any new MEDICINES YOU ARE CURRENTLY TAKING IN THEIR ORIGINAL BOTTLES  And IDENTIFICATION AND INSURANCE CARD     **PLEASE ARRIVE 15 MINUTES AHEAD OF SCHEDULED APPOINTMENT TIME   ++PLEASE CALL 24 HOURS IN ADVANCE IF YOU MUST RESCHEDULE YOUR APPOINTMENT DAY AND/OR TIME     [facility]    WRITTEN HEALTHCARE AND DISCHARGE INFORMATION                        Help at Home           [phone]  After discharge for assistance [facility] On Call Nurse Care Line 24/7  Assistance    Things You are responsible For To Manage Your Care At Home:  1.    Getting your prescriptions filled   2.    Taking your medications as directed, DO NOT MISS ANY DOSES!  3.    Going to your follow-up doctor appointment. This is important because it  allow the doctor to monitor your progress and determine if  any changes need to made to your treatment plan.     Thank you for choosing [facility] for your care.  Please answer any calls you may receive from [facility] we want to continue to support you as you manage your healthcare needs. [facility] is happy to have the opportunity to serve you.     Sincerely,  Your [facility] Team,  [place], [place];  [phone]

## 2018-09-18 NOTE — NURSING
Patient is being discharged back to [facility].  Report called to Rosita.  Patient given incontinence care, no acute distress or pain.  IV removed.  Paper Rx for ativan placed in transport packet.  Patient awaiting transportation.

## 2018-09-18 NOTE — NURSING
Patient discharged back to [facility]. IV removed, tele monitor D/C'd, tech notified.  Patient being transported back to [facility] with oxygen tank from [facility].  Noemi (Director of LLC) will be returning tank to 4th floor after work this evening.  Equipment form filled out and placed in charge binder.

## 2018-09-18 NOTE — PROGRESS NOTES
"EDUCATION:  [place] provided with educational information on GI Disorders.  Information reviewed and placed in :My Healthcare Packet" to be brought home for pt to use as resource after discharge.  Information included:  signs and symptoms to look for and call the doctor if experiencing, and symptoms that may indicate a medical emergency: CALL 911.      All questions answered.  Teach back method used.    Patient stated, "[place] will take care of it and get me to my appts".    TN informed floor nurse, Starr, care management is complete and can proceed with discharge teaching.        "

## 2018-09-18 NOTE — DISCHARGE SUMMARY
[facility] Hospital Medicine  Discharge Summary      Patient Name: Fidel Allan  MRN: [id number]  Admission Date: 9/11/2018  Hospital Length of Stay: 7 days  Discharge Date and Time:  09/18/2018 10:26 AM  Attending Physician: Darwin Palafox MD   Discharging Provider: Darwin Palafox MD  Primary Care Provider: Memo Nieto MD      HPI:   Mr. Fidel Allan is a 89 y.o. male [facility] resident with essential hypertension, hyperlipidemia (31.6 Dec 2018), CAD s/p CABG, CKD Stage 3, COPD, anemia of chronic disease, permanent pacemaker in place, aortic stenosis, BPH, GERD, dementia, and depression who presented initially to [facility] ED with complaints of fever and weakness this morning.  He otherwise has no complaints but is demented.  Further history is limited at this time.    Procedure(s) (LRB):  ERCP, WITH SPHINCTEROTOMY (N/A)      Hospital Course:   89 y/o male that was recently in hospital for gallstone pancreatitis.  He had undergone ERCP with stone removal and improvement of Lipase and LFT's.  He now presents again with abdominal pain and fevers.  Pt admitted with elevated LFTs/bili and concern for cholangitis. Started on IV zosyn. GI consulted.  Patient with repeat ERCP and stone removal.  LFT's have been slow to improve, but patient symptomatically much improved.  Currently tolerating diet without complaints.  AST/ALT much improved, T Bili is slowly improving.  Patient was noted to have Klebsiella bacteremia.  Bacteremia secondary to choledocholithiasis.  He was continued on Zosyn during hospital stay.  Will be discharged on 10 more days of PO Augmentin.  Discussed with GI and ok to discharge home.  Will recheck LFT in 3 days and follow up with PCP and GI.  He is currently afebrile and hemodynamically stable.  Discharge back to NH today.       Consults:   Consults (From admission, onward)        Status Ordering Provider     Inpatient consult to Gastroenterology  Once      Provider:  Roosevelt Castañeda MD    Completed TEOFILO PABLO     IP consult to case management  Once     Provider:  (Not yet assigned)    Completed AURELIA WHITAKER          No new Assessment & Plan notes have been filed under this hospital service since the last note was generated.  Service: Hospital Medicine    Final Active Diagnoses:    Diagnosis Date Noted POA    PRINCIPAL PROBLEM:  Choledocholithiasis [K80.50] 09/11/2018 Yes    Benign prostatic hyperplasia with urinary hesitancy [N40.1, R39.11] 02/16/2018 Yes     Chronic    Anemia of chronic disease [D63.8] 12/18/2017 Yes     Chronic    Essential hypertension [I10] 01/30/2017 Yes     Chronic    Coronary artery disease involving coronary bypass graft of native heart without angina pectoris [I25.810] 01/30/2017 Yes     Chronic    CKD Stage 3 [N18.3] 01/29/2017 Yes     Chronic    COPD (chronic obstructive pulmonary disease) [J44.9]  Yes     Chronic    Vascular dementia [F01.50]  Yes     Chronic    Major depressive disorder [F32.9]  Yes     Chronic    HLD (hyperlipidemia) [E78.5]  Yes     Chronic    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    Specialty:  Urology  Contact information:  [address] [place] [address]  [address]  [facility] [address] [phone]             Bang Pearl Ii, MD In 2 weeks.    Specialty:  Gastroenterology  Contact information:  [address] [place] [address]  [id number]  [facility]  [facility] [address] [phone]                 Patient Instructions:      Diet Cardiac     Notify your health care provider if you experience any of the following:  temperature >100.4      Notify your health care provider if you experience any of the following:  persistent nausea and vomiting or diarrhea     Notify your health care provider if you experience any of the following:  severe uncontrolled pain     Notify your health care provider if you experience any of the following:  difficulty breathing or increased cough     Notify your health care provider if you experience any of the following:  persistent dizziness, light-headedness, or visual disturbances     Notify your health care provider if you experience any of the following:  increased confusion or weakness     Activity as tolerated         Pending Diagnostic Studies:     None         Medications:  Reconciled Home Medications:      Medication List      START taking these medications    amoxicillin-clavulanate 875-125mg 875-125 mg per tablet  Commonly known as:  AUGMENTIN  Take 1 tablet by mouth every 12 (twelve) hours. for 10 days        CHANGE how you take these medications    lisinopril 5 MG tablet  Commonly known as:  PRINIVIL,ZESTRIL  Take 2 tablets (10 mg total) by mouth every morning. Hold for SBP < 120  What changed:  how much to take        CONTINUE taking these medications    acetaminophen 325 MG tablet  Commonly known as:  TYLENOL  Take 2 tablets (650 mg total) by mouth every 6 (six) hours as needed for Pain or Temperature greater than (100).     albuterol sulfate 2.5 mg/0.5 mL Nebu  Take 2.5 mg by nebulization every 4 (four) hours as needed. Rescue     aluminum & magnesium hydroxide-simethicone 400-400-40 mg/5 mL suspension  Commonly known as:  MYLANTA MAX STRENGTH  Take 30 mLs by mouth every 6 (six) hours as needed for Indigestion.     aspirin 81 MG EC tablet  Commonly known as:  ECOTRIN  Take 81 mg by mouth once daily.     benzonatate 100 MG capsule  Commonly known as:  TESSALON  Take 100 mg by mouth 3 (three) times daily as needed for Cough.     BREO ELLIPTA 100-25 mcg/dose diskus inhaler  Generic drug:   fluticasone-vilanterol  Inhale 1 puff into the lungs once daily.     busPIRone 5 MG Tab  Commonly known as:  BUSPAR  Take 5 mg by mouth 2 (two) times daily.     clobetasol 0.05% 0.05 % Oint  Commonly known as:  TEMOVATE  daily as needed. To face and neck     docusate sodium 100 MG capsule  Commonly known as:  COLACE  Take 100 mg by mouth 2 (two) times daily.     donepezil 10 MG tablet  Commonly known as:  ARICEPT  Take 10 mg by mouth every evening.     escitalopram oxalate 10 MG tablet  Commonly known as:  LEXAPRO  Take 10 mg by mouth every morning.     ferrous sulfate 325 (65 FE) MG EC tablet  Take 325 mg by mouth 2 (two) times daily.     finasteride 5 mg tablet  Commonly known as:  PROSCAR  Take 1 tablet (5 mg total) by mouth once daily.     fish oil-omega-3 fatty acids 300-1,000 mg capsule  Take 1 g by mouth every morning.     gabapentin 100 MG capsule  Commonly known as:  NEURONTIN  Take 100 mg by mouth 3 (three) times daily.     ketoconazole 2 % shampoo  Commonly known as:  NIZORAL     latanoprost 0.005 % ophthalmic solution  Place 1 drop into both eyes every evening.     LORazepam 0.5 MG tablet  Commonly known as:  ATIVAN  Take 1 tablet (0.5 mg total) by mouth 2 (two) times daily as needed for Anxiety.     MILK OF MAGNESIA 400 mg/5 mL Susp  Generic drug:  magnesium hydroxide 400 mg/5 ml  Take 30 mLs by mouth every evening.     mirtazapine 15 MG tablet  Commonly known as:  REMERON  Take 15 mg by mouth every evening.     NAMENDA XR 14 mg Cspx  Generic drug:  memantine  Take 14 mg by mouth once daily.     NITROSTAT 0.3 MG SL tablet  Generic drug:  nitroGLYCERIN  Place 0.3 mg under the tongue every 5 (five) minutes as needed for Chest pain.     omeprazole 40 MG capsule  Commonly known as:  PRILOSEC  Take 40 mg by mouth every morning.     ONE DAILY MULTIVITAMIN per tablet  Generic drug:  multivitamin  Take 1 tablet by mouth once daily.     pravastatin 40 MG tablet  Commonly known as:  PRAVACHOL  Take 40 mg by mouth  every evening.     tamsulosin 0.4 mg Cap  Commonly known as:  FLOMAX  Take 1 capsule (0.4 mg total) by mouth once daily.     tiotropium 18 mcg inhalation capsule  Commonly known as:  SPIRIVA  Inhale 1 capsule (18 mcg total) into the lungs once daily. Controller     VITAMIN B-12 1000 MCG tablet  Generic drug:  cyanocobalamin  Take 1,000 mcg by mouth once daily.        STOP taking these medications    HYDROcodone-acetaminophen 5-325 mg per tablet  Commonly known as:  NORCO     ROBITUSSIN-DM ORAL            Indwelling Lines/Drains at time of discharge:   Lines/Drains/Airways          None          Time spent on the discharge of patient: >30 minutes  Patient was seen and examined on the date of discharge and determined to be suitable for discharge.         Darwin Palafox MD  Department of Hospital Medicine  [facility]

## 2018-09-18 NOTE — PROGRESS NOTES
1025 TN contacted Marycarmen at [facility] at [phone] to arrange transportation per pt is discharged. Marycarmen, todd, left message on voice mail.    1125 TN received a call from Marycarmen of [facility] to inform pt will be picked up around 2 pm. TN sent facility orders, d/c summary, and MAR via [facility]. TN will inform floor nurse.    1145 TN contacted PCP, Dr. Nieto's office at [phone] to schedule a f/u; spoke with Breanna; arranged on 10/02/18 at 10:30 AM and placed on the waiting list.    TN contacted Ingrid GRANT at [phone] to schedule a GI f/u with Dr. Pearl; spoke with Cielo, arranged on 10/05/18 at 3:00 PM, to arrive at 2:30 PM.    1500 TN contacted Marycarmen at [facility] twice to inquire of arrival of transportation; left messages.

## 2018-09-18 NOTE — SUBJECTIVE & OBJECTIVE
Interval History: pt reports no abdominal pain     Review of Systems   Unable to perform ROS: Dementia     Objective:     Vital Signs (Most Recent):  Temp: 97.9 °F (36.6 °C) (09/17/18 1945)  Pulse: 60 (09/17/18 1945)  Resp: 20 (09/17/18 1945)  BP: (!) 174/79 (09/17/18 1945)  SpO2: 95 % (09/17/18 1945) Vital Signs (24h Range):  Temp:  [97 °F (36.1 °C)-98.2 °F (36.8 °C)] 97.9 °F (36.6 °C)  Pulse:  [60-75] 60  Resp:  [17-20] 20  SpO2:  [94 %-97 %] 95 %  BP: (114-197)/(53-89) 174/79     Weight: 72.4 kg (159 lb 8.6 oz)  Body mass index is 22.25 kg/m².    Intake/Output Summary (Last 24 hours) at 9/17/2018 2112  Last data filed at 9/17/2018 1859  Gross per 24 hour   Intake 2290 ml   Output 2100 ml   Net 190 ml      Physical Exam   Constitutional: He appears well-developed and well-nourished. No distress.   HENT:   Head: Normocephalic and atraumatic.   Right Ear: External ear normal.   Left Ear: External ear normal.   Nose: Nose normal.   Eyes: Right eye exhibits no discharge. Left eye exhibits no discharge.   Neck: Normal range of motion.   Cardiovascular: Normal rate, regular rhythm, normal heart sounds and intact distal pulses. Exam reveals no gallop and no friction rub.   No murmur heard.  Pulmonary/Chest: Effort normal and breath sounds normal. No stridor. No respiratory distress. He has no wheezes. He has no rales. He exhibits no tenderness.   Abdominal: Soft. Bowel sounds are normal. He exhibits no distension. There is no tenderness. There is no rebound and no guarding.   Musculoskeletal: Normal range of motion. He exhibits no edema.   Neurological:   Awake, alert, pleasant, cooperative, but oriented only to self   Skin: Skin is warm and dry. He is not diaphoretic. No erythema. There is pallor.   Psychiatric: He has a normal mood and affect. His behavior is normal. Judgment and thought content normal.   Nursing note and vitals reviewed.      Significant Labs:   CMP:   Recent Labs   Lab  09/16/18   0509  09/17/18    0438   NA  143  140   K  3.4*  3.1*   CL  106  103   CO2  28  31*   GLU  81  83   BUN  7*  6*   CREATININE  0.9  0.9   CALCIUM  9.1  8.8   PROT  5.6*  5.5*   ALBUMIN  2.1*  2.1*   BILITOT  2.9*  2.7*   ALKPHOS  396*  402*   AST  51*  57*   ALT  54*  55*   ANIONGAP  9  6*   EGFRNONAA  >60  >60       Significant Imaging: I have reviewed all pertinent imaging results/findings within the past 24 hours.

## 2018-09-18 NOTE — PLAN OF CARE
[facility] [address] [place] [address]  [phone]        Facility Transfer Orders                        09/18/2018    Admit to: NH    Diagnoses:  Active Hospital Problems    Diagnosis  POA    *Choledocholithiasis [K80.50]  Yes    Benign prostatic hyperplasia with urinary hesitancy [N40.1, R39.11]  Yes     Chronic    Anemia of chronic disease [D63.8]  Yes     Chronic    Essential hypertension [I10]  Yes     Chronic    Coronary artery disease involving coronary bypass graft of native heart without angina pectoris [I25.810]  Yes     Chronic    CKD Stage 3 [N18.3]  Yes     Chronic    COPD (chronic obstructive pulmonary disease) [J44.9]  Yes     Chronic    Vascular dementia [F01.50]  Yes     Chronic    Major depressive disorder [F32.9]  Yes     Chronic    HLD (hyperlipidemia) [E78.5]  Yes     Chronic    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       Allergies:Review of patient's allergies indicates:  No Known Allergies    Vitals:     Once weekly        Diet: Diet Cardiac Ron       Activity:     - Up in a chair each morning as tolerated   - Ambulate with assistance to bathroom       Nursing Precautions:   - Aspiration precautions:             -  Upright 90 degrees befor during and after meals    - Fall precautions   - Decubitus precautions:        -  for positioning   - Pressure reducing foam mattress   - Turn patient every two hours. Use wedge pillows to anchor patient      LABS:  AST, ALT, T Bili on 9/21/2018          Medications: Discontinue all previous medication orders, if any. See new list below.        Mr. Jose De Jesus Allan   Home Medication Instructions JAMSHID:[id number]    Printed on:09/18/18 1024   Medication Information                       acetaminophen (TYLENOL) 325 MG tablet  Take 2 tablets (650 mg total) by mouth every 6 (six) hours as needed for Pain or Temperature greater than (100).             albuterol sulfate 2.5 mg/0.5 mL Nebu  Take 2.5 mg by nebulization every 4 (four) hours as needed for SOB/Wheezing             aluminum & magnesium hydroxide-simethicone (MYLANTA MAX STRENGTH) 400-400-40 mg/5 mL suspension  Take 30 mLs by mouth every 6 (six) hours as needed for Indigestion.             amoxicillin-clavulanate 875-125mg (AUGMENTIN) 875-125 mg per tablet  Take 1 tablet by mouth every 12 (twelve) hours. for 10 days             aspirin (ECOTRIN) 81 MG EC tablet  Take 81 mg by mouth once daily.             benzonatate (TESSALON) 100 MG capsule  Take 100 mg by mouth 3 (three) times daily as needed for Cough.             BREO ELLIPTA 100-25 mcg/dose diskus inhaler  Inhale 1 puff into the lungs once daily.              busPIRone (BUSPAR) 5 MG Tab  Take 5 mg by mouth 2 (two) times daily.             clobetasol 0.05% (TEMOVATE) 0.05 % Oint  daily as needed. To face and neck             cyanocobalamin (VITAMIN B-12) 1000 MCG tablet  Take 1,000 mcg by mouth once daily.              docusate sodium (COLACE) 100 MG capsule  Take 100 mg by mouth 2 (two) times daily.             donepezil (ARICEPT) 10 MG tablet  Take 10 mg by mouth every evening.             escitalopram oxalate (LEXAPRO) 10 MG tablet  Take 10 mg by mouth every morning.              ferrous sulfate 325 (65 FE) MG EC tablet  Take 325 mg by mouth 2 (two) times daily.             finasteride (PROSCAR) 5 mg tablet  Take 1 tablet (5 mg total) by mouth once daily.             fish oil-omega-3 fatty acids 300-1,000 mg capsule  Take 1 g by mouth every morning.              gabapentin (NEURONTIN) 100 MG capsule  Take 100 mg by mouth 3 (three) times daily.                        latanoprost 0.005 % ophthalmic solution  Place 1 drop into both eyes every evening.              lisinopril  (PRINIVIL,ZESTRIL) 5 MG tablet  Take 2 tablets (10 mg total) by mouth every morning. Hold for SBP < 120             LORazepam (ATIVAN) 0.5 MG tablet  Take 1 tablet (0.5 mg total) by mouth 2 (two) times daily as needed for Anxiety.             magnesium hydroxide 400 mg/5 ml (MILK OF MAGNESIA) 400 mg/5 mL Susp  Take 30 mLs by mouth every evening.              mirtazapine (REMERON) 15 MG tablet  Take 15 mg by mouth every evening.              multivitamin (ONE DAILY MULTIVITAMIN) per tablet  Take 1 tablet by mouth once daily.             NAMENDA XR 14 mg CSpX  Take 14 mg by mouth once daily.              NITROSTAT 0.3 mg SL tablet  Place 0.3 mg under the tongue every 5 (five) minutes as needed for Chest pain.              omeprazole (PRILOSEC) 40 MG capsule  Take 40 mg by mouth every morning.             pravastatin (PRAVACHOL) 40 MG tablet  Take 40 mg by mouth every evening.              tamsulosin (FLOMAX) 0.4 mg Cp24  Take 1 capsule (0.4 mg total) by mouth once daily.             tiotropium (SPIRIVA) 18 mcg inhalation capsule  Inhale 1 capsule (18 mcg total) into the lungs once daily. Controller                       _________________________________  Darwin Palafox MD  09/18/2018

## 2018-09-18 NOTE — PLAN OF CARE
Problem: Patient Care Overview  Goal: Plan of Care Review  Outcome: Ongoing (interventions implemented as appropriate)  Patient is A/Ox4, remains free from falls, is afebrile, states chronic pain in L leg is being well managed with ordered medicine.  Patient voids per urinal, clear yellow urine.  Tolerating diet well, good appetite. Patient repositions self in bed.

## 2018-10-02 ENCOUNTER — OFFICE VISIT (OUTPATIENT)
Dept: UROLOGY | Facility: CLINIC | Age: 83
End: 2018-10-02
Payer: MEDICARE

## 2018-10-02 VITALS
OXYGEN SATURATION: 98 % | BODY MASS INDEX: 22.26 KG/M2 | DIASTOLIC BLOOD PRESSURE: 71 MMHG | RESPIRATION RATE: 18 BRPM | SYSTOLIC BLOOD PRESSURE: 131 MMHG | HEIGHT: 71 IN | HEART RATE: 75 BPM | WEIGHT: 159 LBS

## 2018-10-02 DIAGNOSIS — N40.1 BENIGN PROSTATIC HYPERPLASIA WITH URINARY HESITANCY: Primary | Chronic | ICD-10-CM

## 2018-10-02 DIAGNOSIS — R39.11 BENIGN PROSTATIC HYPERPLASIA WITH URINARY HESITANCY: Primary | Chronic | ICD-10-CM

## 2018-10-02 DIAGNOSIS — R35.1 NOCTURIA: ICD-10-CM

## 2018-10-02 LAB
BILIRUB UR QL STRIP: NEGATIVE
CLARITY UR REFRACT.AUTO: CLEAR
COLOR UR AUTO: YELLOW
GLUCOSE UR QL STRIP: NEGATIVE
HGB UR QL STRIP: NEGATIVE
KETONES UR QL STRIP: NEGATIVE
LEUKOCYTE ESTERASE UR QL STRIP: NEGATIVE
NITRITE UR QL STRIP: NEGATIVE
PH UR STRIP: 6 [PH] (ref 5–8)
PROT UR QL STRIP: NEGATIVE
SP GR UR STRIP: 1 (ref 1–1.03)
URN SPEC COLLECT METH UR: NORMAL
UROBILINOGEN UR STRIP-ACNC: NEGATIVE EU/DL

## 2018-10-02 PROCEDURE — 99215 OFFICE O/P EST HI 40 MIN: CPT | Mod: PBBFAC,PO | Performed by: NURSE PRACTITIONER

## 2018-10-02 PROCEDURE — 99213 OFFICE O/P EST LOW 20 MIN: CPT | Mod: S$PBB,,, | Performed by: NURSE PRACTITIONER

## 2018-10-02 PROCEDURE — 81003 URINALYSIS AUTO W/O SCOPE: CPT

## 2018-10-02 PROCEDURE — 99999 PR PBB SHADOW E&M-EST. PATIENT-LVL V: CPT | Mod: PBBFAC,,, | Performed by: NURSE PRACTITIONER

## 2018-10-02 PROCEDURE — 87086 URINE CULTURE/COLONY COUNT: CPT

## 2018-10-02 RX ORDER — HYDROCODONE BITARTRATE AND ACETAMINOPHEN 10; 325 MG/1; MG/1
TABLET ORAL
Status: ON HOLD | COMMUNITY
Start: 2018-09-04 | End: 2018-10-09 | Stop reason: HOSPADM

## 2018-10-02 NOTE — PLAN OF CARE
10/02/18 1357   Final Note   Assessment Type Final Discharge Note   Discharge Disposition [facility]   [facility] Referral Info   Post Acute Recommendation ([facility]-nursing home)

## 2018-10-02 NOTE — PATIENT INSTRUCTIONS
U/A  Urine culture  PSA, total and free (external order provided)  Continue finasteride 5 mg daily and tamsulosin 0.4 mg daily for BPH  Follow-up in 3 months

## 2018-10-02 NOTE — PROGRESS NOTES
Subjective:       Patient ID: Fidel Allan is a 89 y.o. male.    Chief Complaint: Prostate Cancer; Benign Prostatic Hypertrophy; and Results (recent psa )    Patient is new to me. He is a 89 yo male who is here today for a PSA check. Patient has a hx of BPH with LUTS. He currently takes tamsulosin and finasteride. Patient is from [facility].       Benign Prostatic Hypertrophy   This is a chronic problem. The current episode started more than 1 year ago. The problem is unchanged. Irritative symptoms include nocturia (x2-3). Irritative symptoms do not include frequency or urgency. Obstructive symptoms include a weak stream. Obstructive symptoms do not include dribbling, incomplete emptying or straining. Pertinent negatives include no chills, dysuria, genital pain, hematuria, hesitancy, nausea or vomiting. Nothing aggravates the symptoms. Past treatments include finasteride and tamsulosin. The treatment provided moderate relief.     Review of Systems   Constitutional: Negative for appetite change, chills and fever.   Gastrointestinal: Positive for constipation. Negative for abdominal pain, diarrhea, nausea and vomiting.   Genitourinary: Positive for nocturia (x2-3). Negative for decreased urine volume, difficulty urinating, discharge, dysuria, flank pain, frequency, hematuria, hesitancy, incomplete emptying, penile pain, penile swelling, scrotal swelling, testicular pain and urgency.        Nocturia x2-3   Neurological: Positive for weakness. Negative for dizziness and headaches.   Psychiatric/Behavioral: Negative.        Objective:      Physical Exam   Constitutional: He is oriented to person, place, and time. No distress.   HENT:   Head: Normocephalic and atraumatic.   Eyes: EOM are normal. Pupils are equal, round, and reactive to light.   Neck: Normal range of motion.   Cardiovascular: Normal rate.   Pulmonary/Chest: Effort normal. No respiratory distress.   Patient on 2 liters NC   Abdominal: Soft. There  is no tenderness.   Musculoskeletal: Normal range of motion.   Patient currently in wheelchair.   Neurological: He is alert and oriented to person, place, and time. Coordination normal.   Skin: Skin is warm and dry.   Psychiatric: He has a normal mood and affect. His behavior is normal. Judgment and thought content normal.   Nursing note and vitals reviewed.      Assessment:       1. Benign prostatic hyperplasia with urinary hesitancy    2. Nocturia        Plan:       Fidel was seen today for prostate cancer, benign prostatic hypertrophy and results.    Diagnoses and all orders for this visit:    Benign prostatic hyperplasia with urinary hesitancy    Nocturia  -     PSA, total and free; Future  -     Urinalysis  -     Urine culture    Other orders  1. Continue finasteride 5 mg daily and tamsulosin 0.4 mg daily for BPH    Follow-up in 3 months    Y'Tomasa Watson NP

## 2018-10-04 LAB — BACTERIA UR CULT: NORMAL

## 2018-10-06 ENCOUNTER — HOSPITAL ENCOUNTER (INPATIENT)
Facility: HOSPITAL | Age: 83
LOS: 3 days | Discharge: NURSING, REHAB OR LONG-TERM CARE | DRG: 445 | End: 2018-10-09
Attending: FAMILY MEDICINE | Admitting: FAMILY MEDICINE
Payer: MEDICARE

## 2018-10-06 DIAGNOSIS — K83.09 CHOLANGITIS: Primary | ICD-10-CM

## 2018-10-06 DIAGNOSIS — K83.1 BILIARY OBSTRUCTION: ICD-10-CM

## 2018-10-06 LAB
APTT BLDCRRT: 26.9 SEC
ESTIMATED AVG GLUCOSE: 97 MG/DL
HBA1C MFR BLD HPLC: 5 %
INR PPP: 1.1
PROTHROMBIN TIME: 11 SEC

## 2018-10-06 PROCEDURE — 85730 THROMBOPLASTIN TIME PARTIAL: CPT | Mod: 91

## 2018-10-06 PROCEDURE — 27000221 HC OXYGEN, UP TO 24 HOURS

## 2018-10-06 PROCEDURE — 85610 PROTHROMBIN TIME: CPT | Mod: 91

## 2018-10-06 PROCEDURE — 11000001 HC ACUTE MED/SURG PRIVATE ROOM

## 2018-10-06 PROCEDURE — 36415 COLL VENOUS BLD VENIPUNCTURE: CPT

## 2018-10-06 PROCEDURE — 94761 N-INVAS EAR/PLS OXIMETRY MLT: CPT

## 2018-10-06 PROCEDURE — 25000003 PHARM REV CODE 250: Performed by: STUDENT IN AN ORGANIZED HEALTH CARE EDUCATION/TRAINING PROGRAM

## 2018-10-06 PROCEDURE — 83036 HEMOGLOBIN GLYCOSYLATED A1C: CPT

## 2018-10-06 RX ORDER — ALBUTEROL SULFATE 2.5 MG/.5ML
2.5 SOLUTION RESPIRATORY (INHALATION) EVERY 4 HOURS PRN
Status: DISCONTINUED | OUTPATIENT
Start: 2018-10-06 | End: 2018-10-09 | Stop reason: HOSPADM

## 2018-10-06 RX ORDER — IBUPROFEN 200 MG
24 TABLET ORAL
Status: DISCONTINUED | OUTPATIENT
Start: 2018-10-06 | End: 2018-10-09 | Stop reason: HOSPADM

## 2018-10-06 RX ORDER — BUSPIRONE HYDROCHLORIDE 5 MG/1
5 TABLET ORAL 2 TIMES DAILY
Status: DISCONTINUED | OUTPATIENT
Start: 2018-10-06 | End: 2018-10-09 | Stop reason: HOSPADM

## 2018-10-06 RX ORDER — FLUTICASONE FUROATE AND VILANTEROL 100; 25 UG/1; UG/1
1 POWDER RESPIRATORY (INHALATION) DAILY
Status: DISCONTINUED | OUTPATIENT
Start: 2018-10-07 | End: 2018-10-09 | Stop reason: HOSPADM

## 2018-10-06 RX ORDER — FINASTERIDE 5 MG/1
5 TABLET, FILM COATED ORAL DAILY
Status: DISCONTINUED | OUTPATIENT
Start: 2018-10-07 | End: 2018-10-09 | Stop reason: HOSPADM

## 2018-10-06 RX ORDER — ESCITALOPRAM OXALATE 10 MG/1
10 TABLET ORAL EVERY MORNING
Status: DISCONTINUED | OUTPATIENT
Start: 2018-10-07 | End: 2018-10-09 | Stop reason: HOSPADM

## 2018-10-06 RX ORDER — GLUCAGON 1 MG
1 KIT INJECTION
Status: DISCONTINUED | OUTPATIENT
Start: 2018-10-06 | End: 2018-10-09 | Stop reason: HOSPADM

## 2018-10-06 RX ORDER — CLOBETASOL PROPIONATE 0.5 MG/G
CREAM TOPICAL 2 TIMES DAILY
Status: DISCONTINUED | OUTPATIENT
Start: 2018-10-06 | End: 2018-10-09 | Stop reason: HOSPADM

## 2018-10-06 RX ORDER — TAMSULOSIN HYDROCHLORIDE 0.4 MG/1
0.4 CAPSULE ORAL DAILY
Status: DISCONTINUED | OUTPATIENT
Start: 2018-10-07 | End: 2018-10-09 | Stop reason: HOSPADM

## 2018-10-06 RX ORDER — FERROUS SULFATE 325(65) MG
325 TABLET, DELAYED RELEASE (ENTERIC COATED) ORAL 2 TIMES DAILY
Status: DISCONTINUED | OUTPATIENT
Start: 2018-10-06 | End: 2018-10-09 | Stop reason: HOSPADM

## 2018-10-06 RX ORDER — TIOTROPIUM BROMIDE 18 UG/1
1 CAPSULE ORAL; RESPIRATORY (INHALATION) DAILY
Status: DISCONTINUED | OUTPATIENT
Start: 2018-10-07 | End: 2018-10-09 | Stop reason: HOSPADM

## 2018-10-06 RX ORDER — ACETAMINOPHEN 325 MG/1
650 TABLET ORAL EVERY 6 HOURS PRN
Status: DISCONTINUED | OUTPATIENT
Start: 2018-10-06 | End: 2018-10-09 | Stop reason: HOSPADM

## 2018-10-06 RX ORDER — DEXTROSE MONOHYDRATE, SODIUM CHLORIDE, AND POTASSIUM CHLORIDE 50; 1.49; 4.5 G/1000ML; G/1000ML; G/1000ML
INJECTION, SOLUTION INTRAVENOUS CONTINUOUS
Status: DISCONTINUED | OUTPATIENT
Start: 2018-10-06 | End: 2018-10-09 | Stop reason: HOSPADM

## 2018-10-06 RX ORDER — PRAVASTATIN SODIUM 20 MG/1
40 TABLET ORAL NIGHTLY
Status: DISCONTINUED | OUTPATIENT
Start: 2018-10-06 | End: 2018-10-09 | Stop reason: HOSPADM

## 2018-10-06 RX ORDER — GABAPENTIN 100 MG/1
100 CAPSULE ORAL 3 TIMES DAILY
Status: DISCONTINUED | OUTPATIENT
Start: 2018-10-06 | End: 2018-10-09 | Stop reason: HOSPADM

## 2018-10-06 RX ORDER — DONEPEZIL HYDROCHLORIDE 5 MG/1
10 TABLET, FILM COATED ORAL NIGHTLY
Status: DISCONTINUED | OUTPATIENT
Start: 2018-10-06 | End: 2018-10-09 | Stop reason: HOSPADM

## 2018-10-06 RX ORDER — MEMANTINE HYDROCHLORIDE 5 MG/1
5 TABLET ORAL DAILY
Status: DISCONTINUED | OUTPATIENT
Start: 2018-10-07 | End: 2018-10-09 | Stop reason: HOSPADM

## 2018-10-06 RX ORDER — SODIUM CHLORIDE 0.9 % (FLUSH) 0.9 %
5 SYRINGE (ML) INJECTION
Status: DISCONTINUED | OUTPATIENT
Start: 2018-10-06 | End: 2018-10-09

## 2018-10-06 RX ORDER — IBUPROFEN 200 MG
16 TABLET ORAL
Status: DISCONTINUED | OUTPATIENT
Start: 2018-10-06 | End: 2018-10-09 | Stop reason: HOSPADM

## 2018-10-06 RX ADMIN — PRAVASTATIN SODIUM 40 MG: 20 TABLET ORAL at 08:10

## 2018-10-06 RX ADMIN — DONEPEZIL HYDROCHLORIDE 10 MG: 5 TABLET, FILM COATED ORAL at 08:10

## 2018-10-06 RX ADMIN — CLOBETASOL PROPIONATE: 0.5 CREAM TOPICAL at 09:10

## 2018-10-06 RX ADMIN — BUSPIRONE HYDROCHLORIDE 5 MG: 5 TABLET ORAL at 08:10

## 2018-10-06 RX ADMIN — FERROUS SULFATE TAB EC 325 MG (65 MG FE EQUIVALENT) 325 MG: 325 (65 FE) TABLET DELAYED RESPONSE at 08:10

## 2018-10-06 RX ADMIN — DEXTROSE, SODIUM CHLORIDE, AND POTASSIUM CHLORIDE: 5; .45; .15 INJECTION INTRAVENOUS at 07:10

## 2018-10-06 RX ADMIN — GABAPENTIN 100 MG: 100 CAPSULE ORAL at 08:10

## 2018-10-06 NOTE — PLAN OF CARE
[facility] Transfer Acceptance Note       Referring Provider/Specialty giving report: Dr. Satnam Rodrigues (emergency medicine)     Transferring Facility/Hospital: [facility].       Accepting Physician for admission to hospital: Wm Dumont MD     Date of acceptance:  10/6/2018    Patients name: Fidel Allan         Allergies:Review of patient's allergies indicates:   No Known Allergies       Reason for transfer:  AES/Biliary GI eval for EUS       Overview/ Report from Physician/Mid-Level Provider:     HPI:   89 y/o with hx Hx of CAD s/p Cabg, CKD 3, COPD, Anemia chronic disease, Pacemaker, Aortic Stenosis, BPH, Gerd, Dementia, @ [facility].  Recent hx of several ERCP 8/18 (biliary pancreatitis) and 9/21 (cholangitis)  has abdominal pain post-prandially without N/V, Temp 99-100s, RUQ Tenderness, patient sleeping now BP high 97/55 while asleep, comes up to systolic 120s while pt is awake.  Patient with hx of Dementia but and CT Scan shows persistent Biliary dilatation, 10/06 presents to [facility].         Patient has recently been admitted for gallstone pancreatitis to [facility]  and undergone ERCP with stone removal and improvement in symptoms, and then re-admitted on 9/11 to [facility] with concerns for cholangitis, diagnosed with Klebsiella Bacteremia due to choledocholithiasis, discharged on oral augmentin.     Regional Referral Nurse discussed with Dr. Kee Limon (AES) and he recommends that patient requires EUS due to recurrent ERCP has not solved his recurrent biliary/pancreatic obstruction.     ED Treatment - 2Liter saline, Morphine 2mg/4mg .  Zosyn 4.5 gm IV     VS: temp 99.9  Hr 94, BP 97/55, rr 19  Pox 98% on nasal cannula     Labs:   ALbumin 4.1 T Bili 1.8, , ALT 65  Alk Phos 349   Lactic Acid 1.3.   WBC 6.25 with a shift, No bandemia.  Hgb 11, Plt 159     Lipase 140       Radiographs:   CXR - persistent left pleural effusion with left lung  atelectasis   CT abdomen/Pelvis -   1. Persistent choledocholithiasis with significant extrahepatic or intrahepatic ductal dilatation   2. Bibasilar patchy opacities, possibly due to aspiration or atelectasis   3. 11mm hyper dense focus in the distal common bile duct       To Do List upon arrival:     1. Continue Pain Control/Anti-emetic Control   2. Consult AES/GI for EUS   3. Continue Empiric Zosyn and f/u [facility] Blood Cultures ro rule out bacteremia.         Wm Dumont M.D.   Attending Physician   [facility] Medicine Dept.   Pager: [phone]

## 2018-10-07 LAB
ALBUMIN SERPL BCP-MCNC: 2.7 G/DL
ALP SERPL-CCNC: 287 U/L
ALT SERPL W/O P-5'-P-CCNC: 85 U/L
ANION GAP SERPL CALC-SCNC: 6 MMOL/L
AST SERPL-CCNC: 131 U/L
BASOPHILS # BLD AUTO: 0.02 K/UL
BASOPHILS NFR BLD: 0.2 %
BILIRUB SERPL-MCNC: 2.7 MG/DL
BUN SERPL-MCNC: 15 MG/DL
CALCIUM SERPL-MCNC: 8.5 MG/DL
CHLORIDE SERPL-SCNC: 106 MMOL/L
CO2 SERPL-SCNC: 27 MMOL/L
CREAT SERPL-MCNC: 0.8 MG/DL
DIFFERENTIAL METHOD: ABNORMAL
EOSINOPHIL # BLD AUTO: 0 K/UL
EOSINOPHIL NFR BLD: 0.3 %
ERYTHROCYTE [DISTWIDTH] IN BLOOD BY AUTOMATED COUNT: 14.8 %
EST. GFR  (AFRICAN AMERICAN): >60 ML/MIN/1.73 M^2
EST. GFR  (NON AFRICAN AMERICAN): >60 ML/MIN/1.73 M^2
GLUCOSE SERPL-MCNC: 110 MG/DL
HCT VFR BLD AUTO: 30.7 %
HGB BLD-MCNC: 9.7 G/DL
LYMPHOCYTES # BLD AUTO: 2 K/UL
LYMPHOCYTES NFR BLD: 16.4 %
MAGNESIUM SERPL-MCNC: 1.8 MG/DL
MCH RBC QN AUTO: 29.7 PG
MCHC RBC AUTO-ENTMCNC: 31.6 G/DL
MCV RBC AUTO: 94 FL
MONOCYTES # BLD AUTO: 0.9 K/UL
MONOCYTES NFR BLD: 7.6 %
NEUTROPHILS # BLD AUTO: 8.9 K/UL
NEUTROPHILS NFR BLD: 75.2 %
PHOSPHATE SERPL-MCNC: 2.4 MG/DL
PLATELET # BLD AUTO: 141 K/UL
PMV BLD AUTO: 10.7 FL
POCT GLUCOSE: 107 MG/DL (ref 70–110)
POCT GLUCOSE: 109 MG/DL (ref 70–110)
POCT GLUCOSE: 115 MG/DL (ref 70–110)
POCT GLUCOSE: 128 MG/DL (ref 70–110)
POTASSIUM SERPL-SCNC: 4.3 MMOL/L
PROT SERPL-MCNC: 5.8 G/DL
RBC # BLD AUTO: 3.27 M/UL
SODIUM SERPL-SCNC: 139 MMOL/L
WBC # BLD AUTO: 11.87 K/UL

## 2018-10-07 PROCEDURE — 36415 COLL VENOUS BLD VENIPUNCTURE: CPT

## 2018-10-07 PROCEDURE — 84100 ASSAY OF PHOSPHORUS: CPT

## 2018-10-07 PROCEDURE — 99223 1ST HOSP IP/OBS HIGH 75: CPT | Mod: ,,, | Performed by: INTERNAL MEDICINE

## 2018-10-07 PROCEDURE — 11000001 HC ACUTE MED/SURG PRIVATE ROOM

## 2018-10-07 PROCEDURE — 25000003 PHARM REV CODE 250: Performed by: STUDENT IN AN ORGANIZED HEALTH CARE EDUCATION/TRAINING PROGRAM

## 2018-10-07 PROCEDURE — 25000242 PHARM REV CODE 250 ALT 637 W/ HCPCS: Performed by: STUDENT IN AN ORGANIZED HEALTH CARE EDUCATION/TRAINING PROGRAM

## 2018-10-07 PROCEDURE — 85025 COMPLETE CBC W/AUTO DIFF WBC: CPT

## 2018-10-07 PROCEDURE — 94761 N-INVAS EAR/PLS OXIMETRY MLT: CPT

## 2018-10-07 PROCEDURE — 63600175 PHARM REV CODE 636 W HCPCS: Performed by: STUDENT IN AN ORGANIZED HEALTH CARE EDUCATION/TRAINING PROGRAM

## 2018-10-07 PROCEDURE — 27000221 HC OXYGEN, UP TO 24 HOURS

## 2018-10-07 PROCEDURE — 80053 COMPREHEN METABOLIC PANEL: CPT

## 2018-10-07 PROCEDURE — 83735 ASSAY OF MAGNESIUM: CPT

## 2018-10-07 PROCEDURE — 94640 AIRWAY INHALATION TREATMENT: CPT

## 2018-10-07 RX ORDER — KETOROLAC TROMETHAMINE 30 MG/ML
15 INJECTION, SOLUTION INTRAMUSCULAR; INTRAVENOUS EVERY 6 HOURS PRN
Status: DISCONTINUED | OUTPATIENT
Start: 2018-10-07 | End: 2018-10-09 | Stop reason: HOSPADM

## 2018-10-07 RX ADMIN — KETOROLAC TROMETHAMINE 15 MG: 30 INJECTION, SOLUTION INTRAMUSCULAR at 03:10

## 2018-10-07 RX ADMIN — PIPERACILLIN AND TAZOBACTAM 4.5 G: 4; .5 INJECTION, POWDER, LYOPHILIZED, FOR SOLUTION INTRAVENOUS; PARENTERAL at 01:10

## 2018-10-07 RX ADMIN — FLUTICASONE FUROATE AND VILANTEROL TRIFENATATE 1 PUFF: 100; 25 POWDER RESPIRATORY (INHALATION) at 08:10

## 2018-10-07 RX ADMIN — FINASTERIDE 5 MG: 5 TABLET, FILM COATED ORAL at 08:10

## 2018-10-07 RX ADMIN — GABAPENTIN 100 MG: 100 CAPSULE ORAL at 08:10

## 2018-10-07 RX ADMIN — CLOBETASOL PROPIONATE: 0.5 CREAM TOPICAL at 08:10

## 2018-10-07 RX ADMIN — GABAPENTIN 100 MG: 100 CAPSULE ORAL at 09:10

## 2018-10-07 RX ADMIN — BUSPIRONE HYDROCHLORIDE 5 MG: 5 TABLET ORAL at 08:10

## 2018-10-07 RX ADMIN — MEMANTINE HYDROCHLORIDE 5 MG: 5 TABLET ORAL at 08:10

## 2018-10-07 RX ADMIN — KETOROLAC TROMETHAMINE 15 MG: 30 INJECTION, SOLUTION INTRAMUSCULAR at 10:10

## 2018-10-07 RX ADMIN — PIPERACILLIN AND TAZOBACTAM 4.5 G: 4; .5 INJECTION, POWDER, LYOPHILIZED, FOR SOLUTION INTRAVENOUS; PARENTERAL at 05:10

## 2018-10-07 RX ADMIN — FERROUS SULFATE TAB EC 325 MG (65 MG FE EQUIVALENT) 325 MG: 325 (65 FE) TABLET DELAYED RESPONSE at 09:10

## 2018-10-07 RX ADMIN — ACETAMINOPHEN 650 MG: 325 TABLET ORAL at 08:10

## 2018-10-07 RX ADMIN — BUSPIRONE HYDROCHLORIDE 5 MG: 5 TABLET ORAL at 09:10

## 2018-10-07 RX ADMIN — PRAVASTATIN SODIUM 40 MG: 20 TABLET ORAL at 09:10

## 2018-10-07 RX ADMIN — GABAPENTIN 100 MG: 100 CAPSULE ORAL at 04:10

## 2018-10-07 RX ADMIN — PIPERACILLIN AND TAZOBACTAM 4.5 G: 4; .5 INJECTION, POWDER, LYOPHILIZED, FOR SOLUTION INTRAVENOUS; PARENTERAL at 10:10

## 2018-10-07 RX ADMIN — TAMSULOSIN HYDROCHLORIDE 0.4 MG: 0.4 CAPSULE ORAL at 08:10

## 2018-10-07 RX ADMIN — CLOBETASOL PROPIONATE: 0.5 CREAM TOPICAL at 09:10

## 2018-10-07 RX ADMIN — TIOTROPIUM BROMIDE 18 MCG: 18 CAPSULE ORAL; RESPIRATORY (INHALATION) at 08:10

## 2018-10-07 RX ADMIN — DEXTROSE, SODIUM CHLORIDE, AND POTASSIUM CHLORIDE: 5; .45; .15 INJECTION INTRAVENOUS at 06:10

## 2018-10-07 RX ADMIN — FERROUS SULFATE TAB EC 325 MG (65 MG FE EQUIVALENT) 325 MG: 325 (65 FE) TABLET DELAYED RESPONSE at 08:10

## 2018-10-07 RX ADMIN — ESCITALOPRAM 10 MG: 10 TABLET, FILM COATED ORAL at 06:10

## 2018-10-07 RX ADMIN — DONEPEZIL HYDROCHLORIDE 10 MG: 5 TABLET, FILM COATED ORAL at 09:10

## 2018-10-07 NOTE — NURSING
Pt. Resting with eyes closed .Opens eyes to touch.Vital signs stable.IV fluids maintained as ordered.Fall precautions in place.No distress is noted at this time.Will continue to monitor.

## 2018-10-07 NOTE — CONSULTS
"Gastroenterology/Advanced Endoscopy Consult Note    Date: 10/07/2018    Chief complaint:abd pain    History of Present Illness   Pt presented to OSH yesterday c/o epigastric abdominal pain and stated that he "just felt like he had stones". Prior hx of CBD stones treated with ERCP. He reported mild nausea but denied vomiting, change in appetite, fever, chills, diarrhea or constipation. Pain radiates to shoulder (similar to prior episodes). Pain controlled with current pain medication regimen.      Review of Systems   Constitutional: Negative for fever, wt loss, appetite change  HENT: Negative for sore throat, trouble swallowing and neck pain.   Eyes: Negative for photophobia and visual disturbance.   Respiratory: Negative for wheezing, SOB  Cardiovascular: Negative for chest pain and palpitations.   Gastrointestinal: See HPI for details   Musculoskeletal: Negative for joint swelling and arthralgias.   Skin: Negative for rash and wound.   Neurological: Negative for dizziness, tremors and weakness.   Hematological: Negative for bruising  Psychiatric/Behavioral: Negative for suicidal ideas and behavioral problems.     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     Blindness of right eye     Bone disorder     Bradycardia     CAD (coronary artery disease)     CHF (congestive heart failure)     acute on chronic diastolic chf    Constipation     COPD (chronic obstructive pulmonary disease)     Dysphagia     Dyspnea     Falls frequently     GERD (gastroesophageal reflux disease)     Glaucoma     Hearing loss     HLD (hyperlipidemia)     Hypertension     Insomnia     Major depressive disorder     Melanoma     left neck    Urinary tract infection     Vascular dementia        Social History     Socioeconomic History    Marital status:      Spouse name: Not on file    Number of children: Not on file    Years of education: Not on " file    Highest education level: Not on file   Social Needs    Financial resource strain: Not on file    Food insecurity - worry: Not on file    Food insecurity - inability: Not on file    Transportation needs - medical: Not on file    Transportation needs - non-medical: Not on file   Occupational History    Not on file   Tobacco Use    Smoking status: Never Smoker    Smokeless tobacco: Never Used   Substance and Sexual Activity    Alcohol use: No    Drug use: No    Sexual activity: No   Other Topics Concern    Not on file   Social History Narrative    Not on file       Review of patient's allergies indicates:  No Known Allergies    Family History   Problem Relation Age of Onset    Prostate cancer Neg Hx     Kidney disease Neg Hx         busPIRone  5 mg Oral BID    clobetasol   Topical (Top) BID    donepezil  10 mg Oral QHS    escitalopram oxalate  10 mg Oral QAM    ferrous sulfate  325 mg Oral BID    finasteride  5 mg Oral Daily    fluticasone-vilanterol  1 puff Inhalation Daily    gabapentin  100 mg Oral TID    memantine  5 mg Oral Daily    piperacillin-tazobactam (ZOSYN) IVPB  4.5 g Intravenous Q8H    pravastatin  40 mg Oral QHS    tamsulosin  0.4 mg Oral Daily    tiotropium  1 capsule Inhalation Daily       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     Blindness of right eye     Bone disorder     Bradycardia     CAD (coronary artery disease)     CHF (congestive heart failure)     acute on chronic diastolic chf    Constipation     COPD (chronic obstructive pulmonary disease)     Dysphagia     Dyspnea     Falls frequently     GERD (gastroesophageal reflux disease)     Glaucoma     Hearing loss     HLD (hyperlipidemia)     Hypertension     Insomnia     Major depressive disorder     Melanoma     left neck    Urinary tract infection     Vascular dementia        Past Surgical History:   Procedure  Laterality Date    BIOPSY-SENTINEL NODE N/A 1/16/2017    [facility] 2ND FLR    CARDIAC PACEMAKER PLACEMENT  2016    CHOLEDOCHODUODENOSTOMY      COLECTOMY      CORONARY ANGIOPLASTY WITH STENT PLACEMENT      CORONARY ARTERY BYPASS GRAFT      CYSTOSCOPY N/A 3/19/2018    Performed by [facility]o [facility] at [facility] OR    ERCP N/A 8/22/2018    Procedure: ERCP (ENDOSCOPIC RETROGRADE CHOLANGIOPANCREATOGRAPHY);  Surgeon: Baron Owens MD;  Location: [facility] ENDO;  Service: Endoscopy;  Laterality: N/A;    ERCP N/A 1/30/2017    Performed by Guillaume [facility]rum [facility] at [facility] ENDO    ERCP (ENDOSCOPIC RETROGRADE CHOLANGIOPANCREATOGRAPHY) N/A 8/22/2018    Performed by Baron Owens MD at [facility] ENDO    ERCP W/ SPHICTEROTOMY N/A 9/13/2018    Procedure: ERCP, WITH SPHINCTEROTOMY;  Surgeon: Shana Higgins MD;  Location: [facility] ENDO;  Service: Endoscopy;  Laterality: N/A;    ERCP, WITH SPHINCTEROTOMY N/A 9/13/2018    Performed by Shana Higgins MD at [facility] ENDO    EXCISION-WIDE LOCAL 2 cm Left 1/16/2017    Per[facility] by [facility] at [facility] OR 2ND FLR    HEART CATH-LEFT Right 11/22/2016    Performed by Andre Benton MD at [facility] CATH LAB    INSERTION-PACEMAKER-DUAL Right 11/23/2016    Performed by LELE Fofana MD at [facility] OR    MALIGNANT SKIN LESION EXCISION Left 01/16/2017    neck melanoma    TRANSESOPHAGEAL ECHOCARDIOGRAM (BASSEM) N/A 2/3/2017    Per[facility] at [facility] OR    TURP WITH BIPOLAR N/A 3/19/2018    Performed by Memo Nieto MD at [facility] OR       Family History   Problem Relation Age of Onset    Prostate cancer Neg Hx     Kidney disease Neg Hx        Social History     Socioeconomic History    Marital status:      Spouse name: None    Number of children: None    Years of education: None    Highest education level: None   Social Needs    Financial resource strain: None    Food insecurity - worry: None    Food insecurity - inability:  None    Transportation needs - medical: None    Transportation needs - non-medical: None   Occupational History    None   Tobacco Use    Smoking status: Never Smoker    Smokeless tobacco: Never Used   Substance and Sexual Activity    Alcohol use: No    Drug use: No    Sexual activity: No   Other Topics Concern    None   Social History Narrative    None       Current Facility-Administered Medications   Medication Dose Route Frequency Provider Last Rate Last Dose    acetaminophen tablet 650 mg  650 mg Oral Q6H PRN Jodi Cruz, DO   650 mg at 10/07/18 0808    albuterol sulfate nebulizer solution 2.5 mg  2.5 mg Nebulization Q4H PRN Jodi Cruz, DO        busPIRone tablet 5 mg  5 mg Oral BID Jodi Cruz, DO   5 mg at 10/07/18 0808    clobetasol 0.05 % cream   Topical (Top) BID Jodi Cruz, DO        dextrose 5 % and 0.45 % NaCl with KCl 20 mEq infusion   Intravenous Continuous Jodi Cruz, DO 75 mL/hr at 10/07/18 0613      dextrose 50% injection 12.5 g  12.5 g Intravenous PRN Jodi Cruz, DO        dextrose 50% injection 25 g  25 g Intravenous PRN Jodi Cruz, DO        donepezil tablet 10 mg  10 mg Oral QHS Jodi Cruz, DO   10 mg at 10/06/18 2052    escitalopram oxalate tablet 10 mg  10 mg Oral QAM Jodi Cruz, DO   10 mg at 10/07/18 0605    ferrous sulfate EC tablet 325 mg  325 mg Oral BID Jodi Cruz, DO   325 mg at 10/07/18 0808    finasteride tablet 5 mg  5 mg Oral Daily Jodi Cruz, DO   5 mg at 10/07/18 0808    fluticasone-vilanterol 100-25 mcg/dose diskus inhaler 1 puff  1 puff Inhalation Daily Jodi Cruz, DO   1 puff at 10/07/18 0803    gabapentin capsule 100 mg  100 mg Oral TID Jodi Cruz, DO   100 mg at 10/07/18 0808    glucagon (human recombinant) injection 1 mg  1 mg Intramuscular PRN Jodi Cruz, DO        glucose chewable tablet 16 g  16 g Oral PRN Jodi Cruz, DO        glucose chewable tablet 24 g  24 g Oral PRN Jodi Cruz, DO         memantine tablet 5 mg  5 mg Oral Daily Jodi Cruz, DO   5 mg at 10/07/18 0808    piperacillin-tazobactam 4.5 g in dextrose 5 % 100 mL IVPB (ready to mix system)  4.5 g Intravenous Q8H Jodi Cruz, DO 25 mL/hr at 10/07/18 0130 4.5 g at 10/07/18 0130    pravastatin tablet 40 mg  40 mg Oral QHS Jodi Cruz, DO   40 mg at 10/06/18 2053    sodium chloride 0.9% flush 5 mL  5 mL Intravenous PRN Jodi Cruz, DO        tamsulosin 24 hr capsule 0.4 mg  0.4 mg Oral Daily Jodi Cruz, DO   0.4 mg at 10/07/18 0808    tiotropium inhalation capsule 18 mcg  1 capsule Inhalation Daily Jodi Cruz, DO   18 mcg at 10/07/18 0802       Review of patient's allergies indicates:  No Known Allergies  Objective:      Vitals:    10/07/18 0803   BP:    Pulse: 60   Resp: 18   Temp:      Physical Exam   Gen: Patient is oriented to person, place, and time. Appears well-nourished. No acute distress  HENT: milldly icteric sclera, pink conjunctiva   Eyes: Pupils are equal, round, and reactive to light.   Neck: Neck supple.   Cardiovascular: Regular rate/rhythm  Pulmonary/Chest: Effort normal and breath sounds normal.  No wheezes  Abdominal: Soft. Exhibits no mass. There is no tenderness. There is no guarding.   Extr: no c/c/e  Lymphadenopathy: Has no cervical adenopathy.   Neurological:Alert and oriented to person, place, and time. Moving all extremities  Skin: Skin is warm. No rash noted.   Psychiatric: Has a normal mood and affect. Answering questions appropriately    Labs: Reviewed  Imaging: Reviewed - CT shows a probable stone in the distal CBD.    Prior ERCP reports reviewed- hx of B2 reconstruction.    Assessment:  Epigastric pain/biliary obstruction - suspect CBD stone    Plan:  ERCP tomorrow  Cont Abx today  OK for liquid diet today - keep pt NPO after MN

## 2018-10-07 NOTE — H&P
"[facility]  History & Physical    SUBJECTIVE:     Chief Complaint/Reason for Admission: bilary obstruction     History of Present Illness:  Patient is a 89 y.o. male with hx HTN, HLD, CAD s/p CABG, CKD3, COPD, AOCD, dementia and Hx of choledocholithiasis/cholangitits/biliary pancreatitis with ercp and stone removal x 2 in 8/18 and 9/18 who was transferred from OSH for GI work up and possible EUS. Pt presented to OSH c/o epigastric abdominal pain and stated that he "just felt like he had stones". He reported mild nausea but denied vomiting, change in appetite, fever, chills, diarrhea or constipation.       Pt lives at [facility]    PTA Medications   Medication Sig    acetaminophen (TYLENOL) 325 MG tablet Take 2 tablets (650 mg total) by mouth every 6 (six) hours as needed for Pain or Temperature greater than (100).    albuterol sulfate 2.5 mg/0.5 mL Nebu Take 2.5 mg by nebulization every 4 (four) hours as needed. Rescue    aluminum & magnesium hydroxide-simethicone (MYLANTA MAX STRENGTH) 400-400-40 mg/5 mL suspension Take 30 mLs by mouth every 6 (six) hours as needed for Indigestion.    aspirin (ECOTRIN) 81 MG EC tablet Take 81 mg by mouth once daily.    benzonatate (TESSALON) 100 MG capsule Take 100 mg by mouth 3 (three) times daily as needed for Cough.    BREO ELLIPTA 100-25 mcg/dose diskus inhaler Inhale 1 puff into the lungs once daily.     busPIRone (BUSPAR) 5 MG Tab Take 5 mg by mouth 2 (two) times daily.    clobetasol 0.05% (TEMOVATE) 0.05 % Oint daily as needed. To face and neck    cyanocobalamin (VITAMIN B-12) 1000 MCG tablet Take 1,000 mcg by mouth once daily.     docusate sodium (COLACE) 100 MG capsule Take 100 mg by mouth 2 (two) times daily.    donepezil (ARICEPT) 10 MG tablet Take 10 mg by mouth every evening.    escitalopram oxalate (LEXAPRO) 10 MG tablet Take 10 mg by mouth every morning.     ferrous sulfate 325 (65 FE) MG EC tablet Take 325 mg by " mouth 2 (two) times daily.    finasteride (PROSCAR) 5 mg tablet Take 1 tablet (5 mg total) by mouth once daily.    fish oil-omega-3 fatty acids 300-1,000 mg capsule Take 1 g by mouth every morning.     gabapentin (NEURONTIN) 100 MG capsule Take 100 mg by mouth 3 (three) times daily.    HYDROcodone-acetaminophen (NORCO)  mg per tablet     ketoconazole (NIZORAL) 2 % shampoo     latanoprost 0.005 % ophthalmic solution Place 1 drop into both eyes every evening.     lisinopril (PRINIVIL,ZESTRIL) 5 MG tablet Take 2 tablets (10 mg total) by mouth every morning. Hold for SBP < 120    LORazepam (ATIVAN) 0.5 MG tablet Take 1 tablet (0.5 mg total) by mouth 2 (two) times daily as needed for Anxiety.    magnesium hydroxide 400 mg/5 ml (MILK OF MAGNESIA) 400 mg/5 mL Susp Take 30 mLs by mouth every evening.     mirtazapine (REMERON) 15 MG tablet Take 15 mg by mouth every evening.     multivitamin (ONE DAILY MULTIVITAMIN) per tablet Take 1 tablet by mouth once daily.    NAMENDA XR 14 mg CSpX Take 14 mg by mouth once daily.     NITROSTAT 0.3 mg SL tablet Place 0.3 mg under the tongue every 5 (five) minutes as needed for Chest pain.     omeprazole (PRILOSEC) 40 MG capsule Take 40 mg by mouth every morning.    pravastatin (PRAVACHOL) 40 MG tablet Take 40 mg by mouth every evening.     tamsulosin (FLOMAX) 0.4 mg Cp24 Take 1 capsule (0.4 mg total) by mouth once daily.    tiotropium (SPIRIVA) 18 mcg inhalation capsule Inhale 1 capsule (18 mcg total) into the lungs once daily. 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     Blindness of right eye     Bone disorder     Bradycardia     CAD (coronary artery disease)     CHF (congestive heart failure)     acute on chronic diastolic chf    Constipation     COPD (chronic obstructive pulmonary disease)     Dysphagia      Dyspnea     Falls frequently     GERD (gastroesophageal reflux disease)     Glaucoma     Hearing loss     HLD (hyperlipidemia)     Hypertension     Insomnia     Major depressive disorder     Melanoma     left neck    Urinary tract infection     Vascular dementia      Past Surgical History:   Procedure Laterality Date    BIOPSY-SENTINEL NODE N/A 1/16/2017    Per[facility] by [facility] at [facility] 2ND FLR    CARDIAC PACEMAKER PLACEMENT  2016    CHOLEDOCHODUODENOSTOMY      COLECTOMY      CORONARY ANGIOPLASTY WITH STENT PLACEMENT      CORONARY ARTERY BYPASS GRAFT      CYSTOSCOPY N/A 3/19/2018    Performed by [facility] at [facility] OR    ERCP N/A 8/22/2018    Procedure: ERCP (ENDOSCOPIC RETROGRADE CHOLANGIOPANCREATOGRAPHY);  Surgeon: Baron Owens MD;  Location: [facility] ENDO;  Service: Endoscopy;  Laterality: N/A;    ERCP N/A 1/30/2017    Performed by [facility]ume Landrum MD at [facility] ENDO    ERCP (ENDOSCOPIC RETROGRADE CHOLANGIOPANCREATOGRAPHY) N/A 8/22/2018    Performed by Baron Owens MD at [facility] ENDO    ERCP W/ SPHICTEROTOMY N/A 9/13/2018    Procedure: ERCP, WITH SPHINCTEROTOMY;  Surgeon: Shana Higgins MD;  Location: [facility] ENDO;  Service: Endoscopy;  Laterality: N/A;    ERCP, WITH SPHINCTEROTOMY N/A 9/13/2018    Performed by Shana Higgins MD at [facility] ENDO    EXCISION-WIDE LOCAL 2 cm Left 1/16/2017    Performed by [facility] at [facility] OR 2ND FLR    HEART CATH-LEFT Right 11/22/2016    Performed by Andre [facility]enton MD at [facility] CATH LAB    INSERTION-PACEMAKER-DUAL Right 11/23/2016    Performed by LELE Fofana MD at [facility] OR    MALIGNANT SKIN LESION EXCISION Left 01/16/2017    neck melanoma    TRANSESOPHAGEAL ECHOCARDIOGRAM (BASSEM) N/A 2/3/2017    Performed by [facility]any [facility]man [facility] at [facility] OR    TURP WITH BIPOLAR N/A 3/19/2018    Performed by Memo Nieto MD at [facility] OR     Family History   Problem Relation Age of Onset    Prostate cancer Neg Hx     Kidney disease  Neg Hx      Social History     Tobacco Use    Smoking status: Never Smoker    Smokeless tobacco: Never Used   Substance Use Topics    Alcohol use: No    Drug use: No        Review of Systems:  Review of Systems   Constitutional: Positive for malaise/fatigue. Negative for chills, fever and weight loss.   Eyes: Negative for blurred vision and double vision.   Respiratory: Negative for cough, hemoptysis and shortness of breath.    Cardiovascular: Negative for chest pain, palpitations and leg swelling.   Gastrointestinal: Positive for abdominal pain and nausea. Negative for blood in stool, constipation, diarrhea, melena and vomiting.   Genitourinary: Negative for flank pain, hematuria and urgency.   Musculoskeletal: Negative for back pain and myalgias.   Skin: Negative for rash.   Neurological: Negative for dizziness, focal weakness, weakness and headaches.   Endo/Heme/Allergies: Does not bruise/bleed easily.   Psychiatric/Behavioral: Negative for depression. The patient is not nervous/anxious.        OBJECTIVE:     Vital Signs (Most Recent):  Temp: 98.9 °F (37.2 °C) (10/06/18 1939)  Pulse: 60 (10/06/18 1939)  Resp: 18 (10/06/18 1939)  BP: (!) 87/48 (10/06/18 1939)  SpO2: (!) 94 % (10/06/18 1939)    Physical Exam:  Constitutional:  Awake, alert and oriented x 3, NAD  HENT: ncat, mmm, perrla, eomi, neck normal rom and supple   Cardiovascular: RRR   Pulmonary/Chest: Effort normal, CTA b/l no wheezes, rales, rhonchi   Abdominal: Soft. +bs, epigastric tenderness, no guarding or rebound   Musculoskeletal: Normal range of motion.  no edema, tenderness or deformity.   Neurological:  AAOx3, no focal deficits noted, CN II-XII grossly intact,  normal reflexes.   Skin: Skin is warm and dry. not diaphoretic.   Psychiatric:  normal mood and affect.  behavior is normal. Judgment and thought content normal.   Nursing note and vitals reviewed.      Laboratory:  CBC  Recent Labs   Lab  10/06/18   1037   WBC  6.25   RBC  3.76*   HGB   11.2*   HCT  35.3*   PLT  159   MCV  94   MCH  29.8   MCHC  31.7*     BMP  Recent Labs   Lab  10/06/18   1037   NA  138   K  4.0   CO2  32*   CL  101   BUN  15   CREATININE  0.82   GLU  99     Recent Labs   Lab  10/06/18   1037   CALCIUM  9.3     LFT  Recent Labs   Lab  10/06/18   1037   PROT  7.4   ALBUMIN  4.1   BILITOT  1.8*   AST  137*   ALKPHOS  349*   ALT  65*     COAGS  Recent Labs   Lab  10/06/18   1037  10/06/18   2002   INR  1.0  1.1   APTT  29.3  26.9     LAST HbA1c  Lab Results   Component Value Date    HGBA1C 5.0 10/06/2018       Diagnostic Results:  Ct ab/pelvis without contrast;   1. Persistent choledocholithiasis with significant extrahepatic or intrahepatic ductal dilation      ASSESSMENT/PLAN:   89 yo male with extensive medical hx and hx of prior biliary obstruction transferred from outside hospital for GI evaluation and possible EUS.     Bilary Obstruction  Cholangitis   - CT ab with choledocholithiasis with significant extrahepatic or intrahepatic ductal dilation  - Patient reporting epigastric pain  - AF, WBC wnl   - Started on Zosyn in ED and will continue  - GI consulted, recs appreciated    Essential HTN  - hypotensive on arrival likely 2/2 morphine given enroute by ems  - IVF bolus given with appropriate responce  - will hold home medications      Plan: Will admit for observation to [facility].    Code: full  Diet: npo  Ppx: vte: lovenox  Gi: ppi  Dispo: follow up GI    Case d/w staff.    Jodi Cruz D.O.  [facility] HO-2  10/06/2018

## 2018-10-07 NOTE — PLAN OF CARE
Problem: Patient Care Overview  Goal: Plan of Care Review  Outcome: Ongoing (interventions implemented as appropriate)  Pt awake e and alert. IV fluids and antibiotics per orders, pt assisted with turing in bed. Clear liquid diet; NPO at midnight.

## 2018-10-07 NOTE — NURSING
Received PT from [facility] per EMS.  Pt is awake, OX2, follow commands, HUMZA.  NAD noted.  VS taken by CNA.  Pts BP was taken several times, all hypo, 70's over 30's. (listed in VS).  Dr. Cruz was informed.  l liter NS ordered and administered.  Dr. Cruz arrived on the floor immediately and assessed PTs conditions.

## 2018-10-07 NOTE — PROGRESS NOTES
Progress Note  [facility]  Admit Date: 10/6/2018   LOS: 1 day   SUBJECTIVE:   Follow-up For: Patient seen and examined this AM. Says he feels better and notes some chronic left shoulder pain. No acute events reported last night. Vitals stable    Review of Systems   Constitutional: Negative for diaphoresis and fever.   Eyes: Negative for blurred vision.   Respiratory: Negative for cough and shortness of breath.    Cardiovascular: Negative for chest pain.   Gastrointestinal: Positive for abdominal pain and nausea. Negative for diarrhea and vomiting.   Genitourinary: Negative for dysuria.   Musculoskeletal: Positive for joint pain (shoulder pain). Negative for back pain.   Neurological: Negative for dizziness.       OBJECTIVE:   Vital Signs (Most Recent)  Temp: 96.7 °F (35.9 °C) (10/07/18 0743)  Pulse: 60 (10/07/18 0803)  Resp: 18 (10/07/18 0803)  BP: (!) 149/67 (10/07/18 0743)  SpO2: 98 % (10/07/18 0803)    I & O (Last 24H):    Intake/Output Summary (Last 24 hours) at 10/7/2018 0836  Last data filed at 10/7/2018 0500  Gross per 24 hour   Intake --   Output 459 ml   Net -459 ml     Wt Readings from Last 3 Encounters:   10/07/18 71.3 kg (157 lb 3 oz)   10/06/18 66.2 kg (146 lb)   10/06/18 66.2 kg (145 lb 15.1 oz)       Current Diet Order   Procedures    Diet NPO        Physical Exam   Constitutional: He is well-developed, well-nourished, and in no distress.   HENT:   Head: Normocephalic and atraumatic.   Eyes: EOM are normal.   Neck: Normal range of motion. Neck supple.   Cardiovascular: Normal rate, regular rhythm, normal heart sounds and intact distal pulses.   Pulmonary/Chest: Effort normal and breath sounds normal.   Abdominal: Soft. Bowel sounds are normal.   Musculoskeletal: Normal range of motion.   Neurological: He is alert.   Skin: Skin is warm and dry.   Psychiatric: Affect normal.   Nursing note and vitals reviewed.        Laboratory Data:  CBC  Recent Labs   Lab  10/06/18   1037  10/07/18   0516    WBC  6.25  11.87   RBC  3.76*  3.27*   HGB  11.2*  9.7*   HCT  35.3*  30.7*   PLT  159  141*   MCV  94  94   MCH  29.8  29.7   MCHC  31.7*  31.6*     CMP  Recent Labs   Lab  10/06/18   1037  10/07/18   0516   CALCIUM  9.3  8.5*   PROT  7.4  5.8*   NA  138  139   K  4.0  4.3   CO2  32*  27   CL  101  106   BUN  15  15   CREATININE  0.82  0.8   ALKPHOS  349*  287*   ALT  65*  85*   AST  137*  131*   BILITOT  1.8*  2.7*     POCT-Glucose  POCT Glucose   Date Value Ref Range Status   10/07/2018 115 (H) 70 - 110 mg/dL Final     COAGS  Recent Labs   Lab  10/06/18   1037  10/06/18   2002   INR  1.0  1.1   APTT  29.3  26.9     UA  MICRO  Microbiology Results (last 7 days)     ** No results found for the last 168 hours. **        LIPID PANEL  Lab Results   Component Value Date    CHOL 83 (L) 12/28/2016     Lab Results   Component Value Date    HDL 41 12/28/2016     Lab Results   Component Value Date    LDLCALC 31.6 (L) 12/28/2016     Lab Results   Component Value Date    TRIG 52 12/28/2016     Lab Results   Component Value Date    CHOLHDL 49.4 12/28/2016       Diagnostic Results:    ASSESSMENT/PLAN:   Fidel Allan is a 89 y.o. male with hx HTN, HLD, CAD s/p CABG, CKD3, COPD, AOCD, dementia and Hx of choledocholithiasis/cholangitits/biliary pancreatitis with ercp and stone removal x 2 in 8/18 and 9/18 who was transferred from OSH for GI work up and possible EUS    Bilary Obstruction/Cholangitis   - CT ab with choledocholithiasis with significant extrahepatic or intrahepatic ductal dilation  - Patient reporting epigastric pain  - AF, WBC wnl   - Started on Zosyn in ED and will continue  - GI consulted, recs appreciated     Essential HTN  - hypotensive on arrival likely 2/2 morphine given enroute by ems  - IVF bolus given with appropriate responce  - will hold home medications        10/7/2018 D'Amico C Johnson, MD  8:36 AM

## 2018-10-07 NOTE — PLAN OF CARE
Problem: Patient Care Overview  Goal: Plan of Care Review  Outcome: Ongoing (interventions implemented as appropriate)  Patient on oxygen with documented flow.  Will attempt to wean per O2 order protocol. No distress noted. The proper method of use, as well as anticipated side effects, of this metered-dose inhaler are discussed and demonstrated to the patient. Will continue to monitor.

## 2018-10-08 ENCOUNTER — ANESTHESIA EVENT (OUTPATIENT)
Dept: ENDOSCOPY | Facility: HOSPITAL | Age: 83
DRG: 445 | End: 2018-10-08
Payer: MEDICARE

## 2018-10-08 ENCOUNTER — ANESTHESIA (OUTPATIENT)
Dept: ENDOSCOPY | Facility: HOSPITAL | Age: 83
DRG: 445 | End: 2018-10-08
Payer: MEDICARE

## 2018-10-08 LAB
ALBUMIN SERPL BCP-MCNC: 2.4 G/DL
ALP SERPL-CCNC: 259 U/L
ALT SERPL W/O P-5'-P-CCNC: 64 U/L
ANION GAP SERPL CALC-SCNC: 6 MMOL/L
AST SERPL-CCNC: 78 U/L
BASOPHILS # BLD AUTO: 0.02 K/UL
BASOPHILS NFR BLD: 0.2 %
BILIRUB SERPL-MCNC: 2.3 MG/DL
BUN SERPL-MCNC: 13 MG/DL
CALCIUM SERPL-MCNC: 8.4 MG/DL
CHLORIDE SERPL-SCNC: 106 MMOL/L
CO2 SERPL-SCNC: 23 MMOL/L
CREAT SERPL-MCNC: 0.9 MG/DL
DIFFERENTIAL METHOD: ABNORMAL
EOSINOPHIL # BLD AUTO: 0.1 K/UL
EOSINOPHIL NFR BLD: 1.4 %
ERYTHROCYTE [DISTWIDTH] IN BLOOD BY AUTOMATED COUNT: 14.6 %
EST. GFR  (AFRICAN AMERICAN): >60 ML/MIN/1.73 M^2
EST. GFR  (NON AFRICAN AMERICAN): >60 ML/MIN/1.73 M^2
GLUCOSE SERPL-MCNC: 114 MG/DL
HCT VFR BLD AUTO: 30.8 %
HGB BLD-MCNC: 9.7 G/DL
LYMPHOCYTES # BLD AUTO: 1.6 K/UL
LYMPHOCYTES NFR BLD: 19.4 %
MAGNESIUM SERPL-MCNC: 1.7 MG/DL
MCH RBC QN AUTO: 29.3 PG
MCHC RBC AUTO-ENTMCNC: 31.5 G/DL
MCV RBC AUTO: 93 FL
MONOCYTES # BLD AUTO: 0.6 K/UL
MONOCYTES NFR BLD: 7.6 %
NEUTROPHILS # BLD AUTO: 5.7 K/UL
NEUTROPHILS NFR BLD: 71.3 %
PHOSPHATE SERPL-MCNC: 1.8 MG/DL
PLATELET # BLD AUTO: 117 K/UL
PMV BLD AUTO: 10.7 FL
POCT GLUCOSE: 111 MG/DL (ref 70–110)
POCT GLUCOSE: 111 MG/DL (ref 70–110)
POCT GLUCOSE: 113 MG/DL (ref 70–110)
POCT GLUCOSE: 128 MG/DL (ref 70–110)
POTASSIUM SERPL-SCNC: 4.3 MMOL/L
PROT SERPL-MCNC: 5.5 G/DL
RBC # BLD AUTO: 3.31 M/UL
SODIUM SERPL-SCNC: 135 MMOL/L
WBC # BLD AUTO: 8.05 K/UL

## 2018-10-08 PROCEDURE — 25000003 PHARM REV CODE 250: Performed by: ANESTHESIOLOGY

## 2018-10-08 PROCEDURE — C1726 CATH, BAL DIL, NON-VASCULAR: HCPCS | Performed by: INTERNAL MEDICINE

## 2018-10-08 PROCEDURE — 74328 X-RAY BILE DUCT ENDOSCOPY: CPT | Mod: 26,,, | Performed by: INTERNAL MEDICINE

## 2018-10-08 PROCEDURE — 43277 ERCP EA DUCT/AMPULLA DILATE: CPT | Performed by: INTERNAL MEDICINE

## 2018-10-08 PROCEDURE — 27000221 HC OXYGEN, UP TO 24 HOURS

## 2018-10-08 PROCEDURE — 43264 ERCP REMOVE DUCT CALCULI: CPT | Performed by: INTERNAL MEDICINE

## 2018-10-08 PROCEDURE — 27202074 HC NEEDLE KNIFE, BILIARY: Performed by: INTERNAL MEDICINE

## 2018-10-08 PROCEDURE — 83735 ASSAY OF MAGNESIUM: CPT

## 2018-10-08 PROCEDURE — BF101ZZ FLUOROSCOPY OF BILE DUCTS USING LOW OSMOLAR CONTRAST: ICD-10-PCS | Performed by: INTERNAL MEDICINE

## 2018-10-08 PROCEDURE — 94761 N-INVAS EAR/PLS OXIMETRY MLT: CPT

## 2018-10-08 PROCEDURE — 94640 AIRWAY INHALATION TREATMENT: CPT

## 2018-10-08 PROCEDURE — 25000003 PHARM REV CODE 250: Performed by: NURSE ANESTHETIST, CERTIFIED REGISTERED

## 2018-10-08 PROCEDURE — 63600175 PHARM REV CODE 636 W HCPCS: Performed by: NURSE ANESTHETIST, CERTIFIED REGISTERED

## 2018-10-08 PROCEDURE — 36415 COLL VENOUS BLD VENIPUNCTURE: CPT

## 2018-10-08 PROCEDURE — 27200999 HC RETRIEVAL BALLOON, ERCP: Performed by: INTERNAL MEDICINE

## 2018-10-08 PROCEDURE — 63600175 PHARM REV CODE 636 W HCPCS: Performed by: STUDENT IN AN ORGANIZED HEALTH CARE EDUCATION/TRAINING PROGRAM

## 2018-10-08 PROCEDURE — 84100 ASSAY OF PHOSPHORUS: CPT

## 2018-10-08 PROCEDURE — 37000008 HC ANESTHESIA 1ST 15 MINUTES: Performed by: INTERNAL MEDICINE

## 2018-10-08 PROCEDURE — 43264 ERCP REMOVE DUCT CALCULI: CPT | Mod: 51,,, | Performed by: INTERNAL MEDICINE

## 2018-10-08 PROCEDURE — 99900035 HC TECH TIME PER 15 MIN (STAT)

## 2018-10-08 PROCEDURE — 43273 ENDOSCOPIC PANCREATOSCOPY: CPT | Mod: ,,, | Performed by: INTERNAL MEDICINE

## 2018-10-08 PROCEDURE — 37000009 HC ANESTHESIA EA ADD 15 MINS: Performed by: INTERNAL MEDICINE

## 2018-10-08 PROCEDURE — 25000003 PHARM REV CODE 250: Performed by: STUDENT IN AN ORGANIZED HEALTH CARE EDUCATION/TRAINING PROGRAM

## 2018-10-08 PROCEDURE — 11000001 HC ACUTE MED/SURG PRIVATE ROOM

## 2018-10-08 PROCEDURE — 25000242 PHARM REV CODE 250 ALT 637 W/ HCPCS: Performed by: STUDENT IN AN ORGANIZED HEALTH CARE EDUCATION/TRAINING PROGRAM

## 2018-10-08 PROCEDURE — C1769 GUIDE WIRE: HCPCS | Performed by: INTERNAL MEDICINE

## 2018-10-08 PROCEDURE — 0FC98ZZ EXTIRPATION OF MATTER FROM COMMON BILE DUCT, VIA NATURAL OR ARTIFICIAL OPENING ENDOSCOPIC: ICD-10-PCS | Performed by: INTERNAL MEDICINE

## 2018-10-08 PROCEDURE — 80053 COMPREHEN METABOLIC PANEL: CPT

## 2018-10-08 PROCEDURE — 27201693 HC INFLATION SYSTEM, NVISION ULE: Performed by: INTERNAL MEDICINE

## 2018-10-08 PROCEDURE — A4216 STERILE WATER/SALINE, 10 ML: HCPCS | Performed by: ANESTHESIOLOGY

## 2018-10-08 PROCEDURE — 25000003 PHARM REV CODE 250: Performed by: INTERNAL MEDICINE

## 2018-10-08 PROCEDURE — 43262 ENDO CHOLANGIOPANCREATOGRAPH: CPT | Mod: 51,,, | Performed by: INTERNAL MEDICINE

## 2018-10-08 PROCEDURE — 85025 COMPLETE CBC W/AUTO DIFF WBC: CPT

## 2018-10-08 PROCEDURE — 27202125 HC BALLOON, EXTRACTION (ANY): Performed by: INTERNAL MEDICINE

## 2018-10-08 RX ORDER — KETOROLAC TROMETHAMINE 30 MG/ML
15 INJECTION, SOLUTION INTRAMUSCULAR; INTRAVENOUS ONCE
Status: COMPLETED | OUTPATIENT
Start: 2018-10-08 | End: 2018-10-08

## 2018-10-08 RX ORDER — SODIUM,POTASSIUM PHOSPHATES 280-250MG
1 POWDER IN PACKET (EA) ORAL ONCE
Status: DISCONTINUED | OUTPATIENT
Start: 2018-10-08 | End: 2018-10-08

## 2018-10-08 RX ORDER — ROCURONIUM BROMIDE 10 MG/ML
INJECTION, SOLUTION INTRAVENOUS
Status: DISCONTINUED | OUTPATIENT
Start: 2018-10-08 | End: 2018-10-08

## 2018-10-08 RX ORDER — FENTANYL CITRATE 50 UG/ML
INJECTION, SOLUTION INTRAMUSCULAR; INTRAVENOUS
Status: DISCONTINUED | OUTPATIENT
Start: 2018-10-08 | End: 2018-10-08

## 2018-10-08 RX ORDER — HYDROMORPHONE HYDROCHLORIDE 2 MG/ML
0.5 INJECTION, SOLUTION INTRAMUSCULAR; INTRAVENOUS; SUBCUTANEOUS EVERY 5 MIN PRN
Status: ACTIVE | OUTPATIENT
Start: 2018-10-08 | End: 2018-10-08

## 2018-10-08 RX ORDER — SODIUM CHLORIDE 0.9 % (FLUSH) 0.9 %
3 SYRINGE (ML) INJECTION
Status: DISCONTINUED | OUTPATIENT
Start: 2018-10-08 | End: 2018-10-09

## 2018-10-08 RX ORDER — SODIUM CHLORIDE 9 MG/ML
INJECTION, SOLUTION INTRAVENOUS CONTINUOUS PRN
Status: DISCONTINUED | OUTPATIENT
Start: 2018-10-08 | End: 2018-10-08

## 2018-10-08 RX ORDER — OXYCODONE HYDROCHLORIDE 5 MG/1
5 TABLET ORAL
Status: DISCONTINUED | OUTPATIENT
Start: 2018-10-08 | End: 2018-10-08 | Stop reason: SDUPTHER

## 2018-10-08 RX ORDER — OXYCODONE HYDROCHLORIDE 5 MG/1
5 TABLET ORAL
Status: DISCONTINUED | OUTPATIENT
Start: 2018-10-08 | End: 2018-10-09 | Stop reason: HOSPADM

## 2018-10-08 RX ORDER — DIPHENHYDRAMINE HYDROCHLORIDE 50 MG/ML
25 INJECTION INTRAMUSCULAR; INTRAVENOUS EVERY 6 HOURS PRN
Status: DISCONTINUED | OUTPATIENT
Start: 2018-10-08 | End: 2018-10-09 | Stop reason: HOSPADM

## 2018-10-08 RX ORDER — ETOMIDATE 2 MG/ML
INJECTION INTRAVENOUS
Status: DISCONTINUED | OUTPATIENT
Start: 2018-10-08 | End: 2018-10-08

## 2018-10-08 RX ORDER — HYDROMORPHONE HYDROCHLORIDE 2 MG/ML
0.5 INJECTION, SOLUTION INTRAMUSCULAR; INTRAVENOUS; SUBCUTANEOUS EVERY 5 MIN PRN
Status: DISCONTINUED | OUTPATIENT
Start: 2018-10-08 | End: 2018-10-08 | Stop reason: SDUPTHER

## 2018-10-08 RX ORDER — SODIUM CHLORIDE 0.9 % (FLUSH) 0.9 %
3 SYRINGE (ML) INJECTION EVERY 8 HOURS
Status: DISCONTINUED | OUTPATIENT
Start: 2018-10-08 | End: 2018-10-09

## 2018-10-08 RX ORDER — SUCCINYLCHOLINE CHLORIDE 20 MG/ML
INJECTION INTRAMUSCULAR; INTRAVENOUS
Status: DISCONTINUED | OUTPATIENT
Start: 2018-10-08 | End: 2018-10-08

## 2018-10-08 RX ORDER — ONDANSETRON 2 MG/ML
4 INJECTION INTRAMUSCULAR; INTRAVENOUS DAILY PRN
Status: DISCONTINUED | OUTPATIENT
Start: 2018-10-08 | End: 2018-10-08 | Stop reason: SDUPTHER

## 2018-10-08 RX ORDER — DIPHENHYDRAMINE HYDROCHLORIDE 50 MG/ML
25 INJECTION INTRAMUSCULAR; INTRAVENOUS EVERY 6 HOURS PRN
Status: DISCONTINUED | OUTPATIENT
Start: 2018-10-08 | End: 2018-10-08 | Stop reason: SDUPTHER

## 2018-10-08 RX ORDER — INDOMETHACIN 50 MG/1
100 SUPPOSITORY RECTAL ONCE
Status: COMPLETED | OUTPATIENT
Start: 2018-10-08 | End: 2018-10-08

## 2018-10-08 RX ORDER — ONDANSETRON 2 MG/ML
4 INJECTION INTRAMUSCULAR; INTRAVENOUS DAILY PRN
Status: DISCONTINUED | OUTPATIENT
Start: 2018-10-08 | End: 2018-10-09 | Stop reason: HOSPADM

## 2018-10-08 RX ORDER — LIDOCAINE HCL/PF 100 MG/5ML
SYRINGE (ML) INTRAVENOUS
Status: DISCONTINUED | OUTPATIENT
Start: 2018-10-08 | End: 2018-10-08

## 2018-10-08 RX ORDER — EPHEDRINE SULFATE 50 MG/ML
INJECTION, SOLUTION INTRAVENOUS
Status: DISCONTINUED | OUTPATIENT
Start: 2018-10-08 | End: 2018-10-08

## 2018-10-08 RX ORDER — ONDANSETRON 2 MG/ML
4 INJECTION INTRAMUSCULAR; INTRAVENOUS ONCE
Status: COMPLETED | OUTPATIENT
Start: 2018-10-08 | End: 2018-10-08

## 2018-10-08 RX ORDER — PHENYLEPHRINE HYDROCHLORIDE 10 MG/ML
INJECTION INTRAVENOUS
Status: DISCONTINUED | OUTPATIENT
Start: 2018-10-08 | End: 2018-10-08

## 2018-10-08 RX ADMIN — BUSPIRONE HYDROCHLORIDE 5 MG: 5 TABLET ORAL at 09:10

## 2018-10-08 RX ADMIN — LIDOCAINE HYDROCHLORIDE 75 MG: 20 INJECTION, SOLUTION INTRAVENOUS at 03:10

## 2018-10-08 RX ADMIN — OXYCODONE HYDROCHLORIDE 5 MG: 5 TABLET ORAL at 08:10

## 2018-10-08 RX ADMIN — KETOROLAC TROMETHAMINE 15 MG: 30 INJECTION, SOLUTION INTRAMUSCULAR at 10:10

## 2018-10-08 RX ADMIN — TIOTROPIUM BROMIDE 18 MCG: 18 CAPSULE ORAL; RESPIRATORY (INHALATION) at 07:10

## 2018-10-08 RX ADMIN — ESCITALOPRAM 10 MG: 10 TABLET, FILM COATED ORAL at 06:10

## 2018-10-08 RX ADMIN — ROCURONIUM BROMIDE 5 MG: 10 INJECTION, SOLUTION INTRAVENOUS at 03:10

## 2018-10-08 RX ADMIN — EPHEDRINE SULFATE 10 MG: 50 INJECTION, SOLUTION INTRAMUSCULAR; INTRAVENOUS; SUBCUTANEOUS at 03:10

## 2018-10-08 RX ADMIN — ONDANSETRON HYDROCHLORIDE 4 MG: 2 INJECTION, SOLUTION INTRAMUSCULAR; INTRAVENOUS at 10:10

## 2018-10-08 RX ADMIN — FERROUS SULFATE TAB EC 325 MG (65 MG FE EQUIVALENT) 325 MG: 325 (65 FE) TABLET DELAYED RESPONSE at 08:10

## 2018-10-08 RX ADMIN — FLUTICASONE FUROATE AND VILANTEROL TRIFENATATE 1 PUFF: 100; 25 POWDER RESPIRATORY (INHALATION) at 07:10

## 2018-10-08 RX ADMIN — PRAVASTATIN SODIUM 40 MG: 20 TABLET ORAL at 08:10

## 2018-10-08 RX ADMIN — SODIUM CHLORIDE, PRESERVATIVE FREE 3 ML: 5 INJECTION INTRAVENOUS at 10:10

## 2018-10-08 RX ADMIN — INDOMETHACIN 100 MG: 50 SUPPOSITORY RECTAL at 03:10

## 2018-10-08 RX ADMIN — FINASTERIDE 5 MG: 5 TABLET, FILM COATED ORAL at 09:10

## 2018-10-08 RX ADMIN — PHENYLEPHRINE HYDROCHLORIDE 200 MCG: 10 INJECTION INTRAVENOUS at 03:10

## 2018-10-08 RX ADMIN — ETOMIDATE 16 MG: 2 INJECTION, SOLUTION INTRAVENOUS at 03:10

## 2018-10-08 RX ADMIN — SODIUM CHLORIDE: 0.9 INJECTION, SOLUTION INTRAVENOUS at 02:10

## 2018-10-08 RX ADMIN — BUSPIRONE HYDROCHLORIDE 5 MG: 5 TABLET ORAL at 08:10

## 2018-10-08 RX ADMIN — TAMSULOSIN HYDROCHLORIDE 0.4 MG: 0.4 CAPSULE ORAL at 09:10

## 2018-10-08 RX ADMIN — DEXTROSE, SODIUM CHLORIDE, AND POTASSIUM CHLORIDE: 5; .45; .15 INJECTION INTRAVENOUS at 01:10

## 2018-10-08 RX ADMIN — PIPERACILLIN AND TAZOBACTAM 4.5 G: 4; .5 INJECTION, POWDER, LYOPHILIZED, FOR SOLUTION INTRAVENOUS; PARENTERAL at 09:10

## 2018-10-08 RX ADMIN — PIPERACILLIN AND TAZOBACTAM 4.5 G: 4; .5 INJECTION, POWDER, LYOPHILIZED, FOR SOLUTION INTRAVENOUS; PARENTERAL at 04:10

## 2018-10-08 RX ADMIN — DONEPEZIL HYDROCHLORIDE 10 MG: 5 TABLET, FILM COATED ORAL at 08:10

## 2018-10-08 RX ADMIN — ACETAMINOPHEN 650 MG: 325 TABLET ORAL at 09:10

## 2018-10-08 RX ADMIN — FENTANYL CITRATE 50 MCG: 50 INJECTION, SOLUTION INTRAMUSCULAR; INTRAVENOUS at 04:10

## 2018-10-08 RX ADMIN — MEMANTINE HYDROCHLORIDE 5 MG: 5 TABLET ORAL at 09:10

## 2018-10-08 RX ADMIN — CLOBETASOL PROPIONATE: 0.5 CREAM TOPICAL at 09:10

## 2018-10-08 RX ADMIN — GABAPENTIN 100 MG: 100 CAPSULE ORAL at 02:10

## 2018-10-08 RX ADMIN — CLOBETASOL PROPIONATE: 0.5 CREAM TOPICAL at 08:10

## 2018-10-08 RX ADMIN — PHENYLEPHRINE HYDROCHLORIDE 100 MCG: 10 INJECTION INTRAVENOUS at 03:10

## 2018-10-08 RX ADMIN — FENTANYL CITRATE 25 MCG: 50 INJECTION, SOLUTION INTRAMUSCULAR; INTRAVENOUS at 02:10

## 2018-10-08 RX ADMIN — FENTANYL CITRATE 75 MCG: 50 INJECTION, SOLUTION INTRAMUSCULAR; INTRAVENOUS at 03:10

## 2018-10-08 RX ADMIN — KETOROLAC TROMETHAMINE 15 MG: 30 INJECTION, SOLUTION INTRAMUSCULAR at 05:10

## 2018-10-08 RX ADMIN — GABAPENTIN 100 MG: 100 CAPSULE ORAL at 08:10

## 2018-10-08 RX ADMIN — SUCCINYLCHOLINE CHLORIDE 100 MG: 20 INJECTION, SOLUTION INTRAMUSCULAR; INTRAVENOUS at 03:10

## 2018-10-08 RX ADMIN — PIPERACILLIN AND TAZOBACTAM 4.5 G: 4; .5 INJECTION, POWDER, LYOPHILIZED, FOR SOLUTION INTRAVENOUS; PARENTERAL at 01:10

## 2018-10-08 RX ADMIN — GABAPENTIN 100 MG: 100 CAPSULE ORAL at 09:10

## 2018-10-08 RX ADMIN — FERROUS SULFATE TAB EC 325 MG (65 MG FE EQUIVALENT) 325 MG: 325 (65 FE) TABLET DELAYED RESPONSE at 09:10

## 2018-10-08 NOTE — PROVATION PATIENT INSTRUCTIONS
Discharge Summary/Instructions after an Endoscopic Procedure  Patient Name: Fidel Allan  Patient MRN: [id number]  Patient YOB: 1927 Monday, October 08, 2018  Kee Limon MD  RESTRICTIONS:  During your procedure today, you received medications for sedation.  These   medications may affect your judgment, balance and coordination.  Therefore,   for 24 hours, you have the following restrictions:   - DO NOT drive a car, operate machinery, make legal/financial decisions,   sign important papers or drink alcohol.    ACTIVITY:  Today: no heavy lifting, straining or running due to procedural   sedation/anesthesia.  The following day: return to full activity including work.  DIET:  Eat and drink normally unless instructed otherwise.     TREATMENT FOR COMMON SIDE EFFECTS:  - Mild abdominal pain, nausea, belching, bloating or excessive gas:  rest,   eat lightly and use a heating pad.  - Sore Throat: treat with throat lozenges and/or gargle with warm salt   water.  - Because air was used during the procedure, expelling large amounts of air   from your rectum or belching is normal.  - If a bowel prep was taken, you may not have a bowel movement for 1-3 days.    This is normal.  SYMPTOMS TO WATCH FOR AND REPORT TO YOUR PHYSICIAN:  1. Abdominal pain or bloating, other than gas cramps.  2. Chest pain.  3. Back pain.  4. Signs of infection such as: chills or fever occurring within 24 hours   after the procedure.  5. Rectal bleeding, which would show as bright red, maroon, or black stools.   (A tablespoon of blood from the rectum is not serious, especially if   hemorrhoids are present.)  6. Vomiting.  7. Weakness or dizziness.  GO DIRECTLY TO THE NEAREST EMERGENCY ROOM IF YOU HAVE ANY OF THE FOLLOWING:      Difficulty breathing              Chills and/or fever over 101 F   Persistent vomiting and/or vomiting blood   Severe abdominal pain   Severe chest pain   Black, tarry stools   Bleeding- more than one  tablespoon   Any other symptom or condition that you feel may need urgent attention  Your doctor recommends these additional instructions:  If any biopsies were taken, your doctors clinic will contact you in 1 to 2   weeks with any results.  - [facility].   - Advance diet as tolerated.  - If pt clinically well tomorrow, may DC on a 10-14 day course of broad   spectrum Abx.  For questions, problems or results please call your physician - Kee Limon MD at Work:  [phone].  EMERGENCY PHONE NUMBER: [phone],  LAB RESULTS: [phone]  IF A COMPLICATION OR EMERGENCY SITUATION ARISES AND YOU ARE UNABLE TO REACH   YOUR PHYSICIAN - GO DIRECTLY TO THE EMERGENCY ROOM.  Kee Limon MD  10/8/2018 4:07:57 PM  This report has been verified and signed electronically.  [employer]ATION

## 2018-10-08 NOTE — PLAN OF CARE
Chief Complaint   Patient presents with    Abdominal Pain       pt reports abdominal pain that started this morning after eating breakfast; denies any N/V/D;       Presents for evaluation of upper abdominal pain after eating breakfast. No vomiting, no change in bowel movements. Hx of choledocholithiasis/cholangitits/biliary pancreatitis with ercp and stone removal x 2 in 8/18 and 9/18.      Pt is resident of [facility], last admitted 9/11--9/18/18 at [facility]    Pt needs assist and assistive equipment at NH. Uses O2, RW, WC, Hosp Bed at facility    TN provided pt with Discharge Planning Brochure and Business Card and wrote name on whiteboard      Future Appointments   Date Time Provider Department Center   1/2/2019 10:30 AM Y'Tomasa Watson NP [facility]          10/07/18 2561   Discharge Assessment   Assessment Type Discharge Planning Assessment   Confirmed/corrected address and phone number on facesheet? Yes   Assessment information obtained from? Patient;Medical Record   Expected Length of Stay (days) 3   Communicated expected length of stay with patient/caregiver yes   Prior to hospitilization cognitive status: Alert/Oriented   Prior to hospitalization functional status: Needs Assistance;Assistive Equipment   Current cognitive status: Alert/Oriented   Current Functional Status: Needs Assistance;Assistive Equipment   Facility Arrived From: [facility] ED   Lives With facility resident  ([facility] [phone])   Able to Return to Prior Arrangements yes   Is patient able to care for self after discharge? No   Who are your caregiver(s) and their phone number(s)? Daughter in Law: Debbie Trevizo [phone]   Patient's perception of discharge disposition [facility] care facility   Readmission Within The Last 30 Days other (see comments)  (last admit [facility], IP, 9/11--9/18/18)   Patient currently being followed by outpatient case management? Unable to determine (comments)   Patient currently  receives any other outside agency services? No   Equipment Currently Used at Home oxygen;walker, rolling;wheelchair   Do you have any problems affording any of your prescribed medications? No   Is the patient taking medications as prescribed? yes   Does the patient have transportation home? Yes   Transportation Available agency transportation   Dialysis Name and Scheduled days N/A   Does the patient receive services at the [facility]? No   Discharge Plan A Return to nursing home   Discharge Plan B Skilled Nursing Facility   Patient/Family In Agreement With Plan yes     Abby Alves RN Transitional Navigator  [phone]

## 2018-10-08 NOTE — PLAN OF CARE
Report received from [facility]. NPO status confirmed. NPO since mn. Patent IV access confirmed. VSS

## 2018-10-08 NOTE — PLAN OF CARE
10/08/18 1002   Readmission Questionnaire   At the time of your discharge, did someone talk to you about what your health problems were? Yes   At the time of discharge, did someone talk to you about what to watch out for regarding worsening of your health problem? Yes   At the time of discharge, did someone talk to you about what to do if you experienced worsening of your health problem? Yes   At the time of discharge, did someone talk to you about which medication to take when you left the hospital and which ones to stop taking? Yes   At the time of discharge, did someone talk to you about when and where to follow up with a doctor after you left the hospital? Yes   What do you believe caused you to be sick enough to be re-admitted? billiary obstruction   How often do you need to have someone help you when you read instructions, pamphlets, or other written material from your doctor or pharmacy? Sometimes   Do you have problems taking your medications as prescribed? No   Do you have any problems affording any of  your prescribed medications? No   Do you have problems obtaining/receiving your medications? No   Does the patient have transportation to healthcare appointments? Yes   Lives With facility resident   Living Arrangements residential facility   Does the patient have family/friends to help with healtcare needs after discharge? yes   Who are your caregiver(s) and their phone number(s)? Daughter in Law: Debbie Trevizo [phone]   Does your caregiver provide all the help you need? Yes   Are you currently feeling confused? No   Are you currently having problems thinking? No   Are you currently having memory problems? No   Have you felt down, depressed, or hopeless? 0   Have you felt little interest or pleasure in doing things? 0   In the last 7 days, my sleep quality was: fair     Abby Alves RN Transitional Navigator  [phone]

## 2018-10-08 NOTE — ANESTHESIA RELEASE NOTE
"Anesthesia Release from PACU Note    Patient: Fidel Allan    Procedure(s) Performed: Procedure(s) (LRB):  ERCP (ENDOSCOPIC RETROGRADE CHOLANGIOPANCREATOGRAPHY) (Left)    Anesthesia type: general    Post pain: Adequate analgesia    Post assessment: no apparent anesthetic complications    Last Vitals:   Visit Vitals  BP (!) 142/63   Pulse 78   Temp 37.1 °C (98.8 °F)   Resp 19   Ht 5' 8" (1.727 m)   Wt 71.3 kg (157 lb 3 oz)   SpO2 95%   BMI 23.90 kg/m²       Post vital signs: stable    Level of consciousness: awake    Nausea/Vomiting: no nausea/no vomiting    Complications: none    Airway Patency: patent    Respiratory: unassisted, spontaneous ventilation    Cardiovascular: stable and blood pressure at baseline    Hydration: euvolemic  "

## 2018-10-08 NOTE — TRANSFER OF CARE
"Anesthesia Transfer of Care Note    Patient: Fidel Allan    Procedure(s) Performed: Procedure(s) (LRB):  ERCP (ENDOSCOPIC RETROGRADE CHOLANGIOPANCREATOGRAPHY) (Left)    Patient location: PACU    Anesthesia Type: general    Transport from OR: Transported from OR on 6-10 L/min O2 by face mask with adequate spontaneous ventilation    Post pain: adequate analgesia    Post assessment: no apparent anesthetic complications    Post vital signs: stable    Level of consciousness: awake and alert    Nausea/Vomiting: no nausea/vomiting    Complications: none    Transfer of care protocol was followed      Last vitals:   Visit Vitals  BP (!) 127/58   Pulse 90   Temp 37.1 °C (98.8 °F)   Resp 20   Ht 5' 8" (1.727 m)   Wt 71.3 kg (157 lb 3 oz)   SpO2 95%   BMI 23.90 kg/m²     "

## 2018-10-08 NOTE — ANESTHESIA PREPROCEDURE EVALUATION
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     Had recent ERCP and stone removal x 2 in 8/18 and 9/18 under general anesthesia with no significant anesthetic issues.     Anesthesia Evaluation    I have reviewed the Patient Summary Reports.    I have reviewed the Nursing Notes.   I have reviewed the Medications.     Review of Systems  Anesthesia Hx:  Denies Family Hx of Anesthesia complications.   Denies Personal Hx of Anesthesia complications.   Social:  Former Smoker, No Alcohol Use    Hematology/Oncology:         -- Anemia:   Cardiovascular:   Exercise tolerance: poor Hypertension, well controlled CAD  CABG/stent Dysrhythmias ( s/p pacemaker 2016)  ECG has been reviewed. EF 55-60% from 12/2017 echo   Pulmonary:   COPD ( 2-4L O2 at night, not during day), moderate    Renal/:   Chronic Renal Disease, CRI    Hepatic/GI:   GERD Recurrent choledocholithiasis   Musculoskeletal:  Musculoskeletal Normal    Neurological:  Dementia ( vascular)    Endocrine:  Endocrine Normal        Physical Exam  General:  Well nourished    Airway/Jaw/Neck:  Airway Findings: Mouth Opening: Normal Tongue: Normal  Mallampati: III  TM Distance: Normal, at least 6 cm      Dental:  Dental Findings: Edentulous   Chest/Lungs:  Chest/Lungs Findings: Clear to auscultation, Normal Respiratory Rate     Heart/Vascular:  Heart Findings: Rate: Normal  Rhythm: Regular Rhythm  Sounds: Normal      Musculoskeletal:  Musculoskeletal Findings: Normal    Mental Status:  Mental Status Findings:  Cooperative, Alert and Oriented       Recent Labs   Lab  10/08/18   0455   WBC  8.05   RBC  3.31*   HGB  9.7*   HCT  30.8*   PLT  117*   MCV  93   MCH  29.3   MCHC  31.5*       Chemistry        Component Value Date/Time     (L)  10/08/2018 0455    K 4.3 10/08/2018 0455     10/08/2018 0455    CO2 23 10/08/2018 0455    BUN 13 10/08/2018 0455    CREATININE 0.9 10/08/2018 0455     (H) 10/08/2018 0455        Component Value Date/Time    CALCIUM 8.4 (L) 10/08/2018 0455    ALKPHOS 259 (H) 10/08/2018 0455    AST 78 (H) 10/08/2018 0455    ALT 64 (H) 10/08/2018 0455    BILITOT 2.3 (H) 10/08/2018 0455    ESTGFRAFRICA >60 10/08/2018 0455    EGFRNONAA >60 10/08/2018 0455            EKG 10/6/18:   Sinus rhythm with 1st degree A-V block  Septal infarct (cited on or before 19-MAR-2018)  Abnormal ECG  When compared with ECG of 11-SEP-2018 15:14,  No significant change was found      TTE 12/2017:   CONCLUSIONS     1 - Normal left ventricular systolic function (EF 55-60%).     2 - Impaired LV relaxation, normal LAP (grade 1 diastolic dysfunction).     3 - Normal right ventricular systolic function .     4 - Mild mitral regurgitation.     5 - Mild tricuspid regurgitation.     6 - Intermediate central venous pressure.     7 - The estimated RV systolic pressure is 32 mmHg.     8 - No evidence of vegetations.       Anesthesia Plan  Type of Anesthesia, risks & benefits discussed:  Anesthesia Type:  general, MAC  Patient's Preference:   Intra-op Monitoring Plan: standard ASA monitors  Intra-op Monitoring Plan Comments:   Post Op Pain Control Plan: multimodal analgesia  Post Op Pain Control Plan Comments:   Induction:   IV  Beta Blocker:  Patient is not currently on a Beta-Blocker (No further documentation required).       Informed Consent: Patient understands risks and agrees with Anesthesia plan.  Questions answered. Anesthesia consent signed with patient.  ASA Score: 3     Day of Surgery Review of History & Physical: I have interviewed and examined the patient. I have reviewed the patient's H&P dated:  There are no significant changes.  H&P update referred to the provider.         Ready For Surgery From Anesthesia Perspective.

## 2018-10-08 NOTE — PLAN OF CARE
Patient sleeping, arousable. VSS. Denies any pain at this time. Dr. Mike Barnhart to release patient from PACU. Report to Jennifer RN with time for questions, verbalized understanding. Patient discharged to floor via stretcher.

## 2018-10-08 NOTE — PLAN OF CARE
"Problem: Patient Care Overview  Goal: Plan of Care Review  Outcome: Revised  Patient is awake and alert and has been complaining of severe abdominal pain.  Tylenol and Toradol given with mild relief.  Patient went to ERCP and states, "I feel wonderful" after coming back from the procedure.  No complaints of pain.  IVF restarted.  Patient is incontinent of bowel and bladder and wears the diaper.  Safety is maintained with bed low, wheels locked and side rails up.  Call light within reach.  Will continue to monitor.      "

## 2018-10-08 NOTE — PROGRESS NOTES
Progress Note  [facility]  Admit Date: 10/6/2018   LOS: 2 days   SUBJECTIVE:   Follow-up For: Biliary obstruction     Patient seen and examined this AM. Continues to have epigastric pain that radiates to the right. No chest pain, shortness of breath, headache, nausea or vomiting.  He has been NPO     ROS    Review of Systems:  Constitutional: no fever or chills  Eyes: no visual changes  Respiratory: no cough or shortness of breath  Cardiovascular: no chest pain   Gastrointestinal: no nausea or vomiting; continues to have epigastric pain    Genitourinary: +urination   Neurological: no new focal weakness      OBJECTIVE:   Vital Signs (Most Recent)  Temp: 98.6 °F (37 °C) (10/08/18 0743)  Pulse: 60 (10/08/18 0743)  Resp: (!) 24 (10/08/18 0743)  BP: (!) 165/73 (10/08/18 0743)  SpO2: 98 % (10/08/18 0723)    I & O (Last 24H):    Intake/Output Summary (Last 24 hours) at 10/8/2018 0906  Last data filed at 10/8/2018 0500  Gross per 24 hour   Intake 2878.75 ml   Output 1274 ml   Net 1604.75 ml     Wt Readings from Last 3 Encounters:   10/07/18 71.3 kg (157 lb 3 oz)   10/06/18 66.2 kg (146 lb)   10/06/18 66.2 kg (145 lb 15.1 oz)       Current Diet Order   Procedures    Diet NPO Except for: Medication     Order Specific Question:   Except for     Answer:   Medication        Physical Exam  Physical Exam:  General: well developed, well nourished  Eyes: conjunctivae/corneas clear.   Lungs:  clear to auscultation bilaterally and normal respiratory effort  Cardiovascular: Heart: regular rate and rhythm, S1, S2 normal, no murmur, click, rub or gallop. Chest Wall: no tenderness.   Abdomen/Rectal: Abdomen: soft, non-tender non-distented; bowel sounds normal; no masses,  no organomegaly.    Musculoskeletal: no LE edema or calf tenderness  Neurologic: AO, no change in mental status  Psych/Behavioral:  Alert and oriented, appropriate affect.        Laboratory Data:  CBC  Recent Labs   Lab  10/06/18   1037  10/07/18   0516   10/08/18   0455   WBC  6.25  11.87  8.05   RBC  3.76*  3.27*  3.31*   HGB  11.2*  9.7*  9.7*   HCT  35.3*  30.7*  30.8*   PLT  159  141*  117*   MCV  94  94  93   MCH  29.8  29.7  29.3   MCHC  31.7*  31.6*  31.5*     CMP  Recent Labs   Lab  10/06/18   1037  10/07/18   0516  10/08/18   0455   CALCIUM  9.3  8.5*  8.4*   PROT  7.4  5.8*  5.5*   NA  138  139  135*   K  4.0  4.3  4.3   CO2  32*  27  23   CL  101  106  106   BUN  15  15  13   CREATININE  0.82  0.8  0.9   ALKPHOS  349*  287*  259*   ALT  65*  85*  64*   AST  137*  131*  78*   BILITOT  1.8*  2.7*  2.3*     POCT-Glucose  POCT Glucose   Date Value Ref Range Status   10/08/2018 111 (H) 70 - 110 mg/dL Final   10/07/2018 128 (H) 70 - 110 mg/dL Final   10/07/2018 107 70 - 110 mg/dL Final   10/07/2018 109 70 - 110 mg/dL Final   10/07/2018 115 (H) 70 - 110 mg/dL Final     COAGS  Recent Labs   Lab  10/06/18   1037  10/06/18   2002   INR  1.0  1.1   APTT  29.3  26.9     LIPID PANEL  Lab Results   Component Value Date    CHOL 83 (L) 12/28/2016     Lab Results   Component Value Date    HDL 41 12/28/2016     Lab Results   Component Value Date    LDLCALC 31.6 (L) 12/28/2016     Lab Results   Component Value Date    TRIG 52 12/28/2016     Lab Results   Component Value Date    CHOLHDL 49.4 12/28/2016       ASSESSMENT/PLAN:   Fidel Allan is a 89 y.o. male with hx HTN, HLD, CAD s/p CABG, CKD3, COPD, AOCD, dementia and Hx of choledocholithiasis/cholangitits/biliary pancreatitis with ercp and stone removal x 2 in 8/18 and 9/18 who was transferred from OSH for GI work up and possible EUS     Bilary Obstruction/Cholangitis   - CT ab with choledocholithiasis with significant extrahepatic or intrahepatic ductal dilation  - Patient reporting epigastric pain  - AF, WBC wnl   - Started on Zosyn in ED, will continue  - GI consulted -Plan for ERCP       Essential HTN  - hypotensive on arrival likely 2/2 morphine given enroute by ems  - IVF bolus given with appropriate  response  -initially held home BP meds for hypotension  -BP now 160's/70's  -Will restart home BP meds after ERCP     Dispo: f/u GI, ERCP results   PPx: Selvin glover, SCD's       10/8/2018 Sunitha Kathleen MD  9:06 AM

## 2018-10-08 NOTE — ANESTHESIA POSTPROCEDURE EVALUATION
"Anesthesia Post Evaluation    Patient: Fidel Allan    Procedure(s) Performed: Procedure(s) (LRB):  ERCP (ENDOSCOPIC RETROGRADE CHOLANGIOPANCREATOGRAPHY) (Left)    Final Anesthesia Type: general  Patient location during evaluation: PACU  Patient participation: Yes- Able to Participate  Level of consciousness: awake  Post-procedure vital signs: reviewed and stable  Pain management: adequate  Airway patency: patent  PONV status at discharge: No PONV  Anesthetic complications: no      Cardiovascular status: stable  Respiratory status: unassisted  Hydration status: euvolemic  Follow-up not needed.        Visit Vitals  BP (!) 142/63   Pulse 78   Temp 37.1 °C (98.8 °F)   Resp 19   Ht 5' 8" (1.727 m)   Wt 71.3 kg (157 lb 3 oz)   SpO2 95%   BMI 23.90 kg/m²       Pain/Fior Score: Pain Assessment Performed: Yes (10/8/2018  4:26 PM)  Presence of Pain: denies (10/8/2018  4:26 PM)  Pain Rating Prior to Med Admin: 10 (10/8/2018 10:13 AM)  Foir Score: 8 (10/8/2018  4:26 PM)        "

## 2018-10-09 VITALS
RESPIRATION RATE: 18 BRPM | SYSTOLIC BLOOD PRESSURE: 144 MMHG | HEART RATE: 66 BPM | OXYGEN SATURATION: 94 % | BODY MASS INDEX: 23.82 KG/M2 | HEIGHT: 68 IN | DIASTOLIC BLOOD PRESSURE: 63 MMHG | TEMPERATURE: 99 F | WEIGHT: 157.19 LBS

## 2018-10-09 LAB
ALBUMIN SERPL BCP-MCNC: 2.2 G/DL
ALP SERPL-CCNC: 269 U/L
ALT SERPL W/O P-5'-P-CCNC: 56 U/L
ANION GAP SERPL CALC-SCNC: 6 MMOL/L
AST SERPL-CCNC: 59 U/L
BASOPHILS # BLD AUTO: 0.01 K/UL
BASOPHILS NFR BLD: 0.2 %
BILIRUB SERPL-MCNC: 2.8 MG/DL
BUN SERPL-MCNC: 13 MG/DL
CALCIUM SERPL-MCNC: 8.3 MG/DL
CHLORIDE SERPL-SCNC: 104 MMOL/L
CO2 SERPL-SCNC: 24 MMOL/L
CREAT SERPL-MCNC: 0.9 MG/DL
DIFFERENTIAL METHOD: ABNORMAL
EOSINOPHIL # BLD AUTO: 0.1 K/UL
EOSINOPHIL NFR BLD: 1.2 %
ERYTHROCYTE [DISTWIDTH] IN BLOOD BY AUTOMATED COUNT: 14.2 %
EST. GFR  (AFRICAN AMERICAN): >60 ML/MIN/1.73 M^2
EST. GFR  (NON AFRICAN AMERICAN): >60 ML/MIN/1.73 M^2
GLUCOSE SERPL-MCNC: 109 MG/DL
HCT VFR BLD AUTO: 28.5 %
HGB BLD-MCNC: 9.2 G/DL
LYMPHOCYTES # BLD AUTO: 1.6 K/UL
LYMPHOCYTES NFR BLD: 26.3 %
MAGNESIUM SERPL-MCNC: 1.4 MG/DL
MCH RBC QN AUTO: 29.6 PG
MCHC RBC AUTO-ENTMCNC: 32.3 G/DL
MCV RBC AUTO: 92 FL
MONOCYTES # BLD AUTO: 0.5 K/UL
MONOCYTES NFR BLD: 8.7 %
NEUTROPHILS # BLD AUTO: 3.8 K/UL
NEUTROPHILS NFR BLD: 63.4 %
PHOSPHATE SERPL-MCNC: 2.5 MG/DL
PLATELET # BLD AUTO: 118 K/UL
PMV BLD AUTO: 11.3 FL
POCT GLUCOSE: 106 MG/DL (ref 70–110)
POCT GLUCOSE: 114 MG/DL (ref 70–110)
POCT GLUCOSE: 184 MG/DL (ref 70–110)
POCT GLUCOSE: 84 MG/DL (ref 70–110)
POTASSIUM SERPL-SCNC: 4 MMOL/L
PROT SERPL-MCNC: 5.3 G/DL
RBC # BLD AUTO: 3.11 M/UL
SODIUM SERPL-SCNC: 134 MMOL/L
WBC # BLD AUTO: 6.01 K/UL

## 2018-10-09 PROCEDURE — 25000242 PHARM REV CODE 250 ALT 637 W/ HCPCS: Performed by: STUDENT IN AN ORGANIZED HEALTH CARE EDUCATION/TRAINING PROGRAM

## 2018-10-09 PROCEDURE — 80053 COMPREHEN METABOLIC PANEL: CPT

## 2018-10-09 PROCEDURE — 85025 COMPLETE CBC W/AUTO DIFF WBC: CPT

## 2018-10-09 PROCEDURE — 83735 ASSAY OF MAGNESIUM: CPT

## 2018-10-09 PROCEDURE — 84100 ASSAY OF PHOSPHORUS: CPT

## 2018-10-09 PROCEDURE — 94761 N-INVAS EAR/PLS OXIMETRY MLT: CPT

## 2018-10-09 PROCEDURE — 99900035 HC TECH TIME PER 15 MIN (STAT)

## 2018-10-09 PROCEDURE — 63600175 PHARM REV CODE 636 W HCPCS: Performed by: STUDENT IN AN ORGANIZED HEALTH CARE EDUCATION/TRAINING PROGRAM

## 2018-10-09 PROCEDURE — 36415 COLL VENOUS BLD VENIPUNCTURE: CPT

## 2018-10-09 PROCEDURE — 25000003 PHARM REV CODE 250: Performed by: STUDENT IN AN ORGANIZED HEALTH CARE EDUCATION/TRAINING PROGRAM

## 2018-10-09 PROCEDURE — 94640 AIRWAY INHALATION TREATMENT: CPT

## 2018-10-09 RX ORDER — METRONIDAZOLE 500 MG/1
500 TABLET ORAL 3 TIMES DAILY
Qty: 42 TABLET | Refills: 0 | Status: SHIPPED | OUTPATIENT
Start: 2018-10-09 | End: 2018-10-23

## 2018-10-09 RX ORDER — CIPROFLOXACIN 500 MG/1
500 TABLET ORAL 2 TIMES DAILY
Qty: 28 TABLET | Refills: 0 | Status: SHIPPED | OUTPATIENT
Start: 2018-10-09 | End: 2018-10-23

## 2018-10-09 RX ADMIN — FLUTICASONE FUROATE AND VILANTEROL TRIFENATATE 1 PUFF: 100; 25 POWDER RESPIRATORY (INHALATION) at 07:10

## 2018-10-09 RX ADMIN — ESCITALOPRAM 10 MG: 10 TABLET, FILM COATED ORAL at 08:10

## 2018-10-09 RX ADMIN — DEXTROSE, SODIUM CHLORIDE, AND POTASSIUM CHLORIDE: 5; .45; .15 INJECTION INTRAVENOUS at 01:10

## 2018-10-09 RX ADMIN — PIPERACILLIN AND TAZOBACTAM 4.5 G: 4; .5 INJECTION, POWDER, LYOPHILIZED, FOR SOLUTION INTRAVENOUS; PARENTERAL at 01:10

## 2018-10-09 RX ADMIN — TAMSULOSIN HYDROCHLORIDE 0.4 MG: 0.4 CAPSULE ORAL at 08:10

## 2018-10-09 RX ADMIN — FINASTERIDE 5 MG: 5 TABLET, FILM COATED ORAL at 08:10

## 2018-10-09 RX ADMIN — FERROUS SULFATE TAB EC 325 MG (65 MG FE EQUIVALENT) 325 MG: 325 (65 FE) TABLET DELAYED RESPONSE at 08:10

## 2018-10-09 RX ADMIN — CLOBETASOL PROPIONATE: 0.5 CREAM TOPICAL at 08:10

## 2018-10-09 RX ADMIN — BUSPIRONE HYDROCHLORIDE 5 MG: 5 TABLET ORAL at 08:10

## 2018-10-09 RX ADMIN — GABAPENTIN 100 MG: 100 CAPSULE ORAL at 08:10

## 2018-10-09 RX ADMIN — PIPERACILLIN AND TAZOBACTAM 4.5 G: 4; .5 INJECTION, POWDER, LYOPHILIZED, FOR SOLUTION INTRAVENOUS; PARENTERAL at 08:10

## 2018-10-09 RX ADMIN — MEMANTINE HYDROCHLORIDE 5 MG: 5 TABLET ORAL at 08:10

## 2018-10-09 RX ADMIN — TIOTROPIUM BROMIDE 18 MCG: 18 CAPSULE ORAL; RESPIRATORY (INHALATION) at 07:10

## 2018-10-09 NOTE — PROGRESS NOTES
"[facility]  Gastroenterology  Progress Note    Patient Name: Fidel Allan  MRN: [id number]  Admission Date: 10/6/2018  Hospital Length of Stay: 3 days  Code Status: Full Code   Attending Provider: Severyn Yaroshevsky, MD  Consulting Provider: Chandan Hsu MD  Primary Care Physician: Memo Nieto MD  Principal Problem: Biliary obstruction      Subjective:     Interval History: Mr. Allan, an [employer] , reports feeling "great" this morning, noting immediate post-procedural improvement in symptoms yesterday. He is without fevers overnight, reports no complaints this morning and looks forward to returning to nursing home to see his wife of 50 years. He is in positive mood this morning and reports the secret to marriage is laughing and singing with his wife.    Review of Systems   Constitutional: Negative for chills and fever.   HENT: Negative for nosebleeds and trouble swallowing.    Respiratory: Negative for chest tightness and shortness of breath.    Gastrointestinal: Negative for abdominal pain, nausea and vomiting.     Objective:     Vital Signs (Most Recent):  Temp: 97.2 °F (36.2 °C) (10/09/18 0727)  Pulse: 60 (10/09/18 0727)  Resp: 18 (10/09/18 0727)  BP: (!) 140/65 (10/09/18 0727)  SpO2: (!) 93 % (10/09/18 0720) Vital Signs (24h Range):  Temp:  [97.2 °F (36.2 °C)-98.8 °F (37.1 °C)] 97.2 °F (36.2 °C)  Pulse:  [60-98] 60  Resp:  [16-20] 18  SpO2:  [93 %-96 %] 93 %  BP: (117-177)/(58-73) 140/65     Weight: 71.3 kg (157 lb 3 oz) (10/07/18 0500)  Body mass index is 23.9 kg/m².      Intake/Output Summary (Last 24 hours) at 10/9/2018 0823  Last data filed at 10/9/2018 0600  Gross per 24 hour   Intake 1725 ml   Output 907 ml   Net 818 ml       Lines/Drains/Airways     Peripheral Intravenous Line                 Peripheral IV - Single Lumen 10/06/18 1016 Left Forearm 2 days                Physical Exam   Constitutional: He is oriented to person, place, and time. He appears well-developed and " well-nourished.   HENT:   Head: Normocephalic and atraumatic.   Mouth/Throat: No oropharyngeal exudate.   Eyes: Conjunctivae and EOM are normal. Pupils are equal, round, and reactive to light.   Neck: Neck supple.   Cardiovascular: Normal rate and regular rhythm. Exam reveals no friction rub.   Well-healed sternal scar   Pulmonary/Chest: Effort normal and breath sounds normal. No respiratory distress. He has no wheezes.   Abdominal: Soft. Bowel sounds are normal. He exhibits no distension. There is no tenderness. There is no rebound and no guarding.   Musculoskeletal: Normal range of motion.   Neurological: He is alert and oriented to person, place, and time. Coordination normal.   Skin: Skin is warm and dry. No rash noted.   Psychiatric: He has a normal mood and affect. His behavior is normal.     Significant Labs:  CBC:   Recent Labs   Lab  10/08/18   0455  10/09/18   0453   WBC  8.05  6.01   HGB  9.7*  9.2*   HCT  30.8*  28.5*   PLT  117*  118*     Significant Imaging:  Imaging results within the past 24 hours have been reviewed. ERCP imaging reviewed and as per procedure note.    Assessment/Plan:     Choledocholithiasis    - S/p ERCP yesterday with successful sphincterotomy, papillary dilation, and balloon extraction for removal of impacted stone  - Remains afebrile with no leukocytosis and doing well clinically this morning, feeling resolution of symptoms  - would transition Zosyn to PO antibiotic when able and at discharge send with remainder of 10-14 day course of antibiotic coverage given purulence in duct identified during procedure. Recommend Augmentin if no other contraindications.  - Stable for disposition from GI perspective. Please notify with any questions.            Chandan Hsu MD PGY-4  Gastroenterology Fellow  [facility]

## 2018-10-09 NOTE — PLAN OF CARE
Patient admitted from [facility].  He reported that he has been a resident for the past four years.  Patient reported it is his preference to readmit to [facility].  He share a room with his wife.     made phone contact with patient's wife at [phone] confirming that patient is resident at [facility].  Wife informed patient is ready for readmission.  She agreed with plan.     attempted to make phone contact with daughter, in law Debbie [phone].  She was not available, left voice message.       attached clinicals and transfer facility orders in [facility] for admission coordinator, Marycarmen to review for readmission today.     made phone contact with Marycarmen at [facility] and confirmed orders have been received and reviewed.  Marycarmen gave permission for patient to readmit today and nurse to call report to Agnes [phone].  Also, Marycarmen gave permission for  to arrange [employer] wheelchair van transport via [facility] contract.     [facility] with [facility] and arranged 1:30pm wheelchair van transportation via [facility] contract.     10/09/18 1213   Final Note   Assessment Type Final Discharge Note   Discharge Disposition Nurse Fac [facility]   What phone number can be called within the next 1-3 days to see how you are doing after discharge? [phone]   Hospital Follow Up  Appt(s) scheduled? Yes   Discharge plans and expectations educations in teach back method with documentation complete? Yes   Right Care Referral Info   Post Acute Recommendation Other   Referral Type (nursing home)   Facility Name ([facility])   Street ([facility])   City, [facility] ([place], [place])

## 2018-10-09 NOTE — PLAN OF CARE
Problem: Patient Care Overview  Goal: Plan of Care Review  Outcome: Ongoing (interventions implemented as appropriate)  Pt on room air in no apparent distress.  MDI's times 2 tx. Given with good pt. Effort.  Will cont. To monitor.

## 2018-10-09 NOTE — DISCHARGE SUMMARY
Discharge Summary      Admit Date: 10/6/2018    Discharge Date and Time: 10/09/2018    Attending Physician: Severyn Yaroshevsky, MD     Discharge Physician: Kvng Moya MD     Principal Diagnoses: Biliary obstruction  The primary encounter diagnosis was Cholangitis. A diagnosis of Biliary obstruction was also pertinent to this visit.    Discharged Condition: stable    Hospital Course: Fidel Allan is a 89 y.o. 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 who presented with Biliary obstruction. He has an extensive medical hx of multiple prior biliary obstructions along with biliary pancreatitis. He was  transferred from an outside hospital for GI evaluation. Upon presentation to the ED, patient was c/o epigastric pain. He was afebrile and WBC was WNL. Patient was started on Zosyn and GI was consulted. Patient was hypotensive upon arrival to the ED, most likely due to morphine given on route--normalized with IV bolus. Initial CT showed a probable stone in the distal CBD with significant extrahepatic and intrahepatic ductal dilation.GI completed ERCP with removal of stone from CBD with resolution of his symptoms. He was discharged on oral cipro/flagyl and discharged after he tolerated a diet. Patient feeling significantly better with much improvement.       ERCP  Impression:           - Patent Billroth II gastrojejunostomy was found.                        - An impacted stone was seen in the major papilla.                        - The major papilla  appeared to be bulging.                        - Choledocholithiasis was found. Complete removal                         was accomplished by biliary sphincterotomy (precut                         fistulotomy), papillary dilation and balloon                         extraction.                        - Direct cholangiscopy after stone removal showed                         no residual CBD stones.    Significant Diagnostic Studies: labs:   Recent Labs   Lab  10/07/18   0516  10/08/18   0455  10/09/18   0453   WBC  11.87  8.05  6.01   HGB  9.7*  9.7*  9.2*   HCT  30.7*  30.8*  28.5*   PLT  141*  117*  118*     Recent Labs   Lab  10/07/18   0516  10/08/18   0455  10/09/18   0453   NA  139  135*  134*   K  4.3  4.3  4.0   CL  106  106  104   CO2  27  23  24   BUN  15  13  13   CREATININE  0.8  0.9  0.9   CALCIUM  8.5*  8.4*  8.3*   PROT  5.8*  5.5*  5.3*   BILITOT  2.7*  2.3*  2.8*   ALKPHOS  287*  259*  269*   ALT  85*  64*  56*   AST  131*  78*  59*     Recent Labs   Lab  10/09/18   0021  10/09/18   0422  10/09/18   0821   POCTGLUCOSE  114*  184*  84     Result Date: 10/7/2018  EXAMINATION: XR CHEST AP PORTABLE CLINICAL HISTORY: Unspecified abdominal pain TECHNIQUE: Single frontal view of the chest was performed. COMPARISON: 09/11/2018 FINDINGS: Right-sided cardiac pacemaker re-identified.  There is a small left pleural effusion with left basilar atelectasis.  There is no pneumothorax.  Cardiac silhouette is stable.  Postoperative changes are noted.     As above. Electronically signed by: Kadeem Hyatt MD Date:    10/07/2018 Time:    08:42    Surg Fl Surgery Fluoro Greater Than 1 Hour    Result Date: 9/13/2018  See OP Notes for results.     See OP Notes for results. This procedure was auto-finalized by: Virtual Radiologist     Ct Abdomen Pelvis  Without Contrast    Result Date: 10/7/2018  EXAMINATION: CT ABDOMEN PELVIS WITHOUT CONTRAST CLINICAL HISTORY: upper abdominal pain; TECHNIQUE: Low dose axial images, sagittal  and coronal reformations were obtained from the lung bases to the pubic symphysis.  Oral contrast was not administered. COMPARISON: 09/11/2018 FINDINGS: There is bibasilar atelectasis.  No pericardial or pleural effusion.  Cardiac leads are noted. Evaluation of solid organs limited by absence of intravenous contrast.  Punctate granulomas are seen within the liver and spleen.  There is significant intrahepatic and extrahepatic biliary dilatation with calcified stone in the distal common bile duct measuring approximately 1.2 cm, similar to prior study.  Gallbladder is absent.  Pancreas, spleen, and adrenal glands are unremarkable.  2.1 cm simple cyst noted at the left lower pole kidney.  No hydronephrosis. GI tract demonstrates no evidence for obstruction or inflammation. There is no retroperitoneal lymphadenopathy.  There is no ascites.  There is an infrarenal abdominal aortic aneurysm measuring up to 4.0 x 3.3 cm, unchanged. Regional osseous structures demonstrate no aggressive appearing lytic or blastic lesions.     1. Obstructive stone in the distal common bile duct with significant intrahepatic and extrahepatic biliary dilatation. 2. Stable appearance of infrarenal abdominal aortic aneurysm. Electronically signed by: Kadeem Hyatt MD Date:    10/07/2018 Time:    09:05    Ct Abdomen Pelvis  Without Contrast    Result Date: 9/12/2018  EXAMINATION: CT ABDOMEN PELVIS WITHOUT CONTRAST CLINICAL HISTORY: fever, history of pancreatitis, ercp; TECHNIQUE: Low dose axial images, sagittal and coronal reformations were obtained from the lung bases to the pubic symphysis.  CT without contrast FINDINGS: The visualized portion of the base of the lungs is significant for basilar atelectatic versus fibrotic change with several nodular foci of pleural thickening and diffuse centrilobular emphysema.  The visualized portion of the heart is significant for calcification of the aorta, aortic annulus, mitral annulus, and coronary  arteries.  There is partial visualization of cardiac defibrillator leads.  The stomach is unremarkable.  The spleen contains punctate calcifications consistent with prior granulomatous disease.  The pancreas is unremarkable.  The liver is significant for diffuse intra hepatic biliary dilatation.  The common duct is dilated and  contains a 1.1 cm hyperdense focus consistent with choledocholithiasis.  This was present on previous exam dated 08/21/2018.  The adrenal glands are unremarkable.  There is an infrarenal abdominal aortic aneurysm measuring 4 x 3.3 cm unchanged compared to previous CT.  There is scattered aortic calcification.  The kidneys are unremarkable.  The bladder is unremarkable.  The bowel is significant for diverticulosis coli.  There is a supraumbilical anterior abdominal wall hernia containing a short segment of the anterior wall of the transverse colon.  The osseous structures demonstrate degenerative change.     Intra and extrahepatic biliary ductal dilatation with a common duct filling defect consistent with choledocholithiasis which was present on previous CT dated 08/21/2018. Electronically signed by: Castillo Rosa MD Date:    09/12/2018 Time:    10:34    Radiology Report    Result Date: 10/6/2018  Ordered by an unspecified provider.    Radiology Report    Result Date: 10/6/2018  Ordered by an unspecified provider.    Radiology Report    Result Date: 9/11/2018  Ordered by an unspecified provider.    Treatments: IV hydration, antibiotics: Zosyn, Cipro and metronidazole, analgesia: acetaminophen and Morphine and procedures: ERCP with biliary stent placement    Disposition: Home or Self Care    Patient Instructions:   Current Discharge Medication List      START taking these medications    Details   ciprofloxacin HCl (CIPRO) 500 MG tablet Take 1 tablet (500 mg total) by mouth 2 (two) times daily. for 14 days  Qty: 28 tablet, Refills: 0      metroNIDAZOLE (FLAGYL) 500 MG tablet Take 1 tablet (500 mg  total) by mouth 3 (three) times daily. for 14 days  Qty: 42 tablet, Refills: 0         CONTINUE these medications which have NOT CHANGED    Details   acetaminophen (TYLENOL) 325 MG tablet Take 2 tablets (650 mg total) by mouth every 6 (six) hours as needed for Pain or Temperature greater than (100).  Refills: 0      albuterol sulfate 2.5 mg/0.5 mL Nebu Take 2.5 mg by nebulization every 4 (four) hours as needed. Rescue      aluminum & magnesium hydroxide-simethicone (MYLANTA MAX STRENGTH) 400-400-40 mg/5 mL suspension Take 30 mLs by mouth every 6 (six) hours as needed for Indigestion.  Refills: 0      aspirin (ECOTRIN) 81 MG EC tablet Take 81 mg by mouth once daily.      benzonatate (TESSALON) 100 MG capsule Take 100 mg by mouth 3 (three) times daily as needed for Cough.      BREO ELLIPTA 100-25 mcg/dose diskus inhaler Inhale 1 puff into the lungs once daily.       busPIRone (BUSPAR) 5 MG Tab Take 5 mg by mouth 2 (two) times daily.      clobetasol 0.05% (TEMOVATE) 0.05 % Oint daily as needed. To face and neck      cyanocobalamin (VITAMIN B-12) 1000 MCG tablet Take 1,000 mcg by mouth once daily.       docusate sodium (COLACE) 100 MG capsule Take 100 mg by mouth 2 (two) times daily.      donepezil (ARICEPT) 10 MG tablet Take 10 mg by mouth every evening.      escitalopram oxalate (LEXAPRO) 10 MG tablet Take 10 mg by mouth every morning.       ferrous sulfate 325 (65 FE) MG EC tablet Take 325 mg by mouth 2 (two) times daily.      finasteride (PROSCAR) 5 mg tablet Take 1 tablet (5 mg total) by mouth once daily.  Qty: 30 tablet, Refills: 11      fish oil-omega-3 fatty acids 300-1,000 mg capsule Take 1 g by mouth every morning.       gabapentin (NEURONTIN) 100 MG capsule Take 100 mg by mouth 3 (three) times daily.      ketoconazole (NIZORAL) 2 % shampoo       latanoprost 0.005 % ophthalmic solution Place 1 drop into both eyes every evening.       lisinopril (PRINIVIL,ZESTRIL) 5 MG tablet Take 2 tablets (10 mg total) by  mouth every morning. Hold for SBP < 120      LORazepam (ATIVAN) 0.5 MG tablet Take 1 tablet (0.5 mg total) by mouth 2 (two) times daily as needed for Anxiety.  Qty: 10 tablet, Refills: 0      magnesium hydroxide 400 mg/5 ml (MILK OF MAGNESIA) 400 mg/5 mL Susp Take 30 mLs by mouth every evening.       mirtazapine (REMERON) 15 MG tablet Take 15 mg by mouth every evening.       multivitamin (ONE DAILY MULTIVITAMIN) per tablet Take 1 tablet by mouth once daily.      NAMENDA XR 14 mg CSpX Take 14 mg by mouth once daily.       NITROSTAT 0.3 mg SL tablet Place 0.3 mg under the tongue every 5 (five) minutes as needed for Chest pain.       omeprazole (PRILOSEC) 40 MG capsule Take 40 mg by mouth every morning.      pravastatin (PRAVACHOL) 40 MG tablet Take 40 mg by mouth every evening.       tamsulosin (FLOMAX) 0.4 mg Cp24 Take 1 capsule (0.4 mg total) by mouth once daily.  Qty: 30 capsule, Refills: 3      tiotropium (SPIRIVA) 18 mcg inhalation capsule Inhale 1 capsule (18 mcg total) into the lungs once daily. Controller  Refills: 0         STOP taking these medications       HYDROcodone-acetaminophen (NORCO)  mg per tablet Comments:   Reason for Stopping:               Discharge Procedure Orders   Ambulatory referral to Gastroenterology   Referral Priority: Routine Referral Type: Consultation   Referral Reason: Specialty Services Required   Requested Specialty: Gastroenterology   Number of Visits Requested: 1     Diet Cardiac     Notify your health care provider if you experience any of the following:  temperature >100.4     Notify your health care provider if you experience any of the following:  persistent nausea and vomiting or diarrhea     Notify your health care provider if you experience any of the following:  severe uncontrolled pain     Notify your health care provider if you experience any of the following:  increased confusion or weakness     Notify your health care provider if you experience any of the  following:  persistent dizziness, light-headedness, or visual disturbances     Notify your health care provider if you experience any of the following:  worsening rash     Notify your health care provider if you experience any of the following:  severe persistent headache     Activity as tolerated   >30 minutes spent discharging this patient.    Kvng Moya MD   10/09/2018  8:05 AM

## 2018-10-09 NOTE — ASSESSMENT & PLAN NOTE
- S/p ERCP yesterday with successful sphincterotomy, papillary dilation, and balloon extraction for removal of impacted stone  - Remains afebrile with no leukocytosis and doing well clinically this morning, feeling resolution of symptoms  - would transition Zosyn to PO antibiotic when able and at discharge send with remainder of 10-14 day course of antibiotic coverage given purulence in duct identified during procedure. Recommend Augmentin if no other contraindications.  - Stable for disposition from GI perspective. Please notify with any questions.

## 2018-10-09 NOTE — PROGRESS NOTES
Report called to [facility].  IV accesses removed.  Patient denies pain.  Awaiting for  to go to [facility].

## 2018-10-09 NOTE — PLAN OF CARE
[facility]    FACILITY TRANSFER ORDERS      Patient Name: Fidel Allan  YOB: 1927    PCP: Memo Nieto MD   PCP Address: [address] [place] [address] [address] / [place] [address]  PCP Phone Number: [phone]  PCP Fax: [phone]    Encounter Date: 10/09/2018    Admit to: [facility]    Vital Signs:  Routine    Diagnoses:   Active Hospital Problems    Diagnosis  POA    *Biliary obstruction [K83.1]  Yes    Choledocholithiasis [K80.50]  Yes    Dysphagia [R13.10]  Yes    Anemia of chronic disease [D63.8]  Yes     Chronic    Cholangitis [K83.09]  Unknown    Coronary artery disease involving coronary bypass graft of native heart without angina pectoris [I25.810]  Yes     Chronic    Essential hypertension [I10]  Yes     Chronic    Nursing home resident [Z59.3]  Not Applicable     Chronic     [facility] ([address], [place] [address])      CKD Stage 3 [N18.3]  Yes     Chronic    Vascular dementia [F01.50]  Yes     Chronic    COPD (chronic obstructive pulmonary disease) [J44.9]  Yes     Chronic    HLD (hyperlipidemia) [E78.5]  Yes     Chronic    GERD (gastroesophageal reflux disease) [K21.9]  Yes     Chronic    Moderate aortic stenosis [I35.0]  Yes     Chronic      Resolved Hospital Problems   No resolved problems to display.       Allergies:Review of patient's allergies indicates:  No Known Allergies    Diet: regular diet    Activities: Activity as tolerated    CONSULTS:    Physical Therapy to evaluate and treat. , Occupational Therapy to evaluate and treat. and  to evaluate for community resources/long-range planning.    WOUND CARE ORDERS  None    Medications: Review discharge medications with patient and family and provide education.      Current Discharge Medication List      START taking these medications    Details   ciprofloxacin HCl (CIPRO) 500 MG tablet Take 1 tablet (500 mg total) by mouth 2 (two) times daily. for 14 days  Qty: 28  tablet, Refills: 0      metroNIDAZOLE (FLAGYL) 500 MG tablet Take 1 tablet (500 mg total) by mouth 3 (three) times daily. for 14 days  Qty: 42 tablet, Refills: 0         CONTINUE these medications which have NOT CHANGED    Details   acetaminophen (TYLENOL) 325 MG tablet Take 2 tablets (650 mg total) by mouth every 6 (six) hours as needed for Pain or Temperature greater than (100).  Refills: 0      albuterol sulfate 2.5 mg/0.5 mL Nebu Take 2.5 mg by nebulization every 4 (four) hours as needed. Rescue      aluminum & magnesium hydroxide-simethicone (MYLANTA MAX STRENGTH) 400-400-40 mg/5 mL suspension Take 30 mLs by mouth every 6 (six) hours as needed for Indigestion.  Refills: 0      aspirin (ECOTRIN) 81 MG EC tablet Take 81 mg by mouth once daily.      benzonatate (TESSALON) 100 MG capsule Take 100 mg by mouth 3 (three) times daily as needed for Cough.      BREO ELLIPTA 100-25 mcg/dose diskus inhaler Inhale 1 puff into the lungs once daily.       busPIRone (BUSPAR) 5 MG Tab Take 5 mg by mouth 2 (two) times daily.      clobetasol 0.05% (TEMOVATE) 0.05 % Oint daily as needed. To face and neck      cyanocobalamin (VITAMIN B-12) 1000 MCG tablet Take 1,000 mcg by mouth once daily.       docusate sodium (COLACE) 100 MG capsule Take 100 mg by mouth 2 (two) times daily.      donepezil (ARICEPT) 10 MG tablet Take 10 mg by mouth every evening.      escitalopram oxalate (LEXAPRO) 10 MG tablet Take 10 mg by mouth every morning.       ferrous sulfate 325 (65 FE) MG EC tablet Take 325 mg by mouth 2 (two) times daily.      finasteride (PROSCAR) 5 mg tablet Take 1 tablet (5 mg total) by mouth once daily.  Qty: 30 tablet, Refills: 11      fish oil-omega-3 fatty acids 300-1,000 mg capsule Take 1 g by mouth every morning.       gabapentin (NEURONTIN) 100 MG capsule Take 100 mg by mouth 3 (three) times daily.      ketoconazole (NIZORAL) 2 % shampoo       latanoprost 0.005 % ophthalmic solution Place 1 drop into both eyes every evening.        lisinopril (PRINIVIL,ZESTRIL) 5 MG tablet Take 2 tablets (10 mg total) by mouth every morning. Hold for SBP < 120      LORazepam (ATIVAN) 0.5 MG tablet Take 1 tablet (0.5 mg total) by mouth 2 (two) times daily as needed for Anxiety.  Qty: 10 tablet, Refills: 0      magnesium hydroxide 400 mg/5 ml (MILK OF MAGNESIA) 400 mg/5 mL Susp Take 30 mLs by mouth every evening.       mirtazapine (REMERON) 15 MG tablet Take 15 mg by mouth every evening.       multivitamin (ONE DAILY MULTIVITAMIN) per tablet Take 1 tablet by mouth once daily.      NAMENDA XR 14 mg CSpX Take 14 mg by mouth once daily.       NITROSTAT 0.3 mg SL tablet Place 0.3 mg under the tongue every 5 (five) minutes as needed for Chest pain.       omeprazole (PRILOSEC) 40 MG capsule Take 40 mg by mouth every morning.      pravastatin (PRAVACHOL) 40 MG tablet Take 40 mg by mouth every evening.       tamsulosin (FLOMAX) 0.4 mg Cp24 Take 1 capsule (0.4 mg total) by mouth once daily.  Qty: 30 capsule, Refills: 3      tiotropium (SPIRIVA) 18 mcg inhalation capsule Inhale 1 capsule (18 mcg total) into the lungs once daily. Controller  Refills: 0         STOP taking these medications       HYDROcodone-acetaminophen (NORCO)  mg per tablet Comments:   Reason for Stopping:               _________________________________  Compa Aguilar MD  10/09/2018

## 2018-10-09 NOTE — SUBJECTIVE & OBJECTIVE
"  Subjective:     Interval History: Mr. Allan, an [employer] , reports feeling "great" this morning, noting immediate post-procedural improvement in symptoms yesterday. He is without fevers overnight, reports no complaints this morning and looks forward to returning to nursing home to see his wife of 50 years. He is in positive mood this morning and reports the secret to marriage is laughing and singing with his wife.    Review of Systems   Constitutional: Negative for chills and fever.   HENT: Negative for nosebleeds and trouble swallowing.    Respiratory: Negative for chest tightness and shortness of breath.    Gastrointestinal: Negative for abdominal pain, nausea and vomiting.     Objective:     Vital Signs (Most Recent):  Temp: 97.2 °F (36.2 °C) (10/09/18 0727)  Pulse: 60 (10/09/18 0727)  Resp: 18 (10/09/18 0727)  BP: (!) 140/65 (10/09/18 0727)  SpO2: (!) 93 % (10/09/18 0720) Vital Signs (24h Range):  Temp:  [97.2 °F (36.2 °C)-98.8 °F (37.1 °C)] 97.2 °F (36.2 °C)  Pulse:  [60-98] 60  Resp:  [16-20] 18  SpO2:  [93 %-96 %] 93 %  BP: (117-177)/(58-73) 140/65     Weight: 71.3 kg (157 lb 3 oz) (10/07/18 0500)  Body mass index is 23.9 kg/m².      Intake/Output Summary (Last 24 hours) at 10/9/2018 0823  Last data filed at 10/9/2018 0600  Gross per 24 hour   Intake 1725 ml   Output 907 ml   Net 818 ml       Lines/Drains/Airways     Peripheral Intravenous Line                 Peripheral IV - Single Lumen 10/06/18 1016 Left Forearm 2 days                Physical Exam   Constitutional: He is oriented to person, place, and time. He appears well-developed and well-nourished.   HENT:   Head: Normocephalic and atraumatic.   Mouth/Throat: No oropharyngeal exudate.   Eyes: Conjunctivae and EOM are normal. Pupils are equal, round, and reactive to light.   Neck: Neck supple.   Cardiovascular: Normal rate and regular rhythm. Exam reveals no friction rub.   Well-healed sternal scar   Pulmonary/Chest: Effort normal and breath " sounds normal. No respiratory distress. He has no wheezes.   Abdominal: Soft. Bowel sounds are normal. He exhibits no distension. There is no tenderness. There is no rebound and no guarding.   Musculoskeletal: Normal range of motion.   Neurological: He is alert and oriented to person, place, and time. Coordination normal.   Skin: Skin is warm and dry. No rash noted.   Psychiatric: He has a normal mood and affect. His behavior is normal.     Significant Labs:  CBC:   Recent Labs   Lab  10/08/18   0455  10/09/18   0453   WBC  8.05  6.01   HGB  9.7*  9.2*   HCT  30.8*  28.5*   PLT  117*  118*     Significant Imaging:  Imaging results within the past 24 hours have been reviewed. ERCP imaging reviewed and as per procedure note.

## 2018-10-09 NOTE — PLAN OF CARE
"Problem: Patient Care Overview  Goal: Plan of Care Review  Outcome: Ongoing (interventions implemented as appropriate)  Patient "Mr. Dela Cruz" has been stable over night. Vital signs are within normal limits. IV fluids and IV antibiotics given. Tolerating food and fluids. Voided freely. Slept fairly well during night. Anticipated discharge today. Denies any pain. Will continue to monitor patient.      "

## 2018-10-09 NOTE — PROGRESS NOTES
"Progress Note  [facility]  Admit Date: 10/6/2018   LOS: 3 days   SUBJECTIVE:   Follow-up For: biliary obstruction     Patient seen and examined this AM. Reports feeling "like a new man" without any complaints. Tolerating a regular diet, and reports total improvement in epigastric pain. No chest pain, SOB, N/V/D.     ROS    Review of Systems:  Constitutional: no fever or chills  Eyes: no visual changes  Respiratory: no cough or shortness of breath  Cardiovascular: no chest pain   Gastrointestinal: no nausea or vomiting; no abdominal pain   Genitourinary: +urination   Neurological: no new focal weakness      OBJECTIVE:   Vital Signs (Most Recent)  Temp: 97.2 °F (36.2 °C) (10/09/18 0727)  Pulse: 60 (10/09/18 0727)  Resp: 18 (10/09/18 0727)  BP: (!) 140/65 (10/09/18 0727)  SpO2: (!) 93 % (10/09/18 0720)    I & O (Last 24H):    Intake/Output Summary (Last 24 hours) at 10/9/2018 0836  Last data filed at 10/9/2018 0600  Gross per 24 hour   Intake 1725 ml   Output 907 ml   Net 818 ml     Wt Readings from Last 3 Encounters:   10/07/18 71.3 kg (157 lb 3 oz)   10/06/18 66.2 kg (146 lb)   10/06/18 66.2 kg (145 lb 15.1 oz)       Current Diet Order   Procedures    Diet Adult Regular (IDDSI Level 7)    Diet Cardiac    Diet Cardiac        Physical Exam  Physical Exam:  General: well developed, well nourished  Eyes: conjunctivae/corneas clear.   Lungs:  clear to auscultation bilaterally and normal respiratory effort  Cardiovascular: Heart: regular rate and rhythm, S1, S2 normal, no murmur, click, rub or gallop. Chest Wall: no tenderness.   Abdomen/Rectal: Abdomen: soft, non-tender non-distented; bowel sounds normal; no masses,  no organomegaly.    Musculoskeletal: no LE edema or calf tenderness  Neurologic: AO, no change in mental status  Psych/Behavioral:  Alert and oriented, appropriate affect.        Laboratory Data:  CBC  Recent Labs   Lab  10/07/18   0516  10/08/18   0455  10/09/18   0453   WBC  11.87  8.05  6.01 "   RBC  3.27*  3.31*  3.11*   HGB  9.7*  9.7*  9.2*   HCT  30.7*  30.8*  28.5*   PLT  141*  117*  118*   MCV  94  93  92   MCH  29.7  29.3  29.6   MCHC  31.6*  31.5*  32.3     CMP  Recent Labs   Lab  10/07/18   0516  10/08/18   0455  10/09/18   0453   CALCIUM  8.5*  8.4*  8.3*   PROT  5.8*  5.5*  5.3*   NA  139  135*  134*   K  4.3  4.3  4.0   CO2  27  23  24   CL  106  106  104   BUN  15  13  13   CREATININE  0.8  0.9  0.9   ALKPHOS  287*  259*  269*   ALT  85*  64*  56*   AST  131*  78*  59*   BILITOT  2.7*  2.3*  2.8*     POCT-Glucose  POCT Glucose   Date Value Ref Range Status   10/09/2018 84 70 - 110 mg/dL Final   10/09/2018 184 (H) 70 - 110 mg/dL Final   10/09/2018 114 (H) 70 - 110 mg/dL Final   10/08/2018 128 (H) 70 - 110 mg/dL Final   10/08/2018 111 (H) 70 - 110 mg/dL Final   10/08/2018 113 (H) 70 - 110 mg/dL Final   10/08/2018 111 (H) 70 - 110 mg/dL Final   10/07/2018 128 (H) 70 - 110 mg/dL Final   10/07/2018 107 70 - 110 mg/dL Final   10/07/2018 109 70 - 110 mg/dL Final   10/07/2018 115 (H) 70 - 110 mg/dL Final     COA  Recent Labs   Lab  10/06/18   1037  10/06/18   2002   INR  1.0  1.1   APTT  29.3  26.9       ASSESSMENT/PLAN:   Fidel Allan is a 89 y.o. male with hx HTN, HLD, CAD s/p CABG, CKD3, COPD, and Hx of choledocholithiasis/cholangitits/biliary pancreatitis with ercp and stone removal x 2 in 8/18 and 9/18 who was transferred from OSH for GI work up and possible EUS     Bilary Obstruction/Cholangitis   - CT ab with choledocholithiasis with significant extrahepatic or intrahepatic ductal dilation  - Patient reporting epigastric pain  - AF, WBC wnl   - Started on Zosyn in ED, will continue  - GI consulted -completed ERCP yesterday with removal of stone from CBD, recommends oral  cipro/flagyl out patient for 10 days       Essential HTN  - hypotensive on arrival likely 2/2 morphine given enroute by ems  - IVF bolus given with appropriate response  -initially held home BP meds for hypotension  -BP now  160's/70's  -Restarted home BP medications and current BP is stable at 140/65      HLD  -continue current management    CAD s/p CABG  -continue current management    CKD3  -continue current management    COPD  -continue current management    Dispo: discharge to nursing home   PPx: Selvin glover, SCD's          10/9/2018 Sunitha Kathleen MD  8:36 AM

## 2018-11-01 ENCOUNTER — TELEPHONE (OUTPATIENT)
Dept: ENDOSCOPY | Facility: HOSPITAL | Age: 83
End: 2018-11-01

## 2018-11-01 NOTE — TELEPHONE ENCOUNTER
Message   Received: Today   Message Contents   MD Sruthi Naqvi MA   Caller: Unspecified (Today,  1:37 PM)             He does not need any f/u with us. The stone was removed.      Left message for Chai

## 2018-11-01 NOTE — TELEPHONE ENCOUNTER
----- Message from Krysta Brannon sent at 11/1/2018 11:33 AM CDT -----  Contact: Marlen Gerber- [phone]  Camryn Paula called to schedule another ERCP for the pt- please contact her at [phone]

## 2018-12-20 PROBLEM — I25.709 CORONARY ARTERY DISEASE INVOLVING CORONARY BYPASS GRAFT WITH ANGINA PECTORIS: Status: ACTIVE | Noted: 2018-12-20

## 2019-01-24 PROBLEM — J18.9 PNEUMONIA OF LEFT LOWER LOBE DUE TO INFECTIOUS ORGANISM: Status: RESOLVED | Noted: 2018-01-02 | Resolved: 2019-01-24

## 2019-01-24 PROBLEM — R31.9 HEMATURIA: Status: RESOLVED | Noted: 2018-02-16 | Resolved: 2019-01-24

## 2019-01-24 PROBLEM — R78.81 BACTEREMIA DUE TO STREPTOCOCCUS: Status: RESOLVED | Noted: 2017-12-17 | Resolved: 2019-01-24

## 2019-01-24 PROBLEM — R33.9 URINARY RETENTION: Status: RESOLVED | Noted: 2018-01-03 | Resolved: 2019-01-24

## 2019-01-24 PROBLEM — B95.5 BACTEREMIA DUE TO STREPTOCOCCUS: Status: RESOLVED | Noted: 2017-12-17 | Resolved: 2019-01-24

## 2019-01-24 PROBLEM — R74.8 ABNORMAL LIVER ENZYMES: Status: RESOLVED | Noted: 2017-12-18 | Resolved: 2019-01-24

## 2019-01-24 PROBLEM — D69.6 THROMBOCYTOPENIA: Status: RESOLVED | Noted: 2017-01-29 | Resolved: 2019-01-24

## 2019-01-24 PROBLEM — R13.10 DYSPHAGIA: Status: RESOLVED | Noted: 2018-01-07 | Resolved: 2019-01-24

## 2019-01-24 PROBLEM — N47.2 PARAPHIMOSIS: Status: RESOLVED | Noted: 2018-03-28 | Resolved: 2019-01-24

## 2019-02-18 ENCOUNTER — OFFICE VISIT (OUTPATIENT)
Dept: UROLOGY | Facility: CLINIC | Age: 84
End: 2019-02-18
Payer: MEDICARE

## 2019-02-18 VITALS
BODY MASS INDEX: 19.61 KG/M2 | OXYGEN SATURATION: 97 % | SYSTOLIC BLOOD PRESSURE: 102 MMHG | HEART RATE: 60 BPM | RESPIRATION RATE: 18 BRPM | WEIGHT: 137 LBS | HEIGHT: 70 IN | DIASTOLIC BLOOD PRESSURE: 57 MMHG

## 2019-02-18 DIAGNOSIS — R39.11 BENIGN PROSTATIC HYPERPLASIA WITH URINARY HESITANCY: Primary | Chronic | ICD-10-CM

## 2019-02-18 DIAGNOSIS — N40.1 BENIGN PROSTATIC HYPERPLASIA WITH URINARY HESITANCY: Primary | Chronic | ICD-10-CM

## 2019-02-18 DIAGNOSIS — R35.1 NOCTURIA: ICD-10-CM

## 2019-02-18 LAB
BILIRUB SERPL-MCNC: NORMAL MG/DL
BLOOD URINE, POC: NORMAL
COLOR, POC UA: YELLOW
GLUCOSE UR QL STRIP: NORMAL
KETONES UR QL STRIP: NORMAL
LEUKOCYTE ESTERASE URINE, POC: NORMAL
NITRITE, POC UA: NORMAL
PH, POC UA: 6
PROTEIN, POC: NORMAL
SPECIFIC GRAVITY, POC UA: 1
UROBILINOGEN, POC UA: 0.2

## 2019-02-18 PROCEDURE — 99212 PR OFFICE/OUTPT VISIT, EST, LEVL II, 10-19 MIN: ICD-10-PCS | Mod: S$PBB,,, | Performed by: NURSE PRACTITIONER

## 2019-02-18 PROCEDURE — 87086 URINE CULTURE/COLONY COUNT: CPT

## 2019-02-18 PROCEDURE — 99999 PR PBB SHADOW E&M-EST. PATIENT-LVL V: ICD-10-PCS | Mod: PBBFAC,,, | Performed by: NURSE PRACTITIONER

## 2019-02-18 PROCEDURE — 81002 URINALYSIS NONAUTO W/O SCOPE: CPT | Mod: PBBFAC,PO | Performed by: NURSE PRACTITIONER

## 2019-02-18 PROCEDURE — 99999 PR PBB SHADOW E&M-EST. PATIENT-LVL V: CPT | Mod: PBBFAC,,, | Performed by: NURSE PRACTITIONER

## 2019-02-18 PROCEDURE — 99215 OFFICE O/P EST HI 40 MIN: CPT | Mod: PBBFAC,PO | Performed by: NURSE PRACTITIONER

## 2019-02-18 PROCEDURE — 99212 OFFICE O/P EST SF 10 MIN: CPT | Mod: S$PBB,,, | Performed by: NURSE PRACTITIONER

## 2019-02-18 RX ORDER — ALPRAZOLAM 0.25 MG/1
TABLET ORAL
Status: ON HOLD | COMMUNITY
Start: 2019-02-13 | End: 2020-07-28 | Stop reason: HOSPADM

## 2019-02-18 NOTE — PROGRESS NOTES
Subjective:       Patient ID: Fidel Allan is a 89 y.o. male.    Chief Complaint: Benign Prostatic Hypertrophy and Prostate Cancer (check up)    Patient is a 89 yo WM who is here today for a BPH follow-up. He is here today with Breanna (transportation). Patient is a resident at [facility]. He is currently taking tamsulosin and finasteride for his BPH. His last PSA was 0.03 ng/mL on 10/24/2018. Patient has no complaints at this time.       Benign Prostatic Hypertrophy   This is a chronic problem. The problem is unchanged. Irritative symptoms include nocturia (x3-4). Irritative symptoms do not include frequency or urgency. Obstructive symptoms do not include dribbling, incomplete emptying, straining or a weak stream. Pertinent negatives include no chills, dysuria, genital pain, hematuria, hesitancy, nausea or vomiting. Nothing aggravates the symptoms. Past treatments include finasteride. The treatment provided significant relief. He has been using treatment for 2 or more years.     Review of Systems   Constitutional: Negative for appetite change, chills and fever.   Respiratory: Positive for shortness of breath.    Gastrointestinal: Negative for abdominal pain, constipation, diarrhea, nausea and vomiting.   Genitourinary: Positive for nocturia (x3-4). Negative for decreased urine volume, difficulty urinating, discharge, dysuria, flank pain, frequency, hematuria, hesitancy, incomplete emptying, penile pain, penile swelling, scrotal swelling, testicular pain and urgency.   Psychiatric/Behavioral: Negative.        Objective:      Physical Exam   Constitutional: He is oriented to person, place, and time. He appears well-developed and well-nourished. No distress.   HENT:   Head: Normocephalic and atraumatic.   Eyes: EOM are normal. Pupils are equal, round, and reactive to light.   Neck: Normal range of motion.   Cardiovascular: Normal rate.   Pulmonary/Chest: Effort normal. No respiratory distress.   Currently on 2  liters NC   Abdominal: Soft. There is no tenderness.   Musculoskeletal: Normal range of motion.   Currently in wheelchair.   Neurological: He is alert and oriented to person, place, and time. Coordination normal.   Skin: Skin is warm and dry.   Psychiatric: He has a normal mood and affect. His behavior is normal. Judgment and thought content normal.   Nursing note and vitals reviewed.      Assessment:       1. Benign prostatic hyperplasia with urinary hesitancy    2. Nocturia        Plan:       Fidel was seen today for benign prostatic hypertrophy and prostate cancer.    Diagnoses and all orders for this visit:    Benign prostatic hyperplasia with urinary hesitancy  -     POCT URINE DIPSTICK WITHOUT MICROSCOPE  -     Urine culture    Nocturia  -     POCT URINE DIPSTICK WITHOUT MICROSCOPE  -     Urine culture    Other order  1. Continue finasteride 5 mg daily and tamsulosin 0.4 mg daily for BPH    Follow-up in 6 months.     Harjit Watson NP

## 2019-02-18 NOTE — PATIENT INSTRUCTIONS
1. Urine dipstick  2. Urine culture  3. Continue finasteride 5 mg daily and tamsulosin 0.4 mg daily for BPH  4. Follow-up in 6 months.

## 2019-02-19 LAB — BACTERIA UR CULT: NO GROWTH

## 2019-08-19 ENCOUNTER — OFFICE VISIT (OUTPATIENT)
Dept: UROLOGY | Facility: CLINIC | Age: 84
End: 2019-08-19
Payer: MEDICARE

## 2019-08-19 VITALS
OXYGEN SATURATION: 97 % | HEIGHT: 70 IN | SYSTOLIC BLOOD PRESSURE: 158 MMHG | WEIGHT: 140 LBS | RESPIRATION RATE: 17 BRPM | BODY MASS INDEX: 20.04 KG/M2 | DIASTOLIC BLOOD PRESSURE: 73 MMHG | HEART RATE: 60 BPM

## 2019-08-19 DIAGNOSIS — R39.198 SLOW URINARY STREAM: ICD-10-CM

## 2019-08-19 DIAGNOSIS — R39.11 BENIGN PROSTATIC HYPERPLASIA WITH URINARY HESITANCY: Primary | Chronic | ICD-10-CM

## 2019-08-19 DIAGNOSIS — R35.1 NOCTURIA: ICD-10-CM

## 2019-08-19 DIAGNOSIS — N40.1 BENIGN PROSTATIC HYPERPLASIA WITH URINARY HESITANCY: Primary | Chronic | ICD-10-CM

## 2019-08-19 LAB
BILIRUB UR QL STRIP: NEGATIVE
CLARITY UR REFRACT.AUTO: CLEAR
COLOR UR AUTO: YELLOW
GLUCOSE UR QL STRIP: NEGATIVE
HGB UR QL STRIP: NEGATIVE
KETONES UR QL STRIP: NEGATIVE
LEUKOCYTE ESTERASE UR QL STRIP: NEGATIVE
NITRITE UR QL STRIP: NEGATIVE
PH UR STRIP: 8 [PH] (ref 5–8)
PROT UR QL STRIP: NEGATIVE
SP GR UR STRIP: 1 (ref 1–1.03)
URN SPEC COLLECT METH UR: NORMAL

## 2019-08-19 PROCEDURE — 87088 URINE BACTERIA CULTURE: CPT

## 2019-08-19 PROCEDURE — 99215 OFFICE O/P EST HI 40 MIN: CPT | Mod: PBBFAC,PO | Performed by: NURSE PRACTITIONER

## 2019-08-19 PROCEDURE — 99213 OFFICE O/P EST LOW 20 MIN: CPT | Mod: S$PBB,,, | Performed by: NURSE PRACTITIONER

## 2019-08-19 PROCEDURE — 87077 CULTURE AEROBIC IDENTIFY: CPT

## 2019-08-19 PROCEDURE — 87186 SC STD MICRODIL/AGAR DIL: CPT

## 2019-08-19 PROCEDURE — 99999 PR PBB SHADOW E&M-EST. PATIENT-LVL V: ICD-10-PCS | Mod: PBBFAC,,, | Performed by: NURSE PRACTITIONER

## 2019-08-19 PROCEDURE — 87086 URINE CULTURE/COLONY COUNT: CPT

## 2019-08-19 PROCEDURE — 81003 URINALYSIS AUTO W/O SCOPE: CPT

## 2019-08-19 PROCEDURE — 99213 PR OFFICE/OUTPT VISIT, EST, LEVL III, 20-29 MIN: ICD-10-PCS | Mod: S$PBB,,, | Performed by: NURSE PRACTITIONER

## 2019-08-19 PROCEDURE — 99999 PR PBB SHADOW E&M-EST. PATIENT-LVL V: CPT | Mod: PBBFAC,,, | Performed by: NURSE PRACTITIONER

## 2019-08-19 NOTE — PROGRESS NOTES
Subjective:       Patient ID: Fidel Allan is a 89 y.o. male.    Chief Complaint: Benign Prostatic Hypertrophy    Patient is a 89 yo WM who is here today for a BPH follow-up. He is here today with Rahat (transportation). He is currently taking tamsulosin and finasteride for his BPH. His last PSA was 0.03 ng/mL on 10/24/2018. Patient has no complaints at this time. Patient is a resident at [facility].     Benign Prostatic Hypertrophy   This is a chronic problem. The current episode started more than 1 year ago. The problem is unchanged. Irritative symptoms include nocturia. Irritative symptoms do not include frequency or urgency. Obstructive symptoms include dribbling. Obstructive symptoms do not include incomplete emptying or straining. Pertinent negatives include no chills, dysuria, genital pain, hematuria, hesitancy, nausea or vomiting. He is not sexually active. Nothing aggravates the symptoms. Past treatments include finasteride and tamsulosin. The treatment provided moderate relief.     Review of Systems   Constitutional: Negative for appetite change, chills and fever.   Gastrointestinal: Negative for abdominal pain, constipation, diarrhea, nausea and vomiting.   Genitourinary: Positive for nocturia. Negative for decreased urine volume, difficulty urinating, discharge, dysuria, flank pain, frequency, hematuria, hesitancy, incomplete emptying, penile pain, penile swelling, scrotal swelling, testicular pain and urgency.   Neurological: Positive for weakness. Negative for dizziness and headaches.   Psychiatric/Behavioral: Negative.        Objective:      Physical Exam   Constitutional: He is oriented to person, place, and time. He appears well-developed and well-nourished. No distress.   HENT:   Head: Normocephalic and atraumatic.   Eyes: Pupils are equal, round, and reactive to light. EOM are normal.   Neck: Normal range of motion.   Cardiovascular: Normal rate.   Pulmonary/Chest: Effort normal. No  respiratory distress.   Currently on 2 liters NC.   Abdominal: Soft. There is no tenderness.   Musculoskeletal: Normal range of motion. He exhibits no edema.   Currently in wheelchair.   Neurological: He is alert and oriented to person, place, and time. Coordination normal.   Skin: Skin is warm and dry.   Psychiatric: He has a normal mood and affect. His behavior is normal. Judgment and thought content normal.   Nursing note and vitals reviewed.      Assessment:       1. Benign prostatic hyperplasia with urinary hesitancy    2. Nocturia    3. Slow urinary stream    4. Nursing home resident        Plan:       Fidel was seen today for benign prostatic hypertrophy.    Diagnoses and all orders for this visit:    Benign prostatic hyperplasia with urinary hesitancy    Nocturia  -     PSA, total and free; Future  -     Urine culture  -     Urinalysis    Slow urinary stream    Nursing home resident    Follow-up in 6 months.    Harjit Watson NP

## 2019-08-19 NOTE — PATIENT INSTRUCTIONS
1. U/A and Urine cx (collect today in clinic).  2. PSA, total and free to be done this week (external order). Please fax lab results to [phone] if not done at [facility] [place] [facility] [place].   3. Continue finasteride 5 mg daily and tamsulosin 0.4 mg daily for BPH  4. Follow-up in 6 months.

## 2019-08-21 LAB — BACTERIA UR CULT: ABNORMAL

## 2019-08-26 ENCOUNTER — TELEPHONE (OUTPATIENT)
Dept: UROLOGY | Facility: CLINIC | Age: 84
End: 2019-08-26

## 2019-08-26 DIAGNOSIS — N30.00 ACUTE CYSTITIS WITHOUT HEMATURIA: Primary | ICD-10-CM

## 2019-08-26 RX ORDER — CEPHALEXIN 500 MG/1
500 CAPSULE ORAL 4 TIMES DAILY
Qty: 28 CAPSULE | Refills: 0 | Status: SHIPPED | OUTPATIENT
Start: 2019-08-26 | End: 2019-09-02

## 2019-08-26 NOTE — TELEPHONE ENCOUNTER
----- Message from Harjit Watson NP sent at 8/26/2019  2:56 PM CDT -----  Please inform patient's nurse at [facility] that patient has a UTI. Script for Keflex sent to [facility] in [place]. Repeat urine cx after completion of antibiotic and fax results to clinic.

## 2020-02-19 ENCOUNTER — OFFICE VISIT (OUTPATIENT)
Dept: UROLOGY | Facility: CLINIC | Age: 85
End: 2020-02-19
Payer: MEDICARE

## 2020-02-19 VITALS
DIASTOLIC BLOOD PRESSURE: 74 MMHG | SYSTOLIC BLOOD PRESSURE: 116 MMHG | BODY MASS INDEX: 20.04 KG/M2 | TEMPERATURE: 98 F | HEART RATE: 62 BPM | RESPIRATION RATE: 17 BRPM | OXYGEN SATURATION: 96 % | WEIGHT: 140 LBS | HEIGHT: 70 IN

## 2020-02-19 DIAGNOSIS — R39.15 URINARY URGENCY: ICD-10-CM

## 2020-02-19 DIAGNOSIS — R39.198 SLOW URINARY STREAM: ICD-10-CM

## 2020-02-19 DIAGNOSIS — R35.0 URINARY FREQUENCY: ICD-10-CM

## 2020-02-19 DIAGNOSIS — R35.1 NOCTURIA: ICD-10-CM

## 2020-02-19 DIAGNOSIS — R30.0 DYSURIA: ICD-10-CM

## 2020-02-19 DIAGNOSIS — N40.1 BENIGN PROSTATIC HYPERPLASIA WITH URINARY HESITANCY: Primary | Chronic | ICD-10-CM

## 2020-02-19 DIAGNOSIS — R39.11 BENIGN PROSTATIC HYPERPLASIA WITH URINARY HESITANCY: Primary | Chronic | ICD-10-CM

## 2020-02-19 LAB
BILIRUB SERPL-MCNC: NORMAL MG/DL
BLOOD URINE, POC: NORMAL
COLOR, POC UA: YELLOW
GLUCOSE UR QL STRIP: NORMAL
KETONES UR QL STRIP: NORMAL
LEUKOCYTE ESTERASE URINE, POC: NORMAL
NITRITE, POC UA: NORMAL
PH, POC UA: 7
PROTEIN, POC: NORMAL
SPECIFIC GRAVITY, POC UA: 1.01
UROBILINOGEN, POC UA: 0.2

## 2020-02-19 PROCEDURE — 99213 OFFICE O/P EST LOW 20 MIN: CPT | Mod: S$PBB,,, | Performed by: NURSE PRACTITIONER

## 2020-02-19 PROCEDURE — 87077 CULTURE AEROBIC IDENTIFY: CPT

## 2020-02-19 PROCEDURE — 99999 PR PBB SHADOW E&M-EST. PATIENT-LVL IV: CPT | Mod: PBBFAC,,, | Performed by: NURSE PRACTITIONER

## 2020-02-19 PROCEDURE — 99214 OFFICE O/P EST MOD 30 MIN: CPT | Mod: PBBFAC,PO | Performed by: NURSE PRACTITIONER

## 2020-02-19 PROCEDURE — 87186 SC STD MICRODIL/AGAR DIL: CPT

## 2020-02-19 PROCEDURE — 87086 URINE CULTURE/COLONY COUNT: CPT

## 2020-02-19 PROCEDURE — 99999 PR PBB SHADOW E&M-EST. PATIENT-LVL IV: ICD-10-PCS | Mod: PBBFAC,,, | Performed by: NURSE PRACTITIONER

## 2020-02-19 PROCEDURE — 99213 PR OFFICE/OUTPT VISIT, EST, LEVL III, 20-29 MIN: ICD-10-PCS | Mod: S$PBB,,, | Performed by: NURSE PRACTITIONER

## 2020-02-19 PROCEDURE — 81002 URINALYSIS NONAUTO W/O SCOPE: CPT | Mod: PBBFAC,PO | Performed by: NURSE PRACTITIONER

## 2020-02-19 PROCEDURE — 87088 URINE BACTERIA CULTURE: CPT

## 2020-02-19 RX ORDER — PHENAZOPYRIDINE HYDROCHLORIDE 100 MG/1
100 TABLET, FILM COATED ORAL 3 TIMES DAILY PRN
Qty: 9 TABLET | Refills: 0 | Status: SHIPPED | OUTPATIENT
Start: 2020-02-19 | End: 2020-02-22

## 2020-02-19 NOTE — PROGRESS NOTES
Subjective:       Patient ID: Fidel Allan is a 89 y.o. male.    Chief Complaint: Urinary Frequency and Benign Prostatic Hypertrophy    Patient is a 89 yo WM who is here today for a BPH follow-up. He is here today with transportation. He is currently taking tamsulosin and finasteride for his BPH. Patient has c/o urinary frequency and urgency. Patient is a resident at [facility].     Benign Prostatic Hypertrophy   This is a chronic problem. The current episode started more than 1 year ago. The problem is unchanged. Irritative symptoms include nocturia, frequency, urgency, and dysuria. Obstructive symptoms include dribbling. Obstructive symptoms do not include incomplete emptying or straining. Pertinent negatives include no chills, genital pain, hematuria, hesitancy, nausea or vomiting. He is not sexually active. Nothing aggravates the symptoms. Past treatments include finasteride and tamsulosin. The treatment provided moderate relief.     Review of Systems   Constitutional: Negative for appetite change, chills, fatigue and fever.   Gastrointestinal: Negative for abdominal pain, constipation, diarrhea, nausea and vomiting.   Genitourinary: Positive for dysuria, frequency and urgency. Negative for decreased urine volume, difficulty urinating, discharge, flank pain, hematuria, penile swelling, scrotal swelling and testicular pain.   Musculoskeletal: Positive for back pain (chronic lower back pain).   Neurological: Positive for weakness. Negative for dizziness.   Psychiatric/Behavioral: Negative.        Objective:      Physical Exam   Constitutional: He is oriented to person, place, and time. He appears well-developed and well-nourished. No distress.   HENT:   Head: Normocephalic and atraumatic.   Eyes: Pupils are equal, round, and reactive to light. EOM are normal.   Neck: Normal range of motion.   Cardiovascular: Normal rate.   Pulmonary/Chest: Effort normal. No respiratory distress.   Currently on 2 liters  NC.   Abdominal: Soft. There is no tenderness.   Genitourinary:   Genitourinary Comments: PVR = 32 mL   Musculoskeletal: Normal range of motion. He exhibits no edema.   Currently in wheelchair.    Neurological: He is alert and oriented to person, place, and time. Coordination normal.   Skin: Skin is warm and dry.   Psychiatric: He has a normal mood and affect. His behavior is normal. Judgment and thought content normal.   Nursing note and vitals reviewed.      Assessment:       1. Benign prostatic hyperplasia with urinary hesitancy    2. Urinary frequency    3. Urinary urgency    4. Dysuria    5. Nocturia    6. Slow urinary stream        Plan:       Fidel was seen today for urinary frequency and urinary tract infection.    Diagnoses and all orders for this visit:    Benign prostatic hyperplasia with urinary hesitancy  -     POCT URINE DIPSTICK WITHOUT MICROSCOPE  -     Urine culture    Urinary frequency  -     POCT URINE DIPSTICK WITHOUT MICROSCOPE  -     Urine culture    Urinary urgency  -     POCT URINE DIPSTICK WITHOUT MICROSCOPE  -     Urine culture    Dysuria  -     POCT URINE DIPSTICK WITHOUT MICROSCOPE  -     Urine culture  -     phenazopyridine (PYRIDIUM) 100 MG tablet; Take 1 tablet (100 mg total) by mouth 3 (three) times daily as needed for Pain.    Nocturia  -     POCT URINE DIPSTICK WITHOUT MICROSCOPE  -     Urine culture    Slow urinary stream  -     POCT URINE DIPSTICK WITHOUT MICROSCOPE  -     Urine culture    Other orders  1. Bladder scan (PVR) performed today in clinic.  2. Urine dipstick and Urine cx (collected today in clinic).  3. May take Pyridium as prescribed for pain with urination (script sent to [facility]).   4. Continue finasteride 5 mg daily and tamsulosin 0.4 mg daily for BPH    Follow-up in 6 months.    Harjit Watson NP

## 2020-02-19 NOTE — PATIENT INSTRUCTIONS
1. Bladder scan (PVR) performed today in clinic.  2. Urine dipstick and Urine cx (collected today in clinic).  3. Take Pyridium as prescribed for pain with urination (script sent to [facility]).   4. Continue finasteride 5 mg daily and tamsulosin 0.4 mg daily for BPH.  5. Follow-up in 6 months.

## 2020-02-21 LAB — BACTERIA UR CULT: ABNORMAL

## 2020-03-03 ENCOUNTER — TELEPHONE (OUTPATIENT)
Dept: UROLOGY | Facility: CLINIC | Age: 85
End: 2020-03-03

## 2020-03-03 DIAGNOSIS — N39.0 RECURRENT UTI (URINARY TRACT INFECTION): ICD-10-CM

## 2020-03-03 DIAGNOSIS — N30.00 ACUTE CYSTITIS WITHOUT HEMATURIA: Primary | ICD-10-CM

## 2020-03-03 NOTE — PROGRESS NOTES
Patient has a UTI that is resistant to oral antibiotics. Patient needs IV antibiotic therapy at [facility] infusion clinic. Paper script written for Tobramycin in 0.9% NaCL 120 mg/100 mL IVPB infused over 60 minutes.    Harjit Watson, NP

## 2020-03-03 NOTE — TELEPHONE ENCOUNTER
----- Message from Harjit Watson NP sent at 3/3/2020 10:37 AM CST -----  Patient has a UTI that is resistant to oral antibiotics. Patient needs IV antibiotic therapy at [facility] infusion clinic. Paper script written for Tobramycin in 0.9% NaCL 120 mg/100 mL IVPB infused over 60 minutes.

## 2020-03-04 PROBLEM — N30.00 ACUTE CYSTITIS WITHOUT HEMATURIA: Status: ACTIVE | Noted: 2020-03-04

## 2020-03-04 PROBLEM — N39.0 RECURRENT UTI (URINARY TRACT INFECTION): Status: ACTIVE | Noted: 2020-03-04

## 2020-07-26 PROBLEM — A41.9 SEPSIS DUE TO PNEUMONIA: Status: ACTIVE | Noted: 2020-07-26

## 2020-07-26 PROBLEM — J18.9 SEPSIS DUE TO PNEUMONIA: Status: ACTIVE | Noted: 2020-07-26

## 2020-07-26 PROBLEM — J18.9 PNEUMONIA OF LOWER LOBE DUE TO INFECTIOUS ORGANISM: Status: ACTIVE | Noted: 2020-07-26

## 2020-07-26 PROBLEM — R50.9 FEVER: Status: ACTIVE | Noted: 2020-07-26

## 2020-07-29 PROBLEM — R50.9 FEVER: Status: RESOLVED | Noted: 2020-07-26 | Resolved: 2020-07-29

## 2020-07-29 PROBLEM — N30.00 ACUTE CYSTITIS WITHOUT HEMATURIA: Status: RESOLVED | Noted: 2020-03-04 | Resolved: 2020-07-29

## 2020-07-29 PROBLEM — J18.9 SEPSIS DUE TO PNEUMONIA: Status: RESOLVED | Noted: 2020-07-26 | Resolved: 2020-07-29

## 2020-07-29 PROBLEM — A41.9 SEPSIS DUE TO PNEUMONIA: Status: RESOLVED | Noted: 2020-07-26 | Resolved: 2020-07-29

## 2020-08-19 ENCOUNTER — OFFICE VISIT (OUTPATIENT)
Dept: UROLOGY | Facility: CLINIC | Age: 85
End: 2020-08-19
Payer: MEDICARE

## 2020-08-19 VITALS
BODY MASS INDEX: 20.04 KG/M2 | SYSTOLIC BLOOD PRESSURE: 118 MMHG | WEIGHT: 140 LBS | OXYGEN SATURATION: 97 % | RESPIRATION RATE: 17 BRPM | TEMPERATURE: 98 F | DIASTOLIC BLOOD PRESSURE: 62 MMHG | HEART RATE: 71 BPM | HEIGHT: 70 IN

## 2020-08-19 DIAGNOSIS — N39.41 URGE URINARY INCONTINENCE: ICD-10-CM

## 2020-08-19 DIAGNOSIS — R39.15 URINARY URGENCY: ICD-10-CM

## 2020-08-19 DIAGNOSIS — N40.1 BPH WITH OBSTRUCTION/LOWER URINARY TRACT SYMPTOMS: ICD-10-CM

## 2020-08-19 DIAGNOSIS — R32 ENURESIS: ICD-10-CM

## 2020-08-19 DIAGNOSIS — N13.8 BPH WITH OBSTRUCTION/LOWER URINARY TRACT SYMPTOMS: ICD-10-CM

## 2020-08-19 DIAGNOSIS — R35.0 URINARY FREQUENCY: Primary | ICD-10-CM

## 2020-08-19 LAB
BILIRUB SERPL-MCNC: NORMAL MG/DL
BLOOD URINE, POC: NORMAL
CLARITY, POC UA: CLEAR
COLOR, POC UA: YELLOW
GLUCOSE UR QL STRIP: NORMAL
KETONES UR QL STRIP: NORMAL
LEUKOCYTE ESTERASE URINE, POC: NORMAL
NITRITE, POC UA: NORMAL
PH, POC UA: 7
POC RESIDUAL URINE VOLUME: 0 ML (ref 0–100)
PROTEIN, POC: NORMAL
SPECIFIC GRAVITY, POC UA: 1.02
UROBILINOGEN, POC UA: 0.2

## 2020-08-19 PROCEDURE — 99214 OFFICE O/P EST MOD 30 MIN: CPT | Mod: PBBFAC,PO,25 | Performed by: NURSE PRACTITIONER

## 2020-08-19 PROCEDURE — 99213 OFFICE O/P EST LOW 20 MIN: CPT | Mod: S$PBB,,, | Performed by: NURSE PRACTITIONER

## 2020-08-19 PROCEDURE — 99999 PR PBB SHADOW E&M-EST. PATIENT-LVL IV: ICD-10-PCS | Mod: PBBFAC,,, | Performed by: NURSE PRACTITIONER

## 2020-08-19 PROCEDURE — 81002 URINALYSIS NONAUTO W/O SCOPE: CPT | Mod: PBBFAC,PO | Performed by: NURSE PRACTITIONER

## 2020-08-19 PROCEDURE — 99213 PR OFFICE/OUTPT VISIT, EST, LEVL III, 20-29 MIN: ICD-10-PCS | Mod: S$PBB,,, | Performed by: NURSE PRACTITIONER

## 2020-08-19 PROCEDURE — 87086 URINE CULTURE/COLONY COUNT: CPT

## 2020-08-19 PROCEDURE — 99999 PR PBB SHADOW E&M-EST. PATIENT-LVL IV: CPT | Mod: PBBFAC,,, | Performed by: NURSE PRACTITIONER

## 2020-08-19 PROCEDURE — 51798 US URINE CAPACITY MEASURE: CPT | Mod: PBBFAC,PO | Performed by: NURSE PRACTITIONER

## 2020-08-19 RX ORDER — FINASTERIDE 5 MG/1
5 TABLET, FILM COATED ORAL DAILY
Qty: 30 TABLET | Refills: 11 | Status: SHIPPED | OUTPATIENT
Start: 2020-08-19 | End: 2021-06-17 | Stop reason: SDUPTHER

## 2020-08-19 NOTE — PATIENT INSTRUCTIONS
1. U/A & Urine cx (clinic collect)  2. Continue finasteride 5 mg daily for BPH. REFILLED  3. Discontinue tamsulosin 0.4 mg at this time.  4. Bladder scan (PVR)  5. Follow-up in 4 weeks.

## 2020-08-19 NOTE — PROGRESS NOTES
Subjective:       Patient ID: Fidel Allan is a 89 y.o. male.    Chief Complaint: Urinary Frequency, Other (urinary leakage), and Benign Prostatic Hypertrophy    Patient is a 89 yo WM who is here today for a BPH follow-up. He is here today with transportation. He is currently taking tamsulosin 0.4 mg daily and finasteride 5 mg daily for his BPH. Patient has c/o urinary frequency, urgency, and urinary incontinence. Patient reports he is urinating every 15 minutes. Patient is a resident at [facility].     Urinary Frequency   This is a chronic problem. Episode onset: 6 months ago. The problem has been gradually worsening. The pain is at a severity of 0/10. The patient is experiencing no pain. There has been no fever. He is not sexually active. There is no history of pyelonephritis. Associated symptoms include frequency and urgency. Pertinent negatives include no behavior changes, chills, discharge, flank pain, hematuria, hesitancy, nausea, sweats, vomiting, weight loss, bubble bath use, constipation, rash or withholding. He has tried nothing for the symptoms. His past medical history is significant for catheterization, hypertension, recurrent UTIs and a urological procedure. There is no history of diabetes mellitus, kidney stones or a single kidney.   Benign Prostatic Hypertrophy  This is a chronic problem. The current episode started more than 1 year ago. The problem has been gradually worsening since onset. Irritative symptoms include frequency and urgency. Obstructive symptoms include dribbling and a weak stream. Obstructive symptoms do not include incomplete emptying or straining. Pertinent negatives include no chills, dysuria, genital pain, hematuria, hesitancy, nausea or vomiting. He is not sexually active. Nothing aggravates the symptoms. Past treatments include finasteride and tamsulosin. The treatment provided mild relief.     Review of Systems   Constitutional: Negative for appetite change, chills  and weight loss.   Respiratory: Positive for shortness of breath.    Gastrointestinal: Negative for constipation, diarrhea, nausea and vomiting.   Genitourinary: Positive for bladder incontinence, enuresis, frequency and urgency. Negative for decreased urine volume, difficulty urinating, discharge, dysuria, flank pain, hematuria, hesitancy, incomplete emptying, penile pain, penile swelling, scrotal swelling and testicular pain.   Musculoskeletal: Negative.    Integumentary:  Negative for rash.   Psychiatric/Behavioral: Negative.          Objective:      Physical Exam  Vitals signs and nursing note reviewed.   Constitutional:       General: He is not in acute distress.     Appearance: He is well-developed. He is not ill-appearing.   HENT:      Head: Normocephalic and atraumatic.   Eyes:      Pupils: Pupils are equal, round, and reactive to light.   Neck:      Musculoskeletal: Normal range of motion.   Cardiovascular:      Rate and Rhythm: Normal rate and regular rhythm.   Pulmonary:      Effort: Pulmonary effort is normal. No respiratory distress.      Comments: Currently on NC oxygen.  Abdominal:      Palpations: Abdomen is soft.      Tenderness: There is no abdominal tenderness.   Genitourinary:     Comments: PVR = 0 mL  Musculoskeletal:      Comments: Currently in wheelchair.    Skin:     General: Skin is warm and dry.   Neurological:      Mental Status: He is alert and oriented to person, place, and time.   Psychiatric:         Mood and Affect: Mood normal.         Behavior: Behavior normal.         Thought Content: Thought content normal.         Judgment: Judgment normal.         Assessment:       1. Urinary frequency    2. Urinary urgency    3. Urge urinary incontinence    4. Enuresis    5. BPH with obstruction/lower urinary tract symptoms        Plan:       Fidel was seen today for urinary frequency, other, benign prostatic hypertrophy and cancer.    Diagnoses and all orders for this visit:    Urinary  frequency  -     Urine culture  -     POCT URINE DIPSTICK WITHOUT MICROSCOPE    Urinary urgency  -     Urine culture  -     POCT URINE DIPSTICK WITHOUT MICROSCOPE    Urge urinary incontinence  -     Urine culture  -     POCT URINE DIPSTICK WITHOUT MICROSCOPE    Enuresis  -     Urine culture  -     POCT URINE DIPSTICK WITHOUT MICROSCOPE    BPH with obstruction/lower urinary tract symptoms  -     Urine culture  -     finasteride (PROSCAR) 5 mg tablet; Take 1 tablet (5 mg total) by mouth once daily.  -     POCT URINE DIPSTICK WITHOUT MICROSCOPE    Other orders  1. Continue finasteride 5 mg daily for BPH.   2. Discontinue tamsulosin 0.4 mg at this time.  3. If urine cx is normal, start trial of OAB medication.    Follow-up in 4 weeks.     Harjit Watson NP

## 2020-08-20 LAB
BACTERIA UR CULT: NORMAL
BACTERIA UR CULT: NORMAL

## 2020-08-21 ENCOUNTER — TELEPHONE (OUTPATIENT)
Dept: UROLOGY | Facility: CLINIC | Age: 85
End: 2020-08-21

## 2020-08-21 NOTE — TELEPHONE ENCOUNTER
Called Nurse at [facility] to give order for In and Out Cath and to fax the results.

## 2020-08-21 NOTE — TELEPHONE ENCOUNTER
----- Message from Harjit Watson NP sent at 8/21/2020  8:44 AM CDT -----  Patient had a contaminated urine sample. Please have patient's nurse at [facility] collect another urine specimen today (I&O cath urine) and send it off for urine cx. Please ask nursing home to fax us the results once they receive it. Put a reminder in the computer for next week (Thursday) so we can follow back up with the nursing home about this matter. Thanks.

## 2020-09-16 ENCOUNTER — OFFICE VISIT (OUTPATIENT)
Dept: UROLOGY | Facility: CLINIC | Age: 85
End: 2020-09-16
Payer: MEDICARE

## 2020-09-16 VITALS
SYSTOLIC BLOOD PRESSURE: 115 MMHG | TEMPERATURE: 98 F | OXYGEN SATURATION: 97 % | RESPIRATION RATE: 17 BRPM | DIASTOLIC BLOOD PRESSURE: 62 MMHG | HEIGHT: 70 IN | WEIGHT: 140 LBS | BODY MASS INDEX: 20.04 KG/M2 | HEART RATE: 77 BPM

## 2020-09-16 DIAGNOSIS — R32 URINARY INCONTINENCE, UNSPECIFIED TYPE: ICD-10-CM

## 2020-09-16 DIAGNOSIS — R39.15 URINARY URGENCY: ICD-10-CM

## 2020-09-16 DIAGNOSIS — R35.0 URINARY FREQUENCY: Primary | ICD-10-CM

## 2020-09-16 LAB
BACTERIA #/AREA URNS AUTO: ABNORMAL /HPF
BILIRUB UR QL STRIP: NEGATIVE
CLARITY UR REFRACT.AUTO: ABNORMAL
COLOR UR AUTO: YELLOW
GLUCOSE UR QL STRIP: NEGATIVE
HGB UR QL STRIP: NEGATIVE
KETONES UR QL STRIP: NEGATIVE
LEUKOCYTE ESTERASE UR QL STRIP: ABNORMAL
MICROSCOPIC COMMENT: ABNORMAL
NITRITE UR QL STRIP: NEGATIVE
PH UR STRIP: 5 [PH] (ref 5–8)
PROT UR QL STRIP: NEGATIVE
RBC #/AREA URNS AUTO: 2 /HPF (ref 0–4)
SP GR UR STRIP: 1.02 (ref 1–1.03)
URN SPEC COLLECT METH UR: ABNORMAL
WBC #/AREA URNS AUTO: 8 /HPF (ref 0–5)

## 2020-09-16 PROCEDURE — 99214 OFFICE O/P EST MOD 30 MIN: CPT | Mod: PBBFAC,PO | Performed by: NURSE PRACTITIONER

## 2020-09-16 PROCEDURE — 99999 PR PBB SHADOW E&M-EST. PATIENT-LVL IV: ICD-10-PCS | Mod: PBBFAC,,, | Performed by: NURSE PRACTITIONER

## 2020-09-16 PROCEDURE — 87086 URINE CULTURE/COLONY COUNT: CPT

## 2020-09-16 PROCEDURE — 99213 OFFICE O/P EST LOW 20 MIN: CPT | Mod: S$PBB,,, | Performed by: NURSE PRACTITIONER

## 2020-09-16 PROCEDURE — 99999 PR PBB SHADOW E&M-EST. PATIENT-LVL IV: CPT | Mod: PBBFAC,,, | Performed by: NURSE PRACTITIONER

## 2020-09-16 PROCEDURE — 81001 URINALYSIS AUTO W/SCOPE: CPT

## 2020-09-16 PROCEDURE — 99213 PR OFFICE/OUTPT VISIT, EST, LEVL III, 20-29 MIN: ICD-10-PCS | Mod: S$PBB,,, | Performed by: NURSE PRACTITIONER

## 2020-09-16 NOTE — PROGRESS NOTES
Subjective:       Patient ID: Fidel Allan is a 89 y.o. male.    Chief Complaint: Urinary Urgency and Urinary Frequency    Patient is here today with c/o urinary frequency and urgency. He thinks he may have a UTI. Patient is a resident at [facility] and is here today with his .     Urinary Frequency   This is a chronic problem. The current episode started more than 1 month ago. The problem has been unchanged. The pain is at a severity of 0/10. The patient is experiencing no pain. There has been no fever. He is not sexually active. There is no history of pyelonephritis. Associated symptoms include frequency and urgency. Pertinent negatives include no behavior changes, chills, flank pain, hematuria, hesitancy, nausea, sweats, vomiting, bubble bath use, constipation or withholding. He has tried nothing for the symptoms. His past medical history is significant for hypertension and recurrent UTIs. There is no history of diabetes insipidus or kidney stones.     Review of Systems   Constitutional: Negative for appetite change, chills and fever.   Gastrointestinal: Negative for abdominal pain, change in bowel habit, constipation, diarrhea, nausea, vomiting and change in bowel habit.   Genitourinary: Positive for bladder incontinence, frequency and urgency. Negative for decreased urine volume, difficulty urinating, discharge, dysuria, flank pain, hematuria, hesitancy, penile pain, penile swelling, scrotal swelling and testicular pain.   Neurological: Positive for weakness. Negative for dizziness and headaches.   Psychiatric/Behavioral: Negative.          Objective:      Physical Exam  Vitals signs and nursing note reviewed.   Constitutional:       General: He is not in acute distress.     Appearance: He is well-developed. He is not ill-appearing.   HENT:      Head: Normocephalic and atraumatic.   Eyes:      Pupils: Pupils are equal, round, and reactive to light.   Neck:      Musculoskeletal:  Normal range of motion.   Cardiovascular:      Rate and Rhythm: Normal rate.   Pulmonary:      Effort: Pulmonary effort is normal. No respiratory distress.   Abdominal:      Palpations: Abdomen is soft.      Tenderness: There is no abdominal tenderness.   Musculoskeletal: Normal range of motion.      Comments: Currently in wheelchair.    Skin:     General: Skin is warm and dry.   Neurological:      Mental Status: He is alert and oriented to person, place, and time.      Coordination: Coordination normal.   Psychiatric:         Mood and Affect: Mood normal.         Behavior: Behavior normal.         Thought Content: Thought content normal.         Judgment: Judgment normal.         Assessment:       1. Urinary frequency    2. Urinary urgency    3. Urinary incontinence, unspecified type        Plan:       Fidel was seen today for urinary urgency and urinary frequency.    Diagnoses and all orders for this visit:    Urinary frequency  -     Urine culture  -     Urinalysis    Urinary urgency  -     Urine culture  -     Urinalysis    Urinary incontinence, unspecified type  -     Urine culture  -     Urinalysis    Follow-up pending urine cx results.    NOTE: Patient requested a long-term catheter at this time, but discouraged. May consider it in the future.     Harjit Watson NP

## 2020-09-17 LAB — BACTERIA UR CULT: NO GROWTH

## 2020-09-24 ENCOUNTER — TELEPHONE (OUTPATIENT)
Dept: UROLOGY | Facility: CLINIC | Age: 85
End: 2020-09-24

## 2020-09-24 NOTE — TELEPHONE ENCOUNTER
Spoke to  at [facility] -- stated she would fwd my msg to patients nurse so they will fax his medication list

## 2020-09-25 ENCOUNTER — TELEPHONE (OUTPATIENT)
Dept: UROLOGY | Facility: CLINIC | Age: 85
End: 2020-09-25

## 2020-09-25 DIAGNOSIS — R32 ENURESIS: ICD-10-CM

## 2020-09-25 DIAGNOSIS — N32.81 OAB (OVERACTIVE BLADDER): Primary | ICD-10-CM

## 2020-09-25 DIAGNOSIS — R35.0 URINARY FREQUENCY: ICD-10-CM

## 2020-09-25 DIAGNOSIS — R39.15 URINARY URGENCY: ICD-10-CM

## 2020-09-25 RX ORDER — SOLIFENACIN SUCCINATE 5 MG/1
5 TABLET, FILM COATED ORAL DAILY
Qty: 30 TABLET | Refills: 11 | Status: SHIPPED | OUTPATIENT
Start: 2020-09-25 | End: 2021-06-17

## 2020-09-25 NOTE — TELEPHONE ENCOUNTER
----- Message from Harjit Watson NP sent at 9/25/2020  3:53 PM CDT -----  Please inform patient's nurse at [facility] that I sent a new script for the generic of Vesicare to [facility] to treat patient's overactive bladder. Patient to f/u in 3 months and as needed. Please schedule patient an appt. Thanks.

## 2020-12-17 ENCOUNTER — OFFICE VISIT (OUTPATIENT)
Dept: UROLOGY | Facility: CLINIC | Age: 85
End: 2020-12-17
Payer: MEDICARE

## 2020-12-17 VITALS
DIASTOLIC BLOOD PRESSURE: 62 MMHG | TEMPERATURE: 98 F | OXYGEN SATURATION: 92 % | RESPIRATION RATE: 20 BRPM | HEIGHT: 70 IN | WEIGHT: 141.81 LBS | BODY MASS INDEX: 20.3 KG/M2 | SYSTOLIC BLOOD PRESSURE: 102 MMHG | HEART RATE: 60 BPM

## 2020-12-17 DIAGNOSIS — N13.8 BPH WITH OBSTRUCTION/LOWER URINARY TRACT SYMPTOMS: Primary | ICD-10-CM

## 2020-12-17 DIAGNOSIS — R35.1 NOCTURIA: ICD-10-CM

## 2020-12-17 DIAGNOSIS — R39.15 URINARY URGENCY: ICD-10-CM

## 2020-12-17 DIAGNOSIS — N40.1 BPH WITH OBSTRUCTION/LOWER URINARY TRACT SYMPTOMS: Primary | ICD-10-CM

## 2020-12-17 DIAGNOSIS — R35.0 URINARY FREQUENCY: ICD-10-CM

## 2020-12-17 LAB
BACTERIA #/AREA URNS AUTO: ABNORMAL /HPF
BILIRUB UR QL STRIP: NEGATIVE
CLARITY UR REFRACT.AUTO: ABNORMAL
COLOR UR AUTO: ABNORMAL
GLUCOSE UR QL STRIP: NEGATIVE
HGB UR QL STRIP: NEGATIVE
HYALINE CASTS UR QL AUTO: 5 /LPF
KETONES UR QL STRIP: NEGATIVE
LEUKOCYTE ESTERASE UR QL STRIP: ABNORMAL
MICROSCOPIC COMMENT: ABNORMAL
NITRITE UR QL STRIP: NEGATIVE
PH UR STRIP: 5 [PH] (ref 5–8)
PROT UR QL STRIP: NEGATIVE
RBC #/AREA URNS AUTO: 2 /HPF (ref 0–4)
SP GR UR STRIP: 1.03 (ref 1–1.03)
SQUAMOUS #/AREA URNS AUTO: 0 /HPF
URN SPEC COLLECT METH UR: ABNORMAL
WBC #/AREA URNS AUTO: 21 /HPF (ref 0–5)

## 2020-12-17 PROCEDURE — 81001 URINALYSIS AUTO W/SCOPE: CPT

## 2020-12-17 PROCEDURE — 87086 URINE CULTURE/COLONY COUNT: CPT

## 2020-12-17 PROCEDURE — 99213 PR OFFICE/OUTPT VISIT, EST, LEVL III, 20-29 MIN: ICD-10-PCS | Mod: S$PBB,,, | Performed by: NURSE PRACTITIONER

## 2020-12-17 PROCEDURE — 99215 OFFICE O/P EST HI 40 MIN: CPT | Mod: PBBFAC,PO | Performed by: NURSE PRACTITIONER

## 2020-12-17 PROCEDURE — 99999 PR PBB SHADOW E&M-EST. PATIENT-LVL V: ICD-10-PCS | Mod: PBBFAC,,, | Performed by: NURSE PRACTITIONER

## 2020-12-17 PROCEDURE — 99213 OFFICE O/P EST LOW 20 MIN: CPT | Mod: S$PBB,,, | Performed by: NURSE PRACTITIONER

## 2020-12-17 PROCEDURE — 99999 PR PBB SHADOW E&M-EST. PATIENT-LVL V: CPT | Mod: PBBFAC,,, | Performed by: NURSE PRACTITIONER

## 2020-12-17 NOTE — PROGRESS NOTES
Subjective:       Patient ID: Fidel Allan is a 89 y.o. male.    Chief Complaint: Follow-up (OAB)    Patient is here today for a 3 month follow-up for OAB since starting Vesicare 5 mg daily for that issue. He reports improvement in his urinary symptoms. Patient is here today with  (Mr. Giang). Patient is a resident at [facility].     Follow-up  This is a chronic (OAB) problem. The current episode started more than 1 year ago. The problem occurs daily. The problem has been gradually improving. Associated symptoms include arthralgias, urinary symptoms and weakness. Pertinent negatives include no abdominal pain, change in bowel habit, chills, fatigue, fever, headaches, nausea, swollen glands or vomiting. Nothing aggravates the symptoms. Treatments tried: Vesicare 5 mg daily. The treatment provided moderate relief.     Review of Systems   Constitutional: Negative for chills, fatigue and fever.   Gastrointestinal: Negative for abdominal pain, change in bowel habit, constipation, diarrhea, nausea, vomiting and change in bowel habit.   Genitourinary: Positive for bladder incontinence, frequency and urgency. Negative for decreased urine volume, difficulty urinating, discharge, dysuria, flank pain, hematuria, penile pain, penile swelling, scrotal swelling and testicular pain.   Musculoskeletal: Positive for arthralgias.   Neurological: Positive for weakness. Negative for dizziness and headaches.   Psychiatric/Behavioral: Negative.          Objective:      Physical Exam  Vitals signs and nursing note reviewed.   Constitutional:       General: He is not in acute distress.     Appearance: He is well-developed. He is not ill-appearing.   HENT:      Head: Normocephalic and atraumatic.   Eyes:      Pupils: Pupils are equal, round, and reactive to light.   Neck:      Musculoskeletal: Normal range of motion.   Cardiovascular:      Rate and Rhythm: Normal rate.   Pulmonary:      Effort: Pulmonary effort  is normal. No respiratory distress.   Abdominal:      Palpations: Abdomen is soft.      Tenderness: There is no abdominal tenderness.   Musculoskeletal: Normal range of motion.      Comments: Currently in wheelchair.    Skin:     General: Skin is warm and dry.   Neurological:      Mental Status: He is alert and oriented to person, place, and time.      Coordination: Coordination normal.   Psychiatric:         Mood and Affect: Mood normal.         Behavior: Behavior normal.         Thought Content: Thought content normal.         Judgment: Judgment normal.         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 Urinary frequency    3. Urinary urgency    4. Nocturia        Plan:       Fidel was seen today for follow-up.    Diagnoses and all orders for this visit:    BPH with obstruction/lower urinary tract symptoms  -     Urine culture  -     Urinalysis    Urinary frequency  -     Urine culture  -     Urinalysis    Urinary urgency  -     Urine culture  -     Urinalysis    Nocturia  -     Urine culture  -     Urinalysis    Other orders  1. Continue taking finasteride 5 mg PO daily for BPH.  2. Continue taking Vesicare 5 mg PO daily for OAB.    Follow-up in 6 months and as needed.    Harjit Watson NP

## 2020-12-17 NOTE — PATIENT INSTRUCTIONS
1. U/A & urine cx  2. Continue taking finasteride 5 mg PO daily for BPH.  3. Continue taking Vesicare 5 mg PO daily for OAB.  4. Follow-up in 6 months and as needed.

## 2020-12-19 LAB — BACTERIA UR CULT: NORMAL

## 2021-06-17 ENCOUNTER — OFFICE VISIT (OUTPATIENT)
Dept: UROLOGY | Facility: CLINIC | Age: 86
End: 2021-06-17
Payer: MEDICARE

## 2021-06-17 VITALS
HEART RATE: 64 BPM | BODY MASS INDEX: 20.35 KG/M2 | HEIGHT: 70 IN | DIASTOLIC BLOOD PRESSURE: 67 MMHG | OXYGEN SATURATION: 97 % | SYSTOLIC BLOOD PRESSURE: 136 MMHG

## 2021-06-17 DIAGNOSIS — N13.8 BPH WITH OBSTRUCTION/LOWER URINARY TRACT SYMPTOMS: Primary | ICD-10-CM

## 2021-06-17 DIAGNOSIS — N40.1 BPH WITH OBSTRUCTION/LOWER URINARY TRACT SYMPTOMS: Primary | ICD-10-CM

## 2021-06-17 DIAGNOSIS — R35.1 NOCTURIA: ICD-10-CM

## 2021-06-17 DIAGNOSIS — R35.0 URINARY FREQUENCY: ICD-10-CM

## 2021-06-17 DIAGNOSIS — R39.15 URINARY URGENCY: ICD-10-CM

## 2021-06-17 DIAGNOSIS — R32 ENURESIS: ICD-10-CM

## 2021-06-17 PROCEDURE — 99999 PR PBB SHADOW E&M-EST. PATIENT-LVL III: ICD-10-PCS | Mod: PBBFAC,,, | Performed by: NURSE PRACTITIONER

## 2021-06-17 PROCEDURE — 99999 PR PBB SHADOW E&M-EST. PATIENT-LVL III: CPT | Mod: PBBFAC,,, | Performed by: NURSE PRACTITIONER

## 2021-06-17 PROCEDURE — 99213 PR OFFICE/OUTPT VISIT, EST, LEVL III, 20-29 MIN: ICD-10-PCS | Mod: S$PBB,,, | Performed by: NURSE PRACTITIONER

## 2021-06-17 PROCEDURE — 99213 OFFICE O/P EST LOW 20 MIN: CPT | Mod: PBBFAC,PO | Performed by: NURSE PRACTITIONER

## 2021-06-17 PROCEDURE — 99213 OFFICE O/P EST LOW 20 MIN: CPT | Mod: S$PBB,,, | Performed by: NURSE PRACTITIONER

## 2021-06-17 RX ORDER — OXYBUTYNIN CHLORIDE 5 MG/1
5 TABLET ORAL DAILY
COMMUNITY
Start: 2021-06-15

## 2021-06-17 RX ORDER — FINASTERIDE 5 MG/1
5 TABLET, FILM COATED ORAL DAILY
Qty: 30 TABLET | Refills: 11 | Status: SHIPPED | OUTPATIENT
Start: 2021-06-17 | End: 2022-06-17

## 2021-06-17 RX ORDER — BENZONATATE 100 MG/1
100 CAPSULE ORAL 3 TIMES DAILY PRN
COMMUNITY

## 2021-12-15 ENCOUNTER — TELEPHONE (OUTPATIENT)
Dept: UROLOGY | Facility: CLINIC | Age: 86
End: 2021-12-15
Payer: MEDICARE

## 2024-04-08 NOTE — PROGRESS NOTES
received follow up phone call from daughter in law, Debbie [phone].  She was informed that patient is ready for readmission to [facility] today.  Daughter in law agreed with plan.   No

## 2024-06-28 NOTE — ASSESSMENT & PLAN NOTE
Recommend she follow-up with the PA today or Urgent care over the weekend if the pain continues.    Stable; will continue his home regimen of buspirone and escitalopram.